# Patient Record
Sex: MALE | URBAN - METROPOLITAN AREA
[De-identification: names, ages, dates, MRNs, and addresses within clinical notes are randomized per-mention and may not be internally consistent; named-entity substitution may affect disease eponyms.]

---

## 2023-05-24 ENCOUNTER — EMERGENCY (EMERGENCY)
Facility: HOSPITAL | Age: 68
LOS: 1 days | Discharge: PSYCHIATRIC OR SUBSTANCE USE FACILITY | End: 2023-05-24
Attending: EMERGENCY MEDICINE | Admitting: EMERGENCY MEDICINE
Payer: MEDICARE

## 2023-05-24 VITALS
WEIGHT: 197.98 LBS | RESPIRATION RATE: 18 BRPM | HEIGHT: 71 IN | OXYGEN SATURATION: 97 % | DIASTOLIC BLOOD PRESSURE: 74 MMHG | HEART RATE: 77 BPM | TEMPERATURE: 98 F | SYSTOLIC BLOOD PRESSURE: 118 MMHG

## 2023-05-24 DIAGNOSIS — F25.9 SCHIZOAFFECTIVE DISORDER, UNSPECIFIED: ICD-10-CM

## 2023-05-24 DIAGNOSIS — F39 UNSPECIFIED MOOD [AFFECTIVE] DISORDER: ICD-10-CM

## 2023-05-24 LAB
ALBUMIN SERPL ELPH-MCNC: 3.7 G/DL — SIGNIFICANT CHANGE UP (ref 3.3–5)
ALP SERPL-CCNC: 108 U/L — SIGNIFICANT CHANGE UP (ref 30–120)
ALT FLD-CCNC: 19 U/L DA — SIGNIFICANT CHANGE UP (ref 10–60)
ANION GAP SERPL CALC-SCNC: 8 MMOL/L — SIGNIFICANT CHANGE UP (ref 5–17)
APAP SERPL-MCNC: <1 UG/ML — LOW (ref 10–30)
APPEARANCE UR: ABNORMAL
AST SERPL-CCNC: 13 U/L — SIGNIFICANT CHANGE UP (ref 10–40)
BASOPHILS # BLD AUTO: 0.01 K/UL — SIGNIFICANT CHANGE UP (ref 0–0.2)
BASOPHILS NFR BLD AUTO: 0.2 % — SIGNIFICANT CHANGE UP (ref 0–2)
BILIRUB SERPL-MCNC: 0.7 MG/DL — SIGNIFICANT CHANGE UP (ref 0.2–1.2)
BILIRUB UR-MCNC: NEGATIVE — SIGNIFICANT CHANGE UP
BUN SERPL-MCNC: 17 MG/DL — SIGNIFICANT CHANGE UP (ref 7–23)
CALCIUM SERPL-MCNC: 9.3 MG/DL — SIGNIFICANT CHANGE UP (ref 8.4–10.5)
CHLORIDE SERPL-SCNC: 106 MMOL/L — SIGNIFICANT CHANGE UP (ref 96–108)
CO2 SERPL-SCNC: 28 MMOL/L — SIGNIFICANT CHANGE UP (ref 22–31)
COLOR SPEC: SIGNIFICANT CHANGE UP
CREAT SERPL-MCNC: 0.94 MG/DL — SIGNIFICANT CHANGE UP (ref 0.5–1.3)
DIFF PNL FLD: NEGATIVE — SIGNIFICANT CHANGE UP
EGFR: 89 ML/MIN/1.73M2 — SIGNIFICANT CHANGE UP
EOSINOPHIL # BLD AUTO: 0.03 K/UL — SIGNIFICANT CHANGE UP (ref 0–0.5)
EOSINOPHIL NFR BLD AUTO: 0.5 % — SIGNIFICANT CHANGE UP (ref 0–6)
ETHANOL SERPL-MCNC: <3 MG/DL — SIGNIFICANT CHANGE UP (ref 0–3)
GLUCOSE SERPL-MCNC: 95 MG/DL — SIGNIFICANT CHANGE UP (ref 70–99)
GLUCOSE UR QL: NEGATIVE MG/DL — SIGNIFICANT CHANGE UP
HCT VFR BLD CALC: 38.4 % — LOW (ref 39–50)
HGB BLD-MCNC: 13.3 G/DL — SIGNIFICANT CHANGE UP (ref 13–17)
IMM GRANULOCYTES NFR BLD AUTO: 0.2 % — SIGNIFICANT CHANGE UP (ref 0–0.9)
KETONES UR-MCNC: 80 MG/DL
LEUKOCYTE ESTERASE UR-ACNC: ABNORMAL
LYMPHOCYTES # BLD AUTO: 1.05 K/UL — SIGNIFICANT CHANGE UP (ref 1–3.3)
LYMPHOCYTES # BLD AUTO: 18.7 % — SIGNIFICANT CHANGE UP (ref 13–44)
MCHC RBC-ENTMCNC: 31.1 PG — SIGNIFICANT CHANGE UP (ref 27–34)
MCHC RBC-ENTMCNC: 34.6 GM/DL — SIGNIFICANT CHANGE UP (ref 32–36)
MCV RBC AUTO: 89.7 FL — SIGNIFICANT CHANGE UP (ref 80–100)
MONOCYTES # BLD AUTO: 0.48 K/UL — SIGNIFICANT CHANGE UP (ref 0–0.9)
MONOCYTES NFR BLD AUTO: 8.6 % — SIGNIFICANT CHANGE UP (ref 2–14)
NEUTROPHILS # BLD AUTO: 4.03 K/UL — SIGNIFICANT CHANGE UP (ref 1.8–7.4)
NEUTROPHILS NFR BLD AUTO: 71.8 % — SIGNIFICANT CHANGE UP (ref 43–77)
NITRITE UR-MCNC: NEGATIVE — SIGNIFICANT CHANGE UP
NRBC # BLD: 0 /100 WBCS — SIGNIFICANT CHANGE UP (ref 0–0)
PCP SPEC-MCNC: SIGNIFICANT CHANGE UP
PH UR: 7.5 — SIGNIFICANT CHANGE UP (ref 5–8)
PLATELET # BLD AUTO: 219 K/UL — SIGNIFICANT CHANGE UP (ref 150–400)
POTASSIUM SERPL-MCNC: 3.7 MMOL/L — SIGNIFICANT CHANGE UP (ref 3.5–5.3)
POTASSIUM SERPL-SCNC: 3.7 MMOL/L — SIGNIFICANT CHANGE UP (ref 3.5–5.3)
PROT SERPL-MCNC: 6.8 G/DL — SIGNIFICANT CHANGE UP (ref 6–8.3)
PROT UR-MCNC: SIGNIFICANT CHANGE UP MG/DL
RBC # BLD: 4.28 M/UL — SIGNIFICANT CHANGE UP (ref 4.2–5.8)
RBC # FLD: 13.9 % — SIGNIFICANT CHANGE UP (ref 10.3–14.5)
SALICYLATES SERPL-MCNC: <0.2 MG/DL — LOW (ref 2.8–20)
SARS-COV-2 RNA SPEC QL NAA+PROBE: SIGNIFICANT CHANGE UP
SODIUM SERPL-SCNC: 142 MMOL/L — SIGNIFICANT CHANGE UP (ref 135–145)
SP GR SPEC: 1.02 — SIGNIFICANT CHANGE UP (ref 1–1.03)
UROBILINOGEN FLD QL: 2 MG/DL (ref 0.2–1)
WBC # BLD: 5.61 K/UL — SIGNIFICANT CHANGE UP (ref 3.8–10.5)
WBC # FLD AUTO: 5.61 K/UL — SIGNIFICANT CHANGE UP (ref 3.8–10.5)

## 2023-05-24 PROCEDURE — 90792 PSYCH DIAG EVAL W/MED SRVCS: CPT | Mod: 95,GC

## 2023-05-24 PROCEDURE — 70450 CT HEAD/BRAIN W/O DYE: CPT | Mod: 26,MA

## 2023-05-24 PROCEDURE — 93010 ELECTROCARDIOGRAM REPORT: CPT

## 2023-05-24 PROCEDURE — 99285 EMERGENCY DEPT VISIT HI MDM: CPT

## 2023-05-24 RX ORDER — CEFUROXIME AXETIL 250 MG
500 TABLET ORAL ONCE
Refills: 0 | Status: COMPLETED | OUTPATIENT
Start: 2023-05-24 | End: 2023-05-24

## 2023-05-24 RX ORDER — SODIUM CHLORIDE 9 MG/ML
1000 INJECTION INTRAMUSCULAR; INTRAVENOUS; SUBCUTANEOUS ONCE
Refills: 0 | Status: COMPLETED | OUTPATIENT
Start: 2023-05-24 | End: 2023-05-24

## 2023-05-24 RX ORDER — ACETAMINOPHEN 500 MG
2 TABLET ORAL
Refills: 0 | DISCHARGE

## 2023-05-24 RX ADMIN — Medication 500 MILLIGRAM(S): at 16:59

## 2023-05-24 RX ADMIN — SODIUM CHLORIDE 1000 MILLILITER(S): 9 INJECTION INTRAMUSCULAR; INTRAVENOUS; SUBCUTANEOUS at 14:30

## 2023-05-24 RX ADMIN — SODIUM CHLORIDE 1000 MILLILITER(S): 9 INJECTION INTRAMUSCULAR; INTRAVENOUS; SUBCUTANEOUS at 13:30

## 2023-05-24 NOTE — ED PROVIDER NOTE - PROGRESS NOTE DETAILS
Spoke to telepsych, will place on list for consult. Patient being seen by telepsych and is being evaluated for transfer to [facility] for admission there.  Patient is being treated for a UTI with cefuroxime 500 mg twice daily for 10 days.  Patient will need a walker for ambulation as he cannot have a cane at [facility].  Will need  to order walker as patient states he does not own 1.  Patient is medically cleared for transfer to psychiatric facility for admission there.

## 2023-05-24 NOTE — ED PROVIDER NOTE - ATTESTATION, MLM
EMS
I have reviewed and confirmed nurses' notes for patient's medications, allergies, medical history, and surgical history.

## 2023-05-24 NOTE — ED BEHAVIORAL HEALTH ASSESSMENT NOTE - HPI (INCLUDE ILLNESS QUALITY, SEVERITY, DURATION, TIMING, CONTEXT, MODIFYING FACTORS, ASSOCIATED SIGNS AND SYMPTOMS)
Patient is a 68 year-old male, single, domiciled at [facility] in [place], pmh htn, pph of schizoaffective d/o, BIBEMS from assisted living facility for worsening hallucinations and paranoia.     psychiatrist: Dr. Oral Irby    [facility] - Saritha, patient's sister, [phone]   Patient is on a ton of psych medication  Lives at Assisted Living   Mentlally ill his whole life, never took meds until __?  Patient is 1000x worse than before he went to AL  schizoaffective, bipolar  pt is extremely paranoid - thinks people are coming to get him, he did bad things, thinks smoke detectors are cameras to monitor to him  since Jan, lost over 50 lbs, won't eat or drink - food is very good but he doesn't eat bc insides are shaking  no fam hx of dementia  klonopin  quetiapine 300 mg   racing thoughts, delusions, paranoia, not eating x 5 months, no appetite, wringing hands, very nervous Patient is a 68 year-old male, single, domiciled at [facility] in [place], [facility] htn, mesothelioma, pph of schizoaffective d/o, depression, hx of suicide attempt, BIBEMS from assisted living facility for worsening hallucinations and paranoia.       Patient is alert and oriented to person, place, time and situation. States that he came to the ED due to feeling nervous and uptight, which he has been feeling a lot more lately.     Not eating - nervous. Sept. Nervous w/ the police. Court date coming up. States that he is nervous about going to residential. Feels like phone is tapped by facility, feels there are are cameras in his room, one right over his head. States that he feels safe but feels nervos because of the monitoring and thoughts. Started around Sept.     whole body shakes when nervous    only seen 3 times since Sept. /     feels groggy upon awakening and doesn't sleep well, decreased appetite, low energy, difficulty with concentration, hopeless/worthless, no suicidal ideation, homicidal ideation.     depression, hx of cutting wrist, [facility] recent admission - Sept last admission    alcohol - 1-2 times per week, hasn't used in a while  cocaine - hasn't done in 20 years  no cigs          Paranoia in the past - residential.    No AVH.       psychiatrist: Dr. Oral Irby, [phone]  Collateral - Saritha, patient's sister, [phone]   Patient is on a ton of psych medication  Lives at Assisted Living   Mentlally ill his whole life, never took meds until __?  Patient is 1000x worse than before he went to AL  schizoaffective, bipolar  pt is extremely paranoid - thinks people are coming to get him, he did bad things, thinks smoke detectors are cameras to monitor to him  since Jan, lost over 50 lbs, won't eat or drink - food is very good but he doesn't eat bc insides are shaking  no fam hx of dementia  klonopin  quetiapine 300 mg   racing thoughts, delusions, paranoia, not eating x 5 months, no appetite, wringing hands, very nervous Patient is a 68 year-old male, single, domiciled at [facility] in [place], [facility] htn, mesothelioma, pph of schizoaffective d/o, depression, hx of suicide attempt, BIBEMS from assisted living facility for worsening hallucinations and paranoia. Telepsychiatry consulted for mental health evaluation.    Patient is alert and oriented to person, place, time and situation. States that he came to the ED due to feeling nervous and uptight, which he has been feeling a lot more lately. Has noticed change since September 2022 when he arrived at his assisted living facility. Prior to going to assisted living facility, pt had an argument with his super who got an order of protection against him. A case date followed but case is now closed (per collateral); however, patient is very nervous that he will go to penitentiary due to having an upcoming court date (delusion).     Patient describes increased anxiety and feelings of paranoia, states he is nervous about the police, feels as if his phone is tapped by facility, feels there are cameras in his room including one right over his head. States that he feels safe but nervous that he is being monitored.     Patient describes feeling groggy upon awakening and doesn't sleep well, has had a decreased appetite (having lost about 50 lbs since Sept), has low energy, difficulty with concentration, feelings of hopeless/worthlessness, denies suicidal ideation, homicidal ideation. Denies AVH. Denies current substance use.    States that he has seen his psychiatrist 3 times since Sept and has not been able to express the severity of his sxs. Last IPP admission was in September at [facility].    psychiatrist: Dr. Oral Irby, [phone]  Collateral - Saritha, patient's sister, [phone]   argued with super at [employer] about feeding birds, got an order of protection, went to court in sept, paid fine, all done and over with. Bringing up court cases that are not true. Thinks that fire alarms are watchign him, constant racing thoughts. He is always wringing hands. lost 50 lbs.   Patient has struggled with mental health illness all his life. States his condition is getting much worse since January. Patient is extremely paranoid, thinks people are coming to get him, that he did bad things, thinks smoke detectors are cameras to monitor to him. Since Jan, lost over 50 lbs, won't eat or drink. Patient has racing thoughts, delusions, paranoia, not eating x 5 months, no appetite, wringing hands, very nervous Patient is a 68 year-old male, single, domiciled at [facility] in [place], [facility] htn, mesothelioma, pph of schizoaffective d/o, depression, hx of suicide attempt, BIBEMS from assisted living facility for worsening hallucinations and paranoia. Telepsychiatry consulted for mental health evaluation.    Patient is alert and oriented to person, place, time and situation. States that he came to the ED due to feeling nervous and uptight, which he has been feeling a lot more lately. Has noticed change since September 2022 when he arrived at his assisted living facility. Prior to going to assisted living facility, pt had an argument with his super who got an order of protection against him. A case date followed but case is now closed (per collateral); however, patient is very nervous that he will go to longterm due to having an upcoming court date (delusion).     Patient describes increased anxiety and feelings of paranoia, states he is nervous about the police, feels as if his phone is tapped by facility, feels there are cameras in his room including one right over his head. States that he feels safe but nervous that he is being monitored.     Patient describes feeling groggy upon awakening and doesn't sleep well, has had a decreased appetite (having lost about 50 lbs since Sept), has low energy, difficulty with concentration, feelings of hopeless/worthlessness, denies suicidal ideation, homicidal ideation. Denies AVH. Denies current substance use.    States that he has seen his psychiatrist 3 times since Sept and has not been able to express the severity of his sxs. Last IPP admission was in September at an outside hospital.    Collateral - Psychiatrist, Dr. Oral Irby, [phone] - contacted, left voicemail    Collateral - Saritha, patient's sister, [phone]   Patient has struggled with mental health illness all his life. States his condition is getting much worse since January. Patient is extremely paranoid, thinks people are coming to get him, that he did bad things, thinks smoke detectors and fire alarms are cameras monitoring him. States pt has delusions that he will go to longterm and that he has a court date pending, but all cases are closed and he does not have legal issues currently. Since Jan, lost over 50 lbs, won't eat or drink despite the food being good, does not report pt discussed food being poisoned. Says patient has racing thoughts, delusions, paranoia and is constantly wringing hands. Patient is a 68 year-old male, single, domiciled at [facility] in [place], [facility] htn, mesothelioma, pph of schizoaffective d/o, depression, hx of suicide attempt, BIBEMS from assisted living facility for worsening hallucinations and paranoia. Telepsychiatry consulted for mental health evaluation.    Patient is alert and oriented to person, place, time and situation. States that he came to the ED due to feeling nervous and uptight, which he has been feeling a lot more lately. Has noticed change since September 2022 when he arrived at his assisted living facility. Prior to going to assisted living facility, pt had an argument with his super who got an order of protection against him. A case date followed but case is now closed (per collateral); however, patient is very nervous that he will go to long-term due to having an upcoming court date (delusion).     Patient describes increased anxiety and feelings of paranoia, states he is nervous about the police, feels as if his phone is tapped by facility, feels there are cameras in his room including one right over his head. States that he feels safe but nervous that he is being monitored.     Patient describes feeling groggy upon awakening and doesn't sleep well, has had a decreased appetite (having lost about 50 lbs since Sept), has low energy, difficulty with concentration, feelings of hopeless/worthlessness, denies suicidal ideation, homicidal ideation. Denies AVH. Denies current substance use.    States that he has seen his psychiatrist 3 times since Sept and has not been able to express the severity of his sxs. Last IPP admission was in September at an outside hospital.    Collateral - Psychiatrist, Dr. Oral Irby, [phone] - contacted, left voicemail    Collateral - Saritha, patient's sister, [phone]   Patient has struggled with mental health illness all his life. States his condition is getting much worse since January. Patient is extremely paranoid, thinks people are coming to get him, that he did bad things, thinks smoke detectors and fire alarms are cameras monitoring him. States pt has delusions that he will go to long-term and that he has a court date pending, but all cases are closed and he does not have legal issues currently. Since Jan, lost over 50 lbs, won't eat or drink despite the food being good, does not report pt discussed food being poisoned. Says patient has racing thoughts, delusions, paranoia and is constantly wringing hands.    Group home also contacted and support admission for decompensation.

## 2023-05-24 NOTE — ED BEHAVIORAL HEALTH ASSESSMENT NOTE - CURRENT MEDICATION
seroquel 50 mg qhs, zolpidem 12.5 mg qhs, prozac 40 mg qdaily seroquel 50 mg qhs, seroquel 100 mg TID, zolpidem 12.5 mg qhs, prozac 40 mg qdaily, oxcarbazepine 300 mg BID, amlodipine 10 mg daily, tylenol 325 mg prn TID, klonopin 0.5 mg prn BID, multivitamin daily

## 2023-05-24 NOTE — ED PROVIDER NOTE - DIFFERENTIAL DIAGNOSIS
Differential Diagnosis Differential including but not limited to depression anxiety paranoia electrolyte abnormality

## 2023-05-24 NOTE — ED BEHAVIORAL HEALTH ASSESSMENT NOTE - PSYCHIATRIC ISSUES AND PLAN (INCLUDE STANDING AND PRN MEDICATION)
Hold Ambien, but continue with the rest of the pt's current outpt regimen. For agitation, can offer PO/IM Zyprexa 2.5 mg q6H PRN. If given, please repeat ecg and hold antipsychotics if QTC>500. NOTE: PLEASE DO NOT ADMINISTER IM BENZOS AND IM ZYPREXA WITHIN 2 HOURS OF ONE ANOTHER DUE TO INCREASED RISK OF CARDIOVASCULAR COLLAPSE

## 2023-05-24 NOTE — ED BEHAVIORAL HEALTH ASSESSMENT NOTE - RISK ASSESSMENT
Protective factors are patient's support of family, no access to lethal means, engaged in care, resides in supervised setting. Risk factors of prior suicide attempt, poor outpt care, depressed mood/psychosis, stressors.

## 2023-05-24 NOTE — ED PROVIDER NOTE - NS ED ATTENDING STATEMENT MOD
This was a shared visit with the ALEXSANDER. I reviewed and verified the documentation and independently performed the documented:

## 2023-05-24 NOTE — ED ADULT TRIAGE NOTE - CHIEF COMPLAINT QUOTE
hallucinations today thinks police are coming to get him and not eating today  denies homocidal/suicidal ideation

## 2023-05-24 NOTE — ED BEHAVIORAL HEALTH ASSESSMENT NOTE - NSBHATTESTCOMMENTATTENDFT_PSY_A_CORE
Patient is a 69 yo M, single, domiciled at [facility] in [place], [facility] significant for HTN and mesothelioma, psych h/o schizoaffective disorder, multiple prior psych admissions, prior suicide attempt, in outpt treatment with Dr. Oral Irby, [phone], on seroquel 50 mg qhs, seroquel 100 mg TID, zolpidem 12.5 mg qhs, prozac 40 mg qdaily, oxcarbazepine 300 mg BID, amlodipine 10 mg daily, tylenol 325 mg prn TID, klonopin 0.5 mg prn BID, multivitamin daily, who presents to the ED BIBA sent in from assisted living facility for worsening psychosis and depressive symptoms. Pt presents with worsening paranoid delusions with associated anxiety, lethargy, hopelessness, and reduced appetite with a 50 lb weight loss since Jan 2023 in the setting of moving into his [facility] in Sept 2022. On exam, pt presents as anxious and paranoid with poor insight and believes he will soon be sent to long-term and that his phones are currently tapped. Pt's current presentation is consistent with decompensated schizoaffective disorder. He presents with numerous chronic risk factors (male gender, age, race, prior psych dx/admissions, prior suicide attempt) and modifiable risk factors (psychosis, mood and anxiety symptoms, poor insight) that elevate his risk of harm and warrants inpatient admission for safety and stabilization. Agree with recs above

## 2023-05-24 NOTE — ED BEHAVIORAL HEALTH ASSESSMENT NOTE - SUMMARY
Patient is a 68 year-old male, single, domiciled at [facility] in [place], pmh htn, mesothelioma, pph of schizoaffective d/o, depression, hx of suicide attempt, BIBEMS from assisted living facility for worsening hallucinations and paranoia. Telepsychiatry consulted for mental health evaluation.    Patient reports increased anxiety, depression and paranoia. Denies suicidal ideation, homicidal ideation, AVH. Describes delusions that he is being monitored through fire alarms and smoke detectors. Collateral provided by sister indicates that patient has had delusions that he has an upcoming court date and will go to senior care, paranoid about being monitored, high anxiety preventing him from eating. ___ Patient is a 68 year-old male, single, domiciled at [facility] in [place], pmh htn, mesothelioma, pph of schizoaffective d/o, depression, hx of suicide attempt, BIBEMS from assisted living facility for worsening hallucinations and paranoia. Telepsychiatry consulted for mental health evaluation.    Patient reports increased anxiety, depression and paranoia. Denies suicidal ideation, homicidal ideation, AVH. Describes delusions that he is being monitored through fire alarms and smoke detectors. Collateral provided by sister indicates that patient has had delusions that he has an upcoming court date and will go to long term, paranoid about being monitored, high anxiety preventing him from eating. Assisted living facility also recognized decompensation, loss of appetite and support admission for stabilization. At this time, pt would benefit from medication optimization and continued psychiatric support. He was offered voluntary admission and agreed.    Plan:  -Admit 9.13  -continue seroquel 50 mg qhs, seroquel 100 mg TID, prozac 40 mg qdaily, oxcarbazepine 300 mg BID, amlodipine 10 mg daily, tylenol 325 mg prn TID, klonopin 0.5 mg prn BID, multivitamin daily  -hold  zolpidem 12.5 mg qhs  -For agitation, can offer PO/IM Zyprexa 2.5 mg q6H PRN. If given, please repeat ecg and hold antipsychotics if QTC>500. NOTE: PLEASE DO NOT ADMINISTER IM BENZOS AND IM ZYPREXA WITHIN 2 HOURS OF ONE ANOTHER DUE TO INCREASED RISK OF CARDIOVASCULAR COLLAPSE Patient is a 68 year-old male, single, domiciled at [facility] in [place], pmh htn, mesothelioma, pph of schizoaffective d/o, depression, hx of suicide attempt, BIBEMS from assisted living facility for worsening hallucinations and paranoia. Telepsychiatry consulted for mental health evaluation.    Patient reports increased anxiety, depression and paranoia. Denies suicidal ideation, homicidal ideation, AVH. Describes delusions that he is being monitored through fire alarms and smoke detectors. Collateral provided by sister indicates that patient has had delusions that he has an upcoming court date and will go to custodial, paranoid about being monitored, high anxiety preventing him from eating. Assisted living facility also recognized decompensation, loss of appetite and support admission for stabilization. At this time, pt would benefit from medication optimization and continued psychiatric support. He was offered voluntary admission and agreed.    Plan:  -Admit 9.13  -please note, pt ambulates with cane or walker  -continue seroquel 50 mg qhs, seroquel 100 mg TID, prozac 40 mg qdaily, oxcarbazepine 300 mg BID, amlodipine 10 mg daily, tylenol 325 mg prn TID, klonopin 0.5 mg prn BID, multivitamin daily  -hold  zolpidem 12.5 mg qhs  -For agitation, can offer PO/IM Zyprexa 2.5 mg q6H PRN. If given, please repeat ecg and hold antipsychotics if QTC>500. NOTE: PLEASE DO NOT ADMINISTER IM BENZOS AND IM ZYPREXA WITHIN 2 HOURS OF ONE ANOTHER DUE TO INCREASED RISK OF CARDIOVASCULAR COLLAPSE

## 2023-05-24 NOTE — ED ADULT NURSE NOTE - OBJECTIVE STATEMENT
Sent from [facility], with hallucinations, paranoia. Pt stated he is scared that police is after him, feeling anxious/ nervous. Denies hallucinations.  Denies suicidal or homicidal thoughts. Calm and cooperative. No agitation noted. Pt coherent, oriented x 4. Denies drug or alcohol use.

## 2023-05-24 NOTE — ED PROVIDER NOTE - CLINICAL SUMMARY MEDICAL DECISION MAKING FREE TEXT BOX
Patient brought in by EMS from assisted living for anxiety and paranoia worsening over the past few months despite medications.  Patient denies headache nausea vomiting dizziness chest pain short of breath abdominal pain suicidal homicidal ideation.    Plan EKG CT head labs IV fluids psychiatry consult    Differential including but not limited to depression anxiety paranoia electrolyte abnormality

## 2023-05-24 NOTE — ED ADULT NURSE NOTE - NSFALLUNIVINTERV_ED_ALL_ED
Bed/Stretcher in lowest position, wheels locked, appropriate side rails in place/Call bell, personal items and telephone in reach/Instruct patient to call for assistance before getting out of bed/chair/stretcher/Non-slip footwear applied when patient is off stretcher/[place] to call system/Physically safe environment - no spills, clutter or unnecessary equipment/Purposeful proactive rounding/Room/bathroom lighting operational, light cord in reach

## 2023-05-24 NOTE — ED BEHAVIORAL HEALTH NOTE - BEHAVIORAL HEALTH NOTE
===================       PRE-HOSPITAL COURSE       =================     Name Juan Arias.    SOURCE:  LESLIE Colón   DETAILS: Pt bib self for worsening paranoia, denies SI/HI, denies AV/H.     ============       ED COURSE       ============     SOURCE: LESLIE Colón   ARRIVAL: Per RN patient bib Self to ED. Patient cooperative with triage process.    BELONGINGS:    Per RN, patient currently in a gown with a 1:1 staff member.    BEHAVIOR:   RN reported that the patient appears to be well groomed, has decent hygiene. RN described the patient to be in a calm and cooperative mood. The patient is reportedly a clear communicator with good eye contact according to the Rn. The patient is orientated well and aware of his environment. Per RN, the patient is not displaying aggression towards staff or others. Currently denies SI. There are no visible marks, bruises, or lacerations on the body.    TREATMENT:    No medication administered in ED   VISITORS:    Sister gladys Melara      -----------------------------------------------      COVID Exposure Screen- collateral (i.e. third-party, chart review, belongings, etc; include EMS and ED staff)      ---------------------------------------------------      1. Has the patient had a COVID-19 test in the last 90 days? Unknown.      2. Has the patient tested positive for COVID-19 antibodies? Unknown.      3.Has the patient received 2 doses of the COVID-19 vaccine?  Unknown.      4. In the past 10 days, has the patient been around anyone with a positive COVID-19 test?* Unknown.      5.Has the patient been out of [place] within the past 10 days? Unknown.

## 2023-05-24 NOTE — ED ADULT NURSE NOTE - HPI (INCLUDE ILLNESS QUALITY, SEVERITY, DURATION, TIMING, CONTEXT, MODIFYING FACTORS, ASSOCIATED SIGNS AND SYMPTOMS)
Sent from [facility], with hallucinations, paranoia. Pt stated he is scared that police is after him, feeling anxious/ nervous. Denies hallucinations.  Denies suicidal or homicidal thoughts. Calm and cooperative. No agitation noted. Pt coherent, oriented x 4. Denies drug or alcohol use. Pt under the care of Psych MD Reynoso and has been compliant with meds for depression.  Pt stated he did attempt to hurt himself a long time ago, possibly >30 yrs ago, but has not had any thoughts since then. Pt's sister at bedside and very supportive.

## 2023-05-24 NOTE — ED BEHAVIORAL HEALTH ASSESSMENT NOTE - DESCRIPTION
BIBEMS from assisted living facility. [place], worked for [employer] for 25 years htn, mesothelioma

## 2023-05-24 NOTE — ED PROVIDER NOTE - OBJECTIVE STATEMENT
68-year-old male with history of anxiety, depression, psychosis, hypertension, seizures brought in by ambulance from the [facility] at [facility] for evaluation of hallucinations and paranoia.  Patient states that he has had anxiety and paranoia about going to nursing home building up for couple of months.  States that his symptoms have been getting worse lately and has had no appetite.  Patient states that he has been compliant with his medications.  States that he was having hallucinations this morning of police coming to get him to take him to nursing home.  States that he thinks he is going to go to nursing home due to his pending court cases due to issues with his apartment owner in the past prior to going to the [facility].  Denies suicidal/homicidal ideations, drug/alcohol use, chest pain, shortness of breath, abdominal pain, urinary symptoms, headache, dizziness, fever or other symptoms.   psychiatrist: Dr. Oral Irby  pcp: Dr. Brooks 68-year-old male with history of anxiety, depression, psychosis, hypertension, seizures brought in by ambulance from the [facility] at [facility] for evaluation of hallucinations and paranoia.  Patient states that he has had anxiety and paranoia about going to [facility] building up for couple of months.  States that his symptoms have been getting worse lately and has had no appetite.  Patient states that he has been compliant with his medications.  States that he was having hallucinations this morning of police coming to get him to take him to [facility].  States that he thinks he is going to go to [facility] due to his pending court cases due to issues with his apartment owner in the past prior to going to the [facility]. As per sister, Saritha at bedside, pt has been on multiple psych meds since last September and dosages of medications have been increased warmth  Denies suicidal/homicidal ideations, drug/alcohol use, chest pain, shortness of breath, abdominal pain, urinary symptoms, headache, dizziness, fever or other symptoms.   psychiatrist: Dr. Oral Irby  pcp: Dr. Brooks 68-year-old male with history of anxiety, depression, psychosis, hypertension brought in by ambulance from the [facility] at [facility] living for evaluation of hallucinations and paranoia.  Patient states that he has had anxiety and paranoia about going to [facility] building up for couple of months.  States that his symptoms have been getting worse lately and has had no appetite.  Patient states that he has been compliant with his medications.  States that he was having hallucinations this morning of police coming to get him to take him to [facility].  States that he thinks he is going to go to [facility] due to his pending court cases due to issues with his apartment owner in the past prior to going to the [facility] (hallucinations as per sister). As per sister, Saritha at bedside, pt has been on multiple psych meds since last September and dosages of medications have been increased however states that his paranoia is getting worse and affecting his appetite and has had weight loss since 01/2023. States that symptoms got even worse after quetiapine was increased to 300mg last month as per sister. Denies suicidal/homicidal ideations, drug/alcohol use, chest pain, shortness of breath, abdominal pain, urinary symptoms, headache, dizziness, fever or other symptoms.   psychiatrist: Dr. Oral Irby  pcp: Dr. Brooks

## 2023-05-25 VITALS
OXYGEN SATURATION: 99 % | HEART RATE: 96 BPM | RESPIRATION RATE: 16 BRPM | DIASTOLIC BLOOD PRESSURE: 86 MMHG | TEMPERATURE: 98 F | SYSTOLIC BLOOD PRESSURE: 148 MMHG

## 2023-05-25 LAB
CULTURE RESULTS: SIGNIFICANT CHANGE UP
SPECIMEN SOURCE: SIGNIFICANT CHANGE UP

## 2023-05-25 PROCEDURE — 80183 DRUG SCRN QUANT OXCARBAZEPIN: CPT

## 2023-05-25 PROCEDURE — 36415 COLL VENOUS BLD VENIPUNCTURE: CPT

## 2023-05-25 PROCEDURE — 81001 URINALYSIS AUTO W/SCOPE: CPT

## 2023-05-25 PROCEDURE — 93005 ELECTROCARDIOGRAM TRACING: CPT

## 2023-05-25 PROCEDURE — 96360 HYDRATION IV INFUSION INIT: CPT

## 2023-05-25 PROCEDURE — 87086 URINE CULTURE/COLONY COUNT: CPT

## 2023-05-25 PROCEDURE — 85025 COMPLETE CBC W/AUTO DIFF WBC: CPT

## 2023-05-25 PROCEDURE — 87635 SARS-COV-2 COVID-19 AMP PRB: CPT

## 2023-05-25 PROCEDURE — 80307 DRUG TEST PRSMV CHEM ANLYZR: CPT

## 2023-05-25 PROCEDURE — 99285 EMERGENCY DEPT VISIT HI MDM: CPT | Mod: 25

## 2023-05-25 PROCEDURE — 97161 PT EVAL LOW COMPLEX 20 MIN: CPT

## 2023-05-25 PROCEDURE — 70450 CT HEAD/BRAIN W/O DYE: CPT | Mod: MA

## 2023-05-25 PROCEDURE — 80053 COMPREHEN METABOLIC PANEL: CPT

## 2023-05-25 RX ORDER — FLUOXETINE HCL 10 MG
40 CAPSULE ORAL ONCE
Refills: 0 | Status: COMPLETED | OUTPATIENT
Start: 2023-05-25 | End: 2023-05-25

## 2023-05-25 RX ORDER — OXCARBAZEPINE 300 MG/1
300 TABLET, FILM COATED ORAL
Refills: 0 | Status: DISCONTINUED | OUTPATIENT
Start: 2023-05-25 | End: 2023-05-27

## 2023-05-25 RX ORDER — AMLODIPINE BESYLATE 2.5 MG/1
10 TABLET ORAL ONCE
Refills: 0 | Status: COMPLETED | OUTPATIENT
Start: 2023-05-25 | End: 2023-05-25

## 2023-05-25 RX ORDER — QUETIAPINE FUMARATE 200 MG/1
150 TABLET, FILM COATED ORAL ONCE
Refills: 0 | Status: COMPLETED | OUTPATIENT
Start: 2023-05-25 | End: 2023-05-25

## 2023-05-25 RX ORDER — QUETIAPINE FUMARATE 200 MG/1
100 TABLET, FILM COATED ORAL ONCE
Refills: 0 | Status: COMPLETED | OUTPATIENT
Start: 2023-05-25 | End: 2023-05-25

## 2023-05-25 RX ORDER — CEFUROXIME AXETIL 250 MG
500 TABLET ORAL EVERY 12 HOURS
Refills: 0 | Status: DISCONTINUED | OUTPATIENT
Start: 2023-05-25 | End: 2023-05-27

## 2023-05-25 RX ADMIN — QUETIAPINE FUMARATE 150 MILLIGRAM(S): 200 TABLET, FILM COATED ORAL at 01:20

## 2023-05-25 RX ADMIN — Medication 500 MILLIGRAM(S): at 10:38

## 2023-05-25 RX ADMIN — QUETIAPINE FUMARATE 100 MILLIGRAM(S): 200 TABLET, FILM COATED ORAL at 10:38

## 2023-05-25 RX ADMIN — OXCARBAZEPINE 300 MILLIGRAM(S): 300 TABLET, FILM COATED ORAL at 10:38

## 2023-05-25 RX ADMIN — Medication 40 MILLIGRAM(S): at 10:37

## 2023-05-25 RX ADMIN — AMLODIPINE BESYLATE 10 MILLIGRAM(S): 2.5 TABLET ORAL at 10:38

## 2023-05-25 NOTE — ED ADULT NURSE REASSESSMENT NOTE - NSFALLHARMRISKINTERV_ED_ALL_ED
Assistance OOB with selected safe patient handling equipment if applicable/Communicate risk of Fall with Harm to all staff, patient, and family/Provide visual cue: red socks, yellow wristband, yellow gown, etc/Reinforce activity limits and safety measures with patient and family/Bed in lowest position, wheels locked, appropriate side rails in place/Call bell, personal items and telephone in reach/Instruct patient to call for assistance before getting out of bed/chair/stretcher/Non-slip footwear applied when patient is off stretcher/[place] to call system/Physically safe environment - no spills, clutter or unnecessary equipment/Purposeful Proactive Rounding/Room/bathroom lighting operational, light cord in reach

## 2023-05-25 NOTE — ED BEHAVIORAL HEALTH NOTE - BEHAVIORAL HEALTH NOTE
=========  REASSESSMENT  =========	  SOURCE:  RN Kae and secondhand ED documentation.  BEHAVIOR: RN described patient to be odd, paranoid, believes he’s going to be arrested and will be arrested, otherwise calm and cooperative. No acute issues. No episodes of agitation.   TREATMENT:  Per chart and RN, patient PRN medications: PO Seroquel 150mg for sleep.   VISITORS:  Per RN, no visitors at bedside.

## 2023-05-25 NOTE — ED BEHAVIORAL HEALTH PROGRESS NOTE - DETAILS:
Patient this morning is awake, and continues to appear extremely anxious. He is a very poor historian, reporting main complaint of feeling very "nervous" but not able to elaborate in detail. He is very fixated generally on "legal" issues but he cannot even concretely describes what these are. He points to his head stating he is not doing well mentally. He is still agreeable to psychiatric admission.    Pt's outpatient psychiatrist returned call from last night. He strongly feels patient requires psychiatric hospitalization. States patient has been decompensated for the past year and half. Wanted patient to be admitted even prior. Attributes presentation to exacerbation of chronic psychosis.

## 2023-05-25 NOTE — ED ADULT NURSE REASSESSMENT NOTE - DESCRIPTION
pt received on stretcher calm and cooperative. offers no c/o at present still fixated on idea that police looking for him in relationship to housing issue. denies wanting to hurt self or others. pt pending dispo. signed voluntary papers.  pt awake and alert  skin warm and dry no resp distress lungs clear and equal b/l ascultation abd soft non tender + bs

## 2023-05-25 NOTE — PHYSICAL THERAPY INITIAL EVALUATION ADULT - PERTINENT HX OF CURRENT PROBLEM, REHAB EVAL
pt is a 68-year-old male with history of anxiety, depression, psychosis, hypertension brought in by ambulance from the [facility] for evaluation of hallucinations and paranoia.  Patient states that he has had anxiety and paranoia about going to assisted building up for couple of months.  States that his symptoms have been getting worse lately and has had no appetite.

## 2023-05-25 NOTE — ED BEHAVIORAL HEALTH PROGRESS NOTE - SUMMARY
Protective factors are patient's support of family, no access to lethal means, engaged in care, resides in supervised setting. Risk factors of prior suicide attempt, poor outpt care, depressed mood/psychosis, stressors.    Low acute risk of self-harm or violence

## 2023-05-25 NOTE — ED BEHAVIORAL HEALTH PROGRESS NOTE - RISK ASSESSMENT
Acute risk of self-harm remains low. Patient is being admitted primarily due to anxious distress and paranoid delusions.

## 2023-05-25 NOTE — ED ADULT NURSE REASSESSMENT NOTE - NS ED NURSE REASSESS COMMENT FT1
called telepsych and pt pending final destination
pt calm and cooperative and pending  for walker. pt evaluated pt and clear for transfer
pt maintained on 1 to 1and pending inpt dispo for psych bed

## 2023-05-25 NOTE — ED BEHAVIORAL HEALTH PROGRESS NOTE - CASE SUMMARY/FORMULATION (CLEARLY DOCUMENT RATIONALE FOR DISPOSITION CHANGE)
Patient is a 68 year-old male, single, domiciled at [facility] in [place], pmh htn, mesothelioma, pph of schizoaffective d/o, depression, hx of suicide attempt, BIBEMS from assisted living facility for worsening hallucinations and paranoia. Telepsychiatry consulted for mental health evaluation.    Patient reports increased anxiety, depression and paranoia. Denies suicidal ideation, homicidal ideation, AVH. Describes delusions that he is being monitored through fire alarms and smoke detectors. Collateral provided by sister indicates that patient has had delusions that he has an upcoming court date and will go to skilled nursing, paranoid about being monitored, high anxiety preventing him from eating. Assisted living facility also recognized decompensation, loss of appetite and support admission for stabilization. At this time, pt would benefit from medication optimization and continued psychiatric support. He was offered voluntary admission and agreed.    Plan:  -Admit 9.13  -please note, pt ambulates with cane or walker  -continue seroquel 50 mg qhs, seroquel 100 mg TID, prozac 40 mg qdaily, Remeron 7.5mg qhs, oxcarbazepine 300 mg BID, amlodipine 10 mg daily, tylenol 325 mg prn TID, klonopin 0.5 mg prn BID, multivitamin daily  -hold  zolpidem 12.5 mg qhs  -For agitation, can offer PO/IM Zyprexa 2.5 mg q6H PRN. If given, please repeat ecg and hold antipsychotics if QTC>500. NOTE: PLEASE DO NOT ADMINISTER IM BENZOS AND IM ZYPREXA WITHIN 2 HOURS OF ONE ANOTHER DUE TO INCREASED RISK OF CARDIOVASCULAR COLLAPSE

## 2023-05-26 LAB — OXCARBAZEPINE SERPL-MCNC: 2 UG/ML — LOW (ref 10–35)

## 2023-05-26 NOTE — CASE MANAGEMENT PROGRESS NOTE - NSCMPROGRESSNOTE_GEN_ALL_CORE
Late entry:    Was called by the ED team yesterday that this pt was accepted to [facility] but was ambulating with a cane and was not able to go to the facility with a cane, as the facility sees it as a weapon. PTE was ordered and the recommendation was for a rolling walker. I received a script for the RW from the ED attending and sent the script to [facility]. Spoke with the rep Catie who stated that the RW was approved, and this CM dispensed it to the bedside. The consignment form was signed by the pt and the bedside RN was made aware that transportation could be set up now for transfer to the facility. CM team remained available for post acute needs.

## 2023-05-26 NOTE — SOCIAL WORK PROGRESS NOTE - NSSWPROGRESSNOTE_GEN_ALL_CORE
Per chart review, patient was discharged from [facility] ED to [facility] for inpatient mental health treatment on 05/25/2023; [facility] ED  spoke w/ Charlee of [employer] @ [place] [phone] who issued reference #[id number] for authorization request and reported that a clinician would outreach to this  for live initial review. Will await such. UR dept @ receiving facility aware.

## 2023-07-03 ENCOUNTER — INPATIENT (INPATIENT)
Facility: HOSPITAL | Age: 68
LOS: 48 days | Discharge: HOME | End: 2023-08-21
Attending: STUDENT IN AN ORGANIZED HEALTH CARE EDUCATION/TRAINING PROGRAM | Admitting: PSYCHIATRY & NEUROLOGY
Payer: MEDICARE

## 2023-07-03 VITALS — TEMPERATURE: 97 F

## 2023-07-03 DIAGNOSIS — R26.9 UNSPECIFIED ABNORMALITIES OF GAIT AND MOBILITY: ICD-10-CM

## 2023-07-03 DIAGNOSIS — F25.9 SCHIZOAFFECTIVE DISORDER, UNSPECIFIED: ICD-10-CM

## 2023-07-03 PROCEDURE — 99223 1ST HOSP IP/OBS HIGH 75: CPT | Mod: GC

## 2023-07-03 RX ORDER — OXCARBAZEPINE 300 MG/1
150 TABLET, FILM COATED ORAL
Refills: 0 | Status: DISCONTINUED | OUTPATIENT
Start: 2023-07-03 | End: 2023-07-07

## 2023-07-03 RX ORDER — OLANZAPINE 15 MG/1
20 TABLET, FILM COATED ORAL AT BEDTIME
Refills: 0 | Status: DISCONTINUED | OUTPATIENT
Start: 2023-07-03 | End: 2023-08-10

## 2023-07-03 RX ORDER — CLONAZEPAM 1 MG
0.5 TABLET ORAL
Refills: 0 | Status: DISCONTINUED | OUTPATIENT
Start: 2023-07-03 | End: 2023-07-07

## 2023-07-03 RX ORDER — FLUOXETINE HCL 10 MG
80 CAPSULE ORAL DAILY
Refills: 0 | Status: DISCONTINUED | OUTPATIENT
Start: 2023-07-03 | End: 2023-07-20

## 2023-07-03 RX ADMIN — OLANZAPINE 20 MILLIGRAM(S): 15 TABLET, FILM COATED ORAL at 20:31

## 2023-07-03 RX ADMIN — OXCARBAZEPINE 150 MILLIGRAM(S): 300 TABLET, FILM COATED ORAL at 20:32

## 2023-07-03 RX ADMIN — Medication 0.5 MILLIGRAM(S): at 20:31

## 2023-07-03 NOTE — BH INPATIENT PSYCHIATRY ASSESSMENT NOTE - MSE UNSTRUCTURED FT
APPEARANCE: Adult male who appears stated age, in no acute distress; dressed in hospital attire; fair grooming and fair hygiene.  BEHAVIOR: Calm and cooperative; fair eye contact, poorly related.  SPEECH: Sponanteous and fluent; normal rate, rhythm, tone, and volume.   MOTOR: No psychomotor retardation/agitation; no tremor; no abnormal movements. Unstable gait with R hip abnormality.  MOOD: "depressed and anxious"  AFFECT: Anxious with constricted range; congruent with stated mood; stable.  THOUGHT PROCESS: Disorganized.  THOUGHT CONTENT: Delusions of "going to retirement for life"; denies suicidal ideation, intent, or plan. Denies homicidal ideation, intent, or plan.  PERCEPTION: Denies auditory or visual hallucinations.  COGNITION: Grossly intact.  INSIGHT: Poor.  JUDGMENT: Poor.  IMPULSE CONTROL: Fair. APPEARANCE: Adult male who appears stated age, in no acute distress; dressed in hospital attire; fair grooming and fair hygiene.  BEHAVIOR: Calm and cooperative; fair eye contact, poorly related.  SPEECH: Spontaneous and fluent; normal rate, rhythm, tone, and volume.   MOTOR: No psychomotor retardation/agitation; no tremor; no abnormal movements. Unstable gait with R hip abnormality.  MOOD: "depressed and anxious"  AFFECT: Anxious with constricted range; congruent with stated mood; stable.  THOUGHT PROCESS: Linear  THOUGHT CONTENT: Delusions of "going to [facility] for life"; denies suicidal ideation, intent, or plan. Denies homicidal ideation, intent, or plan.  PERCEPTION: Denies auditory or visual hallucinations.  COGNITION: Grossly intact.  INSIGHT: Poor.  JUDGMENT: Poor.  IMPULSE CONTROL: Fair.

## 2023-07-03 NOTE — BH INPATIENT PSYCHIATRY ASSESSMENT NOTE - LEGAL HISTORY
Per [facility] collateral note with Ezequiel Cuevas, pt has hx of 3 DUIs secondary to alcohol use and one year long alf sentence years ago.

## 2023-07-03 NOTE — BH INPATIENT PSYCHIATRY ASSESSMENT NOTE - NSBHASSESSSUMMFT_PSY_ALL_CORE
Pt is a 67 yo M domiciled in assisted living facility with hx of schizoaffective d/o (bipolar type), multiple past psych hospitalizations, 1 past SA in 1992 by cutting wrists, and remote substance use (alcohol use leading to 3 DUIs and 1 year-long halfway sentence, cocaine use 20 years ago), presenting to [facility] 2S as transfer from [facility] for ECT evaluation due to increasing paranoia and poor self-care. Working diagnosis is schizoaffective disorder with psychosis. Currently adherent to tx with psychotropic medications with no reported side effects. Reporting with ongoing delusion of "going to halfway for life" with subsequent anxiety and depression leading to poor appetite and sleep. Continued inpatient admission required for safety and ECT assessment.     Plan:   1. Legal: admitted on 9.13 voluntary status  2. Safety: No reported SI/SIB/HI/VI currently on unit; continue routine observation.  3. Psychiatric:   -Prozac 80 mg PO qD for schizoaffective d/o  -Zyprexa 20 mg PO qHS for schizoaffective d/o  -Trileptal 150 mg PO qHS (titrating down, per [facility] note) for previously suspected seizure  -Klonopin 0.5 mg PO BID for anxiety   4. Medical:  -PT consult ordered for abnormal gait likely 2/2 R hip osteoarthritis; fall precautions, recommend ambulating with walker and assistance.  -Missing teeth - recommend pureed diet  -HTN previously on Norvasc 10 mg PO daily, decreased to 5 mg PO daily on 6/16 at [facility] due to low BP -> discontinued on 6/19/23 at [facility]; f/u BP daily  5. Collateral: per [facility] note, phone contact made with sister Saritha on 6/8/23 and outpatient psychiatrist Dr. Irby on 6/2/23.  6. Disposition: When stable and reporting improvement in ability to care for self.      Pt is a 69 yo M domiciled in assisted living facility with hx of schizoaffective d/o (bipolar type), multiple past psych hospitalizations, 1 past SA in 1992 by cutting wrists, and remote substance use (alcohol use leading to 3 DUIs and 1 year-long correction sentence, cocaine use 20 years ago), presenting to [facility] 2S as transfer from [facility] for ECT evaluation due to increasing paranoia and poor self-care. Working diagnosis is schizoaffective disorder with psychosis. Currently adherent to tx with psychotropic medications with no reported side effects. Reporting with ongoing delusion of "going to correction for life" with subsequent anxiety and depression leading to poor appetite and sleep. Continued inpatient admission required for safety and ECT assessment.     Plan:   1. Legal: admitted on 9.13 voluntary status  2. Safety: No reported SI/SIB/HI/VI currently on unit; continue routine observation.  3. Psychiatric:   -Prozac 80 mg PO daily for schizoaffective d/o  -Zyprexa 20 mg PO qHS for schizoaffective d/o  -Trileptal 150 mg PO qHS (titrating down, per [facility] note) for previously suspected seizure  -Klonopin 0.5 mg PO BID for anxiety   	- Per [facility]: Last prescribed Klonopin 0.5 mg #60 on 5/5/23    Holding home dose of Ambien 12.5 mg QHS  4. Medical:  -ECT referral  	- CBC, CMP, EKG ordered for 7/4  	- May require dental consult  -PT consult ordered for abnormal gait likely 2/2 R hip osteoarthritis; fall precautions, recommend ambulating with walker and assistance.  -Missing teeth - recommend pureed diet  -HTN previously on Norvasc 10 mg PO daily, decreased to 5 mg PO daily on 6/16 at [facility] due to low BP -> discontinued on 6/19/23 at [facility]; f/u BP daily  5. Collateral: per [facility] note, phone contact made with sister Saritha on 6/8/23 and outpatient psychiatrist Dr. Irby on 6/2/23.  6. Disposition: When stable and reporting improvement in ability to care for self.

## 2023-07-03 NOTE — BH INPATIENT PSYCHIATRY ASSESSMENT NOTE - DESCRIPTION
Lives in assisted living facility, pt reports no family or children; collateral obtained at [facility]: long-time friend of 30 years, Ezequiel Cuevas ([phone]).

## 2023-07-03 NOTE — CHART NOTE - NSCHARTNOTEFT_GEN_A_CORE
Screening Medical Evaluation    Patient Admitted from: [facility] admitting diagnosis: Schizoaffective disorder      PAST MEDICAL & SURGICAL HISTORY:  HTN (hypertension)      HLD (hyperlipidemia)      Gait abnormality      Osteoarthritis            Allergies    No Known Allergies    Intolerances          Social History:       FAMILY HISTORY:        MEDICATIONS  (STANDING):  clonazePAM  Tablet 0.5 milliGRAM(s) Oral two times a day  FLUoxetine 80 milliGRAM(s) Oral daily  OLANZapine 20 milliGRAM(s) Oral at bedtime  OXcarbazepine 150 milliGRAM(s) Oral <User Schedule>    MEDICATIONS  (PRN):        Vital Signs Last 24 Hrs  T(C): 36.3 (03 Jul 2023 18:51), Max: 36.3 (03 Jul 2023 18:51)  T(F): 97.3 (03 Jul 2023 18:51), Max: 97.3 (03 Jul 2023 18:51)  HR: -- 88b/ min.  BP: -- ;   BP(mean): --  RR: --18b/ min.   SpO2: --      CAPILLARY BLOOD GLUCOSE            PHYSICAL EXAM:  GENERAL: NAD.   HEAD:  Atraumatic, Normocephalic  EYES: EOMI, PERRLA, conjunctiva and sclera clear  Mouth: Absent upper dentures, poor dentition. MMM  NECK: Supple, No JVD  CHEST/LUNG: Clear to auscultation bilaterally; No wheeze  HEART: Regular rate and rhythm; No murmurs, rubs, or gallops  ABDOMEN: Soft, Nontender, Nondistended; Bowel sounds present  EXTREMITIES:  2+ Peripheral Pulses, No clubbing, cyanosis, or edema  PSYCH: Calm and cooperative,   NEUROLOGY: non-focal  SKIN: No rashes or lesions see on exposed skin.     LABS:                    RADIOLOGY & ADDITIONAL TESTS:      Assessment and Plan:  68M admitted to [facility] for Schizoaffective disorder. PMHx of Gait abnormality, HTN, Hyperlipidemia. Pt seen for medical screening evaluation. Patient has no acute complaints at this time. Patient denies fever, chills, headache, dizziness, lightheadedness, N/V, SOB, cough, congestion, chest pain, abdominal pain, dysuria, hematuria, diarrhea, constipation. Physical exam unremarkable, VSS. Labs pending. 5/26: EKG NSR @ 66b/ min, QT/ QTC= 426/ 443.      1.) Gait abnormality, Fall precautions. PT eval.   2.) HTN: BP = 130/ 80. Pt not on BP medications, DASH diet.   3.) Hyperlipidemia: Pt not on lipid lowering agents: Lipid panel ordered for 7/4. DASH diet.   4.) Schizoaffective disorder: Plan per primary team.

## 2023-07-03 NOTE — BH INPATIENT PSYCHIATRY ASSESSMENT NOTE - NSICDXPASTMEDICALHX_GEN_ALL_CORE_FT
PAST MEDICAL HISTORY:  Gait abnormality     HLD (hyperlipidemia)     HTN (hypertension)     Osteoarthritis

## 2023-07-03 NOTE — BH PATIENT PROFILE - FALL HARM RISK - HARM RISK INTERVENTIONS

## 2023-07-03 NOTE — BH INPATIENT PSYCHIATRY ASSESSMENT NOTE - PAST PSYCHOTROPIC MEDICATION
Prozac 40 mg PO qAM -> 60 mg PO qD on 6/6/23 -> 80 mg PO qD  Seroquel 100 mg PO qAM and 300 mg PO qHS (discontinued with last dose on 6/4/23)  Zyprexa 5 mg PO qHS on 5/31/23 -> 7.5 mg PO qHS on 6/2/23 -> 10 mg PO qHS on 6/5/23 -> 12.5 mg PO qHS on 6/14/23 -> 15 mg PO qHS on 6/22/23 -> 17.5 mg PO qHS on 6/27/23 -> 20 mg qHS on 6/30/23  Trileptal 300 mg PO BID -> 150 mg PO qAM and 300 mg PO qHS on 6/10/23 -> 150 mg PO BID on 6/19/23 -> 150 mg PO qHS on 6/29/23

## 2023-07-03 NOTE — BH PATIENT PROFILE - HOME MEDICATIONS
1 Adult Reji Crawford , As Directed.  blue emu 4% daily prn  KlonoPIN 0.5 mg oral tablet , 1 orally 2 times a day  Tylenol 325 mg oral tablet , 2 orally 3 times a day  QUEtiapine 100 mg oral tablet , 1 orally 3 times a day at 8am 5pm and 9 pm  OXcarbazepine 300 mg oral tablet , 1 orally 2 times a day  mvi 1 tab daily  amLODIPine 10 mg oral tablet , 1 orally once a day  vitamin [id number] daily  PROzac 40 mg oral capsule , 1 orally once a day  Ambien CR 12.5 mg oral tablet, extended release , 1 orally once a day (at bedtime)  QUEtiapine 50 mg oral tablet , 1 orally once a day at 900 pm

## 2023-07-03 NOTE — BH INPATIENT PSYCHIATRY ASSESSMENT NOTE - RISK ASSESSMENT
Risk factors: h/o SA (1992), h/o psych admissions, past substance abuse (alcohol and remote cocaine use), unable to care for self 2/2 psychiatric illness and paranoid delusions, social isolation,  10 years ago.    Protective factors: no current SIIP/HIIP, no access to weapons, no active substance abuse, domiciled in assisted living facility, engaged in treatment, compliant with treatment.    Overall, pt is at moderate risk of harm to self and meets criteria for psychiatric admission.

## 2023-07-03 NOTE — BH INPATIENT PSYCHIATRY ASSESSMENT NOTE - NSCOMMENTSUICRISKFACT_PSY_ALL_CORE
Male, past suicide attempt in 1992 via cutting wrists, current psychosis and anxiety, social isolation, lack of family or friends.

## 2023-07-03 NOTE — BH INPATIENT PSYCHIATRY ASSESSMENT NOTE - OTHER PAST PSYCHIATRIC HISTORY (INCLUDE DETAILS REGARDING ONSET, COURSE OF ILLNESS, INPATIENT/OUTPATIENT TREATMENT)
Schizoaffective d/o, bipolar type with psychotic features, transferred from [facility]  Schizoaffective d/o, bipolar type with psychotic features, transferred from [facility]   10+ psychiatric hospitalizations

## 2023-07-03 NOTE — BH INPATIENT PSYCHIATRY ASSESSMENT NOTE - NSBHCHARTREVIEWVS_PSY_A_CORE FT
Vital Signs Last 24 Hrs  T(C): 36.3 (07-03-23 @ 18:51), Max: 36.3 (07-03-23 @ 18:51)  T(F): 97.3 (07-03-23 @ 18:51), Max: 97.3 (07-03-23 @ 18:51)  HR: --  BP: --  BP(mean): --  RR: --  SpO2: --    Orthostatic VS  07-03-23 @ 18:51  Lying BP: --/-- HR: --  Sitting BP: 130/80 HR: 88  Standing BP: 128/78 HR: 88  Site: --  Mode: --

## 2023-07-03 NOTE — BH INPATIENT PSYCHIATRY ASSESSMENT NOTE - HPI (INCLUDE ILLNESS QUALITY, SEVERITY, DURATION, TIMING, CONTEXT, MODIFYING FACTORS, ASSOCIATED SIGNS AND SYMPTOMS)
Pt is a 69 yo M domiciled at assisted living facility with hx of schizoaffective disorder (bipolar type with psychotic features), one past suicide attempt in 1992 via cutting wrists (found by mother and brought in to hospital), multiple past psychiatric hospitalizations, and remote substance use hx (alcohol last used 1 year ago, cocaine last used 20 yrs ago), who presents to [facility] from [facility] for evaluation of possible ECT treatment due to increasing paranoia and poor self care. Pt reports feeling "nervous and scared" that he will go to [facility] for "a lot of things, like threatening people," so much so that it is impacting his ability to sleep at night. Reporting racing thoughts about going to [facility]. Pt has been taking medications as prescribed at [facility]; denies medication side effects at this time. Reports poor appetite due to anxiety about going to [facility], and also states that he is missing some teeth, which makes chewing solid foods difficult. Denies any passive or active SI/HI currently. Denies any auditory or visual hallucinations, though fixates on the idea that he will be going to [facility]. Denies belief that anyone is coming after him. Pt is at increased fall risk due to arthritis in R hip causing antalgic gait. Pt is a 69 yo M domiciled at assisted living facility with hx of schizoaffective disorder (bipolar type with psychotic features), one past suicide attempt in 1992 via cutting wrists (found by mother and brought in to hospital), multiple past psychiatric hospitalizations, and remote substance use hx (alcohol last used 1 year ago, cocaine last used 20 yrs ago), who presents to [facility] from [facility] for evaluation of possible ECT treatment due to increasing paranoia and poor self care. Pt reports feeling "nervous and scared" that he will go to [facility] for "a lot of things, like threatening people," so much so that it is impacting his ability to sleep at night. Reporting racing thoughts about going to [facility]. Pt has been taking medications as prescribed at [facility]; denies medication side effects at this time. Reports poor appetite due to anxiety about going to [facility], and also states that he is missing some teeth, which makes chewing solid foods difficult. Denies any passive or active SI/HI currently. Denies any auditory or visual hallucinations, though fixates on the idea that he will be going to [facility]. Denies belief that anyone is coming after him. Pt is at increased fall risk due to arthritis in R hip causing antalgic gait.    From ED note at [place]:  Patient is a 68 year-old male, single, domiciled at [facility] in [place], [facility] htn, mesothelioma, pph of schizoaffective d/o, depression, hx of suicide attempt, BIBEMS from assisted living facility for worsening hallucinations and paranoia. Telepsychiatry consulted for mental health evaluation.    Patient is alert and oriented to person, place, time and situation. States that he came to the ED due to feeling nervous and uptight, which he has been feeling a lot more lately. Has noticed change since September 2022 when he arrived at his assisted living facility. Prior to going to assisted living facility, pt had an argument with his super who got an order of protection against him. A case date followed but case is now closed (per collateral); however, patient is very nervous that he will go to [facility] due to having an upcoming court date (delusion).     Patient describes increased anxiety and feelings of paranoia, states he is nervous about the police, feels as if his phone is tapped by facility, feels there are cameras in his room including one right over his head. States that he feels safe but nervous that he is being monitored.     Patient describes feeling groggy upon awakening and doesn't sleep well, has had a decreased appetite (having lost about 50 lbs since Sept), has low energy, difficulty with concentration, feelings of hopeless/worthlessness, denies suicidal ideation, homicidal ideation. Denies AVH. Denies current substance use.    States that he has seen his psychiatrist 3 times since Sept and has not been able to express the severity of his sxs. Last IPP admission was in September at an outside hospital.    Collateral - Psychiatrist, Dr. Oral Irby, [phone] - contacted, left voicemail    Collateral - Saritha, patient's sister, [phone]   Patient has struggled with mental health illness all his life. States his condition is getting much worse since January. Patient is extremely paranoid, thinks people are coming to get him, that he did bad things, thinks smoke detectors and fire alarms are cameras monitoring him. States pt has delusions that he will go to [facility] and that he has a court date pending, but all cases are closed and he does not have legal issues currently. Since Jan, lost over 50 lbs, won't eat or drink despite the food being good, does not report pt discussed food being poisoned. Says patient has racing thoughts, delusions, paranoia and is constantly wringing hands.    Group home also contacted and support admission for decompensation.

## 2023-07-03 NOTE — BH INPATIENT PSYCHIATRY ASSESSMENT NOTE - CURRENT MEDICATION
MEDICATIONS  (STANDING):  clonazePAM  Tablet 0.5 milliGRAM(s) Oral two times a day  FLUoxetine 80 milliGRAM(s) Oral daily  OLANZapine 20 milliGRAM(s) Oral at bedtime    MEDICATIONS  (PRN):   MEDICATIONS  (STANDING):  clonazePAM  Tablet 0.5 milliGRAM(s) Oral two times a day  FLUoxetine 80 milliGRAM(s) Oral daily  OLANZapine 20 milliGRAM(s) Oral at bedtime  OXcarbazepine 150 milliGRAM(s) Oral <User Schedule>    MEDICATIONS  (PRN):

## 2023-07-04 LAB
ALBUMIN SERPL ELPH-MCNC: 3.8 G/DL — SIGNIFICANT CHANGE UP (ref 3.3–5)
ALP SERPL-CCNC: 100 U/L — SIGNIFICANT CHANGE UP (ref 40–120)
ALT FLD-CCNC: 44 U/L — HIGH (ref 4–41)
ANION GAP SERPL CALC-SCNC: 10 MMOL/L — SIGNIFICANT CHANGE UP (ref 7–14)
AST SERPL-CCNC: 32 U/L — SIGNIFICANT CHANGE UP (ref 4–40)
BASOPHILS # BLD AUTO: 0.02 K/UL — SIGNIFICANT CHANGE UP (ref 0–0.2)
BASOPHILS NFR BLD AUTO: 0.3 % — SIGNIFICANT CHANGE UP (ref 0–2)
BILIRUB SERPL-MCNC: 0.4 MG/DL — SIGNIFICANT CHANGE UP (ref 0.2–1.2)
BUN SERPL-MCNC: 15 MG/DL — SIGNIFICANT CHANGE UP (ref 7–23)
CALCIUM SERPL-MCNC: 9.6 MG/DL — SIGNIFICANT CHANGE UP (ref 8.4–10.5)
CHLORIDE SERPL-SCNC: 105 MMOL/L — SIGNIFICANT CHANGE UP (ref 98–107)
CO2 SERPL-SCNC: 27 MMOL/L — SIGNIFICANT CHANGE UP (ref 22–31)
CREAT SERPL-MCNC: 0.78 MG/DL — SIGNIFICANT CHANGE UP (ref 0.5–1.3)
EGFR: 98 ML/MIN/1.73M2 — SIGNIFICANT CHANGE UP
EOSINOPHIL # BLD AUTO: 0.15 K/UL — SIGNIFICANT CHANGE UP (ref 0–0.5)
EOSINOPHIL NFR BLD AUTO: 2.6 % — SIGNIFICANT CHANGE UP (ref 0–6)
GLUCOSE SERPL-MCNC: 86 MG/DL — SIGNIFICANT CHANGE UP (ref 70–99)
HCT VFR BLD CALC: 41.8 % — SIGNIFICANT CHANGE UP (ref 39–50)
HGB BLD-MCNC: 13.8 G/DL — SIGNIFICANT CHANGE UP (ref 13–17)
IANC: 3.64 K/UL — SIGNIFICANT CHANGE UP (ref 1.8–7.4)
IMM GRANULOCYTES NFR BLD AUTO: 0.2 % — SIGNIFICANT CHANGE UP (ref 0–0.9)
LYMPHOCYTES # BLD AUTO: 1.43 K/UL — SIGNIFICANT CHANGE UP (ref 1–3.3)
LYMPHOCYTES # BLD AUTO: 24.9 % — SIGNIFICANT CHANGE UP (ref 13–44)
MCHC RBC-ENTMCNC: 31.7 PG — SIGNIFICANT CHANGE UP (ref 27–34)
MCHC RBC-ENTMCNC: 33 GM/DL — SIGNIFICANT CHANGE UP (ref 32–36)
MCV RBC AUTO: 96.1 FL — SIGNIFICANT CHANGE UP (ref 80–100)
MONOCYTES # BLD AUTO: 0.49 K/UL — SIGNIFICANT CHANGE UP (ref 0–0.9)
MONOCYTES NFR BLD AUTO: 8.5 % — SIGNIFICANT CHANGE UP (ref 2–14)
NEUTROPHILS # BLD AUTO: 3.64 K/UL — SIGNIFICANT CHANGE UP (ref 1.8–7.4)
NEUTROPHILS NFR BLD AUTO: 63.5 % — SIGNIFICANT CHANGE UP (ref 43–77)
NRBC # BLD: 0 /100 WBCS — SIGNIFICANT CHANGE UP (ref 0–0)
NRBC # FLD: 0 K/UL — SIGNIFICANT CHANGE UP (ref 0–0)
PLATELET # BLD AUTO: 275 K/UL — SIGNIFICANT CHANGE UP (ref 150–400)
POTASSIUM SERPL-MCNC: 4.1 MMOL/L — SIGNIFICANT CHANGE UP (ref 3.5–5.3)
POTASSIUM SERPL-SCNC: 4.1 MMOL/L — SIGNIFICANT CHANGE UP (ref 3.5–5.3)
PROT SERPL-MCNC: 6.8 G/DL — SIGNIFICANT CHANGE UP (ref 6–8.3)
RBC # BLD: 4.35 M/UL — SIGNIFICANT CHANGE UP (ref 4.2–5.8)
RBC # FLD: 15 % — HIGH (ref 10.3–14.5)
SARS-COV-2 RNA SPEC QL NAA+PROBE: SIGNIFICANT CHANGE UP
SODIUM SERPL-SCNC: 142 MMOL/L — SIGNIFICANT CHANGE UP (ref 135–145)
WBC # BLD: 5.74 K/UL — SIGNIFICANT CHANGE UP (ref 3.8–10.5)
WBC # FLD AUTO: 5.74 K/UL — SIGNIFICANT CHANGE UP (ref 3.8–10.5)

## 2023-07-04 PROCEDURE — 99231 SBSQ HOSP IP/OBS SF/LOW 25: CPT

## 2023-07-04 PROCEDURE — 93010 ELECTROCARDIOGRAM REPORT: CPT

## 2023-07-04 RX ADMIN — Medication 0.5 MILLIGRAM(S): at 10:00

## 2023-07-04 RX ADMIN — Medication 0.5 MILLIGRAM(S): at 20:20

## 2023-07-04 RX ADMIN — Medication 80 MILLIGRAM(S): at 10:00

## 2023-07-04 RX ADMIN — OLANZAPINE 20 MILLIGRAM(S): 15 TABLET, FILM COATED ORAL at 20:19

## 2023-07-04 RX ADMIN — OXCARBAZEPINE 150 MILLIGRAM(S): 300 TABLET, FILM COATED ORAL at 20:20

## 2023-07-04 NOTE — BH INPATIENT PSYCHIATRY PROGRESS NOTE - MSE UNSTRUCTURED FT
Patient is awake and alert. Apprehensive affect. Speech fluent. TP coherent. +delusions of guilt. Motor retardation. Poor insight.

## 2023-07-04 NOTE — BH INPATIENT PSYCHIATRY PROGRESS NOTE - NSBHASSESSSUMMFT_PSY_ALL_CORE
Pt is a 69 yo M domiciled in assisted living facility with hx of schizoaffective d/o (bipolar type), multiple past psych hospitalizations, 1 past SA in 1992 by cutting wrists, and remote substance use (alcohol use leading to 3 DUIs and 1 year-long long-term sentence, cocaine use 20 years ago), presenting to [facility] as transfer from [facility] for ECT evaluation due to increasing paranoia and poor self-care. Working diagnosis is schizoaffective disorder with psychosis. Currently adherent to tx with psychotropic medications with no reported side effects. Reporting with ongoing delusion of "going to long-term for life" with subsequent anxiety and depression leading to poor appetite and sleep. Continued inpatient admission required for safety and ECT assessment.     Patient continues to have delusional guilt, plan is for ECT     Plan:   1. Legal: admitted on 9.13 voluntary status  2. Safety: No reported SI/SIB/HI/VI currently on unit; continue routine observation.  3. Psychiatric:   -Prozac 80 mg PO daily for schizoaffective d/o  -Zyprexa 20 mg PO qHS for schizoaffective d/o  -Trileptal 150 mg PO qHS (titrating down, per [facility] note) for previously suspected seizure  -Klonopin 0.5 mg PO BID for anxiety   	- Per [facility]: Last prescribed Klonopin 0.5 mg #60 on 5/5/23    Holding home dose of Ambien 12.5 mg QHS  4. Medical:  -ECT referral  	- CBC, CMP, EKG ordered for 7/4  	- May require dental consult  -PT consult ordered for abnormal gait likely 2/2 R hip osteoarthritis; fall precautions, recommend ambulating with walker and assistance.  -Missing teeth - recommend pureed diet  -HTN previously on Norvasc 10 mg PO daily, decreased to 5 mg PO daily on 6/16 at [facility] due to low BP -> discontinued on 6/19/23 at [facility]; f/u BP daily  5. Collateral: per [facility] note, phone contact made with sister Saritha on 6/8/23 and outpatient psychiatrist Dr. Irby on 6/2/23.  6. Disposition: When stable and reporting improvement in ability to care for self.

## 2023-07-04 NOTE — BH INPATIENT PSYCHIATRY PROGRESS NOTE - NSBHFUPINTERVALHXFT_PSY_A_CORE
Patient remains with delusional guilt, reclusive, in his room most of the time. Slightly hypertensive, systolic high 150s

## 2023-07-04 NOTE — BH INPATIENT PSYCHIATRY PROGRESS NOTE - NSBHCHARTREVIEWVS_PSY_A_CORE FT
Vital Signs Last 24 Hrs  T(C): 36.6 (07-04-23 @ 06:36), Max: 36.6 (07-04-23 @ 06:36)  T(F): 97.8 (07-04-23 @ 06:36), Max: 97.8 (07-04-23 @ 06:36)  HR: --  BP: --  BP(mean): --  RR: --  SpO2: --    Orthostatic VS  07-04-23 @ 06:36  Lying BP: --/-- HR: --  Sitting BP: 158/91 HR: 52  Standing BP: 145/95 HR: 60  Site: upper right arm  Mode: electronic  Orthostatic VS  07-03-23 @ 18:51  Lying BP: --/-- HR: --  Sitting BP: 130/80 HR: 88  Standing BP: 128/78 HR: 88  Site: --  Mode: --

## 2023-07-04 NOTE — BH INPATIENT PSYCHIATRY PROGRESS NOTE - CURRENT MEDICATION
MEDICATIONS  (STANDING):  clonazePAM  Tablet 0.5 milliGRAM(s) Oral two times a day  FLUoxetine 80 milliGRAM(s) Oral daily  OLANZapine 20 milliGRAM(s) Oral at bedtime  OXcarbazepine 150 milliGRAM(s) Oral <User Schedule>    MEDICATIONS  (PRN):

## 2023-07-04 NOTE — PSYCHIATRIC REHAB INITIAL EVALUATION - NSBHPRRECOMMEND_PSY_ALL_CORE
Writer met with the patient to orient him to the psych rehab services and to establish therapeutic goals. Patient stated his goal is not to go to [facility]. According to the patient he was hospitalized due to increased symptoms of depression and anxiety. Patient over the months he has become depressed because he knows he is going to [facility]. According to the patient he has done many bad things and now the police are going to arrest him. However, according to the chart patient does not have any legal issues. According to the chart patient has a long history of mental illness, multiple psychiatric admissions and one suicide attempt. According to the chart patient is compliant with his medications.

## 2023-07-05 PROCEDURE — 99232 SBSQ HOSP IP/OBS MODERATE 35: CPT | Mod: GC

## 2023-07-05 RX ORDER — ACETAMINOPHEN 500 MG
650 TABLET ORAL EVERY 6 HOURS
Refills: 0 | Status: DISCONTINUED | OUTPATIENT
Start: 2023-07-05 | End: 2023-08-21

## 2023-07-05 RX ORDER — POLYETHYLENE GLYCOL 3350 17 G/17G
17 POWDER, FOR SOLUTION ORAL DAILY
Refills: 0 | Status: DISCONTINUED | OUTPATIENT
Start: 2023-07-05 | End: 2023-08-21

## 2023-07-05 RX ORDER — SENNA PLUS 8.6 MG/1
2 TABLET ORAL AT BEDTIME
Refills: 0 | Status: DISCONTINUED | OUTPATIENT
Start: 2023-07-05 | End: 2023-08-21

## 2023-07-05 RX ADMIN — OLANZAPINE 20 MILLIGRAM(S): 15 TABLET, FILM COATED ORAL at 21:14

## 2023-07-05 RX ADMIN — Medication 0.5 MILLIGRAM(S): at 21:14

## 2023-07-05 RX ADMIN — Medication 80 MILLIGRAM(S): at 09:03

## 2023-07-05 RX ADMIN — Medication 0.5 MILLIGRAM(S): at 09:03

## 2023-07-05 RX ADMIN — OXCARBAZEPINE 150 MILLIGRAM(S): 300 TABLET, FILM COATED ORAL at 21:14

## 2023-07-05 NOTE — UM REPORT PROGRESS NOTE - NSUMSWNOTE_GEN_A_CORE FT
Pt with eight ECT treatments to date, with gradual improvement.  Pt had been fixed on belief that he would go to long-term at [facility], despite reassurance from writer, and multiple staff members.  Pt is starting to question that belief.  Pt will continue with ECT through next week.

## 2023-07-05 NOTE — DIETITIAN INITIAL EVALUATION ADULT - PERTINENT LABORATORY DATA
07-04    142  |  105  |  15  ----------------------------<  86  4.1   |  27  |  0.78    Ca    9.6      04 Jul 2023 11:30    TPro  6.8  /  Alb  3.8  /  TBili  0.4  /  DBili  x   /  AST  32  /  ALT  44<H>  /  AlkPhos  100  07-04

## 2023-07-05 NOTE — BH INPATIENT PSYCHIATRY PROGRESS NOTE - PRN MEDS
MEDICATIONS  (PRN):   MEDICATIONS  (PRN):  acetaminophen     Tablet .. 650 milliGRAM(s) Oral every 6 hours PRN Mild Pain (1 - 3), Moderate Pain (4 - 6), Severe Pain (7 - 10)  polyethylene glycol 3350 17 Gram(s) Oral daily PRN constipation  senna 2 Tablet(s) Oral at bedtime PRN constipation

## 2023-07-05 NOTE — BH CHART NOTE - NSEVENTNOTEFT_PSY_ALL_CORE
Spoke to Saritha at     Reports patient had and argument with  2 years ago. She states manager filled an order of protection towards patient after he was threatening him. Court date was adjourned due to Covid; last court date was on september/2022. Patient was fined with 500$ for patient and all resolved. She states that since that moment patient started to present beliefs about going to correction with poor functioning at home; not sleeping or eating. She states patient was admitted to [facility] for UTI which was treated.     Discussed about ECT, provided psychoeducation. Denies patient has mesothelioma, and states patient sometimes have "invented" terminal diseases that are not true. She also reports patient has been in correction in the past for DWI, last when he was 49 y/o.          Spoke to Saritha at     Reports patient had and argument with  2 years ago. She states manager filled an order of protection towards patient after he was threatening him. Court date was adjourned due to Covid; last court date was on september/2022. Patient was fined with 500$ and all resolved. She states that since that moment patient started to present beliefs about going to [facility] with poor functioning at home; not sleeping or eating. She states patient was admitted to other hospital for UTI which was treated.     Discussed about ECT, provided psychoeducation. Denies patient has mesothelioma, and states patient sometimes have "invented" terminal diseases that are not true. She also reports patient has been in [facility] in the past for DWI, last when he was 49 y/o.

## 2023-07-05 NOTE — BH INPATIENT PSYCHIATRY PROGRESS NOTE - NSBHFUPINTERVALHXFT_PSY_A_CORE
Chart reviewed. Case discussed with interdisciplinary team. Patient seen and examined for follow up of psychosis and depression. No acute events overnight. Compliant with standing medication. No PRNs required or requested. Per sleep log, fair sleep.    Patient mostly isolative in his room. Patient reports concerns and anxiety related to beliefs he will go to [facility] after being discharged from the hospital. Patient states going to [facility] after he was threatening "to kill" some people more than a year ago. Patient denies acting in these threats, but beliefs he has to "pay for this crime". Patient states having court hearings coming, however, denies being served. Patient denies any AH/VH or referential thinking. He states feeling depressed about going to [facility] with anhedonia, hopelessness, guilt and shame related to these beliefs. Patient also reports poor sleep/eating; and SI w/o plan or intent on the unit saying "I don't have anything to hurt my self here, but if I had I would". Patient reports being able to reach out for staff if needed.     Reports decrease appetite and sleep. No physical complaints. No side effects to medications reported.

## 2023-07-05 NOTE — BH INPATIENT PSYCHIATRY PROGRESS NOTE - MSE UNSTRUCTURED FT
APPEARANCE: Adult male who appears stated age, in no acute distress; dressed in hospital attire; fair grooming and fair hygiene.  BEHAVIOR: Calm and cooperative; fair eye contact, poorly related.  SPEECH: Spontaneous and fluent; normal rate, rhythm, tone, and volume.   MOTOR: psychomotor retardation; no tremor; no abnormal movements. Unstable gait with R hip abnormality, uses a walker.   MOOD: "depressed and anxious"  AFFECT: Anxious with constricted range; congruent with stated mood; stable.  THOUGHT PROCESS: Linear  THOUGHT CONTENT: Themes around paranoid delusions impacting functioning. Reports SI w/o plan or intent on the unit. Denies homicidal ideation, intent, or plan.  PERCEPTION: Denies auditory or visual hallucinations.  COGNITION: Grossly intact.  INSIGHT: Poor.  JUDGMENT: Poor.  IMPULSE CONTROL: Fair.

## 2023-07-05 NOTE — BH INPATIENT PSYCHIATRY PROGRESS NOTE - CURRENT MEDICATION
MEDICATIONS  (STANDING):  clonazePAM  Tablet 0.5 milliGRAM(s) Oral two times a day  FLUoxetine 80 milliGRAM(s) Oral daily  OLANZapine 20 milliGRAM(s) Oral at bedtime  OXcarbazepine 150 milliGRAM(s) Oral <User Schedule>    MEDICATIONS  (PRN):   MEDICATIONS  (STANDING):  clonazePAM  Tablet 0.5 milliGRAM(s) Oral two times a day  FLUoxetine 80 milliGRAM(s) Oral daily  OLANZapine 20 milliGRAM(s) Oral at bedtime  OXcarbazepine 150 milliGRAM(s) Oral <User Schedule>    MEDICATIONS  (PRN):  acetaminophen     Tablet .. 650 milliGRAM(s) Oral every 6 hours PRN Mild Pain (1 - 3), Moderate Pain (4 - 6), Severe Pain (7 - 10)  polyethylene glycol 3350 17 Gram(s) Oral daily PRN constipation  senna 2 Tablet(s) Oral at bedtime PRN constipation

## 2023-07-05 NOTE — BH INPATIENT PSYCHIATRY PROGRESS NOTE - NSBHASSESSSUMMFT_PSY_ALL_CORE
Pt is a 67 yo M domiciled in assisted living facility with hx of schizoaffective d/o (bipolar type), multiple past psych hospitalizations, 1 past SA in 1992 by cutting wrists, and remote substance use (alcohol use leading to 3 DUIs and 1 year-long [facility] sentence, cocaine use 20 years ago), presenting to [facility] 2S as transfer from [facility] for ECT evaluation due to increasing paranoia and poor self-care. Working diagnosis is schizoaffective disorder with psychosis. Currently adherent to tx with psychotropic medications with no reported side effects. Reporting with ongoing delusion of "going to [facility] for life" with subsequent anxiety and depression leading to poor appetite and sleep. Continued inpatient admission required for safety and ECT assessment.     working diagnosis: SAD bipolar type + current depressive episode w/ psychosis.     Today, patient continues to be perseverative about paranoid delusions, unable to challenge beliefs. Patient continues to endorse depressive/anxious mood with poor sleep and eating secondary to delusions. Patient compliant with standing medication w/o side effects. Started planning for ECT.     Plan:   1. Legal: admitted on 9.13 voluntary status  2. Safety: No reported SI/SIB/HI/VI currently on unit; continue routine observation.  3. Psychiatric:   -Prozac 80 mg PO daily for schizoaffective d/o  -Zyprexa 20 mg PO qHS for schizoaffective d/o  -Trileptal 150 mg PO qHS (titrating down, per [facility] note) for previously suspected seizure  -Klonopin 0.5 mg PO BID for anxiety   	- Per ISTOP: Last prescribed Klonopin 0.5 mg #60 on 5/5/23  - Holding home dose of Ambien 12.5 mg QHS  4. Medical:  a) ECT referral  	- CBC, CMP, EKG ordered for 7/4 wnl. Review labs in [facility] 5/23: TSH, B12, Folate, lipid panel and A1c WNL.   	- Last UA 5/26 abnormal likely contamination but will repeat UA.  	- Will complete ECT checklist for consult.   b) PT consult: fall precautions, recommend ambulating with walker.   c) Dental consult ordered for missing teeth/ECT. Minced and moist diet with ensure TID.    -HTN previously on Norvasc 10 mg PO daily. Discontinued on 6/19/23 at [facility] fro low BP. On unit BP stable around 130's/90', will continue to monitor closely.   5. Collateral: sister Saritha on 6/8/23 and outpatient psychiatrist Dr. Irby on 6/2/23 on [facility]. Will expand collateral here.   6. Disposition: When stable.      Pt is a 67 yo M domiciled in assisted living facility with hx of schizoaffective d/o (bipolar type), multiple past psych hospitalizations, 1 past SA in 1992 by cutting wrists, and remote substance use (alcohol use leading to 3 DUIs and 1 year-long retirement sentence, cocaine use 20 years ago), presenting to [facility] 2S as transfer from [facility] for ECT evaluation due to increasing paranoia and poor self-care. Continued inpatient admission required for safety and ECT assessment.     working diagnosis: SAD bipolar type + current depressive episode w/ psychosis.     Today, patient continues to be perseverative about paranoid delusions, unable to challenge beliefs. Patient continues to endorse depressive/anxious mood with poor sleep and eating secondary to delusions. Patient compliant with standing medication w/o side effects. Started planning for ECT.     Plan:   1. Legal: admitted on 9.13 voluntary status  2. Safety: No reported SI/SIB/HI/VI currently on unit; continue routine observation.  3. Psychiatric:   -Prozac 80 mg PO daily for schizoaffective d/o  -Zyprexa 20 mg PO qHS for schizoaffective d/o  -Trileptal 150 mg PO qHS (titrating down, per [facility] note) for previously suspected seizure  -Klonopin 0.5 mg PO BID for anxiety   	- Per ISTOP: Last prescribed Klonopin 0.5 mg #60 on 5/5/23  - Holding home dose of Ambien 12.5 mg QHS  4. Medical:  a) ECT referral  	- CBC, CMP, EKG ordered for 7/4 wnl. Review labs in [facility] 5/23: TSH, B12, Folate, lipid panel and A1c WNL.   	- Last UA 5/26 abnormal likely contamination but will repeat UA.  	- Will complete ECT checklist for consult.   b) PT consult: fall precautions, recommend ambulating with walker.   c) Dental consult ordered for missing teeth/ECT. Minced and moist diet with ensure TID.    -HTN previously on Norvasc 10 mg PO daily. Discontinued on 6/19/23 at [facility] fro low BP. On unit BP stable around 130's/90', will continue to monitor closely.   5. Collateral: sister Saritha on 6/8/23 and outpatient psychiatrist Dr. Irby on 6/2/23 on [facility]. Will expand collateral here.   6. Disposition: When stable.      Pt is a 67 yo M domiciled in assisted living facility with hx of schizoaffective d/o (bipolar type), multiple past psych hospitalizations, 1 past SA in 1992 by cutting wrists, and remote substance use (alcohol use leading to 3 DUIs and 1 year-long skilled nursing sentence, cocaine use 20 years ago), presenting to [facility] 2S as transfer from [facility] for ECT evaluation due to increasing paranoia and poor self-care. Continued inpatient admission required for safety and ECT assessment.     working diagnosis: SAD bipolar type + current depressive episode w/ psychosis.     Today, patient continues to be perseverative about paranoid delusions, unable to challenge beliefs. Patient continues to endorse depressive/anxious mood with poor sleep and eating secondary to delusions. Patient compliant with standing medication w/o side effects. Started planning for ECT.     Plan:   1. Legal: admitted on 9.13 voluntary status  2. Safety: No reported SI/SIB/HI/VI currently on unit; continue routine observation.  3. Psychiatric:   -Prozac 80 mg PO daily for schizoaffective d/o  -Zyprexa 20 mg PO qHS for schizoaffective d/o  -Trileptal 150 mg PO qHS (titrating down, per [facility] note) for previously suspected seizure  -Klonopin 0.5 mg PO BID for anxiety   	- Per ISTOP: Last prescribed Klonopin 0.5 mg #60 on 5/5/23  - Holding home dose of Ambien 12.5 mg QHS  4. Medical:  a) ECT referral  	- CBC, CMP, EKG ordered for 7/4 wnl. Review labs in [facility] 5/23: TSH, B12, Folate, lipid panel and A1c WNL.   	- Last UA 5/26 abnormal. Will repeat UA.  	- Will complete ECT checklist for consult.   b) PT consult: fall precautions, recommend ambulating with walker.   c) Dental consult ordered for missing teeth/ECT. Minced and moist diet with ensure TID.    -HTN previously on Norvasc 10 mg PO daily. Discontinued on 6/19/23 at [facility] fro low BP. On unit BP stable around 130's/90', will continue to monitor closely.   5. Collateral: sister Saritha on 6/8/23 and outpatient psychiatrist Dr. Irby on 6/2/23 on [facility]. Will expand collateral here.   6. Disposition: When stable.      Pt is a 67 yo M domiciled in assisted living facility with hx of schizoaffective d/o (bipolar type), multiple past psych hospitalizations, 1 past SA in 1992 by cutting wrists, and remote substance use (alcohol use leading to 3 DUIs and 1 year-long skilled nursing sentence, cocaine use 20 years ago), presenting to [facility] 2S as transfer from [facility] for ECT evaluation due to increasing paranoia and poor self-care. Continued inpatient admission required for safety and ECT assessment.     working diagnosis: SAD bipolar type + current depressive episode w/ psychosis.     Today, patient continues to be perseverative about paranoid delusions, unable to challenge beliefs. Patient continues to endorse depressive/anxious mood with poor sleep and eating secondary to delusions. Patient compliant with standing medication w/o side effects. Started planning for ECT.     Plan:   1. Legal: admitted on 9.13 voluntary status  2. Safety: No reported SI/SIB/HI/VI currently on unit; continue routine observation.  3. Psychiatric:   -Prozac 80 mg PO daily for schizoaffective d/o  -Zyprexa 20 mg PO qHS for schizoaffective d/o  -Trileptal 150 mg PO qHS (titrating down, per [facility] note last on 6/29/23) for previously suspected seizure  -Klonopin 0.5 mg PO BID for anxiety   	- Per ISTOP: Last prescribed Klonopin 0.5 mg #60 on 5/5/23  - Holding home dose of Ambien 12.5 mg QHS  4. Medical:  a) ECT referral  	- CBC, CMP, EKG ordered for 7/4 wnl. Review labs in [facility] 5/23: TSH, B12, Folate, lipid panel and A1c WNL.   	- Last UA 5/26 UTI completed antibiotics on 5/31/23. Will repeat UA.  	- Will complete ECT checklist for consult.   b) PT consult: fall precautions, recommend ambulating with walker.   c) Dental consult ordered for missing teeth/ECT. Minced and moist diet with ensure TID.    -HTN previously on Norvasc 10 mg PO daily. Discontinued on 6/19/23 at [facility] for low BP. On unit BP stable around 130's/90' w/o antihypertensives. Will continue to monitor closely.   5. Collateral: sister Saritha on 6/8/23 and outpatient psychiatrist Dr. Irby on 6/2/23 on [facility]. Will expand collateral here.   6. Disposition: When stable.

## 2023-07-05 NOTE — DIETITIAN INITIAL EVALUATION ADULT - OTHER INFO
Pt is a 67 y/o male with PMH of Hyperlipidemia, OA, HTN, Mesothelioma, PPHx of Schizoaffective disorder, depressive type. [facility]. Pt presents to [facility] from [facility] for evaluation of ECT Treatment d/t increased paranoia. Saw Pt in his room. He is currently on Pureed diet. Reports consuming ~50% of most meals. Noted Pt with missing teeth. Denies any swallowing difficulty. No GI distress noted. UBW: 200 lbs. Noted 37 lbs weight loss within 10 months. Pt amenable for Ensure Plus HP x 3 daily (1050 kcal and 60 g protein). Will also send nutrient dense snacks (puddings). Encouraged po intake. Pt verbalized understanding. NKFA.  Pt is a 67 y/o male with PMH of Hyperlipidemia, OA, HTN, Mesothelioma, PPHx of Schizoaffective disorder, depressive type. Pt domiciled in [facility] since September 2022. Pt presents to [facility] from [facility] for evaluation of ECT Treatment d/t increased paranoia. Saw Pt in his room. Noted Pt with missing teeth. He is currently on Pureed diet. Reports consuming ~50% of most meals. Denies any swallowing difficulty. No GI distress noted. UBW: 200 lbs. Noted 37 lbs weight loss within 10 months. Pt states he feels full quickly when eating. Pt amenable for Ensure Plus HP x 3 daily (1050 kcal and 60 g protein). Will also send nutrient dense snacks (puddings). Encouraged po intake. Pt verbalized understanding. NKFA.

## 2023-07-05 NOTE — UM REPORT PROGRESS NOTE - NSUMSWACTION_GEN_A_CORE FT
Contact with pt to encourage acceptance of return to his [facility].  Contact with [facility], the [facility] [place], who've indicated pt will be screened for readmission, but they are expecting him back.  SW will likely call the [facility] early next week, based on pt's tx progress, and initiate the readmission process.  ATA updates with pt's brother, who plans to transport pt to his [facility] at [place].

## 2023-07-05 NOTE — BH INPATIENT PSYCHIATRY PROGRESS NOTE - NSBHCHARTREVIEWVS_PSY_A_CORE FT
Vital Signs Last 24 Hrs  T(C): 36.7 (07-05-23 @ 07:59), Max: 36.7 (07-05-23 @ 07:59)  T(F): 98 (07-05-23 @ 07:59), Max: 98 (07-05-23 @ 07:59)  HR: --  BP: --  BP(mean): --  RR: --  SpO2: --    Orthostatic VS  07-05-23 @ 07:59  Lying BP: --/-- HR: --  Sitting BP: 119/88 HR: 90  Standing BP: 134/79 HR: 76  Site: --  Mode: --  Orthostatic VS  07-04-23 @ 06:36  Lying BP: --/-- HR: --  Sitting BP: 158/91 HR: 52  Standing BP: 145/95 HR: 60  Site: upper right arm  Mode: electronic  Orthostatic VS  07-03-23 @ 18:51  Lying BP: --/-- HR: --  Sitting BP: 130/80 HR: 88  Standing BP: 128/78 HR: 88  Site: --  Mode: --

## 2023-07-06 PROCEDURE — 93010 ELECTROCARDIOGRAM REPORT: CPT

## 2023-07-06 PROCEDURE — 99232 SBSQ HOSP IP/OBS MODERATE 35: CPT | Mod: GC

## 2023-07-06 RX ADMIN — Medication 0.5 MILLIGRAM(S): at 20:38

## 2023-07-06 RX ADMIN — OLANZAPINE 20 MILLIGRAM(S): 15 TABLET, FILM COATED ORAL at 20:37

## 2023-07-06 RX ADMIN — Medication 80 MILLIGRAM(S): at 09:04

## 2023-07-06 RX ADMIN — OXCARBAZEPINE 150 MILLIGRAM(S): 300 TABLET, FILM COATED ORAL at 20:38

## 2023-07-06 RX ADMIN — Medication 0.5 MILLIGRAM(S): at 09:04

## 2023-07-06 NOTE — ECT CONSULT NOTE - OTHER PAST PSYCHIATRIC HISTORY (INCLUDE DETAILS REGARDING ONSET, COURSE OF ILLNESS, INPATIENT/OUTPATIENT TREATMENT)
As per the medical record and interview with the patient on the psychiatric johnson today, "69 yo M w/ PMHx HTN (stable no in tx), HLD, osteoarthritis on the hip, w/ hx of schizoaffective d/o (bipolar type), presenting with ongoing delusion of "going to prison for life" with subsequent anxiety and depression leading to poor functioning, unable to take care of self and SI w/o plan." Patient was transferred from [facility] for ECT treatment at [facility].     On initial ED evaluation 5/24/2023, "Patient is a 68 year-old male, single, domiciled at [facility] in [place], [facility] htn, mesothelioma, pph of schizoaffective d/o, depression, hx of suicide attempt, BIBEMS from assisted living facility for worsening hallucinations and paranoia. Telepsychiatry consulted for mental health evaluation.    "Patient is alert and oriented to person, place, time and situation. States that he came to the ED due to feeling nervous and uptight, which he has been feeling a lot more lately. Has noticed change since September 2022 when he arrived at his assisted living facility. Prior to going to assisted living facility, pt had an argument with his super who got an order of protection against him. A case date followed but case is now closed (per collateral); however, patient is very nervous that he will go to prison due to having an upcoming court date (delusion).     "Patient describes increased anxiety and feelings of paranoia, states he is nervous about the police, feels as if his phone is tapped by facility, feels there are cameras in his room including one right over his head. States that he feels safe but nervous that he is being monitored.     "Patient describes feeling groggy upon awakening and doesn't sleep well, has had a decreased appetite (having lost about 50 lbs since Sept), has low energy, difficulty with concentration, feelings of hopeless/worthlessness, denies suicidal ideation, homicidal ideation. Denies AVH. Denies current substance use.    "States that he has seen his psychiatrist 3 times since Sept and has not been able to express the severity of his sxs. Last IPP admission was in September at an outside hospital...    "Collateral - Saritha, patient's sister, [phone]   Patient has struggled with mental health illness all his life. States his condition is getting much worse since January. Patient is extremely paranoid, thinks people are coming to get him, that he did bad things, thinks smoke detectors and fire alarms are cameras monitoring him. States pt has delusions that he will go to prison and that he has a court date pending, but all cases are closed and he does not have legal issues currently. Since Jan, lost over 50 lbs, won't eat or drink despite the food being good, does not report pt discussed food being poisoned. Says patient has racing thoughts, delusions, paranoia and is constantly wringing hands."    Upon admission, he "...presents to [facility] from [facility] for evaluation of possible ECT treatment due to increasing paranoia and poor self care. Pt reports feeling "nervous and scared" that he will go to prison for "a lot of things, like threatening people," so much so that it is impacting his ability to sleep at night. Reporting racing thoughts about going to prison. Pt has been taking medications as prescribed at [facility]; denies medication side effects at this time. Reports poor appetite due to anxiety about going to prison, and also states that he is missing some teeth, which makes chewing solid foods difficult. Denies any passive or active SI/HI currently. Denies any auditory or visual hallucinations, though fixates on the idea that he will be going to prison. Denies belief that anyone is coming after him. Pt is at increased fall risk due to arthritis in R hip causing antalgic gait."    Today's inpatient psychiatry PN noted, "...Compliant with standing medication. No PRNs required or requested. Per sleep log, fair sleep.    "Patient remains isolative in his room.  Patient continues to reports depressive mood with poor sleep and appetite related to beliefs he will end up in prison after being discharge from the hospital. Patient is perseverative about "going to prison", and is unable to engage in any meaningful conversation. Patient continues to endorse SI w/o plan or intent on the unit.      "Decrease appetite and PO intake, but has been taking ensure. Reports poor sleep due to perseveration about going to prison. No physical complaints. No side effects to medications reported."    Patient confirmed above ongoing symptoms and remains focused on his fears of going to prison for life. However, he was fully amenable to discussion of ECT risks/benefits and alternatives, participated reasonably in the conversation and requested ECT treatment.
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
## 2023-07-06 NOTE — BH SOCIAL WORK INITIAL PSYCHOSOCIAL EVALUATION - NSBHSUPPORTCONSENTPR_PSY_ALL_CORE
Brother reports that pt's sister is his financial POA.  He will clarify if pt has an existing healthcare proxy.

## 2023-07-06 NOTE — BH INPATIENT PSYCHIATRY PROGRESS NOTE - NSBHASSESSSUMMFT_PSY_ALL_CORE
Pt is a 67 yo M domiciled in assisted living facility with hx of schizoaffective d/o (bipolar type), multiple past psych hospitalizations, 1 past SA in 1992 by cutting wrists, and remote substance use (alcohol use leading to 3 DUIs and 1 year-long residential sentence, cocaine use 20 years ago), presenting to [facility] 2S as transfer from [facility] for ECT evaluation due to increasing paranoia and poor self-care. Continued inpatient admission required for safety and ECT assessment.     working diagnosis: SAD bipolar type + current depressive episode w/ psychosis.     Today, patient continues to be perseverative about paranoid delusions, unable to challenge beliefs. Patient continues to endorse depressive/anxious mood with poor sleep and eating secondary to delusions. Patient compliant with standing medication w/o side effects. Started planning for ECT.     Plan:   1. Legal: admitted on 9.13 voluntary status  2. Safety: No reported SI/SIB/HI/VI currently on unit; continue routine observation.  3. Psychiatric:   -Prozac 80 mg PO daily for schizoaffective d/o  -Zyprexa 20 mg PO qHS for schizoaffective d/o  -Trileptal 150 mg PO qHS (titrating down, per [facility] note last on 6/29/23) for previously suspected seizure  -Klonopin 0.5 mg PO BID for anxiety   	- Per ISTOP: Last prescribed Klonopin 0.5 mg #60 on 5/5/23  - Holding home dose of Ambien 12.5 mg QHS  4. Medical:  a) ECT referral  	- CBC, CMP, EKG ordered for 7/4 wnl. Review labs in [facility] 5/23: TSH, B12, Folate, lipid panel and A1c WNL.   	- Last UA 5/26 UTI completed antibiotics on 5/31/23. Will repeat UA.  	- Will complete ECT checklist for consult.   b) PT consult: fall precautions, recommend ambulating with walker.   c) Dental consult ordered for missing teeth/ECT. Minced and moist diet with ensure TID.    -HTN previously on Norvasc 10 mg PO daily. Discontinued on 6/19/23 at [facility] for low BP. On unit BP stable around 130's/90' w/o antihypertensives. Will continue to monitor closely.   5. Collateral: sister Saritha on 6/8/23 and outpatient psychiatrist Dr. Irby on 6/2/23 on [facility]. Will expand collateral here.   6. Disposition: When stable.      Pt is a 69 yo M domiciled in assisted living facility with hx of schizoaffective d/o (bipolar type), multiple past psych hospitalizations, 1 past SA in 1992 by cutting wrists, and remote substance use (alcohol use leading to 3 DUIs and 1 year-long [facility] sentence, cocaine use 20 years ago), presenting to [facility] 2S as transfer from [facility] for ECT evaluation due to increasing paranoia and poor self-care. Continued inpatient admission required for safety and ECT assessment.     working diagnosis: SAD bipolar type + current depressive episode w/ psychosis.     Today, patient unable to engage in any meaningful conversation given perseveration on delusions with ongoing depressed and anxious mood; and SI w/o plan or intent on the unit. PO intake encouraged  given patient decrease appetite and mostly isolative in his room. Patient compliant with standing medication w/o side effects. Planning for ECT.     Plan:   1. Legal: admitted on 9.13 voluntary status  2. Safety: No reported SI/SIB/HI/VI currently on unit; continue routine observation.  3. Psychiatric:   -Prozac 80 mg PO daily for schizoaffective d/o  -Zyprexa 20 mg PO qHS for schizoaffective d/o  -Trileptal 150 mg PO qHS (titrating down, per [facility] note last on 6/29/23) for previously suspected seizure with goal to dc.   -Klonopin 0.5 mg PO BID for anxiety   	- Per ISTOP: Last prescribed Klonopin 0.5 mg #60 on 5/5/23  - Holding home dose of Ambien 12.5 mg QHS  4. Medical:  a) ECT referral  	- CBC, CMP, EKG ordered for 7/4 wnl. Review labs in [facility] 5/23: TSH, B12, Folate, lipid panel and A1c WNL.   	- Last UA 5/26 UTI completed antibiotics on 5/31/23. Pending UA.   	- Will complete ECT checklist for consult. ECT consult ordered.   	- EKG today  b) PT consult: fall precautions, recommend ambulating with walker but patient has been refusing.   c) Dental consult ordered for missing teeth/ECT. Minced and moist diet with ensure TID.    d) HTN previously on Norvasc 10 mg PO daily. Discontinued on 6/19/23 at [facility] for low BP. On unit BP stable around 130's/90' w/o antihypertensives. Will continue to monitor closely.   5. Collateral: sister Saritha on 6/8/23 and outpatient psychiatrist Dr. Irby on 6/2/23 on [facility]. Will expand collateral here.   6. Disposition: When stable.

## 2023-07-06 NOTE — BH INPATIENT PSYCHIATRY PROGRESS NOTE - NSBHFUPINTERVALHXFT_PSY_A_CORE
Chart reviewed. Case discussed with interdisciplinary team. Patient seen and examined for follow up of psychosis and depression. No acute events overnight. Compliant with standing medication. No PRNs required or requested. Per sleep log, fair sleep.    Patient remains isolative in his room.  Patient reports concerns and anxiety related to beliefs he will go to skilled nursing after being discharged from the hospital. Patient states going to skilled nursing after he was threatening "to kill" some people more than a year ago. Patient denies acting in these threats, but beliefs he has to "pay for this crime". Patient states having court hearings coming, however, denies being served. Patient denies any AH/VH or referential thinking. He states feeling depressed about going to skilled nursing with anhedonia, hopelessness, guilt and shame related to these beliefs. Patient also reports poor sleep/eating; and SI w/o plan or intent on the unit saying "I don't have anything to hurt my self here, but if I had I would". Patient reports being able to reach out for staff if needed.     Reports decrease appetite and sleep. No physical complaints. No side effects to medications reported. Chart reviewed. Case discussed with interdisciplinary team. Patient seen and examined for follow up of psychosis and depression. No acute events overnight. Compliant with standing medication. No PRNs required or requested. Per sleep log, fair sleep.    Patient remains isolative in his room.  Patient continues to reports depressive mood with poor sleep and eating stating "I am terrible". Pa     Patient also reports poor sleep/eating; and SI w/o plan or intent on the unit saying "I don't have anything to hurt my self here, but if I had I would". Patient reports being able to reach out for staff if needed.     Reports decrease appetite and sleep. No physical complaints. No side effects to medications reported. Chart reviewed. Case discussed with interdisciplinary team. Patient seen and examined for follow up of psychosis and depression. No acute events overnight. Compliant with standing medication. No PRNs required or requested. Per sleep log, fair sleep.    Patient remains isolative in his room.  Patient continues to reports depressive mood with poor sleep and appetite related to beliefs he will end up in alf after being discharge from the hospital. Patient is perseverative about "going to alf", and is unable to engage in any meaningful conversation. Patient continues to endorse SI w/o plan or intent on the unit.        Decrease appetite and PO intake, but has been taking ensure. Reports poor sleep due to perseveration about going to alf. No physical complaints. No side effects to medications reported.

## 2023-07-06 NOTE — ECT CONSULT NOTE - NSECTASSESSRECOMM_PSY_ALL_CORE
A course of ECT is a reasonable choice.    The bulk of consult time was spent in counselling and/or coordination of care, including but not limited to discussing with patient the risks and benefits of ECT, the usual trajectory of response with acute course of ECT, obtaining informed consent for ECT, and reviewing ECT fasting guidelines.    Referring psychiatrist was contacted.     Dental consultation needed    Hold OXcarbazepine on day of treatment please

## 2023-07-06 NOTE — BH SOCIAL WORK INITIAL PSYCHOSOCIAL EVALUATION - NSBHBARRIERS_PSY_ALL_CORE
Stress related to recent legal action, involving his behavior, loss of his apartment.  Pt's mother passed away four years ago.  As per brother, pt's mother was a source of stability for him

## 2023-07-06 NOTE — BH INPATIENT PSYCHIATRY PROGRESS NOTE - CURRENT MEDICATION
MEDICATIONS  (STANDING):  clonazePAM  Tablet 0.5 milliGRAM(s) Oral two times a day  FLUoxetine 80 milliGRAM(s) Oral daily  OLANZapine 20 milliGRAM(s) Oral at bedtime  OXcarbazepine 150 milliGRAM(s) Oral <User Schedule>    MEDICATIONS  (PRN):  acetaminophen     Tablet .. 650 milliGRAM(s) Oral every 6 hours PRN Mild Pain (1 - 3), Moderate Pain (4 - 6), Severe Pain (7 - 10)  polyethylene glycol 3350 17 Gram(s) Oral daily PRN constipation  senna 2 Tablet(s) Oral at bedtime PRN constipation

## 2023-07-06 NOTE — BH SOCIAL WORK INITIAL PSYCHOSOCIAL EVALUATION - OTHER PAST PSYCHIATRIC HISTORY (INCLUDE DETAILS REGARDING ONSET, COURSE OF ILLNESS, INPATIENT/OUTPATIENT TREATMENT)
Pt with formal history of psychiatric treatment for schizoaffective disorder since about age 18.  Pt with multiple admissions, history of cocaine use (none x 20 years), and ETOH abuse (none x 1 year).  Pt reports attendance in substance abuse rehabs, inpatient and outpatient.  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  Pt reportedly as a hard worker, but also argumentative, at times unreasonable and suspicious in his interactions.  Pt currently followed onsite at his assisted living with Dr. Oral Irby.  Pt with history of suicide attempt in 1992.  Pt placed in the [facility], ( nine months ago) after a few months of living in the [facility].  Pt was evicted from his apartment a year ago, due to issues with his landlord over sheltering a goose with a broken wing.  Pt reportedly damaged his landlord's door.  Legal actions are completed.  Pt's condition has worsened since the resolution of his legal issues, with worsening paranoia.  Pt has reportedly lost 50 lbs over the last nine months.

## 2023-07-06 NOTE — BH SOCIAL WORK INITIAL PSYCHOSOCIAL EVALUATION - NSHIGHRISKBEHFT_PSY_ALL_CORE
poor self-care, with significant weight loss.  Pt with poor socialization, history of disruptive interactions related to paranoia

## 2023-07-06 NOTE — BH INPATIENT PSYCHIATRY PROGRESS NOTE - NSBHCHARTREVIEWVS_PSY_A_CORE FT
Vital Signs Last 24 Hrs  T(C): 37 (07-06-23 @ 06:43), Max: 37 (07-06-23 @ 06:43)  T(F): 98.6 (07-06-23 @ 06:43), Max: 98.6 (07-06-23 @ 06:43)  HR: --  BP: --  BP(mean): --  RR: --  SpO2: --    Orthostatic VS  07-06-23 @ 06:43  Lying BP: --/-- HR: --  Sitting BP: 129/87 HR: 90  Standing BP: 138/87 HR: 67  Site: --  Mode: --  Orthostatic VS  07-05-23 @ 07:59  Lying BP: --/-- HR: --  Sitting BP: 119/88 HR: 90  Standing BP: 134/79 HR: 76  Site: --  Mode: --

## 2023-07-06 NOTE — BH INPATIENT PSYCHIATRY PROGRESS NOTE - MSE UNSTRUCTURED FT
APPEARANCE: Adult male who appears stated age, in no acute distress; dressed in hospital attire; fair grooming and fair hygiene.  BEHAVIOR: Calm and cooperative; fair eye contact, poorly related.  SPEECH: Spontaneous and fluent; normal rate, rhythm, tone, and volume.   MOTOR: psychomotor retardation; no tremor; no abnormal movements. Unstable gait with R hip abnormality, uses a walker.   MOOD: "depressed and anxious"  AFFECT: Anxious with constricted range; congruent with stated mood; stable.  THOUGHT PROCESS: Linear  THOUGHT CONTENT: Themes around paranoid delusions impacting functioning. Reports SI w/o plan or intent on the unit. Denies homicidal ideation, intent, or plan.  PERCEPTION: Denies auditory or visual hallucinations.  COGNITION: Grossly intact.  INSIGHT: Poor.  JUDGMENT: Poor.  IMPULSE CONTROL: Fair. APPEARANCE: Adult male who appears stated age, in no acute distress; dressed in hospital attire; fair grooming and fair hygiene.  BEHAVIOR: Calm and cooperative; fair eye contact, poorly related.  SPEECH: Spontaneous and fluent; normal rate, rhythm, tone, and volume.   MOTOR: psychomotor retardation; no tremor; no abnormal movements. Unstable gait with R hip abnormality, refuses to use a walker.   MOOD: "terrible"  AFFECT: Anxious and depressed with constricted range; congruent with stated mood; stable.  THOUGHT PROCESS: Linear  THOUGHT CONTENT: Perseveration about paranoid delusions impacting functioning. Reports SI w/o plan or intent on the unit. Denies homicidal ideation, intent, or plan.  PERCEPTION: Denies auditory or visual hallucinations.  COGNITION: Grossly intact.  INSIGHT: Poor.  JUDGMENT: Poor.  IMPULSE CONTROL: Fair.

## 2023-07-06 NOTE — ECT CONSULT NOTE - NSECTMENTALSTATUSEXAM_PSY_ALL_CORE
Conscious, cooperative, alert.   No psychomotor agitation/retardation.   Good eye contact.   Speech: regular rate and rhythm,   Mood: "Depressed" Affect: appropriate, full range.   Thought Process: linear, goal directed   Thought Content: Death wish, No active Suicidal ideation/intent/plan, No homicidal ideation/intent/plan. Delusions as above  Perception: No hallucinations   Insight and Judgement: fair  Impulse Control: fair at this time.

## 2023-07-06 NOTE — BH INPATIENT PSYCHIATRY PROGRESS NOTE - PRN MEDS
MEDICATIONS  (PRN):  acetaminophen     Tablet .. 650 milliGRAM(s) Oral every 6 hours PRN Mild Pain (1 - 3), Moderate Pain (4 - 6), Severe Pain (7 - 10)  polyethylene glycol 3350 17 Gram(s) Oral daily PRN constipation  senna 2 Tablet(s) Oral at bedtime PRN constipation

## 2023-07-07 PROCEDURE — 99232 SBSQ HOSP IP/OBS MODERATE 35: CPT | Mod: GC

## 2023-07-07 RX ORDER — CLONAZEPAM 1 MG
0.5 TABLET ORAL
Refills: 0 | Status: DISCONTINUED | OUTPATIENT
Start: 2023-07-07 | End: 2023-07-14

## 2023-07-07 RX ADMIN — OLANZAPINE 20 MILLIGRAM(S): 15 TABLET, FILM COATED ORAL at 20:22

## 2023-07-07 RX ADMIN — Medication 0.5 MILLIGRAM(S): at 08:43

## 2023-07-07 RX ADMIN — Medication 0.5 MILLIGRAM(S): at 20:21

## 2023-07-07 RX ADMIN — Medication 80 MILLIGRAM(S): at 08:43

## 2023-07-07 NOTE — BH INPATIENT PSYCHIATRY PROGRESS NOTE - NSBHMETABOLIC_PSY_ALL_CORE_FT
BMI: BMI (kg/m2): 27.6 (05-24-23 @ 11:58)  HbA1c:   Glucose:   BP: --  Lipid Panel:  BMI: BMI (kg/m2): 27.6 (05-26-23)  HbA1c: 5.3  Glucose: 95  BP: --  Lipid Panel:   cholesterol: 161  HDL: 61  Triglycerides: 47

## 2023-07-07 NOTE — BH INPATIENT PSYCHIATRY PROGRESS NOTE - NSBHCHARTREVIEWVS_PSY_A_CORE FT
Vital Signs Last 24 Hrs  T(C): 36.3 (07-07-23 @ 07:50), Max: 36.3 (07-07-23 @ 07:50)  T(F): 97.3 (07-07-23 @ 07:50), Max: 97.3 (07-07-23 @ 07:50)  HR: 68 (07-07-23 @ 09:06) (68 - 68)  BP: --  BP(mean): --  RR: 17 (07-07-23 @ 09:06) (17 - 17)  SpO2: 99% (07-07-23 @ 09:06) (99% - 99%)    Orthostatic VS  07-07-23 @ 09:28  Lying BP: --/-- HR: --  Sitting BP: 126/76 HR: 78  Standing BP: 124/77 HR: 87  Site: --  Mode: --  Orthostatic VS  07-07-23 @ 07:50  Lying BP: --/-- HR: --  Sitting BP: 127/74 HR: 60  Standing BP: 107/87 HR: --  Site: upper left arm  Mode: electronic  Orthostatic VS  07-06-23 @ 06:43  Lying BP: --/-- HR: --  Sitting BP: 129/87 HR: 90  Standing BP: 138/87 HR: 67  Site: --  Mode: --

## 2023-07-07 NOTE — BH INPATIENT PSYCHIATRY PROGRESS NOTE - CURRENT MEDICATION
MEDICATIONS  (STANDING):  clonazePAM  Tablet 0.5 milliGRAM(s) Oral two times a day  FLUoxetine 80 milliGRAM(s) Oral daily  OLANZapine 20 milliGRAM(s) Oral at bedtime    MEDICATIONS  (PRN):  acetaminophen     Tablet .. 650 milliGRAM(s) Oral every 6 hours PRN Mild Pain (1 - 3), Moderate Pain (4 - 6), Severe Pain (7 - 10)  polyethylene glycol 3350 17 Gram(s) Oral daily PRN constipation  senna 2 Tablet(s) Oral at bedtime PRN constipation

## 2023-07-07 NOTE — BH INPATIENT PSYCHIATRY PROGRESS NOTE - NSBHFUPINTERVALHXFT_PSY_A_CORE
Chart reviewed. Case discussed with interdisciplinary team. Patient seen and examined for follow up of psychosis and depression. No acute events overnight. Compliant with standing medication. No PRNs required or requested. Per sleep log, fair sleep.    Patient remains isolative in his room. Patient continues to be perseverative about "going to custodial" with anxiety. Patient states "police tapped" his phone before coming to the hospital to monitor him as he has been "threatening people". Patient states       Decrease appetite and PO intake, but has been taking ensure. Reports poor sleep due to perseveration about going to custodial. No physical complaints. No side effects to medications reported. Chart reviewed. Case discussed with interdisciplinary team. Patient seen and examined for follow up of psychosis and depression. No acute events overnight. Compliant with standing medication. No PRNs required or requested. Per sleep log, fair sleep.    Patient remains isolative in his room. Patient continues to be perseverative about "going to alf" with anxiety. Patient states "police tapped" his phone before coming to the hospital to monitor him as he has been "threatening people". Patient states also being monitored on the hospital from cameras and microphones on the room. Patient denies any AH/VH. Patient continues to endorse "terrible" mood with SI w/o plan or intent on the unit.     Decrease appetite and PO intake, but has been taking ensure and having two meals a day. Reports poor sleep due to perseveration about going to alf. No physical complaints, last BM today normal. No side effects to medications reported.

## 2023-07-07 NOTE — BH CHART NOTE - NSEVENTNOTEFT_PSY_ALL_CORE
Spoke to Saritha at .     Provided updates regarding clinical course and treatment plan. Discussed about ECT which she agrees. Will continue to update.

## 2023-07-07 NOTE — BH INPATIENT PSYCHIATRY PROGRESS NOTE - NSBHASSESSSUMMFT_PSY_ALL_CORE
Pt is a 69 yo M domiciled in assisted living facility with hx of schizoaffective d/o (bipolar type), multiple past psych hospitalizations, 1 past SA in 1992 by cutting wrists, and remote substance use (alcohol use leading to 3 DUIs and 1 year-long custodial sentence, cocaine use 20 years ago), presenting to [facility] 2S as transfer from [facility] for ECT evaluation due to increasing paranoia and poor self-care. Continued inpatient admission required for safety and ECT assessment.     working diagnosis: SAD bipolar type + current depressive episode w/ psychosis.     Today, patient unable to engage in any meaningful conversation given perseveration on delusions with ongoing depressed and anxious mood; and SI w/o plan or intent on the unit. PO intake encouraged  given patient decrease appetite and mostly isolative in his room. Patient compliant with standing medication w/o side effects. Planning for ECT.     Plan:   1. Legal: admitted on 9.13 voluntary status  2. Safety: No reported SI/SIB/HI/VI currently on unit; continue routine observation.  3. Psychiatric:   -Prozac 80 mg PO daily for schizoaffective d/o  -Zyprexa 20 mg PO qHS for schizoaffective d/o  -Trileptal 150 mg PO qHS (titrating down, per [facility] note last on 6/29/23) for previously suspected seizure with goal to dc.   -Klonopin 0.5 mg PO BID for anxiety   	- Per ISTOP: Last prescribed Klonopin 0.5 mg #60 on 5/5/23  - Holding home dose of Ambien 12.5 mg QHS  4. Medical:  a) ECT referral  	- CBC, CMP, EKG ordered for 7/4 wnl. Review labs in [facility] 5/23: TSH, B12, Folate, lipid panel and A1c WNL.   	- Last UA 5/26 UTI completed antibiotics on 5/31/23. Pending UA.   	- Will complete ECT checklist for consult. ECT consult ordered.   	- EKG today  b) PT consult: fall precautions, recommend ambulating with walker but patient has been refusing.   c) Dental consult ordered for missing teeth/ECT. Minced and moist diet with ensure TID.    d) HTN previously on Norvasc 10 mg PO daily. Discontinued on 6/19/23 at [facility] for low BP. On unit BP stable around 130's/90' w/o antihypertensives. Will continue to monitor closely.   5. Collateral: sister Saritha on 6/8/23 and outpatient psychiatrist Dr. Irby on 6/2/23 on [facility]. Will expand collateral here.   6. Disposition: When stable.      Pt is a 69 yo M domiciled in assisted living facility with hx of schizoaffective d/o (bipolar type), multiple past psych hospitalizations, 1 past SA in 1992 by cutting wrists, and remote substance use (alcohol use leading to 3 DUIs and 1 year-long correction sentence, cocaine use 20 years ago), presenting to [facility] 2S as transfer from [facility] for ECT evaluation due to increasing paranoia and poor self-care. Continued inpatient admission required for safety and ECT assessment.     working diagnosis: SAD bipolar type + current depressive episode w/ psychosis.     Today, patient continues to be perseverative about paranoid delusions, and unable to challenge delusions or engage in any meaningful information. Patient is mostly isolative on his room requiring encouragement to maintain PO intake. Patient continues to endorse depressed/anxious mood and SI w/o plan or intent related to paranoid delusions. Pending dental consult today, and planning for ECT on Monday.     Plan:   1. Legal: admitted on 9.13 voluntary status  2. Safety: Reports SI w/o plan or intent. Denies SIB/HI/VI; continue routine observation.  3. Psychiatric:   -Prozac 80 mg PO daily for schizoaffective d/o  -Zyprexa 20 mg PO qHS for schizoaffective d/o  - DC Trileptal 150 mg PO qHS (titrating down, per [facility] note last on 6/29/23) for previously suspected seizure.    -Klonopin 0.5 mg PO BID for anxiety   	- Per ISTOP: Last prescribed Klonopin 0.5 mg #60 on 5/5/23  - Holding home dose of Ambien 12.5 mg QHS  4. Medical:  a) ECT referral  	- CBC, CMP, EKG ordered for 7/4 wnl. Review labs in [facility] 5/23: TSH, B12, Folate, lipid panel and A1c WNL.   	- Last UA 5/26 UTI completed antibiotics on 5/31/23. Pending UA.   	- ECT consult ordered and completed. Pending dental consult today. Possible  ECT on 7/10/23.   	- EKG 7/6: WNL.   b) PT consult: fall precautions, recommend ambulating with walker but patient has been refusing.   c) NUT consult: Minced and moist diet with ensure TID.    d) HTN previously on Norvasc 10 mg PO daily. Discontinued on 6/19/23 at [facility] for low BP. On unit BP stable around 130's/90' w/o antihypertensives. Will continue to monitor closely.   5. Collateral: sister Saritha on 6/8/23 and outpatient psychiatrist Dr. Irby on 6/2/23 on [facility]. Collateral from sister. See  note.   6. Disposition: When stable.

## 2023-07-07 NOTE — BH INPATIENT PSYCHIATRY PROGRESS NOTE - MSE UNSTRUCTURED FT
APPEARANCE: Adult male who appears stated age, in no acute distress; dressed in hospital attire; fair grooming and fair hygiene.  BEHAVIOR: Calm and cooperative; fair eye contact, poorly related.  SPEECH: Spontaneous and fluent; normal rate, rhythm, tone, and volume.   MOTOR: psychomotor retardation; no tremor; no abnormal movements. Unstable gait with R hip abnormality, refuses to use a walker.   MOOD: "terrible"  AFFECT: Anxious and depressed with constricted range; congruent with stated mood; stable.  THOUGHT PROCESS: Linear  THOUGHT CONTENT: Perseveration about paranoid delusions impacting functioning. Reports SI w/o plan or intent on the unit. Denies homicidal ideation, intent, or plan.  PERCEPTION: Denies auditory or visual hallucinations.  COGNITION: Grossly intact.  INSIGHT: Poor.  JUDGMENT: Poor.  IMPULSE CONTROL: Fair. APPEARANCE: Adult male who appears stated age, in no acute distress; dressed in hospital attire; disheveled.   BEHAVIOR: Calm and cooperative; fair eye contact, poorly related.  SPEECH: Spontaneous and fluent; normal rate, rhythm, tone, and volume.   MOTOR: psychomotor retardation; no tremor; no abnormal movements. Unstable gait with R hip abnormality, refuses to use a walker.   MOOD: "same, terrible"  AFFECT: Anxious and depressed with constricted range; congruent with stated mood; stable.  THOUGHT PROCESS: Linear  THOUGHT CONTENT: Perseveration about paranoid delusions impacting functioning (phone being tapped, being monitored on the unit). Reports SI w/o plan or intent on the unit. Denies homicidal ideation, intent, or plan.  PERCEPTION: Denies auditory or visual hallucinations.  COGNITION: Grossly intact.  INSIGHT: Poor.  JUDGMENT: Poor.  IMPULSE CONTROL: Fair.

## 2023-07-07 NOTE — PROGRESS NOTE ADULT - SUBJECTIVE AND OBJECTIVE BOX
Pt presents for ECT Consultation    EOE: (-) Trismus, (-) LAD, (-) asymmetry, (-) swelling, (-) palpation    IOE: Carious root tips #26, 27, 29. Multiple mandibular restorations present. No tooth mobility. Edentulous maxilla.  Pt does not wear maxillary complete denture.    Assessment: Custom mouthguard not indicated for pt.     Advised pt to seek comprehensive dental care for extraction of root tips and treatment of caries.     Recommendations:   1. Use standard bite block therapy  2. Remove patient's dentures prior to ECT  3. Follow up with outside dentist or [facility] for comprehensive dental care and extraction of root tips  4. Contact [facility] for any acute dental needs [phone]    Carley Flores, #[id number]  Kelsey Tobar, #[id number]

## 2023-07-08 PROCEDURE — 99232 SBSQ HOSP IP/OBS MODERATE 35: CPT | Mod: GC

## 2023-07-08 RX ADMIN — Medication 80 MILLIGRAM(S): at 08:49

## 2023-07-08 RX ADMIN — OLANZAPINE 20 MILLIGRAM(S): 15 TABLET, FILM COATED ORAL at 20:38

## 2023-07-08 RX ADMIN — Medication 0.5 MILLIGRAM(S): at 08:49

## 2023-07-08 RX ADMIN — Medication 0.5 MILLIGRAM(S): at 20:39

## 2023-07-08 NOTE — BH INPATIENT PSYCHIATRY PROGRESS NOTE - NSBHFUPINTERVALHXFT_PSY_A_CORE
Chart reviewed. Case discussed with interdisciplinary team. Patient seen and examined for follow up of psychosis and depression. No acute events overnight. Compliant with standing medication. No PRNs required or requested. Per sleep log, fair sleep.    Patient remains isolative in his room. Patient continues to be perseverative about "going to [facility]" with anxiety. Patient states "police tapped" his phone before coming to the hospital to monitor him as he has been "threatening people". Patient states also being monitored on the hospital from cameras and microphones on the room. Patient denies any AH/VH. Patient continues to endorse "terrible" mood with SI w/o plan or intent on the unit.     Decrease appetite and PO intake, but has been taking ensure and having two meals a day. Reports poor sleep due to perseveration about going to [facility]. No physical complaints, last BM today normal. No side effects to medications reported.  7/8 Patient seen for SAD. No overnight events. Patient eating poorly but takes Ensures. Sleep okay

## 2023-07-08 NOTE — PHYSICAL THERAPY INITIAL EVALUATION ADULT - PERTINENT HX OF CURRENT PROBLEM, REHAB EVAL
Patient is 68 year old M domiciled in assisted living facility with hx of schizoaffective d/o (bipolar type), multiple past psych hospitalizations, 1 past SA in 1992 by cutting wrists, and remote substance use (alcohol use leading to 3 DUIs and 1 year-long [facility] sentence, cocaine use 20 years ago), presenting to [facility] 2S as transfer from [facility] for ECT evaluation due to increasing paranoia and poor self-care.

## 2023-07-08 NOTE — BH INPATIENT PSYCHIATRY PROGRESS NOTE - NSBHASSESSSUMMFT_PSY_ALL_CORE
Pt is a 69 yo M domiciled in assisted living facility with hx of schizoaffective d/o (bipolar type), multiple past psych hospitalizations, 1 past SA in 1992 by cutting wrists, and remote substance use (alcohol use leading to 3 DUIs and 1 year-long skilled nursing sentence, cocaine use 20 years ago), presenting to [facility]S as transfer from [facility] for ECT evaluation due to increasing paranoia and poor self-care. Continued inpatient admission required for safety and ECT assessment.     working diagnosis: SAD bipolar type + current depressive episode w/ psychosis.     Today, patient continues to be perseverative about paranoid delusions, and unable to challenge delusions or engage in any meaningful information. Patient is mostly isolative on his room requiring encouragement to maintain PO intake. Patient continues to endorse depressed/anxious mood and SI w/o plan or intent related to paranoid delusions. Pending dental consult today, and planning for ECT on Monday.     Plan:   1. Legal: admitted on 9.13 voluntary status  2. Safety: Reports SI w/o plan or intent. Denies SIB/HI/VI; continue routine observation.  3. Psychiatric:   -Prozac 80 mg PO daily for schizoaffective d/o  -Zyprexa 20 mg PO qHS for schizoaffective d/o  - DC Trileptal 150 mg PO qHS (titrating down, per [facility] note last on 6/29/23) for previously suspected seizure.    -Klonopin 0.5 mg PO BID for anxiety   	- Per ISTOP: Last prescribed Klonopin 0.5 mg #60 on 5/5/23  - Holding home dose of Ambien 12.5 mg QHS  4. Medical:  a) ECT referral  	- CBC, CMP, EKG ordered for 7/4 wnl. Review labs in [facility] 5/23: TSH, B12, Folate, lipid panel and A1c WNL.   	- Last UA 5/26 UTI completed antibiotics on 5/31/23. Pending UA.   	- ECT consult ordered and completed. Pending dental consult today. Possible  ECT on 7/10/23.   	- EKG 7/6: WNL.   b) PT consult: fall precautions, recommend ambulating with walker but patient has been refusing.   c) NUT consult: Minced and moist diet with ensure TID.    d) HTN previously on Norvasc 10 mg PO daily. Discontinued on 6/19/23 at [facility] for low BP. On unit BP stable around 130's/90' w/o antihypertensives. Will continue to monitor closely.   5. Collateral: sister Saritha on 6/8/23 and outpatient psychiatrist Dr. Irby on 6/2/23 on [facility]. Collateral from sister. See  note.   6. Disposition: When stable.   7/8epressed with paranoid delusions that he will be arrested and passive SI. Dential consult says he can have standard bite block. Will send for ECT 7/10 cont to push fluids consider IV fluids during ECT if needed

## 2023-07-08 NOTE — BH INPATIENT PSYCHIATRY PROGRESS NOTE - MSE UNSTRUCTURED FT
APPEARANCE: Adult male who appears stated age, in no acute distress; dressed in hospital attire; disheveled.   BEHAVIOR: Calm and cooperative; fair eye contact, poorly related.  SPEECH: Spontaneous and fluent; normal rate, rhythm, tone, and volume.   MOTOR: psychomotor retardation; no tremor; no abnormal movements. Unstable gait with R hip abnormality, refuses to use a walker.   MOOD: "same, terrible"  AFFECT: Anxious and depressed with constricted range; congruent with stated mood; stable.  THOUGHT PROCESS: Linear  THOUGHT CONTENT: Perseveration about paranoid delusions impacting functioning (phone being tapped, being monitored on the unit). Reports SI w/o plan or intent on the unit. Denies homicidal ideation, intent, or plan.  PERCEPTION: Denies auditory or visual hallucinations.  COGNITION: Grossly intact.  INSIGHT: Poor.  JUDGMENT: Poor.  IMPULSE CONTROL: Fair.

## 2023-07-08 NOTE — BH INPATIENT PSYCHIATRY PROGRESS NOTE - NSBHCHARTREVIEWVS_PSY_A_CORE FT
Vital Signs Last 24 Hrs  T(C): 36.2 (07-08-23 @ 07:47), Max: 36.2 (07-08-23 @ 07:47)  T(F): 97.2 (07-08-23 @ 07:47), Max: 97.2 (07-08-23 @ 07:47)  HR: --  BP: --  BP(mean): --  RR: --  SpO2: --    Orthostatic VS  07-08-23 @ 09:03  Lying BP: --/-- HR: --  Sitting BP: 120/80 HR: --  Standing BP: 120/80 HR: --  Site: --  Mode: --  Orthostatic VS  07-08-23 @ 07:47  Lying BP: --/-- HR: --  Sitting BP: 131/60 HR: 106  Standing BP: 107/79 HR: 64  Site: --  Mode: --  Orthostatic VS  07-07-23 @ 09:28  Lying BP: --/-- HR: --  Sitting BP: 126/76 HR: 78  Standing BP: 124/77 HR: 87  Site: --  Mode: --  Orthostatic VS  07-07-23 @ 07:50  Lying BP: --/-- HR: --  Sitting BP: 127/74 HR: 60  Standing BP: 107/87 HR: --  Site: upper left arm  Mode: electronic

## 2023-07-09 PROCEDURE — 99231 SBSQ HOSP IP/OBS SF/LOW 25: CPT | Mod: GC

## 2023-07-09 RX ORDER — HYDROXYZINE HCL 10 MG
25 TABLET ORAL ONCE
Refills: 0 | Status: COMPLETED | OUTPATIENT
Start: 2023-07-09 | End: 2023-07-09

## 2023-07-09 RX ADMIN — OLANZAPINE 20 MILLIGRAM(S): 15 TABLET, FILM COATED ORAL at 21:40

## 2023-07-09 RX ADMIN — Medication 0.5 MILLIGRAM(S): at 08:29

## 2023-07-09 RX ADMIN — Medication 25 MILLIGRAM(S): at 17:14

## 2023-07-09 RX ADMIN — Medication 0.5 MILLIGRAM(S): at 21:40

## 2023-07-09 RX ADMIN — Medication 80 MILLIGRAM(S): at 08:30

## 2023-07-09 NOTE — BH INPATIENT PSYCHIATRY PROGRESS NOTE - NSBHCHARTREVIEWVS_PSY_A_CORE FT
Vital Signs Last 24 Hrs  T(C): 36.4 (07-09-23 @ 07:52), Max: 36.4 (07-09-23 @ 07:52)  T(F): 97.6 (07-09-23 @ 07:52), Max: 97.6 (07-09-23 @ 07:52)  HR: --  BP: --  BP(mean): --  RR: --  SpO2: --    Orthostatic VS  07-09-23 @ 07:52  Lying BP: --/-- HR: --  Sitting BP: 115/82 HR: 82  Standing BP: 132/81 HR: 61  Site: upper left arm  Mode: electronic  Orthostatic VS  07-08-23 @ 09:03  Lying BP: --/-- HR: --  Sitting BP: 120/80 HR: --  Standing BP: 120/80 HR: --  Site: --  Mode: --  Orthostatic VS  07-08-23 @ 07:47  Lying BP: --/-- HR: --  Sitting BP: 131/60 HR: 106  Standing BP: 107/79 HR: 64  Site: --  Mode: --

## 2023-07-09 NOTE — BH INPATIENT PSYCHIATRY PROGRESS NOTE - NSBHASSESSSUMMFT_PSY_ALL_CORE
Pt is a 67 yo M domiciled in assisted living facility with hx of schizoaffective d/o (bipolar type), multiple past psych hospitalizations, 1 past SA in 1992 by cutting wrists, and remote substance use (alcohol use leading to 3 DUIs and 1 year-long penitentiary sentence, cocaine use 20 years ago), presenting to [facility] 2S as transfer from [facility] for ECT evaluation due to increasing paranoia and poor self-care. Continued inpatient admission required for safety and ECT assessment.     working diagnosis: SAD bipolar type + current depressive episode w/ psychosis.     Today, patient continues to be perseverative about paranoid delusions, and unable to challenge delusions or engage in any meaningful information. Patient is mostly isolative on his room requiring encouragement to maintain PO intake. Patient continues to endorse depressed/anxious mood and SI w/o plan or intent related to paranoid delusions. Pending dental consult today, and planning for ECT on Monday.     Plan:   1. Legal: admitted on 9.13 voluntary status  2. Safety: Reports SI w/o plan or intent. Denies SIB/HI/VI; continue routine observation.  3. Psychiatric:   -Prozac 80 mg PO daily for schizoaffective d/o  -Zyprexa 20 mg PO qHS for schizoaffective d/o  - DC Trileptal 150 mg PO qHS (titrating down, per [facility] note last on 6/29/23) for previously suspected seizure.    -Klonopin 0.5 mg PO BID for anxiety   	- Per ISTOP: Last prescribed Klonopin 0.5 mg #60 on 5/5/23  - Holding home dose of Ambien 12.5 mg QHS  4. Medical:  a) ECT referral  	- CBC, CMP, EKG ordered for 7/4 wnl. Review labs in [facility] 5/23: TSH, B12, Folate, lipid panel and A1c WNL.   	- Last UA 5/26 UTI completed antibiotics on 5/31/23. Pending UA.   	- ECT consult ordered and completed. Pending dental consult today. Possible  ECT on 7/10/23.   	- EKG 7/6: WNL.   b) PT consult: fall precautions, recommend ambulating with walker but patient has been refusing.   c) NUT consult: Minced and moist diet with ensure TID.    d) HTN previously on Norvasc 10 mg PO daily. Discontinued on 6/19/23 at [facility] for low BP. On unit BP stable around 130's/90' w/o antihypertensives. Will continue to monitor closely.   5. Collateral: sister Saritha on 6/8/23 and outpatient psychiatrist Dr. Irby on 6/2/23 on [facility]. Collateral from sister. See  note.   6. Disposition: When stable.   7/8epressed with paranoid delusions that he will be arrested and passive SI. Dential consult says he can have standard bite block. Will send for ECT 7/10 cont to push fluids consider IV fluids during ECT if needed  7/9 Depressed psychotic passive no active Si. For ECT in AM

## 2023-07-09 NOTE — BH INPATIENT PSYCHIATRY PROGRESS NOTE - NSBHFUPINTERVALHXFT_PSY_A_CORE
Chart reviewed. Case discussed with interdisciplinary team. Patient seen and examined for follow up of psychosis and depression. No acute events overnight. Compliant with standing medication. No PRNs required or requested. Per sleep log, fair sleep.    Patient remains isolative in his room. Patient continues to be perseverative about "going to retirement" with anxiety. Patient states "police tapped" his phone before coming to the hospital to monitor him as he has been "threatening people". Patient states also being monitored on the hospital from cameras and microphones on the room. Patient denies any AH/VH. Patient continues to endorse "terrible" mood with SI w/o plan or intent on the unit.     Decrease appetite and PO intake, but has been taking ensure and having two meals a day. Reports poor sleep due to perseveration about going to retirement. No physical complaints, last BM today normal. No side effects to medications reported.  7/9Patient seen for SAD. No overnight events. Patient eating poorly but takes Ensures. Sleep okay VSS

## 2023-07-10 PROCEDURE — 99231 SBSQ HOSP IP/OBS SF/LOW 25: CPT | Mod: GC

## 2023-07-10 RX ADMIN — Medication 80 MILLIGRAM(S): at 11:03

## 2023-07-10 RX ADMIN — Medication 0.5 MILLIGRAM(S): at 21:55

## 2023-07-10 RX ADMIN — Medication 0.5 MILLIGRAM(S): at 11:02

## 2023-07-10 RX ADMIN — OLANZAPINE 20 MILLIGRAM(S): 15 TABLET, FILM COATED ORAL at 21:55

## 2023-07-10 NOTE — BH INPATIENT PSYCHIATRY PROGRESS NOTE - NSBHCHARTREVIEWVS_PSY_A_CORE FT
Vital Signs Last 24 Hrs  T(C): 36.4 (07-10-23 @ 05:47), Max: 36.4 (07-10-23 @ 05:47)  T(F): 97.6 (07-10-23 @ 05:47), Max: 97.6 (07-10-23 @ 05:47)  HR: --  BP: --  BP(mean): --  RR: --  SpO2: --    Orthostatic VS  07-10-23 @ 05:47  Lying BP: --/-- HR: --  Sitting BP: 129/79 HR: 53  Standing BP: 135/77 HR: 58  Site: --  Mode: --  Orthostatic VS  07-09-23 @ 07:52  Lying BP: --/-- HR: --  Sitting BP: 115/82 HR: 82  Standing BP: 132/81 HR: 61  Site: upper left arm  Mode: electronic

## 2023-07-10 NOTE — BH INPATIENT PSYCHIATRY PROGRESS NOTE - NSBHASSESSSUMMFT_PSY_ALL_CORE
Pt is a 69 yo M domiciled in assisted living facility with hx of schizoaffective d/o (bipolar type), multiple past psych hospitalizations, 1 past SA in 1992 by cutting wrists, and remote substance use (alcohol use leading to 3 DUIs and 1 year-long residential sentence, cocaine use 20 years ago), presenting to [facility] 2S as transfer from [facility] for ECT evaluation due to increasing paranoia and poor self-care. Continued inpatient admission required for safety and ECT assessment.     working diagnosis: SAD bipolar type + current depressive episode w/ psychosis.     7/8epressed with paranoid delusions that he will be arrested and passive SI. Dential consult says he can have standard bite block. Will send for ECT 7/10 cont to push fluids consider IV fluids during ECT if needed  7/9 Depressed psychotic passive no active Si. For ECT in AM  7/10 Continues to endorse depressive mood related to paranoid beliefs and SI w/o plan or intent on the unit. ECT planned for today, however, patient not compliant to NPO and ECT cancelled. Will go for ECT tomorrow.     Plan:   1. Legal: admitted on 9.13 voluntary status  2. Safety: Reports SI w/o plan or intent. Denies SIB/HI/VI; continue routine observation.  3. Psychiatric:   -Prozac 80 mg PO daily for schizoaffective d/o  -Zyprexa 20 mg PO qHS for schizoaffective d/o  - DC Trileptal 150 mg PO qHS (titrating down, per [facility] note last on 6/29/23) for previously suspected seizure.    -Klonopin 0.5 mg PO BID for anxiety   	- Per ISTOP: Last prescribed Klonopin 0.5 mg #60 on 5/5/23  - Holding home dose of Ambien 12.5 mg QHS  4. Medical:  a) ECT referral  	- CBC, CMP, EKG ordered for 7/4 wnl. Review labs in [facility] 5/23: TSH, B12, Folate, lipid panel and A1c WNL.   	- Last UA 5/26 UTI completed antibiotics on 5/31/23. Pending UA.   	- ECT consult ordered and completed. Pending dental consult today. ECT on 7/11/23.   	- EKG 7/6: WNL.   b) PT consult: fall precautions, recommend ambulating with walker but patient has been refusing.   c) NUT consult: Minced and moist diet with ensure TID.    d) HTN previously on Norvasc 10 mg PO daily. Discontinued on 6/19/23 at [facility] for low BP. On unit BP stable around 130's/90' w/o antihypertensives. Will continue to monitor closely.   5. Collateral: sister Saritha on 6/8/23 and outpatient psychiatrist Dr. Irby on 6/2/23 on [facility]. Collateral from sister. See  note.   6. Disposition: When stable.

## 2023-07-10 NOTE — BH INPATIENT PSYCHIATRY PROGRESS NOTE - NSBHFUPINTERVALHXFT_PSY_A_CORE
Chart reviewed. Case discussed with interdisciplinary team. Patient seen and examined for follow up of psychosis and depression. No acute events overnight. Compliant with standing medication. Atarax PRN requested for anxiety Per sleep log, fair sleep.    Patient remains isolative in his room. Patient reports feeling the same as previous days, states depressive mood related to beliefs he will go to long term after being discharged from the hospital. Patient reports staying on his room all weekend unable to think about other things beside this belief. Patient reports he wasn't aware he was not able to eat or drink for ECT, and states having "a bite of breakfast" this AM. Provided psychoeducation regarding ETC. Patient continues to endorse  SI w/o plan or intent on the unit.     Patient continues to report poor sleep due to perseveration about going to long term. No physical complaints, last BM today normal. No side effects to medications reported.

## 2023-07-10 NOTE — BH INPATIENT PSYCHIATRY PROGRESS NOTE - MSE UNSTRUCTURED FT
APPEARANCE: Adult male who appears stated age, in no acute distress; dressed in hospital attire; disheveled.   BEHAVIOR: Calm and cooperative; fair eye contact, poorly related.  SPEECH: Spontaneous and fluent; normal rate, rhythm, tone, and volume.   MOTOR: psychomotor retardation; no tremor; no abnormal movements. Unstable gait with R hip abnormality, refuses to use a walker.   MOOD: "I am not good"  AFFECT: Anxious and depressed with constricted range; congruent with stated mood; stable.  THOUGHT PROCESS: Linear  THOUGHT CONTENT: Themes around delusions about going to long-term with depressive mood, hopelessness and SI w/o plan or intent on the unit. Denies homicidal ideation, intent, or plan.  PERCEPTION: Denies auditory or visual hallucinations.  COGNITION: Grossly intact.  INSIGHT: Poor.  JUDGMENT: Poor.  IMPULSE CONTROL: Fair.

## 2023-07-11 PROCEDURE — 99232 SBSQ HOSP IP/OBS MODERATE 35: CPT | Mod: GC,25

## 2023-07-11 PROCEDURE — 90870 ELECTROCONVULSIVE THERAPY: CPT

## 2023-07-11 RX ORDER — MIDAZOLAM HYDROCHLORIDE 1 MG/ML
2 INJECTION, SOLUTION INTRAMUSCULAR; INTRAVENOUS ONCE
Refills: 0 | Status: DISCONTINUED | OUTPATIENT
Start: 2023-07-11 | End: 2023-07-19

## 2023-07-11 RX ORDER — FLUMAZENIL 0.1 MG/ML
0.2 VIAL (ML) INTRAVENOUS ONCE
Refills: 0 | Status: COMPLETED | OUTPATIENT
Start: 2023-07-11 | End: 2023-07-11

## 2023-07-11 RX ADMIN — Medication 0.5 MILLIGRAM(S): at 21:10

## 2023-07-11 RX ADMIN — Medication 0.5 MILLIGRAM(S): at 09:04

## 2023-07-11 RX ADMIN — Medication 0.2 MILLIGRAM(S): at 11:25

## 2023-07-11 RX ADMIN — Medication 80 MILLIGRAM(S): at 09:05

## 2023-07-11 RX ADMIN — OLANZAPINE 20 MILLIGRAM(S): 15 TABLET, FILM COATED ORAL at 21:10

## 2023-07-11 NOTE — BH INPATIENT PSYCHIATRY PROGRESS NOTE - NSBHMETABOLIC_PSY_ALL_CORE_FT
BMI: BMI (kg/m2): 22.7 (07-07-23 @ 17:08)  HbA1c:   Glucose:   BP: 147/88 (07-11-23 @ 11:40) (130/85 - 152/83)  Lipid Panel:  BMI: BMI (kg/m2): 22.7 (07-07-23 @ 17:08)  HbA1c:   Glucose:   BP: 145/85 (07-11-23 @ 12:15) (130/85 - 154/88)  Lipid Panel:

## 2023-07-11 NOTE — BH INPATIENT PSYCHIATRY PROGRESS NOTE - NSBHCHARTREVIEWVS_PSY_A_CORE FT
Vital Signs Last 24 Hrs  T(C): 36.5 (07-11-23 @ 11:29), Max: 36.8 (07-11-23 @ 11:01)  T(F): 97.7 (07-11-23 @ 11:29), Max: 98.3 (07-11-23 @ 11:01)  HR: 51 (07-11-23 @ 11:40) (50 - 53)  BP: 147/88 (07-11-23 @ 11:40) (130/85 - 152/83)  BP(mean): 102 (07-11-23 @ 11:40) (100 - 102)  RR: 16 (07-11-23 @ 11:40) (16 - 17)  SpO2: 98% (07-11-23 @ 11:40) (98% - 100%)    Orthostatic VS  07-11-23 @ 07:45  Lying BP: --/-- HR: --  Sitting BP: 121/73 HR: 84  Standing BP: 116/78 HR: 86  Site: --  Mode: --  Orthostatic VS  07-10-23 @ 05:47  Lying BP: --/-- HR: --  Sitting BP: 129/79 HR: 53  Standing BP: 135/77 HR: 58  Site: --  Mode: --   Vital Signs Last 24 Hrs  T(C): 36.2 (07-11-23 @ 12:15), Max: 36.8 (07-11-23 @ 11:01)  T(F): 97.2 (07-11-23 @ 12:15), Max: 98.3 (07-11-23 @ 11:01)  HR: 50 (07-11-23 @ 12:15) (50 - 53)  BP: 145/85 (07-11-23 @ 12:15) (130/85 - 154/88)  BP(mean): 102 (07-11-23 @ 11:40) (100 - 102)  RR: 16 (07-11-23 @ 12:15) (14 - 17)  SpO2: 100% (07-11-23 @ 12:15) (97% - 100%)    Orthostatic VS  07-11-23 @ 07:45  Lying BP: --/-- HR: --  Sitting BP: 121/73 HR: 84  Standing BP: 116/78 HR: 86  Site: --  Mode: --  Orthostatic VS  07-10-23 @ 05:47  Lying BP: --/-- HR: --  Sitting BP: 129/79 HR: 53  Standing BP: 135/77 HR: 58  Site: --  Mode: --

## 2023-07-11 NOTE — BH INPATIENT PSYCHIATRY PROGRESS NOTE - CURRENT MEDICATION
MEDICATIONS  (STANDING):  clonazePAM  Tablet 0.5 milliGRAM(s) Oral two times a day  FLUoxetine 80 milliGRAM(s) Oral daily  midazolam Injectable 2 milliGRAM(s) IV Push once  OLANZapine 20 milliGRAM(s) Oral at bedtime    MEDICATIONS  (PRN):  acetaminophen     Tablet .. 650 milliGRAM(s) Oral every 6 hours PRN Mild Pain (1 - 3), Moderate Pain (4 - 6), Severe Pain (7 - 10)  polyethylene glycol 3350 17 Gram(s) Oral daily PRN constipation  senna 2 Tablet(s) Oral at bedtime PRN constipation

## 2023-07-11 NOTE — BH INPATIENT PSYCHIATRY PROGRESS NOTE - NSBHFUPINTERVALHXFT_PSY_A_CORE
Chart reviewed. Case discussed with interdisciplinary team. Patient seen and examined for follow up of psychosis and depression. No acute events overnight. Compliant with standing medication. No PRN requested or required. Per sleep log, fair sleep.    Patient remains isolative in his room. Patient seen in his room resting, continues to endorse beliefs about going to senior living and states being unable to focused in any other themes. Patient continues to endorse depressed mood with hopelessness. Patient states feeling anxious about going to ETC as he doesn't know what to expect. Provided psychoeducation regarding ETC and patient is receptive. Patient continues to endorse  SI w/o plan or intent on the unit.     Patient continues to report poor sleep due to perseveration about going to senior living. No physical complaints, last BM today normal. No side effects to medications reported.

## 2023-07-11 NOTE — BH INPATIENT PSYCHIATRY PROGRESS NOTE - MSE UNSTRUCTURED FT
APPEARANCE: Adult male who appears stated age, in no acute distress; dressed in hospital attire; disheveled.   BEHAVIOR: Calm and cooperative; fair eye contact, poorly related.  SPEECH: Spontaneous and fluent; normal rate, rhythm, tone, and volume.   MOTOR: psychomotor retardation; no tremor; no abnormal movements. Unstable gait with R hip abnormality, refuses to use a walker.   MOOD: "anxious"  AFFECT: Anxious and depressed with constricted range; congruent with stated mood; stable.  THOUGHT PROCESS: Linear  THOUGHT CONTENT: Themes about ECT treatment and expectations. Themes around delusions about going to [facility] with depressive mood, hopelessness and SI w/o plan or intent on the unit. Denies homicidal ideation, intent, or plan.  PERCEPTION: Denies auditory or visual hallucinations.  COGNITION: Grossly intact.  INSIGHT: Poor.  JUDGMENT: Poor.  IMPULSE CONTROL: Fair.

## 2023-07-11 NOTE — BH INPATIENT PSYCHIATRY PROGRESS NOTE - NSBHASSESSSUMMFT_PSY_ALL_CORE
Pt is a 67 yo M domiciled in assisted living facility with hx of schizoaffective d/o (bipolar type), multiple past psych hospitalizations, 1 past SA in 1992 by cutting wrists, and remote substance use (alcohol use leading to 3 DUIs and 1 year-long residential sentence, cocaine use 20 years ago), presenting to [facility] 2S as transfer from [facility] for ECT evaluation due to increasing paranoia and poor self-care. Continued inpatient admission required for safety and ECT assessment.     working diagnosis: SAD bipolar type + current depressive episode w/ psychosis.     7/8epressed with paranoid delusions that he will be arrested and passive SI. Dential consult says he can have standard bite block. Will send for ECT 7/10 cont to push fluids consider IV fluids during ECT if needed  7/9 Depressed psychotic passive no active Si. For ECT in AM  7/10 Continues to endorse depressive mood related to paranoid beliefs and SI w/o plan or intent on the unit. ECT planned for today, however, patient not compliant to NPO and ECT cancelled. Will go for ECT tomorrow.   7/11: remains perseverative about paranoid delusions with depressed mood and SI w/o plan or intent. Patient reports anxiety related to ECT treatment today. Provided psychoeducation about ECT. ECT#1 today.      Plan:   1. Legal: admitted on 9.13 voluntary status  2. Safety: Reports SI w/o plan or intent. Denies SIB/HI/VI; continue routine observation.  3. Psychiatric:   -Prozac 80 mg PO daily for schizoaffective d/o  -Zyprexa 20 mg PO qHS for schizoaffective d/o  - DC Trileptal 150 mg PO qHS (titrating down, per [facility] note last on 6/29/23) for previously suspected seizure.    -Klonopin 0.5 mg PO BID for anxiety   	- Per ISTOP: Last prescribed Klonopin 0.5 mg #60 on 5/5/23  - Holding home dose of Ambien 12.5 mg QHS  4. Medical:  a) ECT referral  	- CBC, CMP, EKG ordered for 7/4 wnl. Review labs in [facility] 5/23: TSH, B12, Folate, lipid panel and A1c WNL.   	- Last UA 5/26 UTI completed antibiotics on 5/31/23. Pending UA.   	- ECT consult ordered and completed. ECT #1 on 7/11/23.   	- EKG 7/6: WNL.   b) PT consult: fall precautions, recommend ambulating with walker but patient has been refusing.   c) NUT consult: Minced and moist diet with ensure TID.    d) HTN previously on Norvasc 10 mg PO daily. Discontinued on 6/19/23 at [facility] for low BP. On unit BP stable around 130's/90' w/o antihypertensives. Will continue to monitor closely.   5. Collateral: sister Saritha on 6/8/23 and outpatient psychiatrist Dr. Irby on 6/2/23 on [facility]. Collateral from sister. See  note.   6. Disposition: When stable.

## 2023-07-12 PROCEDURE — 99231 SBSQ HOSP IP/OBS SF/LOW 25: CPT | Mod: 1L,GC

## 2023-07-12 RX ADMIN — OLANZAPINE 20 MILLIGRAM(S): 15 TABLET, FILM COATED ORAL at 21:05

## 2023-07-12 RX ADMIN — Medication 80 MILLIGRAM(S): at 09:08

## 2023-07-12 RX ADMIN — Medication 0.5 MILLIGRAM(S): at 21:05

## 2023-07-12 RX ADMIN — Medication 0.5 MILLIGRAM(S): at 09:08

## 2023-07-12 NOTE — BH INPATIENT PSYCHIATRY PROGRESS NOTE - NSBHFUPINTERVALHXFT_PSY_A_CORE
Chart reviewed. Case discussed with interdisciplinary team. Patient seen and examined for follow up of psychosis and depression. No acute events overnight. Compliant with standing medication. No PRN requested or required. Per sleep log, fair sleep.    Patient remains isolative in his room. Patient calmly resting in his room, reports feeling the same "still anxious" related to beliefs about going to custodial with depressive mood, hopelessness and SI w/o plan or intent. He states not feeling any change after ECT treatment yesterday, provided psychoeducation on ECT which patient was receptive. Patient reports poor sleep despite sleep log stating "my eyes are closed but I am still thinking about custodial". Patient reports eating adequately and denies any physical complaint.

## 2023-07-12 NOTE — BH INPATIENT PSYCHIATRY PROGRESS NOTE - NSBHMETABOLIC_PSY_ALL_CORE_FT
BMI: BMI (kg/m2): 22.7 (07-07-23 @ 17:08)  HbA1c:   Glucose:   BP: 145/85 (07-11-23 @ 12:15) (130/85 - 154/88)  Lipid Panel:

## 2023-07-12 NOTE — BH INPATIENT PSYCHIATRY PROGRESS NOTE - NSBHASSESSSUMMFT_PSY_ALL_CORE
Pt is a 69 yo M domiciled in assisted living facility with hx of schizoaffective d/o (bipolar type), multiple past psych hospitalizations, 1 past SA in 1992 by cutting wrists, and remote substance use (alcohol use leading to 3 DUIs and 1 year-long prison sentence, cocaine use 20 years ago), presenting to [facility] as transfer from [facility] for ECT evaluation due to increasing paranoia and poor self-care. Continued inpatient admission required for safety and ECT assessment.     working diagnosis: SAD bipolar type + current depressive episode w/ psychosis.     7/8epressed with paranoid delusions that he will be arrested and passive SI. Dential consult says he can have standard bite block. Will send for ECT 7/10 cont to push fluids consider IV fluids during ECT if needed  7/9 Depressed psychotic passive no active Si. For ECT in AM  7/10 Continues to endorse depressive mood related to paranoid beliefs and SI w/o plan or intent on the unit. ECT planned for today, however, patient not compliant to NPO and ECT cancelled. Will go for ECT tomorrow.   7/11: remains perseverative about paranoid delusions with depressed mood and SI w/o plan or intent. Patient reports anxiety related to ECT treatment today. Provided psychoeducation about ECT. ECT#1 today.    7/12: continues to endorse depressive mood with hopelessness, anxiety and SI w/o plan or intent related to paranoid beliefs. Tolerating well ECT treatment, tomorrow ECT#2. No physical complaints, VS stable.     Plan:   1. Legal: admitted on 9.13 voluntary status  2. Safety: Reports SI w/o plan or intent. Denies SIB/HI/VI; continue routine observation.  3. Psychiatric:   -Prozac 80 mg PO daily for schizoaffective d/o  -Zyprexa 20 mg PO qHS for schizoaffective d/o  -Klonopin 0.5 mg PO BID for anxiety   	- Per ISTOP: Last prescribed Klonopin 0.5 mg #60 on 5/5/23  - Holding home dose of Ambien 12.5 mg QHS  4. Medical:  a) ECT referral  	- CBC, CMP, EKG ordered for 7/4 wnl. Review labs in HIE 5/23: TSH, B12, Folate, lipid panel and A1c WNL.   	- Last UA 5/26 UTI completed antibiotics on 5/31/23. Pending UA.   	- ECT consult ordered and completed. ECT #1 on 7/11/23.   	- EKG 7/6: WNL.   b) PT consult: fall precautions, recommend ambulating with walker but patient has been refusing.   c) NUT consult: Minced and moist diet with ensure TID.    d) HTN previously on Norvasc 10 mg PO daily. Discontinued on 6/19/23 at [facility] for low BP. On unit BP stable around 130's/90' w/o antihypertensives. Will continue to monitor closely.   5. Collateral: outpatient psychiatrist Dr. Irby on 6/2/23 on [facility] and handoff from Dr. Hernandez. Collateral from sister. See  note.   6. Disposition: When stable.

## 2023-07-12 NOTE — BH INPATIENT PSYCHIATRY PROGRESS NOTE - MSE UNSTRUCTURED FT
APPEARANCE: Adult male who appears stated age, in no acute distress; dressed in hospital attire; disheveled.   BEHAVIOR: Calm and cooperative; fair eye contact, poorly related.  SPEECH: Spontaneous and fluent; normal rate, rhythm, tone, and volume.   MOTOR: psychomotor retardation; no tremor; no abnormal movements. Unstable gait with R hip abnormality, refuses to use a walker.   MOOD: "still anxious and depressed"  AFFECT: Anxious and depressed with constricted range; congruent with stated mood; stable.  THOUGHT PROCESS: Linear  THOUGHT CONTENT: Perseveration about going to senior care with hopelessness and SI w/o plan or intent;  and themes around ECT treatment and improvements. Denies homicidal ideation, intent, or plan.  PERCEPTION: Denies auditory or visual hallucinations.  COGNITION: Grossly intact.  INSIGHT: Poor.  JUDGMENT: Poor.  IMPULSE CONTROL: Fair.

## 2023-07-12 NOTE — BH CHART NOTE - NSEVENTNOTEFT_PSY_ALL_CORE
Spoke to Saritha at ,     Provided updates regarding clinical course and treatment plan which she agrees.     She state not being aware of all the medication trials patient has had, but states patient was very inconsistent with medication. She states mother was in charge of patient's medications and doctors appointments but after she  , patient stopped taking medications. She then states that from 2022 through may 2023 patient was on Seroquel 200mg a day (150mg AM, 25mg noon and 50mg PO qhs). She states patient was "mellow" but still endorsed intense anxiety related to beliefs about going to [facility]. She also remembers patient was on Zoloft on .     Sent a secure email to Dr. Hernandez for collateral about past medications trials. She states looking into patient's chart but states patient did not remember his medications and does not have any information about past medication trials.

## 2023-07-12 NOTE — BH INPATIENT PSYCHIATRY PROGRESS NOTE - NSBHCHARTREVIEWVS_PSY_A_CORE FT
Vital Signs Last 24 Hrs  T(C): 36.6 (07-12-23 @ 07:45), Max: 36.6 (07-12-23 @ 07:45)  T(F): 97.8 (07-12-23 @ 07:45), Max: 97.8 (07-12-23 @ 07:45)  HR: --  BP: --  BP(mean): --  RR: --  SpO2: --    Orthostatic VS  07-12-23 @ 07:45  Lying BP: --/-- HR: --  Sitting BP: 119/77 HR: 54  Standing BP: 111/78 HR: 65  Site: upper left arm  Mode: electronic  Orthostatic VS  07-11-23 @ 07:45  Lying BP: --/-- HR: --  Sitting BP: 121/73 HR: 84  Standing BP: 116/78 HR: 86  Site: --  Mode: --

## 2023-07-13 PROCEDURE — 99231 SBSQ HOSP IP/OBS SF/LOW 25: CPT

## 2023-07-13 RX ADMIN — OLANZAPINE 20 MILLIGRAM(S): 15 TABLET, FILM COATED ORAL at 20:44

## 2023-07-13 RX ADMIN — Medication 0.5 MILLIGRAM(S): at 20:43

## 2023-07-13 RX ADMIN — Medication 80 MILLIGRAM(S): at 09:11

## 2023-07-13 RX ADMIN — Medication 0.5 MILLIGRAM(S): at 09:11

## 2023-07-13 NOTE — BH INPATIENT PSYCHIATRY PROGRESS NOTE - NSBHCHARTREVIEWVS_PSY_A_CORE FT
Vital Signs Last 24 Hrs  T(C): 36.8 (07-13-23 @ 07:50), Max: 36.8 (07-13-23 @ 07:50)  T(F): 98.3 (07-13-23 @ 07:50), Max: 98.3 (07-13-23 @ 07:50)  HR: --  BP: --  BP(mean): --  RR: --  SpO2: --    Orthostatic VS  07-13-23 @ 07:50  Lying BP: --/-- HR: --  Sitting BP: 122/75 HR: 52  Standing BP: 117/73 HR: 61  Site: --  Mode: --  Orthostatic VS  07-12-23 @ 07:45  Lying BP: --/-- HR: --  Sitting BP: 119/77 HR: 54  Standing BP: 111/78 HR: 65  Site: upper left arm  Mode: electronic   Vital Signs Last 24 Hrs  T(C): 36.4 (07-14-23 @ 06:19), Max: 36.8 (07-13-23 @ 07:50)  T(F): 97.6 (07-14-23 @ 06:19), Max: 98.3 (07-13-23 @ 07:50)  HR: --  BP: --  BP(mean): --  RR: 18 (07-14-23 @ 06:19) (18 - 18)  SpO2: --    Orthostatic VS  07-14-23 @ 06:19  Lying BP: --/-- HR: --  Sitting BP: 134/82 HR: 55  Standing BP: 134/89 HR: 68  Site: upper left arm  Mode: electronic  Orthostatic VS  07-13-23 @ 07:50  Lying BP: --/-- HR: --  Sitting BP: 122/75 HR: 52  Standing BP: 117/73 HR: 61  Site: --  Mode: --  Orthostatic VS  07-12-23 @ 07:45  Lying BP: --/-- HR: --  Sitting BP: 119/77 HR: 54  Standing BP: 111/78 HR: 65  Site: upper left arm  Mode: electronic

## 2023-07-13 NOTE — BH INPATIENT PSYCHIATRY PROGRESS NOTE - MSE UNSTRUCTURED FT
APPEARANCE: Adult male who appears stated age, in no acute distress; dressed in hospital attire; disheveled.   BEHAVIOR: Calm and cooperative; fair eye contact, poorly related.  SPEECH: Spontaneous and fluent; normal rate, rhythm, tone, and volume.   MOTOR: psychomotor retardation; no tremor; no abnormal movements. Unstable gait with R hip abnormality, refuses to use a walker.   MOOD: "same"  AFFECT: Anxious and depressed with constricted range; congruent with stated mood; stable.  THOUGHT PROCESS: Linear  THOUGHT CONTENT: Themes around ECT with some hopelessness regarding treatment. Perseveration of paranoid delusions (going to intermediate). Si w/o plan or intent. Denies homicidal ideation, intent, or plan.  PERCEPTION: Denies auditory or visual hallucinations.  COGNITION: Grossly intact.  INSIGHT: Poor.  JUDGMENT: Poor.  IMPULSE CONTROL: Fair.

## 2023-07-13 NOTE — BH INPATIENT PSYCHIATRY PROGRESS NOTE - MSE UNSTRUCTURED FT
APPEARANCE: Adult male who appears stated age, in no acute distress; dressed in hospital attire; disheveled.   BEHAVIOR: Calm and cooperative; fair eye contact, poorly related.  SPEECH: Spontaneous and fluent; normal rate, rhythm, tone, and volume.   MOTOR: psychomotor retardation; no tremor; no abnormal movements. Unstable gait with R hip abnormality, refuses to use a walker.   MOOD: "same"  AFFECT: Anxious and depressed with constricted range; congruent with stated mood; stable.  THOUGHT PROCESS: Linear  THOUGHT CONTENT: Themes around ECT with some hopelessness regarding treatment. Perseveration of paranoid delusions (going to [facility]). Si w/o plan or intent. Denies homicidal ideation, intent, or plan.  PERCEPTION: Denies auditory or visual hallucinations.  COGNITION: Grossly intact.  INSIGHT: Poor.  JUDGMENT: Poor.  IMPULSE CONTROL: Fair.

## 2023-07-13 NOTE — BH INPATIENT PSYCHIATRY PROGRESS NOTE - NSBHFUPINTERVALHXFT_PSY_A_CORE
Chart reviewed. Case discussed with interdisciplinary team. Patient seen and examined for follow up of psychosis and depression. No acute events overnight. Compliant with standing medication. No PRN requested or required. Per sleep log, fair sleep.    Patient remains isolative in his room. Patient reports feeling "the same today" with depressive mood and anxiety related to belief he is going to custodial. Patient states feeling hopeless about the treatment stating "I know this feeling is not going to go away with anything", and not feeling any improvement with ECT session. Provided psychoeducation about ECT and patient is receptive. Patient continues to endorse SI w/o plan or intent. Patient continues to report fragment sleep despite adequate sleep log. Patient reports eating adequately and denies any physical complaint.

## 2023-07-13 NOTE — BH INPATIENT PSYCHIATRY PROGRESS NOTE - NSBHCHARTREVIEWVS_PSY_A_CORE FT
Vital Signs Last 24 Hrs  T(C): 36.8 (07-13-23 @ 07:50), Max: 36.8 (07-13-23 @ 07:50)  T(F): 98.3 (07-13-23 @ 07:50), Max: 98.3 (07-13-23 @ 07:50)  HR: --  BP: --  BP(mean): --  RR: --  SpO2: --    Orthostatic VS  07-13-23 @ 07:50  Lying BP: --/-- HR: --  Sitting BP: 122/75 HR: 52  Standing BP: 117/73 HR: 61  Site: --  Mode: --  Orthostatic VS  07-12-23 @ 07:45  Lying BP: --/-- HR: --  Sitting BP: 119/77 HR: 54  Standing BP: 111/78 HR: 65  Site: upper left arm  Mode: electronic

## 2023-07-13 NOTE — BH INPATIENT PSYCHIATRY PROGRESS NOTE - NSBHASSESSSUMMFT_PSY_ALL_CORE
Pt is a 67 yo M domiciled in assisted living facility with hx of schizoaffective d/o (bipolar type), multiple past psych hospitalizations, 1 past SA in 1992 by cutting wrists, and remote substance use (alcohol use leading to 3 DUIs and 1 year-long [facility] sentence, cocaine use 20 years ago), presenting to [facility] as transfer from [facility] for ECT evaluation due to increasing paranoia and poor self-care. Continued inpatient admission required for safety and ECT assessment.     working diagnosis: SAD bipolar type + current depressive episode w/ psychosis.     7/8epressed with paranoid delusions that he will be arrested and passive SI. Dential consult says he can have standard bite block. Will send for ECT 7/10 cont to push fluids consider IV fluids during ECT if needed  7/9 Depressed psychotic passive no active Si. For ECT in AM  7/10 Continues to endorse depressive mood related to paranoid beliefs and SI w/o plan or intent on the unit. ECT planned for today, however, patient not compliant to NPO and ECT cancelled. Will go for ECT tomorrow.   7/11: remains perseverative about paranoid delusions with depressed mood and SI w/o plan or intent. Patient reports anxiety related to ECT treatment today. Provided psychoeducation about ECT. ECT#1 today.    7/12: continues to endorse depressive mood with hopelessness, anxiety and SI w/o plan or intent related to paranoid beliefs. Tolerating well ECT treatment, Friday ECT#2. No physical complaints, VS stable.   7/13: continues to present depressive mood with some hopelessness regarding treatment, but receptive to continuing ECT. Tolerating well ECT, tomorrow ECT#2. No physical complaints, VS stable.      Plan:   1. Legal: admitted on 9.13 voluntary status  2. Safety: Reports SI w/o plan or intent. Denies SIB/HI/VI; continue routine observation.  3. Psychiatric:   -Prozac 80 mg PO daily for schizoaffective d/o  -Zyprexa 20 mg PO qHS for schizoaffective d/o  -Klonopin 0.5 mg PO BID for anxiety   	- Per ISTOP: Last prescribed Klonopin 0.5 mg #60 on 5/5/23  - Holding home dose of Ambien 12.5 mg QHS  4. Medical:  a) ECT referral  	- CBC, CMP, EKG ordered for 7/4 wnl. Review labs in HIE 5/23: TSH, B12, Folate, lipid panel and A1c WNL.   	- Last UA 5/26 UTI completed antibiotics on 5/31/23. Pending UA.   	- ECT consult ordered and completed. ECT #1 on 7/11/23. ECT#2 7/14.   	- EKG 7/6: WNL.   b) PT consult: fall precautions, recommend ambulating with walker but patient has been refusing.   c) NUT consult: Minced and moist diet with ensure TID.    d) HTN previously on Norvasc 10 mg PO daily. Discontinued on 6/19/23 at [facility] for low BP. On unit BP stable around 130's/90' w/o antihypertensives. Will continue to monitor closely.   5. Collateral: outpatient psychiatrist Dr. Irby on 6/2/23 on [facility] and handoff from Dr. Hernandez. Collateral from sister. See  note.   6. Disposition: When stable.

## 2023-07-13 NOTE — BH INPATIENT PSYCHIATRY PROGRESS NOTE - NSBHMETABOLIC_PSY_ALL_CORE_FT
HPI:  79 RHM w/ PMH of HTN, HLD, prostate cancer s/p prostectomy, basal cell skin cancer, GBM (dx in 2018, s/p resection 8/3/18, s/p 2nd resection 19, s/p 3rd resection 2020 s/p multiple does of Temodar admitted to [facility] on 20 w/ worsening of his L. sided weakness. No other complaints. Denied changes in speech, vision, hearing, swallowing, voice quality, numbness/tingling,  balance/room-spinning sensations.    He was seen in Dr. Harman's office on 20 where he was noted to have worsening strength, lethargy and decreased appetitie. He had been tapering his steroids for over a month. Recently tapered from 4mg QD --> 3mg QD ---> 2mg QD.    CT Head on admission 2020, showed- Worsening vasogenic edema involving the R. temporal frontal parietal region again identified increased when compared to previous CT in 2020.     Patient admitted to neurology floor, started on decadron 4mg q6h later increased to 8mg q8h. MR head w/ and w/o contrast with progression of disease, neuro-onc on board, patient to follow up outpatient for initiation of avastin therapy.   Patient also with urinary retention, urology consulted, suspect urinary retention is neurologic in etiology, on clean intermittent catheterization; further followup to continue at rehab.   Patient stable for discharge to acute rehab 20.     Of note--patient was admitted to  rehab after his most recent resection in May 2020.  He was at [facility] from 20  to 20.  At the time of discharge from  rehab, patient was supervision for eating, grooming, and dressing, and contact guard for transfers.  He was able to ambulate 150' with SAC with CG/min assist and to navigate 12 steps with u/l HR with min assist.  SLP at [facility] noted higher level cognitive deficits with mild impulsivity and reduced insight, necessitating supervision while at home. (07 Aug 2020 14:30)      PAST MEDICAL & SURGICAL HISTORY:  Fall, sequela  Bilateral hearing loss, unspecified hearing loss type   activity: Fixed - [employer] [place]    [place]  Type 2 diabetes mellitus without complication, without long-term current use of insulin: stop oral meds  3 weeks ago  Glioblastoma: biopsy 2018  surgery  6 weeks radiation  35 days of oral chemotherapy last 3 weeks  Basal cell carcinoma (BCC) of left lower leg: s/p resection  right lower leg top  left lower leg  Prostate cancer: s/p radical prostatectomy   HLD (hyperlipidemia)  HTN (hypertension)  S/P colonoscopic polypectomy: 3 years benign polyps  S/P tonsillectomy: age 30  H/O bilateral cataract extraction: 3-4 years ago  S/P prostatectomy:   S/P brain surgery: 2018  Lipoma of neck: s/p resection at age 30  Basal cell carcinoma (BCC) of lower leg: s/p resection  History of tonsillectomy: at age 30      Subjective:  Patient noted to be febrile overnight. Also hypoglycemic which humalog standing was dc'd. Patient endorsed to feeling a little better today.     REVIEW OF SYMPTOMS  Positive:  Fever. depressed, left HP, cognitive deficits, urinary retention   Denies: headache, lightheadedness, CP, SOB, abdominal pain, dysuria, nausea, constipation      Vital Signs Last 24 Hrs  T(C): 37.6 (03 Sep 2020 09:20), Max: 38.9 (02 Sep 2020 19:53)  T(F): 99.7 (03 Sep 2020 09:20), Max: 102 (02 Sep 2020 19:53)  HR: 77 (03 Sep 2020 07:42) (77 - 95)  BP: 94/60 (03 Sep 2020 07:42) (94/60 - 121/77)  BP(mean): --  RR: 16 (03 Sep 2020 07:42) (16 - 16)  SpO2: 94% (03 Sep 2020 07:42) (93% - 94%)    PHYSICAL EXAM  Constitutional - NAD, Comfortable  	HEENT - NCAT, EOMI  	Chest -  CTAB   	Cardio - warm and well perfused, RRR, no murmur  	Abdomen -  Soft, NTND  	Extremities - No edema, No calf tenderness   	Neurologic Exam:                 	   Cognitive -   	          Orientation: Awake, Alert,  	      	          Thought:  slow processing, perseverative, agitated  	   Speech - Fluent, Comprehensible,   	   Cranial Nerves - left visual field deficit, left facial weakness, Tongue midline, EOMI, Shoulder shrug intact  	   Motor -  LEFT hemiparesis  	   Sensory - Impaired-- extinguishes on left  	   Coordination - impaired on left  Psychiatric - depressed, anxious      RECENT LABS/IMAGING                        11.3   6.98  )-----------( 119      ( 03 Sep 2020 05:03 )             33.1     09-03    135  |  99  |  37<H>  ----------------------------<  122<H>  3.3<L>   |  29  |  0.81    Ca    8.1<L>      03 Sep 2020 05:03  Mg     2.0         TPro  5.6<L>  /  Alb  2.1<L>  /  TBili  0.8  /  DBili  x   /  AST  21  /  ALT  43  /  AlkPhos  63  09-03      Urinalysis Basic - ( 02 Sep 2020 08:31 )    Color: Yellow / Appearance: Slightly Turbid / S.010 / pH: x  Gluc: x / Ketone: Negative  / Bili: Negative / Urobili: 1   Blood: x / Protein: 30 mg/dL / Nitrite: Positive   Leuk Esterase: Small / RBC: 5-10 /HPF / WBC 26-50 /HPF   Sq Epi: x / Non Sq Epi: Neg.-Few / Bacteria: TNTC /HPF      RADIOLOGY/OTHER RESULTS  < from: CT Chest No Cont (20 @ 12:08) >  IMPRESSION:  Bilateral upper lobe and right middle lobe airspace infiltrates on the basisof pneumonia, pulmonary edema and/or motion artifact. Bibasilar dependent changes.    < end of copied text >      MEDICATIONS  (STANDING):  ALPRAZolam 0.5 milliGRAM(s) Oral at bedtime  ALPRAZolam 0.25 milliGRAM(s) Oral three times a day  aspirin  chewable 81 milliGRAM(s) Oral daily  atorvastatin 20 milliGRAM(s) Oral at bedtime  bethanechol 10 milliGRAM(s) Oral three times a day  bisacodyl 5 milliGRAM(s) Oral every 12 hours  cefepime   IVPB 2000 milliGRAM(s) IV Intermittent every 8 hours  docusate sodium 100 milliGRAM(s) Oral daily  enoxaparin Injectable 40 milliGRAM(s) SubCutaneous daily  escitalopram 10 milliGRAM(s) Oral <User Schedule>  furosemide    Tablet 40 milliGRAM(s) Oral daily  hemorrhoidal Ointment 1 Application(s) Rectal three times a day  levETIRAcetam 1000 milliGRAM(s) Oral two times a day  lidocaine   Patch 1 Patch Transdermal daily  melatonin 3 milliGRAM(s) Oral at bedtime  metoprolol succinate ER 25 milliGRAM(s) Oral daily  pantoprazole    Tablet 40 milliGRAM(s) Oral before breakfast  polyethylene glycol 3350 17 Gram(s) Oral two times a day  saccharomyces boulardii 250 milliGRAM(s) Oral two times a day  senna 2 Tablet(s) Oral at bedtime  triamcinolone 0.1% Cream 1 Application(s) Topical two times a day  vancomycin  IVPB 1000 milliGRAM(s) IV Intermittent every 12 hours    MEDICATIONS  (PRN):  acetaminophen   Tablet .. 650 milliGRAM(s) Oral every 6 hours PRN Temp greater or equal to 38C (100.4F), Mild Pain (1 - 3)  ALPRAZolam 0.25 milliGRAM(s) Oral every 8 hours PRN Anxiety  benzocaine 15 mG/menthol 3.6 mG (Sugar-Free) Lozenge 1 Lozenge Oral two times a day PRN Sore Throat  bisacodyl Suppository 10 milliGRAM(s) Rectal daily PRN Constipation  glucagon  Injectable 1 milliGRAM(s) IntraMuscular once PRN Glucose LESS THAN 70 milligrams/deciliter  ibuprofen  Tablet. 400 milliGRAM(s) Oral every 8 hours PRN Temp greater or equal to 38C (100.4F), Moderate Pain (4 - 6) HPI:  79 RHM w/ PMH of HTN, HLD, prostate cancer s/p prostectomy, basal cell skin cancer, GBM (dx in 2018, s/p resection 8/3/18, s/p 2nd resection 19, s/p 3rd resection 2020 s/p multiple does of Temodar admitted to [facility] on 20 w/ worsening of his L. sided weakness. No other complaints. Denied changes in speech, vision, hearing, swallowing, voice quality, numbness/tingling,  balance/room-spinning sensations.    He was seen in Dr. Harman's office on 20 where he was noted to have worsening strength, lethargy and decreased appetitie. He had been tapering his steroids for over a month. Recently tapered from 4mg QD --> 3mg QD ---> 2mg QD.    CT Head on admission 2020, showed- Worsening vasogenic edema involving the R. temporal frontal parietal region again identified increased when compared to previous CT in 2020.     Patient admitted to neurology floor, started on decadron 4mg q6h later increased to 8mg q8h. MR head w/ and w/o contrast with progression of disease, neuro-onc on board, patient to follow up outpatient for initiation of avastin therapy.   Patient also with urinary retention, urology consulted, suspect urinary retention is neurologic in etiology, on clean intermittent catheterization; further followup to continue at rehab.   Patient stable for discharge to acute rehab 20.     Of note--patient was admitted to  rehab after his most recent resection in May 2020.  He was at [facility] from 20  to 20.  At the time of discharge from  rehab, patient was supervision for eating, grooming, and dressing, and contact guard for transfers.  He was able to ambulate 150' with SAC with CG/min assist and to navigate 12 steps with u/l HR with min assist.  SLP at [facility] noted higher level cognitive deficits with mild impulsivity and reduced insight, necessitating supervision while at home. (07 Aug 2020 14:30)      PAST MEDICAL & SURGICAL HISTORY:  Fall, sequela  Bilateral hearing loss, unspecified hearing loss type   activity: [place] -    [place]  Type 2 diabetes mellitus without complication, without long-term current use of insulin: stop oral meds  3 weeks ago  Glioblastoma: biopsy 2018  surgery  6 weeks radiation  35 days of oral chemotherapy last 3 weeks  Basal cell carcinoma (BCC) of left lower leg: s/p resection  right lower leg top  left lower leg  Prostate cancer: s/p radical prostatectomy   HLD (hyperlipidemia)  HTN (hypertension)  S/P colonoscopic polypectomy: 3 years benign polyps  S/P tonsillectomy: age 30  H/O bilateral cataract extraction: 3-4 years ago  S/P prostatectomy:   S/P brain surgery: 2018  Lipoma of neck: s/p resection at age 30  Basal cell carcinoma (BCC) of lower leg: s/p resection  History of tonsillectomy: at age 30      Subjective:  Patient noted to be febrile overnight Tmax 102.  Low grade fever this AM.  Also hypoglycemic which humalog standing was dc'd. Patient lethargic,   No void-- Bladder scan 900s.   States he slept last night.   SpO2 this AM dropped to 85% on RA,  improved with 2 L NC to 94%    REVIEW OF SYMPTOMS  Positive:  Fever. depressed, left HP, cognitive deficits, urinary retention   Denies: headache, lightheadedness, CP, SOB, abdominal pain, dysuria, nausea, constipation      Vital Signs Last 24 Hrs  T(C): 37.6 (03 Sep 2020 09:20), Max: 38.9 (02 Sep 2020 19:53)  T(F): 99.7 (03 Sep 2020 09:20), Max: 102 (02 Sep 2020 19:53)  HR: 77 (03 Sep 2020 07:42) (77 - 95)  BP: 94/60 (03 Sep 2020 07:42) (94/60 - 121/77)  BP(mean): --  RR: 16 (03 Sep 2020 07:42) (16 - 16)  SpO2: 94% (03 Sep 2020 07:42) (93% - 94%)    PHYSICAL EXAM  Constitutional - NAD, Comfortable  	HEENT - NCAT, EOMI  	Chest -  CTAB   	Cardio - warm and well perfused, RRR, no murmur  	Abdomen -  Soft, NTND  	Extremities - No edema, No calf tenderness   	Neurologic Exam:                 	   Cognitive -   	          Orientation: Awake, Alert,  	      	          Thought:  slow processing, perseverative, agitated  	   Speech - Fluent, Comprehensible,   	   Cranial Nerves - left visual field deficit, left facial weakness, Tongue midline, EOMI, Shoulder shrug intact  	   Motor -  LEFT hemiparesis  	   Sensory - Impaired-- extinguishes on left  	   Coordination - impaired on left  Psychiatric - depressed, anxious      RECENT LABS/IMAGING                        11.3   6.98  )-----------( 119      ( 03 Sep 2020 05:03 )             33.1         135  |  99  |  37<H>  ----------------------------<  122<H>  3.3<L>   |  29  |  0.81    Ca    8.1<L>      03 Sep 2020 05:03  Mg     2.0         TPro  5.6<L>  /  Alb  2.1<L>  /  TBili  0.8  /  DBili  x   /  AST  21  /  ALT  43  /  AlkPhos  63        Urinalysis Basic - ( 02 Sep 2020 08:31 )    Color: Yellow / Appearance: Slightly Turbid / S.010 / pH: x  Gluc: x / Ketone: Negative  / Bili: Negative / Urobili: 1   Blood: x / Protein: 30 mg/dL / Nitrite: Positive   Leuk Esterase: Small / RBC: 5-10 /HPF / WBC 26-50 /HPF   Sq Epi: x / Non Sq Epi: Neg.-Few / Bacteria: TNTC /HPF      RADIOLOGY/OTHER RESULTS  < from: CT Chest No Cont (20 @ 12:08) >  IMPRESSION:  Bilateral upper lobe and right middle lobe airspace infiltrates on the basisof pneumonia, pulmonary edema and/or motion artifact. Bibasilar dependent changes.    < end of copied text >      MEDICATIONS  (STANDING):  ALPRAZolam 0.5 milliGRAM(s) Oral at bedtime  ALPRAZolam 0.25 milliGRAM(s) Oral three times a day  aspirin  chewable 81 milliGRAM(s) Oral daily  atorvastatin 20 milliGRAM(s) Oral at bedtime  bethanechol 10 milliGRAM(s) Oral three times a day  bisacodyl 5 milliGRAM(s) Oral every 12 hours  cefepime   IVPB 2000 milliGRAM(s) IV Intermittent every 8 hours  docusate sodium 100 milliGRAM(s) Oral daily  enoxaparin Injectable 40 milliGRAM(s) SubCutaneous daily  escitalopram 10 milliGRAM(s) Oral <User Schedule>  furosemide    Tablet 40 milliGRAM(s) Oral daily  hemorrhoidal Ointment 1 Application(s) Rectal three times a day  levETIRAcetam 1000 milliGRAM(s) Oral two times a day  lidocaine   Patch 1 Patch Transdermal daily  melatonin 3 milliGRAM(s) Oral at bedtime  metoprolol succinate ER 25 milliGRAM(s) Oral daily  pantoprazole    Tablet 40 milliGRAM(s) Oral before breakfast  polyethylene glycol 3350 17 Gram(s) Oral two times a day  saccharomyces boulardii 250 milliGRAM(s) Oral two times a day  senna 2 Tablet(s) Oral at bedtime  triamcinolone 0.1% Cream 1 Application(s) Topical two times a day  vancomycin  IVPB 1000 milliGRAM(s) IV Intermittent every 12 hours    MEDICATIONS  (PRN):  acetaminophen   Tablet .. 650 milliGRAM(s) Oral every 6 hours PRN Temp greater or equal to 38C (100.4F), Mild Pain (1 - 3)  ALPRAZolam 0.25 milliGRAM(s) Oral every 8 hours PRN Anxiety  benzocaine 15 mG/menthol 3.6 mG (Sugar-Free) Lozenge 1 Lozenge Oral two times a day PRN Sore Throat  bisacodyl Suppository 10 milliGRAM(s) Rectal daily PRN Constipation  glucagon  Injectable 1 milliGRAM(s) IntraMuscular once PRN Glucose LESS THAN 70 milligrams/deciliter  ibuprofen  Tablet. 400 milliGRAM(s) Oral every 8 hours PRN Temp greater or equal to 38C (100.4F), Moderate Pain (4 - 6) BMI: BMI (kg/m2): 22.7 (07-07-23 @ 17:08)  HbA1c:   Glucose:   BP: 145/85 (07-11-23 @ 12:15) (130/85 - 154/88)  Lipid Panel:

## 2023-07-13 NOTE — BH INPATIENT PSYCHIATRY PROGRESS NOTE - NSBHASSESSSUMMFT_PSY_ALL_CORE
Pt is a 67 yo M domiciled in assisted living facility with hx of schizoaffective d/o (bipolar type), multiple past psych hospitalizations, 1 past SA in 1992 by cutting wrists, and remote substance use (alcohol use leading to 3 DUIs and 1 year-long retirement sentence, cocaine use 20 years ago), presenting to [facility] 2S as transfer from [facility] for ECT evaluation due to increasing paranoia and poor self-care. Continued inpatient admission required for safety and ECT assessment.     working diagnosis: SAD bipolar type + current depressive episode w/ psychosis.     7/8epressed with paranoid delusions that he will be arrested and passive SI. Dential consult says he can have standard bite block. Will send for ECT 7/10 cont to push fluids consider IV fluids during ECT if needed  7/9 Depressed psychotic passive no active Si. For ECT in AM  7/10 Continues to endorse depressive mood related to paranoid beliefs and SI w/o plan or intent on the unit. ECT planned for today, however, patient not compliant to NPO and ECT cancelled. Will go for ECT tomorrow.   7/11: remains perseverative about paranoid delusions with depressed mood and SI w/o plan or intent. Patient reports anxiety related to ECT treatment today. Provided psychoeducation about ECT. ECT#1 today.    7/12: continues to endorse depressive mood with hopelessness, anxiety and SI w/o plan or intent related to paranoid beliefs. Tolerating well ECT treatment, Friday ECT#2. No physical complaints, VS stable.   7/13: continues to present depressive mood with some hopelessness regarding treatment, but receptive to continuing ECT. Tolerating well ECT, tomorrow ECT#2. No physical complaints, VS stable.      Plan:   1. Legal: admitted on 9.13 voluntary status  2. Safety: Reports SI w/o plan or intent. Denies SIB/HI/VI; continue routine observation.  3. Psychiatric:   -Prozac 80 mg PO daily for schizoaffective d/o  -Zyprexa 20 mg PO qHS for schizoaffective d/o  -Klonopin 0.5 mg PO BID for anxiety   	- Per ISTOP: Last prescribed Klonopin 0.5 mg #60 on 5/5/23  - Holding home dose of Ambien 12.5 mg QHS  4. Medical:  a) ECT referral  	- CBC, CMP, EKG ordered for 7/4 wnl. Review labs in HIE 5/23: TSH, B12, Folate, lipid panel and A1c WNL.   	- Last UA 5/26 UTI completed antibiotics on 5/31/23. Pending UA.   	- ECT consult ordered and completed. ECT #1 on 7/11/23. ECT#2 7/14.   	- EKG 7/6: WNL.   b) PT consult: fall precautions, recommend ambulating with walker but patient has been refusing.   c) NUT consult: Minced and moist diet with ensure TID.    d) HTN previously on Norvasc 10 mg PO daily. Discontinued on 6/19/23 at [facility] for low BP. On unit BP stable around 130's/90' w/o antihypertensives. Will continue to monitor closely.   5. Collateral: outpatient psychiatrist Dr. Irby on 6/2/23 on [facility] and handoff from Dr. Hernandez. Collateral from sister. See  note.   6. Disposition: When stable.

## 2023-07-13 NOTE — BH INPATIENT PSYCHIATRY PROGRESS NOTE - NSBHFUPINTERVALHXFT_PSY_A_CORE
Chart reviewed. Case discussed with interdisciplinary team. Patient seen and examined for follow up of psychosis and depression. No acute events overnight. Compliant with standing medication. No PRN requested or required. Per sleep log, fair sleep.    Patient remains isolative in his room. Patient reports feeling "the same today" with depressive mood and anxiety related to belief he is going to prison. Patient states feeling hopeless about the treatment stating "I know this feeling is not going to go away with anything", and not feeling any improvement with ECT session. Provided psychoeducation about ECT and patient is receptive. Patient continues to endorse SI w/o plan or intent. Patient continues to report fragment sleep despite adequate sleep log. Patient reports eating adequately and denies any physical complaint.

## 2023-07-14 PROCEDURE — 90870 ELECTROCONVULSIVE THERAPY: CPT

## 2023-07-14 PROCEDURE — 99232 SBSQ HOSP IP/OBS MODERATE 35: CPT | Mod: GC,25

## 2023-07-14 RX ORDER — FLUMAZENIL 0.1 MG/ML
0.2 VIAL (ML) INTRAVENOUS ONCE
Refills: 0 | Status: COMPLETED | OUTPATIENT
Start: 2023-07-14 | End: 2023-07-14

## 2023-07-14 RX ORDER — CLONAZEPAM 1 MG
0.5 TABLET ORAL
Refills: 0 | Status: DISCONTINUED | OUTPATIENT
Start: 2023-07-14 | End: 2023-07-21

## 2023-07-14 RX ADMIN — Medication 0.5 MILLIGRAM(S): at 07:39

## 2023-07-14 RX ADMIN — Medication 80 MILLIGRAM(S): at 07:39

## 2023-07-14 RX ADMIN — OLANZAPINE 20 MILLIGRAM(S): 15 TABLET, FILM COATED ORAL at 21:30

## 2023-07-14 RX ADMIN — Medication 0.2 MILLIGRAM(S): at 08:02

## 2023-07-14 RX ADMIN — Medication 0.5 MILLIGRAM(S): at 21:29

## 2023-07-14 NOTE — BH INPATIENT PSYCHIATRY PROGRESS NOTE - NSBHMETABOLIC_PSY_ALL_CORE_FT
BMI: BMI (kg/m2): 22.7 (07-07-23 @ 17:08)  HbA1c:   Glucose:   BP: 145/86 (07-14-23 @ 08:50) (138/86 - 154/86)  Lipid Panel:

## 2023-07-14 NOTE — BH INPATIENT PSYCHIATRY PROGRESS NOTE - NSBHFUPINTERVALHXFT_PSY_A_CORE
Chart reviewed. Case discussed with interdisciplinary team. Patient seen and examined for follow up of psychosis and depression. No acute events overnight. Compliant with standing medication. No PRN requested or required. Per sleep log, fair sleep.    Patient seen on the day room, having breakfast after ECT#2. Patient reports feeling ok, enjoying his breakfast and appropriately smiling occasionally. Patient endorse mood is still depressed and anxious with ongoing beliefs about going to [facility]. Patient states being anxious about being "around people" as he doesn't known other patient but denies any paranoid beliefs towards staff or peers. Patient denies any AH/VH or referential thinking. Patient continues to reports fragmented sleep beliefs related to delusions. Patient denies any self harm thoughts, passive or active SI/HI. Denies any physical complaints or medication side effects.

## 2023-07-14 NOTE — BH INPATIENT PSYCHIATRY PROGRESS NOTE - NSBHCHARTREVIEWVS_PSY_A_CORE FT
Vital Signs Last 24 Hrs  T(C): 36.7 (07-14-23 @ 08:50), Max: 36.7 (07-14-23 @ 08:50)  T(F): 98 (07-14-23 @ 08:50), Max: 98 (07-14-23 @ 08:50)  HR: 51 (07-14-23 @ 08:50) (50 - 61)  BP: 145/86 (07-14-23 @ 08:50) (138/86 - 154/86)  BP(mean): --  RR: 18 (07-14-23 @ 08:50) (16 - 18)  SpO2: 100% (07-14-23 @ 08:50) (99% - 100%)    Orthostatic VS  07-14-23 @ 09:20  Lying BP: --/-- HR: --  Sitting BP: 154/83 HR: 57  Standing BP: 149/91 HR: 60  Site: --  Mode: --  Orthostatic VS  07-14-23 @ 06:19  Lying BP: --/-- HR: --  Sitting BP: 134/82 HR: 55  Standing BP: 134/89 HR: 68  Site: upper left arm  Mode: electronic  Orthostatic VS  07-13-23 @ 07:50  Lying BP: --/-- HR: --  Sitting BP: 122/75 HR: 52  Standing BP: 117/73 HR: 61  Site: --  Mode: --

## 2023-07-14 NOTE — BH INPATIENT PSYCHIATRY PROGRESS NOTE - NSBHASSESSSUMMFT_PSY_ALL_CORE
Pt is a 69 yo M domiciled in assisted living facility with hx of schizoaffective d/o (bipolar type), multiple past psych hospitalizations, 1 past SA in 1992 by cutting wrists, and remote substance use (alcohol use leading to 3 DUIs and 1 year-long alf sentence, cocaine use 20 years ago), presenting to [facility] 2S as transfer from [facility] for ECT evaluation due to increasing paranoia and poor self-care. Continued inpatient admission required for safety and ECT assessment.     working diagnosis: SAD bipolar type + current depressive episode w/ psychosis.     7/8epressed with paranoid delusions that he will be arrested and passive SI. Dential consult says he can have standard bite block. Will send for ECT 7/10 cont to push fluids consider IV fluids during ECT if needed  7/9 Depressed psychotic passive no active Si. For ECT in AM  7/10 Continues to endorse depressive mood related to paranoid beliefs and SI w/o plan or intent on the unit. ECT planned for today, however, patient not compliant to NPO and ECT cancelled. Will go for ECT tomorrow.   7/11: remains perseverative about paranoid delusions with depressed mood and SI w/o plan or intent. Patient reports anxiety related to ECT treatment today. Provided psychoeducation about ECT. ECT#1 today.    7/12: continues to endorse depressive mood with hopelessness, anxiety and SI w/o plan or intent related to paranoid beliefs. Tolerating well ECT treatment, Friday ECT#2. No physical complaints, VS stable.   7/13: continues to present depressive mood with some hopelessness regarding treatment, but receptive to continuing ECT. Tolerating well ECT, tomorrow ECT#2. No physical complaints, VS stable.    7/14: continues to endorse depressed mood and anxiety, though present small improvement in affect and less perseverative about paranoid delusions. Tolerating well ECT, today #2. Next on 7/17. No physical complaints, VS stable.      Plan:   1. Legal: admitted on 9.13 voluntary status  2. Safety: Reports SI w/o plan or intent. Denies SIB/HI/VI; continue routine observation.  3. Psychiatric:   -Prozac 80 mg PO daily for schizoaffective d/o  -Zyprexa 20 mg PO qHS for schizoaffective d/o  -Klonopin 0.5 mg PO BID for anxiety   	- Per ISTOP: Last prescribed Klonopin 0.5 mg #60 on 5/5/23  - Holding home dose of Ambien 12.5 mg QHS  4. Medical:  a) ECT referral  	- CBC, CMP, EKG ordered for 7/4 wnl. Review labs in HIE 5/23: TSH, B12, Folate, lipid panel and A1c WNL.   	- Last UA 5/26 UTI completed antibiotics on 5/31/23. Pending UA.   	- ECT consult ordered and completed. ECT #1 on 7/11/23. ECT#2 7/14. ECT#3 on 7/17.   	- EKG 7/6: WNL.   b) PT consult: fall precautions, recommend ambulating with walker but patient has been refusing.   c) NUT consult: Minced and moist diet with ensure TID.    d) HTN previously on Norvasc 10 mg PO daily. Discontinued on 6/19/23 at [facility] for low BP. On unit BP stable around 130's/90' w/o antihypertensives. Will continue to monitor closely.   5. Collateral: outpatient psychiatrist Dr. Irby on 6/2/23 on [facility] and handoff from Dr. Hernandez. Collateral from sister. See  note.   6. Disposition: When stable.

## 2023-07-14 NOTE — BH INPATIENT PSYCHIATRY PROGRESS NOTE - MSE UNSTRUCTURED FT
APPEARANCE: Adult male who appears stated age, in no acute distress; dressed in hospital attire; disheveled.   BEHAVIOR: Calm and cooperative; fair eye contact, poorly related.  SPEECH: Spontaneous and fluent; normal rate, rhythm, tone, and volume.   MOTOR: psychomotor retardation; no tremor; no abnormal movements. Unstable gait with R hip abnormality, refuses to use a walker.   MOOD: "still depressed and anxious"  AFFECT: anxious and depressed but with small improvement in affect, smiling occasionally.   THOUGHT PROCESS: Linear and goal directed.   THOUGHT CONTENT: Perseveration of paranoid delusions (going to correction) and themes around being anxious around people. Denies suicidal or homicidal ideation, intent, or plan.  PERCEPTION: Denies auditory or visual hallucinations.  COGNITION: Grossly intact.  INSIGHT: Poor.  JUDGMENT: Poor.  IMPULSE CONTROL: Fair.

## 2023-07-15 PROCEDURE — 99232 SBSQ HOSP IP/OBS MODERATE 35: CPT

## 2023-07-15 RX ADMIN — OLANZAPINE 20 MILLIGRAM(S): 15 TABLET, FILM COATED ORAL at 20:47

## 2023-07-15 RX ADMIN — Medication 80 MILLIGRAM(S): at 08:49

## 2023-07-15 RX ADMIN — Medication 0.5 MILLIGRAM(S): at 20:46

## 2023-07-15 RX ADMIN — Medication 0.5 MILLIGRAM(S): at 08:49

## 2023-07-15 NOTE — BH INPATIENT PSYCHIATRY PROGRESS NOTE - MSE UNSTRUCTURED FT
APPEARANCE: Adult male who appears stated age, in no acute distress; dressed in hospital attire; disheveled.   BEHAVIOR: Calm and cooperative; fair eye contact, superficially related.  SPEECH: relevant and fluent; normal rate, rhythm, tone, and volume.   MOTOR: mild psychomotor retardation; no tremor; no abnormal movements. Unstable gait with R hip abnormality, refuses to use a walker.   MOOD: "depressed and anxious"  AFFECT: anxious and depressed but with small improvement in affect, smiling occasionally.   THOUGHT PROCESS: Linear and goal directed.   THOUGHT CONTENT: Perseveration of paranoid delusions (going to [facility]) and themes around being anxious around people. Denies suicidal or homicidal ideation, intent, or plan.  PERCEPTION: Denies auditory or visual hallucinations.  COGNITION: Grossly intact.  INSIGHT: Poor.  JUDGMENT: Poor.  IMPULSE CONTROL: Fair.

## 2023-07-15 NOTE — BH INPATIENT PSYCHIATRY PROGRESS NOTE - NSBHCHARTREVIEWVS_PSY_A_CORE FT
Vital Signs Last 24 Hrs  T(C): 36.8 (07-15-23 @ 07:37), Max: 36.8 (07-15-23 @ 07:37)  T(F): 98.2 (07-15-23 @ 07:37), Max: 98.2 (07-15-23 @ 07:37)  HR: --  BP: --  BP(mean): --  RR: --  SpO2: --    Orthostatic VS  07-15-23 @ 07:37  Lying BP: --/-- HR: --  Sitting BP: 115/80 HR: 68  Standing BP: 112/76 HR: 69  Site: --  Mode: --  Orthostatic VS  07-14-23 @ 09:20  Lying BP: --/-- HR: --  Sitting BP: 154/83 HR: 57  Standing BP: 149/91 HR: 60  Site: --  Mode: --  Orthostatic VS  07-14-23 @ 06:19  Lying BP: --/-- HR: --  Sitting BP: 134/82 HR: 55  Standing BP: 134/89 HR: 68  Site: upper left arm  Mode: electronic

## 2023-07-15 NOTE — BH INPATIENT PSYCHIATRY PROGRESS NOTE - NSBHASSESSSUMMFT_PSY_ALL_CORE
Pt is a 69 yo M domiciled in assisted living facility with hx of schizoaffective d/o (bipolar type), multiple past psych hospitalizations, 1 past SA in 1992 by cutting wrists, and remote substance use (alcohol use leading to 3 DUIs and 1 year-long retirement sentence, cocaine use 20 years ago), presenting to [facility] 2S as transfer from [facility] for ECT evaluation due to increasing paranoia and poor self-care. Continued inpatient admission required for safety and ECT assessment.     working diagnosis: SAD bipolar type + current depressive episode w/ psychosis.     7/8epressed with paranoid delusions that he will be arrested and passive SI. Dential consult says he can have standard bite block. Will send for ECT 7/10 cont to push fluids consider IV fluids during ECT if needed  7/9 Depressed psychotic passive no active Si. For ECT in AM  7/10 Continues to endorse depressive mood related to paranoid beliefs and SI w/o plan or intent on the unit. ECT planned for today, however, patient not compliant to NPO and ECT cancelled. Will go for ECT tomorrow.   7/11: remains perseverative about paranoid delusions with depressed mood and SI w/o plan or intent. Patient reports anxiety related to ECT treatment today. Provided psychoeducation about ECT. ECT#1 today.    7/12: continues to endorse depressive mood with hopelessness, anxiety and SI w/o plan or intent related to paranoid beliefs. Tolerating well ECT treatment, Friday ECT#2. No physical complaints, VS stable.   7/13: continues to present depressive mood with some hopelessness regarding treatment, but receptive to continuing ECT. Tolerating well ECT, tomorrow ECT#2. No physical complaints, VS stable.    7/14: continues to endorse depressed mood and anxiety, though present small improvement in affect and less perseverative about paranoid delusions. Tolerating well ECT, today #2. Next on 7/17. No physical complaints, VS stable.   7/15 dysphoric, withdrawn, superficial, no SI/HI.      Plan:   1. Legal: admitted on 9.13 voluntary status  2. Safety: Reports SI w/o plan or intent. Denies SIB/HI/VI; continue routine observation.  3. Psychiatric:   -Prozac 80 mg PO daily for schizoaffective d/o  -Zyprexa 20 mg PO qHS for schizoaffective d/o  -Klonopin 0.5 mg PO BID for anxiety   	- Per ISTOP: Last prescribed Klonopin 0.5 mg #60 on 5/5/23  - Holding home dose of Ambien 12.5 mg QHS  4. Medical:  a) ECT referral  	- CBC, CMP, EKG ordered for 7/4 wnl. Review labs in HIE 5/23: TSH, B12, Folate, lipid panel and A1c WNL.   	- Last UA 5/26 UTI completed antibiotics on 5/31/23. Pending UA.   	- ECT consult ordered and completed. ECT #1 on 7/11/23. ECT#2 7/14. ECT#3 on 7/17.   	- EKG 7/6: WNL.   b) PT consult: fall precautions, recommend ambulating with walker but patient has been refusing.   c) NUT consult: Minced and moist diet with ensure TID.    d) HTN previously on Norvasc 10 mg PO daily. Discontinued on 6/19/23 at [facility] for low BP. On unit BP stable around 130's/90' w/o antihypertensives. Will continue to monitor closely.   5. Collateral: outpatient psychiatrist Dr. Irby on 6/2/23 on [facility] and handoff from Dr. Hernandez. Collateral from sister. See  note.   6. Disposition: When stable.

## 2023-07-15 NOTE — BH INPATIENT PSYCHIATRY PROGRESS NOTE - NSBHFUPINTERVALHXFT_PSY_A_CORE
Chart reviewed. Case discussed with nursing team. Patient seen and examined for follow up of psychosis and depression. No acute events overnight. Compliant with standing medication. No PRN requested or required. On exam, Patient reports feeling depressed, when asked to clarify, he reports everything is bothering him.  Reports sleep is not good and appetite is ok. Patient denies any paranoid beliefs, AH/VH or referential thinking. Patient denies any self harm thoughts, passive or active SI/HI. Denies any physical complaints or medication side effects.

## 2023-07-16 RX ADMIN — Medication 80 MILLIGRAM(S): at 08:57

## 2023-07-16 RX ADMIN — Medication 0.5 MILLIGRAM(S): at 08:57

## 2023-07-16 RX ADMIN — OLANZAPINE 20 MILLIGRAM(S): 15 TABLET, FILM COATED ORAL at 20:55

## 2023-07-16 RX ADMIN — Medication 0.5 MILLIGRAM(S): at 20:55

## 2023-07-17 PROCEDURE — 90870 ELECTROCONVULSIVE THERAPY: CPT

## 2023-07-17 PROCEDURE — 99232 SBSQ HOSP IP/OBS MODERATE 35: CPT | Mod: 25,GC

## 2023-07-17 RX ORDER — FLUMAZENIL 0.1 MG/ML
0.2 VIAL (ML) INTRAVENOUS ONCE
Refills: 0 | Status: COMPLETED | OUTPATIENT
Start: 2023-07-17 | End: 2023-07-17

## 2023-07-17 RX ADMIN — Medication 80 MILLIGRAM(S): at 09:08

## 2023-07-17 RX ADMIN — Medication 0.5 MILLIGRAM(S): at 09:08

## 2023-07-17 RX ADMIN — Medication 0.2 MILLIGRAM(S): at 07:56

## 2023-07-17 RX ADMIN — Medication 0.5 MILLIGRAM(S): at 20:53

## 2023-07-17 RX ADMIN — OLANZAPINE 20 MILLIGRAM(S): 15 TABLET, FILM COATED ORAL at 20:52

## 2023-07-17 NOTE — BH INPATIENT PSYCHIATRY PROGRESS NOTE - NSBHASSESSSUMMFT_PSY_ALL_CORE
Pt is a 67 yo M domiciled in assisted living facility with hx of schizoaffective d/o (bipolar type), multiple past psych hospitalizations, 1 past SA in 1992 by cutting wrists, and remote substance use (alcohol use leading to 3 DUIs and 1 year-long care home sentence, cocaine use 20 years ago), presenting to [facility] 2S as transfer from [facility] for ECT evaluation due to increasing paranoia and poor self-care. Continued inpatient admission required for safety and ECT assessment.     working diagnosis: SAD bipolar type + current depressive episode w/ psychosis.     7/11: remains perseverative about paranoid delusions with depressed mood and SI w/o plan or intent. Patient reports anxiety related to ECT treatment today. Provided psychoeducation about ECT. ECT#1 today.    7/12: continues to endorse depressive mood with hopelessness, anxiety and SI w/o plan or intent related to paranoid beliefs. Tolerating well ECT treatment, Friday ECT#2. No physical complaints, VS stable.   7/13: continues to present depressive mood with some hopelessness regarding treatment, but receptive to continuing ECT. Tolerating well ECT, tomorrow ECT#2. No physical complaints, VS stable.    7/14: continues to endorse depressed mood and anxiety, though present small improvement in affect and less perseverative about paranoid delusions. Tolerating well ECT, today #2. Next on 7/17. No physical complaints, VS stable.   7/15 dysphoric, withdrawn, superficial, no SI/HI.    7/17: continues to reports depressed mood with fragmented sleep and mostly isolative, though patient is less perseverative about paranoid delusions with no SIIP/HIIP. Tolerating well ECT today #3. Next on 7/19. No physical complaints, VS stable.     Plan:   1. Legal: admitted on 9.13 voluntary status  2. Safety: Reports SI w/o plan or intent. Denies SIB/HI/VI; continue routine observation.  3. Psychiatric:   -Prozac 80 mg PO daily for schizoaffective d/o  -Zyprexa 20 mg PO qHS for schizoaffective d/o  -Klonopin 0.5 mg PO BID for anxiety   	- Per ISTOP: Last prescribed Klonopin 0.5 mg #60 on 5/5/23  - Holding home dose of Ambien 12.5 mg QHS  4. Medical:  a) ECT referral  	- CBC, CMP, EKG ordered for 7/4 wnl. Review labs in HIE 5/23: TSH, B12, Folate, lipid panel and A1c WNL.   	- Last UA 5/26 UTI completed antibiotics on 5/31/23. Pending UA.   	- ECT consult ordered and completed. ECT #1 on 7/11/23, #2 7/14, #3 on 7/17.   	- EKG 7/6: WNL.   b) PT consult: fall precautions, recommend ambulating with walker but patient has been refusing.   c) NUT consult: Minced and moist diet with ensure TID.    d) HTN previously on Norvasc 10 mg PO daily. Discontinued on 6/19/23 at [facility] for low BP. On unit BP stable around 130's/90' w/o antihypertensives. Will continue to monitor closely.   5. Collateral: outpatient psychiatrist Dr. Irby on 6/2/23 on [facility] and handoff from Dr. Hernandez. Collateral from sister. See  note.   6. Disposition: When stable.      Pt is a 69 yo M domiciled in assisted living facility with hx of schizoaffective d/o (bipolar type), multiple past psych hospitalizations, 1 past SA in 1992 by cutting wrists, and remote substance use (alcohol use leading to 3 DUIs and 1 year-long group home sentence, cocaine use 20 years ago), presenting to [facility] 2S as transfer from [facility] for ECT evaluation due to increasing paranoia and poor self-care. Continued inpatient admission required for safety and ECT assessment.     working diagnosis: SAD bipolar type + current depressive episode w/ psychosis.     7/11: remains perseverative about paranoid delusions with depressed mood and SI w/o plan or intent. Patient reports anxiety related to ECT treatment today. Provided psychoeducation about ECT. ECT#1 today.    7/12: continues to endorse depressive mood with hopelessness, anxiety and SI w/o plan or intent related to paranoid beliefs. Tolerating well ECT treatment, Friday ECT#2. No physical complaints, VS stable.   7/13: continues to present depressive mood with some hopelessness regarding treatment, but receptive to continuing ECT. Tolerating well ECT, tomorrow ECT#2. No physical complaints, VS stable.    7/14: continues to endorse depressed mood and anxiety, though present small improvement in affect and less perseverative about paranoid delusions. Tolerating well ECT, today #2. Next on 7/17. No physical complaints, VS stable.   7/15 dysphoric, withdrawn, superficial, no SI/HI.    7/17: continues to reports depressed mood with fragmented sleep and mostly isolative, though patient is less perseverative about paranoid delusions with no SIIP/HIIP. Tolerating well ECT today #3. Next on 7/19. No physical complaints, VS stable.     Plan:   1. Legal: admitted on 9.13 voluntary status  2. Safety:  Denies SI/SIB/HI/VI; continue routine observation.  3. Psychiatric:   -Prozac 80 mg PO daily for schizoaffective d/o  -Zyprexa 20 mg PO qHS for schizoaffective d/o  -Klonopin 0.5 mg PO BID for anxiety   	- Per ISTOP: Last prescribed Klonopin 0.5 mg #60 on 5/5/23  - Holding home dose of Ambien 12.5 mg QHS  4. Medical:  a) ECT referral  	- CBC, CMP, EKG ordered for 7/4 wnl. Review labs in HIE 5/23: TSH, B12, Folate, lipid panel and A1c WNL.   	- Last UA 5/26 UTI completed antibiotics on 5/31/23. Pending UA.   	- ECT consult ordered and completed. ECT #1 on 7/11/23, #2 7/14, #3 on 7/17.   	- EKG 7/6: WNL.   b) PT consult: fall precautions, recommend ambulating with walker but patient has been refusing.   c) NUT consult: Minced and moist diet with ensure TID.    d) HTN previously on Norvasc 10 mg PO daily. Discontinued on 6/19/23 at [facility] for low BP. On unit BP stable around 130's/90' w/o antihypertensives. Will continue to monitor closely.   5. Collateral: outpatient psychiatrist Dr. Irby on 6/2/23 on [facility] and handoff from Dr. Hernandez. Collateral from sister. See  note.   6. Disposition: When stable.

## 2023-07-17 NOTE — BH INPATIENT PSYCHIATRY PROGRESS NOTE - NSBHMETABOLIC_PSY_ALL_CORE_FT
BMI: BMI (kg/m2): 22.7 (07-07-23 @ 17:08)  HbA1c:   Glucose:   BP: 136/84 (07-17-23 @ 09:07) (133/89 - 147/84)  Lipid Panel:

## 2023-07-17 NOTE — BH INPATIENT PSYCHIATRY PROGRESS NOTE - NSBHCHARTREVIEWVS_PSY_A_CORE FT
Vital Signs Last 24 Hrs  T(C): 36.6 (07-17-23 @ 08:45), Max: 36.6 (07-17-23 @ 07:44)  T(F): 97.8 (07-17-23 @ 08:45), Max: 97.8 (07-17-23 @ 07:44)  HR: 50 (07-17-23 @ 08:45) (50 - 59)  BP: 136/84 (07-17-23 @ 09:07) (133/89 - 147/84)  BP(mean): 60 (07-17-23 @ 09:07) (60 - 60)  RR: 16 (07-17-23 @ 08:45) (16 - 22)  SpO2: 100% (07-17-23 @ 08:45) (97% - 100%)    Orthostatic VS  07-17-23 @ 05:26  Lying BP: --/-- HR: --  Sitting BP: 147/95 HR: 53  Standing BP: 135/92 HR: 72  Site: --  Mode: --  Orthostatic VS  07-16-23 @ 07:57  Lying BP: --/-- HR: --  Sitting BP: 122/75 HR: 53  Standing BP: 121/75 HR: 67  Site: --  Mode: --

## 2023-07-17 NOTE — BH INPATIENT PSYCHIATRY PROGRESS NOTE - MSE UNSTRUCTURED FT
APPEARANCE: Adult male who appears stated age, in no acute distress; dressed in hospital attire; disheveled.   BEHAVIOR: Calm and cooperative; fair eye contact, superficially related.  SPEECH: relevant and fluent; normal rate, rhythm, tone, and volume.   MOTOR: mild psychomotor retardation; no tremor; no abnormal movements. Unstable gait with R hip abnormality, refuses to use a walker.   MOOD: "depressed and anxious"  AFFECT: anxious and depressed but with small improvement in affect, smiling occasionally.   THOUGHT PROCESS: Linear and goal directed.   THOUGHT CONTENT: Perseveration of paranoid delusions (going to [facility]) and themes around being anxious around people. Denies suicidal or homicidal ideation, intent, or plan.  PERCEPTION: Denies auditory or visual hallucinations.  COGNITION: Grossly intact.  INSIGHT: Poor.  JUDGMENT: Poor.  IMPULSE CONTROL: Fair. APPEARANCE: Adult male who appears stated age, in no acute distress; dressed in hospital attire; disheveled.   BEHAVIOR: Calm and cooperative; fair eye contact, superficially related.  SPEECH: relevant and fluent; normal rate, rhythm, tone, and volume.   MOTOR: mild psychomotor retardation; no tremor; no abnormal movements. Unstable gait with R hip abnormality, refuses to use a walker.   MOOD: "depressed"  AFFECT: depressed but with small improvement in affect, smiling occasionally.   THOUGHT PROCESS: Linear and goal directed.   THOUGHT CONTENT: less perseveration of paranoid delusions (going to custodial). Denies suicidal or homicidal ideation, intent, or plan.  PERCEPTION: Denies auditory or visual hallucinations.  COGNITION: Grossly intact.  INSIGHT: Poor.  JUDGMENT: Poor.  IMPULSE CONTROL: Fair.

## 2023-07-17 NOTE — BH INPATIENT PSYCHIATRY PROGRESS NOTE - NSBHFUPINTERVALHXFT_PSY_A_CORE
Chart reviewed. Case discussed with interdisciplinary team. Patient seen and examined for follow up of psychosis and depression. No acute events overnight. Compliant with standing medication. No PRN requested or required. Per sleep log, fair sleep.    Patient seen on the day room after ECT#3. Patient reports feeling "the same" with depressed mood related to beliefs he will go to correction though is less perseverative. Patient reports eating fine, but states poor sleep though is unable to state reason saying "I don't know". Patient denies any AH/VH or referential thinking. Patient denies any self harm thoughts, passive or active SI/HI. Denies any physical complaints or medication side effects.

## 2023-07-18 PROCEDURE — 99232 SBSQ HOSP IP/OBS MODERATE 35: CPT | Mod: GC

## 2023-07-18 RX ADMIN — OLANZAPINE 20 MILLIGRAM(S): 15 TABLET, FILM COATED ORAL at 20:34

## 2023-07-18 RX ADMIN — Medication 80 MILLIGRAM(S): at 09:12

## 2023-07-18 RX ADMIN — Medication 0.5 MILLIGRAM(S): at 09:12

## 2023-07-18 RX ADMIN — Medication 0.5 MILLIGRAM(S): at 20:34

## 2023-07-18 NOTE — BH INPATIENT PSYCHIATRY PROGRESS NOTE - NSBHASSESSSUMMFT_PSY_ALL_CORE
Pt is a 69 yo M domiciled in assisted living facility with hx of schizoaffective d/o (bipolar type), multiple past psych hospitalizations, 1 past SA in 1992 by cutting wrists, and remote substance use (alcohol use leading to 3 DUIs and 1 year-long custodial sentence, cocaine use 20 years ago), presenting to [facility] 2S as transfer from [facility] for ECT evaluation due to increasing paranoia and poor self-care. Continued inpatient admission required for safety and ECT assessment.     working diagnosis: SAD bipolar type + current depressive episode w/ psychosis.       7/15 dysphoric, withdrawn, superficial, no SI/HI.    7/17: continues to reports depressed mood with fragmented sleep and mostly isolative, though patient is less perseverative about paranoid delusions with no SIIP/HIIP. Tolerating well ECT today #3. Next on 7/19. No physical complaints, VS stable.   7/18: continues to endorse depressed mood with concerns about future living situation though is less perseverative about paranoid delusions with no SIP/HIIP. Tolerating well ECT, next on 7/19. No physical complaints, VS stable.     Plan:   1. Legal: admitted on 9.13 voluntary status  2. Safety:  Denies SI/SIB/HI/VI; continue routine observation.  3. Psychiatric:   -Prozac 80 mg PO daily for schizoaffective d/o  -Zyprexa 20 mg PO qHS for schizoaffective d/o  -Klonopin 0.5 mg PO BID for anxiety   	- Per ISTOP: Last prescribed Klonopin 0.5 mg #60 on 5/5/23  - Holding home dose of Ambien 12.5 mg QHS  4. Medical:  a) ECT referral  	- CBC, CMP, EKG ordered for 7/4 wnl. Review labs in HIE 5/23: TSH, B12, Folate, lipid panel and A1c WNL.   	- Last UA 5/26 UTI completed antibiotics on 5/31/23. Pending UA.   	- ECT consult ordered and completed. ECT #1 on 7/11/23, #2 7/14, #3 on 7/17.   	- EKG 7/6: WNL.   b) PT consult: fall precautions, recommend ambulating with walker but patient has been refusing.   c) NUT consult: Minced and moist diet with ensure TID.    d) HTN previously on Norvasc 10 mg PO daily. Discontinued on 6/19/23 at [facility] for low BP. On unit BP stable around 130's/90' w/o antihypertensives. Will continue to monitor closely.   5. Collateral: outpatient psychiatrist Dr. Irby on 6/2/23 on [facility] and handoff from Dr. Hernandez. Collateral from sister. See  note.   6. Disposition: When stable.

## 2023-07-18 NOTE — BH INPATIENT PSYCHIATRY PROGRESS NOTE - NSBHCHARTREVIEWVS_PSY_A_CORE FT
Vital Signs Last 24 Hrs  T(C): --  T(F): --  HR: --  BP: --  BP(mean): --  RR: --  SpO2: --    Orthostatic VS  07-17-23 @ 05:26  Lying BP: --/-- HR: --  Sitting BP: 147/95 HR: 53  Standing BP: 135/92 HR: 72  Site: --  Mode: --

## 2023-07-18 NOTE — BH INPATIENT PSYCHIATRY PROGRESS NOTE - MSE UNSTRUCTURED FT
APPEARANCE: Adult male who appears stated age, in no acute distress; dressed in hospital attire.   BEHAVIOR: Calm and cooperative; fair eye contact, superficially related.  SPEECH: relevant and fluent; normal rate, rhythm, tone, and volume.   MOTOR: mild psychomotor retardation; no tremor; no abnormal movements. Unstable gait with R hip abnormality, refuses to use a walker.   MOOD: "same"  AFFECT: constricted affect, stable.   THOUGHT PROCESS: Linear and goal directed.   THOUGHT CONTENT: Themes around future and living situation with somewhat perseveration. Denies suicidal or homicidal ideation, intent, or plan.  PERCEPTION: Denies auditory or visual hallucinations.  COGNITION: Grossly intact.  INSIGHT: Poor.  JUDGMENT: Poor.  IMPULSE CONTROL: Fair.

## 2023-07-18 NOTE — BH INPATIENT PSYCHIATRY PROGRESS NOTE - NSBHFUPINTERVALHXFT_PSY_A_CORE
Chart reviewed. Case discussed with interdisciplinary team. Patient seen and examined for follow up of psychosis and depression. No acute events overnight. Compliant with standing medication. No PRN requested or required. Per sleep log, fair sleep.    Patient reports feeling "the same" with low mood and unable to rest properly as his "mind keeps going in circles". Patient reports concerns about where going "next after the hospital", as he is doesn't not have "anywhere" to go. Discussed about assisted living facility, but patient states he can not get back as previous roommate told him he wont be able to return if leaving. Patient is reassured, but later states having also concerns about previous place was "too expensive" for him. Discussed with patient that SW will provide information about living situation, and patient is receptive. Patient denies any self harm thoughts, passive or active SI/HI. Denies any physical complaint or side effect from medication.

## 2023-07-19 PROCEDURE — 90870 ELECTROCONVULSIVE THERAPY: CPT

## 2023-07-19 PROCEDURE — 99232 SBSQ HOSP IP/OBS MODERATE 35: CPT | Mod: 25,GC

## 2023-07-19 RX ORDER — FLUMAZENIL 0.1 MG/ML
0.2 VIAL (ML) INTRAVENOUS ONCE
Refills: 0 | Status: COMPLETED | OUTPATIENT
Start: 2023-07-19 | End: 2023-07-19

## 2023-07-19 RX ADMIN — Medication 0.2 MILLIGRAM(S): at 08:07

## 2023-07-19 RX ADMIN — OLANZAPINE 20 MILLIGRAM(S): 15 TABLET, FILM COATED ORAL at 21:02

## 2023-07-19 RX ADMIN — Medication 0.5 MILLIGRAM(S): at 21:02

## 2023-07-19 RX ADMIN — Medication 80 MILLIGRAM(S): at 07:47

## 2023-07-19 RX ADMIN — Medication 0.5 MILLIGRAM(S): at 07:47

## 2023-07-19 NOTE — BH INPATIENT PSYCHIATRY PROGRESS NOTE - NSBHCHARTREVIEWVS_PSY_A_CORE FT
Vital Signs Last 24 Hrs  T(C): 36.6 (07-19-23 @ 08:55), Max: 36.7 (07-19-23 @ 07:52)  T(F): 97.9 (07-19-23 @ 08:55), Max: 98 (07-19-23 @ 07:52)  HR: 48 (07-19-23 @ 08:55) (48 - 61)  BP: 147/80 (07-19-23 @ 08:55) (131/77 - 151/85)  BP(mean): --  RR: 16 (07-19-23 @ 08:55) (12 - 16)  SpO2: 99% (07-19-23 @ 08:55) (93% - 99%)    Orthostatic VS  07-19-23 @ 06:36  Lying BP: --/-- HR: --  Sitting BP: 132/78 HR: 64  Standing BP: 129/78 HR: 67  Site: --  Mode: --

## 2023-07-19 NOTE — BH INPATIENT PSYCHIATRY PROGRESS NOTE - NSBHFUPINTERVALHXFT_PSY_A_CORE
Chart reviewed. Case discussed with interdisciplinary team. Patient seen and examined for follow up of psychosis and depression. No acute events overnight. Compliant with standing medication. No PRN requested or required. Per sleep log, fair sleep.    Patient seen after ECT treatment, reports mood is "fine, 50/50" with improved sleep and good appetite. Patient reports talking to SW regarding living situation but states not recalling the details of the conversation though does not mention previous concerns about assisting living facility. Patient reports having ECT today w/o any issues. Patient denies any self harm thoughts, passive or active SI/HI. Denies any physical complaint or side effect from medication.

## 2023-07-19 NOTE — BH INPATIENT PSYCHIATRY PROGRESS NOTE - NSBHASSESSSUMMFT_PSY_ALL_CORE
Pt is a 67 yo M domiciled in assisted living facility with hx of schizoaffective d/o (bipolar type), multiple past psych hospitalizations, 1 past SA in 1992 by cutting wrists, and remote substance use (alcohol use leading to 3 DUIs and 1 year-long [facility] sentence, cocaine use 20 years ago), presenting to [facility] 2S as transfer from [facility] for ECT evaluation due to increasing paranoia and poor self-care. Continued inpatient admission required for safety and ECT assessment.     working diagnosis: SAD bipolar type + current depressive episode w/ psychosis.       7/15 dysphoric, withdrawn, superficial, no SI/HI.    7/17: continues to reports depressed mood with fragmented sleep and mostly isolative, though patient is less perseverative about paranoid delusions with no SIIP/HIIP. Tolerating well ECT today #3. Next on 7/19. No physical complaints, VS stable.   7/18: continues to endorse depressed mood with concerns about future living situation though is less perseverative about paranoid delusions with no SIP/HIIP. Tolerating well ECT, next on 7/19. No physical complaints, VS stable.   7/19: reports small improvement in sxs today with better mood and sleep; and less perseveration about paranoid delusions and no SIIP/HIIP. Tolerating well ECT #4 today, next on 7/24. No physical complaints, VS stable.     Plan:   1. Legal: admitted on 9.13 voluntary status  2. Safety:  Denies SI/SIB/HI/VI; continue routine observation.  3. Psychiatric:   -Prozac 80 mg PO daily for schizoaffective d/o  -Zyprexa 20 mg PO qHS for schizoaffective d/o  -Klonopin 0.5 mg PO BID for anxiety   	- Per ISTOP: Last prescribed Klonopin 0.5 mg #60 on 5/5/23  - Holding home dose of Ambien 12.5 mg QHS  4. Medical:  a) ECT referral  	- CBC, CMP, EKG ordered for 7/4 wnl. Review labs in HIE 5/23: TSH, B12, Folate, lipid panel and A1c WNL.   	- Last UA 5/26 UTI completed antibiotics on 5/31/23. Pending UA.   	- ECT consult ordered and completed. ECT #1 on 7/11/23, #2 7/14, #3 on 7/17, #4 7/19.   	- EKG 7/6: WNL.   b) PT consult: fall precautions, recommend ambulating with walker but patient has been refusing.   c) NUT consult: Minced and moist diet with ensure TID.    d) HTN previously on Norvasc 10 mg PO daily. Discontinued on 6/19/23 at [facility] for low BP. On unit BP stable around 130's/90' w/o antihypertensives. Will continue to monitor closely.   5. Collateral: outpatient psychiatrist Dr. Irby on 6/2/23 on [facility] and handoff from Dr. Hernandez. Collateral from sister. See  note.   6. Disposition: When stable.

## 2023-07-19 NOTE — BH INPATIENT PSYCHIATRY PROGRESS NOTE - MSE UNSTRUCTURED FT
APPEARANCE: Adult male who appears stated age, in no acute distress; dressed in hospital attire.   BEHAVIOR: Calm and cooperative; fair eye contact, superficially related.  SPEECH: relevant and fluent; normal rate, rhythm, tone, and volume.   MOTOR: mild psychomotor retardation; no tremor; no abnormal movements. Unstable gait with R hip abnormality, refuses to use a walker.   MOOD: "fine"  AFFECT: constricted affect, stable.   THOUGHT PROCESS: Linear and goal directed.   THOUGHT CONTENT: Themes ECT treatment with some improvements in sxs; and talking to SW about living situation. Denies suicidal or homicidal ideation, intent, or plan.  PERCEPTION: Denies auditory or visual hallucinations.  COGNITION: Grossly intact.  INSIGHT: Poor.  JUDGMENT: Poor.  IMPULSE CONTROL: Fair.

## 2023-07-19 NOTE — BH INPATIENT PSYCHIATRY PROGRESS NOTE - NSBHMETABOLIC_PSY_ALL_CORE_FT
BMI: BMI (kg/m2): 22.7 (07-07-23 @ 17:08)  HbA1c:   Glucose:   BP: 147/80 (07-19-23 @ 08:55) (131/77 - 151/85)  Lipid Panel:

## 2023-07-20 PROCEDURE — 99232 SBSQ HOSP IP/OBS MODERATE 35: CPT | Mod: GC

## 2023-07-20 RX ORDER — VENLAFAXINE HCL 75 MG
37.5 CAPSULE, EXT RELEASE 24 HR ORAL DAILY
Refills: 0 | Status: DISCONTINUED | OUTPATIENT
Start: 2023-07-20 | End: 2023-07-20

## 2023-07-20 RX ORDER — FLUOXETINE HCL 10 MG
60 CAPSULE ORAL DAILY
Refills: 0 | Status: DISCONTINUED | OUTPATIENT
Start: 2023-07-20 | End: 2023-07-21

## 2023-07-20 RX ORDER — VENLAFAXINE HCL 75 MG
37.5 CAPSULE, EXT RELEASE 24 HR ORAL DAILY
Refills: 0 | Status: DISCONTINUED | OUTPATIENT
Start: 2023-07-20 | End: 2023-07-24

## 2023-07-20 RX ADMIN — Medication 0.5 MILLIGRAM(S): at 10:21

## 2023-07-20 RX ADMIN — Medication 80 MILLIGRAM(S): at 10:21

## 2023-07-20 RX ADMIN — Medication 0.5 MILLIGRAM(S): at 20:44

## 2023-07-20 RX ADMIN — OLANZAPINE 20 MILLIGRAM(S): 15 TABLET, FILM COATED ORAL at 20:44

## 2023-07-20 NOTE — BH INPATIENT PSYCHIATRY PROGRESS NOTE - MSE UNSTRUCTURED FT
APPEARANCE: Adult male who appears stated age, in no acute distress; dressed in hospital attire and somewhat disheveled.   BEHAVIOR: Calm and cooperative; fair eye contact, superficially related.  SPEECH: relevant and fluent; normal rate, rhythm, tone, and volume.   MOTOR: mild psychomotor retardation; no tremor; no abnormal movements. Unstable gait with R hip abnormality, refuses to use a walker.   MOOD: "same, not good"  AFFECT: depressed and constricted affect.  THOUGHT PROCESS: Linear and goal directed.   THOUGHT CONTENT: Themes around future with some hopelessness and perseveration about going to detention.  Denies suicidal or homicidal ideation, intent, or plan.  PERCEPTION: Denies auditory or visual hallucinations.  COGNITION: Grossly intact.  INSIGHT: Poor.  JUDGMENT: Poor.  IMPULSE CONTROL: Fair.

## 2023-07-20 NOTE — BH INPATIENT PSYCHIATRY PROGRESS NOTE - NSBHCHARTREVIEWVS_PSY_A_CORE FT
Vital Signs Last 24 Hrs  T(C): --  T(F): --  HR: --  BP: --  BP(mean): --  RR: --  SpO2: --    Orthostatic VS  07-19-23 @ 06:36  Lying BP: --/-- HR: --  Sitting BP: 132/78 HR: 64  Standing BP: 129/78 HR: 67  Site: --  Mode: --

## 2023-07-20 NOTE — BH INPATIENT PSYCHIATRY PROGRESS NOTE - NSBHASSESSSUMMFT_PSY_ALL_CORE
Pt is a 67 yo M domiciled in assisted living facility with hx of schizoaffective d/o (bipolar type), multiple past psych hospitalizations, 1 past SA in 1992 by cutting wrists, and remote substance use (alcohol use leading to 3 DUIs and 1 year-long custodial sentence, cocaine use 20 years ago), presenting to [facility] 2S as transfer from [facility] for ECT evaluation due to increasing paranoia and poor self-care. Continued inpatient admission required for safety and ECT assessment.     working diagnosis: SAD bipolar type + current depressive episode w/ psychosis.       7/15 dysphoric, withdrawn, superficial, no SI/HI.    7/17: continues to reports depressed mood with fragmented sleep and mostly isolative, though patient is less perseverative about paranoid delusions with no SIIP/HIIP. Tolerating well ECT today #3. Next on 7/19. No physical complaints, VS stable.   7/18: continues to endorse depressed mood with concerns about future living situation though is less perseverative about paranoid delusions with no SIP/HIIP. Tolerating well ECT, next on 7/19. No physical complaints, VS stable.   7/19: reports small improvement in sxs today with better mood and sleep; and less perseveration about paranoid delusions and no SIIP/HIIP. Tolerating well ECT #4 today, next on 7/24. No physical complaints, VS stable.   7/20: reports depressed mood with low energy/motivation, appetite and some hopelessness about future which are related to paranoid delusions. Denies SIIP/HIIP. Tolerating well ECT w/o physical complains, VS stable. Will start Cross tapering from Prozac to Effexor for better management of sxs.     Plan:   1. Legal: admitted on 9.13 voluntary status  2. Safety:  Denies SI/SIB/HI/VI; continue routine observation.  3. Psychiatric:   - Start cross taper from Prozac to Effexor. Taper Prozac to 60mg mg PO daily and start Effexor 37.5mg PO daily for schizoaffective d/o  -Zyprexa 20 mg PO qHS for schizoaffective d/o  -Klonopin 0.5 mg PO BID for anxiety   	- Per ISTOP: Last prescribed Klonopin 0.5 mg #60 on 5/5/23  - Holding home dose of Ambien 12.5 mg QHS  4. Medical:  a) ECT referral  	- CBC, CMP, EKG ordered for 7/4 wnl. Review labs in HIE 5/23: TSH, B12, Folate, lipid panel and A1c WNL.   	- Last UA 5/26 UTI completed antibiotics on 5/31/23. Pending UA.   	- ECT consult ordered and completed. ECT #1 on 7/11/23, #2 7/14, #3 on 7/17, #4 7/19.   	- EKG 7/6: WNL.   b) PT consult: fall precautions, recommend ambulating with walker but patient has been refusing.   c) NUT consult: Minced and moist diet with ensure TID.    d) HTN previously on Norvasc 10 mg PO daily. Discontinued on 6/19/23 at [facility] for low BP. On unit BP stable around 130's/90' w/o antihypertensives. Will continue to monitor closely.   5. Collateral: outpatient psychiatrist Dr. Irby on 6/2/23 on [facility] and handoff from Dr. Hernandez. Collateral from sister. See  note.   6. Disposition: When stable.

## 2023-07-20 NOTE — BH INPATIENT PSYCHIATRY PROGRESS NOTE - NSBHFUPINTERVALHXFT_PSY_A_CORE
Chart reviewed. Case discussed with interdisciplinary team. Patient seen and examined for follow up of psychosis and depression. No acute events overnight. Compliant with standing medication. No PRN requested or required. Per sleep log, fair sleep.    Patient seen on his room resting. Patient reports low mood with decrease motivation and energy, as well as not feeling rested as he closes his eyes but his mind continues to focus in negative thoughts about where he will be going after the hospital. Patient briefly mentions about beliefs of going to prison. Patient also reports poor appetite, though reports adequate PO. Patient denies any self harm thoughts, passive or active SI/HI. Denies any physical complaint or side effect from medication.

## 2023-07-20 NOTE — BH INPATIENT PSYCHIATRY PROGRESS NOTE - CURRENT MEDICATION
MEDICATIONS  (STANDING):  clonazePAM  Tablet 0.5 milliGRAM(s) Oral two times a day  FLUoxetine 80 milliGRAM(s) Oral daily  OLANZapine 20 milliGRAM(s) Oral at bedtime    MEDICATIONS  (PRN):  acetaminophen     Tablet .. 650 milliGRAM(s) Oral every 6 hours PRN Mild Pain (1 - 3), Moderate Pain (4 - 6), Severe Pain (7 - 10)  polyethylene glycol 3350 17 Gram(s) Oral daily PRN constipation  senna 2 Tablet(s) Oral at bedtime PRN constipation   MEDICATIONS  (STANDING):  clonazePAM  Tablet 0.5 milliGRAM(s) Oral two times a day  FLUoxetine 60 milliGRAM(s) Oral daily  OLANZapine 20 milliGRAM(s) Oral at bedtime  venlafaxine XR 37.5 milliGRAM(s) Oral daily    MEDICATIONS  (PRN):  acetaminophen     Tablet .. 650 milliGRAM(s) Oral every 6 hours PRN Mild Pain (1 - 3), Moderate Pain (4 - 6), Severe Pain (7 - 10)  polyethylene glycol 3350 17 Gram(s) Oral daily PRN constipation  senna 2 Tablet(s) Oral at bedtime PRN constipation

## 2023-07-21 RX ORDER — FLUOXETINE HCL 10 MG
40 CAPSULE ORAL DAILY
Refills: 0 | Status: DISCONTINUED | OUTPATIENT
Start: 2023-07-21 | End: 2023-07-24

## 2023-07-21 RX ORDER — CLONAZEPAM 1 MG
0.5 TABLET ORAL
Refills: 0 | Status: DISCONTINUED | OUTPATIENT
Start: 2023-07-21 | End: 2023-07-27

## 2023-07-21 RX ADMIN — Medication 60 MILLIGRAM(S): at 09:42

## 2023-07-21 RX ADMIN — Medication 0.5 MILLIGRAM(S): at 21:00

## 2023-07-21 RX ADMIN — Medication 0.5 MILLIGRAM(S): at 09:42

## 2023-07-21 RX ADMIN — Medication 37.5 MILLIGRAM(S): at 09:41

## 2023-07-21 RX ADMIN — OLANZAPINE 20 MILLIGRAM(S): 15 TABLET, FILM COATED ORAL at 21:00

## 2023-07-21 NOTE — BH INPATIENT PSYCHIATRY PROGRESS NOTE - NSBHCHARTREVIEWVS_PSY_A_CORE FT
Vital Signs Last 24 Hrs  T(C): 36.7 (07-21-23 @ 08:08), Max: 36.7 (07-21-23 @ 08:08)  T(F): 98 (07-21-23 @ 08:08), Max: 98 (07-21-23 @ 08:08)  HR: --  BP: --  BP(mean): --  RR: --  SpO2: --    Orthostatic VS  07-21-23 @ 08:08  Lying BP: --/-- HR: --  Sitting BP: 100/69 HR: 64  Standing BP: 106/72 HR: 78  Site: upper left arm  Mode: electronic

## 2023-07-21 NOTE — BH INPATIENT PSYCHIATRY PROGRESS NOTE - MSE UNSTRUCTURED FT
APPEARANCE: Adult male who appears stated age, in no acute distress; dressed in hospital attire and somewhat disheveled.   BEHAVIOR: Calm and cooperative; fair eye contact, superficially related.  SPEECH: relevant and fluent; normal rate, rhythm, tone, and volume.   MOTOR: mild psychomotor retardation; no tremor; no abnormal movements. Unstable gait with R hip abnormality, refuses to use a walker.   MOOD: "same, not good"  AFFECT: depressed and constricted affect.  THOUGHT PROCESS: Linear and goal directed.   THOUGHT CONTENT: Themes around future with some hopelessness and perseveration about going to prison.  Denies suicidal or homicidal ideation, intent, or plan.  PERCEPTION: Denies auditory or visual hallucinations.  COGNITION: Grossly intact.  INSIGHT: Poor.  JUDGMENT: Poor.  IMPULSE CONTROL: Fair.

## 2023-07-21 NOTE — BH INPATIENT PSYCHIATRY PROGRESS NOTE - NSBHFUPINTERVALHXFT_PSY_A_CORE
Chart reviewed. Case discussed with interdisciplinary team. Patient seen and examined for follow up of psychosis and depression. No acute events overnight. Compliant with standing medication. No PRN requested or required.     Patient seen on his room resting. Patient reports low mood with decrease motivation and energy. Continues to endorse belief he may go to [facility] after leaving the hospital, unable to explain why. When given reassurance his MALKA wants him back he responds "we'll see". Patient denies any self harm thoughts, passive or active SI/HI. Denies any physical complaint or side effect from medication. Will continue ECT.

## 2023-07-21 NOTE — BH INPATIENT PSYCHIATRY PROGRESS NOTE - CURRENT MEDICATION
MEDICATIONS  (STANDING):  clonazePAM  Tablet 0.5 milliGRAM(s) Oral two times a day  FLUoxetine 40 milliGRAM(s) Oral daily  OLANZapine 20 milliGRAM(s) Oral at bedtime  venlafaxine XR 37.5 milliGRAM(s) Oral daily    MEDICATIONS  (PRN):  acetaminophen     Tablet .. 650 milliGRAM(s) Oral every 6 hours PRN Mild Pain (1 - 3), Moderate Pain (4 - 6), Severe Pain (7 - 10)  polyethylene glycol 3350 17 Gram(s) Oral daily PRN constipation  senna 2 Tablet(s) Oral at bedtime PRN constipation

## 2023-07-21 NOTE — BH INPATIENT PSYCHIATRY PROGRESS NOTE - NSBHASSESSSUMMFT_PSY_ALL_CORE
Pt is a 69 yo M domiciled in assisted living facility with hx of schizoaffective d/o (bipolar type), multiple past psych hospitalizations, 1 past SA in 1992 by cutting wrists, and remote substance use (alcohol use leading to 3 DUIs and 1 year-long half-way sentence, cocaine use 20 years ago), presenting to [facility] 2S as transfer from [facility] for ECT evaluation due to increasing paranoia and poor self-care. Continued inpatient admission required for safety and ECT assessment.     working diagnosis: SAD bipolar type + current depressive episode w/ psychosis.       7/15 dysphoric, withdrawn, superficial, no SI/HI.    7/17: continues to reports depressed mood with fragmented sleep and mostly isolative, though patient is less perseverative about paranoid delusions with no SIIP/HIIP. Tolerating well ECT today #3. Next on 7/19. No physical complaints, VS stable.   7/18: continues to endorse depressed mood with concerns about future living situation though is less perseverative about paranoid delusions with no SIP/HIIP. Tolerating well ECT, next on 7/19. No physical complaints, VS stable.   7/19: reports small improvement in sxs today with better mood and sleep; and less perseveration about paranoid delusions and no SIIP/HIIP. Tolerating well ECT #4 today, next on 7/24. No physical complaints, VS stable.   7/20: reports depressed mood with low energy/motivation, appetite and some hopelessness about future which are related to paranoid delusions. Denies SIIP/HIIP. Tolerating well ECT w/o physical complains, VS stable. Will start Cross tapering from Prozac to Effexor for better management of sxs.     Plan:   1. Legal: admitted on 9.13 voluntary status  2. Safety:  Denies SI/SIB/HI/VI; continue routine observation.  3. Psychiatric:   - Start cross taper from Prozac to Effexor. Taper Prozac to 60mg mg PO daily and start Effexor 37.5mg PO daily for schizoaffective d/o  -Zyprexa 20 mg PO qHS for schizoaffective d/o  -Klonopin 0.5 mg PO BID for anxiety   	- Per ISTOP: Last prescribed Klonopin 0.5 mg #60 on 5/5/23  - Holding home dose of Ambien 12.5 mg QHS  4. Medical:  a) ECT referral  	- CBC, CMP, EKG ordered for 7/4 wnl. Review labs in HIE 5/23: TSH, B12, Folate, lipid panel and A1c WNL.   	- Last UA 5/26 UTI completed antibiotics on 5/31/23. Pending UA.   	- ECT consult ordered and completed. ECT #1 on 7/11/23, #2 7/14, #3 on 7/17, #4 7/19.   	- EKG 7/6: WNL.   b) PT consult: fall precautions, recommend ambulating with walker but patient has been refusing.   c) NUT consult: Minced and moist diet with ensure TID.    d) HTN previously on Norvasc 10 mg PO daily. Discontinued on 6/19/23 at [facility] for low BP. On unit BP stable around 130's/90' w/o antihypertensives. Will continue to monitor closely.   5. Collateral: outpatient psychiatrist Dr. Irby on 6/2/23 on [facility] and handoff from Dr. Hernandez. Collateral from sister. See  note.   6. Disposition: When stable.

## 2023-07-22 RX ADMIN — Medication 0.5 MILLIGRAM(S): at 21:59

## 2023-07-22 RX ADMIN — Medication 40 MILLIGRAM(S): at 08:31

## 2023-07-22 RX ADMIN — Medication 37.5 MILLIGRAM(S): at 08:31

## 2023-07-22 RX ADMIN — OLANZAPINE 20 MILLIGRAM(S): 15 TABLET, FILM COATED ORAL at 21:59

## 2023-07-22 RX ADMIN — Medication 0.5 MILLIGRAM(S): at 08:31

## 2023-07-23 RX ADMIN — Medication 37.5 MILLIGRAM(S): at 09:32

## 2023-07-23 RX ADMIN — Medication 0.5 MILLIGRAM(S): at 09:32

## 2023-07-23 RX ADMIN — Medication 0.5 MILLIGRAM(S): at 21:05

## 2023-07-23 RX ADMIN — Medication 40 MILLIGRAM(S): at 09:32

## 2023-07-23 RX ADMIN — OLANZAPINE 20 MILLIGRAM(S): 15 TABLET, FILM COATED ORAL at 21:05

## 2023-07-24 PROCEDURE — 90870 ELECTROCONVULSIVE THERAPY: CPT

## 2023-07-24 RX ORDER — VENLAFAXINE HCL 75 MG
75 CAPSULE, EXT RELEASE 24 HR ORAL DAILY
Refills: 0 | Status: DISCONTINUED | OUTPATIENT
Start: 2023-07-24 | End: 2023-07-27

## 2023-07-24 RX ORDER — FLUMAZENIL 0.1 MG/ML
0.2 VIAL (ML) INTRAVENOUS ONCE
Refills: 0 | Status: COMPLETED | OUTPATIENT
Start: 2023-07-24 | End: 2023-07-24

## 2023-07-24 RX ORDER — LOPERAMIDE HCL 2 MG
4 TABLET ORAL ONCE
Refills: 0 | Status: DISCONTINUED | OUTPATIENT
Start: 2023-07-24 | End: 2023-07-24

## 2023-07-24 RX ORDER — FLUOXETINE HCL 10 MG
20 CAPSULE ORAL DAILY
Refills: 0 | Status: DISCONTINUED | OUTPATIENT
Start: 2023-07-24 | End: 2023-07-25

## 2023-07-24 RX ADMIN — Medication 37.5 MILLIGRAM(S): at 07:15

## 2023-07-24 RX ADMIN — OLANZAPINE 20 MILLIGRAM(S): 15 TABLET, FILM COATED ORAL at 21:03

## 2023-07-24 RX ADMIN — Medication 0.5 MILLIGRAM(S): at 21:03

## 2023-07-24 RX ADMIN — Medication 0.5 MILLIGRAM(S): at 07:15

## 2023-07-24 RX ADMIN — Medication 0.2 MILLIGRAM(S): at 11:12

## 2023-07-24 RX ADMIN — Medication 40 MILLIGRAM(S): at 07:15

## 2023-07-24 NOTE — BH INPATIENT PSYCHIATRY PROGRESS NOTE - NSBHFUPINTERVALHXFT_PSY_A_CORE
Chart reviewed. Case discussed with interdisciplinary team. Patient seen and examined for follow up of psychosis and depression. No acute events over the weekend. Compliant with standing medication. No PRN requested or required. Per sleep log, fair sleep.     Patient seen on his room resting, states waiting for ECT today. Reports no issues over the weekend, reports feeling "the same" with depressed mood and anxiety, though states small improvement in sleep and appetite. Patient continues to endorse concerns about his future and where he will live after hospital.  Patient denies any self harm thoughts, passive or active SI/HI. Denies any physical complaint or side effect from medication.     Denies any physical complaints or side effects from medication. VS stable.

## 2023-07-24 NOTE — BH INPATIENT PSYCHIATRY PROGRESS NOTE - NSBHASSESSSUMMFT_PSY_ALL_CORE
Pt is a 67 yo M domiciled in assisted living facility with hx of schizoaffective d/o (bipolar type), multiple past psych hospitalizations, 1 past SA in 1992 by cutting wrists, and remote substance use (alcohol use leading to 3 DUIs and 1 year-long [facility] sentence, cocaine use 20 years ago), presenting to [facility] 2S as transfer from [facility] for ECT evaluation due to increasing paranoia and poor self-care. Continued inpatient admission required for safety and ECT assessment.     working diagnosis: SAD bipolar type + current depressive episode w/ psychosis.     7/15 dysphoric, withdrawn, superficial, no SI/HI.    7/17: continues to reports depressed mood with fragmented sleep and mostly isolative, though patient is less perseverative about paranoid delusions with no SIIP/HIIP. Tolerating well ECT today #3. Next on 7/19. No physical complaints, VS stable.   7/18: continues to endorse depressed mood with concerns about future living situation though is less perseverative about paranoid delusions with no SIP/HIIP. Tolerating well ECT, next on 7/19. No physical complaints, VS stable.   7/19: reports small improvement in sxs today with better mood and sleep; and less perseveration about paranoid delusions and no SIIP/HIIP. Tolerating well ECT #4 today, next on 7/24. No physical complaints, VS stable.   7/20: reports depressed mood with low energy/motivation, appetite and some hopelessness about future which are related to paranoid delusions. Denies SIIP/HIIP. Tolerating well ECT w/o physical complains, VS stable. Will start Cross tapering from Prozac to Effexor for better management of sxs.   7/21: continue to taper Prozac, patient remains mostly isolative with perseveration about going to [facility]  7/24: continues to endorse depression and anxiety though with small improvement in sleep and appetite; and less perseveration about going to [facility] today. Denies SIIP/HIIP. Tolerating well ECT w/o physical complains, VS stable. ECT session #5 today.       Plan:   1. Legal: admitted on 9.13 voluntary status  2. Safety:  Denies SI/SIB/HI/VI; continue routine observation.  3. Psychiatric:   - Start cross taper from Prozac to Effexor. Taper Prozac to 20mg mg PO daily and titrate Effexor to 75mg PO daily for schizoaffective d/o  -Zyprexa 20 mg PO qHS for schizoaffective d/o  -Klonopin 0.5 mg PO BID for anxiety   	- Per ISTOP: Last prescribed Klonopin 0.5 mg #60 on 5/5/23  - Holding home dose of Ambien 12.5 mg QHS  4. Medical:  a) ECT referral  	- CBC, CMP, EKG ordered for 7/4 wnl. Review labs in HIE 5/23: TSH, B12, Folate, lipid panel and A1c WNL.   	- Last UA 5/26 UTI completed antibiotics on 5/31/23. Pending UA.   	- ECT consult ordered and completed. ECT #1 on 7/11/23, #2 7/14, #3 on 7/17, #4 7/19, #5 7/24  	- EKG 7/6: WNL.   b) PT consult: fall precautions, recommend ambulating with walker but patient has been refusing.   c) NUT consult: Minced and moist diet with ensure TID.    d) HTN previously on Norvasc 10 mg PO daily. Discontinued on 6/19/23 at [facility] for low BP. On unit BP stable around 130's/90' w/o antihypertensives. Will continue to monitor closely.   5. Collateral: outpatient psychiatrist Dr. Irby on 6/2/23 on [facility] and handoff from Dr. Hernandez. Collateral from sister. See  note.   6. Disposition: When stable.

## 2023-07-24 NOTE — BH INPATIENT PSYCHIATRY PROGRESS NOTE - NSBHMETABOLIC_PSY_ALL_CORE_FT
BMI: BMI (kg/m2): 22.7 (07-07-23 @ 17:08)  HbA1c:   Glucose:   BP: 133/81 (07-24-23 @ 12:18) (105/75 - 163/87)  Lipid Panel:

## 2023-07-24 NOTE — BH INPATIENT PSYCHIATRY PROGRESS NOTE - NSBHCHARTREVIEWVS_PSY_A_CORE FT
Vital Signs Last 24 Hrs  T(C): 36.9 (07-24-23 @ 12:00), Max: 37.1 (07-24-23 @ 10:55)  T(F): 98.5 (07-24-23 @ 12:00), Max: 98.7 (07-24-23 @ 10:55)  HR: 55 (07-24-23 @ 12:18) (54 - 66)  BP: 133/81 (07-24-23 @ 12:18) (105/75 - 163/87)  BP(mean): --  RR: 19 (07-24-23 @ 12:00) (17 - 20)  SpO2: 98% (07-24-23 @ 12:00) (97% - 99%)    Orthostatic VS  07-24-23 @ 05:58  Lying BP: --/-- HR: --  Sitting BP: 115/79 HR: 57  Standing BP: 125/85 HR: 92  Site: --  Mode: --  Orthostatic VS  07-23-23 @ 07:42  Lying BP: --/-- HR: --  Sitting BP: 109/54 HR: 54  Standing BP: 115/73 HR: 62  Site: --  Mode: --

## 2023-07-24 NOTE — BH INPATIENT PSYCHIATRY PROGRESS NOTE - MSE UNSTRUCTURED FT
APPEARANCE: Adult male who appears stated age, in no acute distress; dressed in hospital attire and somewhat disheveled.   BEHAVIOR: Calm and cooperative; fair eye contact, superficially related.  SPEECH: relevant and fluent; normal rate, rhythm, tone, and volume.   MOTOR: mild psychomotor retardation; no tremor; no abnormal movements. Unstable gait with R hip abnormality, refuses to use a walker.   MOOD: "same"  AFFECT: depressed and constricted affect.  THOUGHT PROCESS: Linear and goal directed.   THOUGHT CONTENT: Themes around future and going to ECT session today in order to eat breakfast. Denies suicidal or homicidal ideation, intent, or plan.  PERCEPTION: Denies auditory or visual hallucinations.  COGNITION: Grossly intact.  INSIGHT: Poor.  JUDGMENT: Poor.  IMPULSE CONTROL: Fair.

## 2023-07-25 PROCEDURE — 99231 SBSQ HOSP IP/OBS SF/LOW 25: CPT | Mod: GC

## 2023-07-25 RX ADMIN — Medication 0.5 MILLIGRAM(S): at 20:16

## 2023-07-25 RX ADMIN — Medication 75 MILLIGRAM(S): at 08:47

## 2023-07-25 RX ADMIN — Medication 20 MILLIGRAM(S): at 08:46

## 2023-07-25 RX ADMIN — Medication 0.5 MILLIGRAM(S): at 08:47

## 2023-07-25 RX ADMIN — OLANZAPINE 20 MILLIGRAM(S): 15 TABLET, FILM COATED ORAL at 20:16

## 2023-07-25 NOTE — BH INPATIENT PSYCHIATRY PROGRESS NOTE - NSBHFUPINTERVALHXFT_PSY_A_CORE
Chart reviewed. Case discussed with interdisciplinary team. Patient seen and examined for follow up of psychosis and depression. No acute events overnight. Compliant with standing medication. No PRN requested or required. Per sleep log, fair sleep.     Patient seen on his room resting, patient reports mood is 50/50 today. Patients states small improvement in sleep and appetite, but continues to endorse concerns towards the future. Patient states concerns where he will live after the hospital but doesn't elaborate any further stating "I don't know, I don't know". Patient denies any self harm thoughts, passive or active SI/hi.     Denies any physical complaints or side effects from medication. VS stable.

## 2023-07-25 NOTE — BH INPATIENT PSYCHIATRY PROGRESS NOTE - NSBHCHARTREVIEWVS_PSY_A_CORE FT
Vital Signs Last 24 Hrs  T(C): 36.7 (07-25-23 @ 07:45), Max: 37.1 (07-24-23 @ 10:55)  T(F): 98.1 (07-25-23 @ 07:45), Max: 98.7 (07-24-23 @ 10:55)  HR: 60 (07-25-23 @ 07:45) (54 - 66)  BP: 105/73 (07-25-23 @ 07:45) (105/73 - 163/87)  BP(mean): --  RR: 16 (07-25-23 @ 07:45) (16 - 20)  SpO2: 98% (07-24-23 @ 12:00) (97% - 99%)    Orthostatic VS  07-24-23 @ 05:58  Lying BP: --/-- HR: --  Sitting BP: 115/79 HR: 57  Standing BP: 125/85 HR: 92  Site: --  Mode: --   Vital Signs Last 24 Hrs  T(C): 36.7 (07-25-23 @ 07:45), Max: 36.7 (07-25-23 @ 07:45)  T(F): 98.1 (07-25-23 @ 07:45), Max: 98.1 (07-25-23 @ 07:45)  HR: 60 (07-25-23 @ 07:45) (60 - 60)  BP: 105/73 (07-25-23 @ 07:45) (105/73 - 105/73)  BP(mean): --  RR: 16 (07-25-23 @ 07:45) (16 - 16)  SpO2: --    Orthostatic VS  07-24-23 @ 05:58  Lying BP: --/-- HR: --  Sitting BP: 115/79 HR: 57  Standing BP: 125/85 HR: 92  Site: --  Mode: --

## 2023-07-25 NOTE — BH INPATIENT PSYCHIATRY PROGRESS NOTE - CURRENT MEDICATION
MEDICATIONS  (STANDING):  clonazePAM  Tablet 0.5 milliGRAM(s) Oral two times a day  FLUoxetine 20 milliGRAM(s) Oral daily  OLANZapine 20 milliGRAM(s) Oral at bedtime  venlafaxine XR 75 milliGRAM(s) Oral daily    MEDICATIONS  (PRN):  acetaminophen     Tablet .. 650 milliGRAM(s) Oral every 6 hours PRN Mild Pain (1 - 3), Moderate Pain (4 - 6), Severe Pain (7 - 10)  polyethylene glycol 3350 17 Gram(s) Oral daily PRN constipation  senna 2 Tablet(s) Oral at bedtime PRN constipation   MEDICATIONS  (STANDING):  clonazePAM  Tablet 0.5 milliGRAM(s) Oral two times a day  OLANZapine 20 milliGRAM(s) Oral at bedtime  venlafaxine XR 75 milliGRAM(s) Oral daily    MEDICATIONS  (PRN):  acetaminophen     Tablet .. 650 milliGRAM(s) Oral every 6 hours PRN Mild Pain (1 - 3), Moderate Pain (4 - 6), Severe Pain (7 - 10)  polyethylene glycol 3350 17 Gram(s) Oral daily PRN constipation  senna 2 Tablet(s) Oral at bedtime PRN constipation

## 2023-07-25 NOTE — BH INPATIENT PSYCHIATRY PROGRESS NOTE - NSBHASSESSSUMMFT_PSY_ALL_CORE
Pt is a 67 yo M domiciled in assisted living facility with hx of schizoaffective d/o (bipolar type), multiple past psych hospitalizations, 1 past SA in 1992 by cutting wrists, and remote substance use (alcohol use leading to 3 DUIs and 1 year-long care home sentence, cocaine use 20 years ago), presenting to [facility]S as transfer from [facility] for ECT evaluation due to increasing paranoia and poor self-care. Continued inpatient admission required for safety and ECT assessment.     working diagnosis: SAD bipolar type + current depressive episode w/ psychosis.     7/15 dysphoric, withdrawn, superficial, no SI/HI.    7/17: continues to reports depressed mood with fragmented sleep and mostly isolative, though patient is less perseverative about paranoid delusions with no SIIP/HIIP. Tolerating well ECT today #3. Next on 7/19. No physical complaints, VS stable.   7/18: continues to endorse depressed mood with concerns about future living situation though is less perseverative about paranoid delusions with no SIP/HIIP. Tolerating well ECT, next on 7/19. No physical complaints, VS stable.   7/19: reports small improvement in sxs today with better mood and sleep; and less perseveration about paranoid delusions and no SIIP/HIIP. Tolerating well ECT #4 today, next on 7/24. No physical complaints, VS stable.   7/20: reports depressed mood with low energy/motivation, appetite and some hopelessness about future which are related to paranoid delusions. Denies SIIP/HIIP. Tolerating well ECT w/o physical complains, VS stable. Will start Cross tapering from Prozac to Effexor for better management of sxs.   7/21: continue to taper Prozac, patient remains mostly isolative with perseveration about going to care home  7/24: continues to endorse depression and anxiety though with small improvement in sleep and appetite; and less perseveration about going to care home today. Denies SIIP/HIIP. Tolerating well ECT w/o physical complains, VS stable. ECT session #5 today.   7/25: reports small improvements in mood, appetite and sleep, though continues to perseverate about future and living situation. Denies SIIP/HIIP. Tolerating well ECT w/o physical complains, VS stable. Next ECT 7/26.       Plan:   1. Legal: admitted on 9.13 voluntary status  2. Safety:  Denies SI/SIB/HI/VI; continue routine observation.  3. Psychiatric:   - Start cross taper from Prozac to Effexor. c/w Prozac to 20mg mg PO daily and c/w Effexor to 75mg PO daily for schizoaffective d/o  -Zyprexa 20 mg PO qHS for schizoaffective d/o  -Klonopin 0.5 mg PO BID for anxiety   	- Per ISTOP: Last prescribed Klonopin 0.5 mg #60 on 5/5/23  - Holding home dose of Ambien 12.5 mg QHS  4. Medical:  a) ECT referral  	- CBC, CMP, EKG ordered for 7/4 wnl. Review labs in HIE 5/23: TSH, B12, Folate, lipid panel and A1c WNL.   	- Last UA 5/26 UTI completed antibiotics on 5/31/23. Pending UA.   	- ECT consult ordered and completed. ECT #1 on 7/11/23, #2 7/14, #3 on 7/17, #4 7/19, #5 7/24  	- EKG 7/6: WNL.   b) PT consult: fall precautions, recommend ambulating with walker but patient has been refusing.   c) NUT consult: Minced and moist diet with ensure TID.    d) HTN previously on Norvasc 10 mg PO daily. Discontinued on 6/19/23 at [facility] for low BP. On unit BP stable around 130's/90' w/o antihypertensives. Will continue to monitor closely.   5. Collateral: outpatient psychiatrist Dr. Irby on 6/2/23 on [facility] and handoff from Dr. Hernandez. Collateral from sister. See  note.   6. Disposition: When stable.

## 2023-07-25 NOTE — BH INPATIENT PSYCHIATRY PROGRESS NOTE - NSBHMETABOLIC_PSY_ALL_CORE_FT
BMI: BMI (kg/m2): 22.7 (07-07-23 @ 17:08)  HbA1c:   Glucose:   BP: 105/73 (07-25-23 @ 07:45) (105/73 - 163/87)  Lipid Panel:

## 2023-07-25 NOTE — BH INPATIENT PSYCHIATRY PROGRESS NOTE - MSE UNSTRUCTURED FT
APPEARANCE: Adult male who appears stated age, in no acute distress; dressed in hospital attire and somewhat disheveled.   BEHAVIOR: Calm and cooperative; fair eye contact, superficially related.  SPEECH: relevant and fluent; normal rate, rhythm, tone, and volume.   MOTOR: mild psychomotor retardation; no tremor; no abnormal movements. Unstable gait with R hip abnormality, refuses to use a walker.   MOOD: "50/50"  AFFECT: depressed and constricted affect.  THOUGHT PROCESS: Linear and goal directed.   THOUGHT CONTENT: Themes around future and living situation. Denies suicidal or homicidal ideation, intent, or plan.  PERCEPTION: Denies auditory or visual hallucinations.  COGNITION: Grossly intact.  INSIGHT: Poor.  JUDGMENT: Poor.  IMPULSE CONTROL: Fair.

## 2023-07-26 PROCEDURE — 90870 ELECTROCONVULSIVE THERAPY: CPT

## 2023-07-26 PROCEDURE — 99231 SBSQ HOSP IP/OBS SF/LOW 25: CPT | Mod: GC,25

## 2023-07-26 RX ORDER — FLUMAZENIL 0.1 MG/ML
0.2 VIAL (ML) INTRAVENOUS ONCE
Refills: 0 | Status: COMPLETED | OUTPATIENT
Start: 2023-07-26 | End: 2023-07-26

## 2023-07-26 RX ADMIN — Medication 0.5 MILLIGRAM(S): at 21:15

## 2023-07-26 RX ADMIN — Medication 0.2 MILLIGRAM(S): at 08:34

## 2023-07-26 RX ADMIN — OLANZAPINE 20 MILLIGRAM(S): 15 TABLET, FILM COATED ORAL at 21:15

## 2023-07-26 RX ADMIN — Medication 0.5 MILLIGRAM(S): at 09:48

## 2023-07-26 RX ADMIN — Medication 75 MILLIGRAM(S): at 09:47

## 2023-07-26 NOTE — BH INPATIENT PSYCHIATRY PROGRESS NOTE - NSBHASSESSSUMMFT_PSY_ALL_CORE
Pt is a 69 yo M domiciled in assisted living facility with hx of schizoaffective d/o (bipolar type), multiple past psych hospitalizations, 1 past SA in 1992 by cutting wrists, and remote substance use (alcohol use leading to 3 DUIs and 1 year-long correction sentence, cocaine use 20 years ago), presenting to [facility] 2S as transfer from [facility] for ECT evaluation due to increasing paranoia and poor self-care. Continued inpatient admission required for safety and ECT assessment.     working diagnosis: SAD bipolar type + current depressive episode w/ psychosis.     7/15 dysphoric, withdrawn, superficial, no SI/HI.    7/17: continues to reports depressed mood with fragmented sleep and mostly isolative, though patient is less perseverative about paranoid delusions with no SIIP/HIIP. Tolerating well ECT today #3. Next on 7/19. No physical complaints, VS stable.   7/18: continues to endorse depressed mood with concerns about future living situation though is less perseverative about paranoid delusions with no SIP/HIIP. Tolerating well ECT, next on 7/19. No physical complaints, VS stable.   7/19: reports small improvement in sxs today with better mood and sleep; and less perseveration about paranoid delusions and no SIIP/HIIP. Tolerating well ECT #4 today, next on 7/24. No physical complaints, VS stable.   7/20: reports depressed mood with low energy/motivation, appetite and some hopelessness about future which are related to paranoid delusions. Denies SIIP/HIIP. Tolerating well ECT w/o physical complains, VS stable. Will start Cross tapering from Prozac to Effexor for better management of sxs.   7/21: continue to taper Prozac, patient remains mostly isolative with perseveration about going to correction  7/24: continues to endorse depression and anxiety though with small improvement in sleep and appetite; and less perseveration about going to correction today. Denies SIIP/HIIP. Tolerating well ECT w/o physical complains, VS stable. ECT session #5 today.   7/25: reports small improvements in mood, appetite and sleep, though continues to perseverate about future and living situation. Denies SIIP/HIIP. Tolerating well ECT w/o physical complains, VS stable. Next ECT 7/26.       Plan:   1. Legal: admitted on 9.13 voluntary status  2. Safety:  Denies SI/SIB/HI/VI; continue routine observation.  3. Psychiatric:   - Start cross taper from Prozac to Effexor. c/w Prozac to 20mg mg PO daily and c/w Effexor to 75mg PO daily for schizoaffective d/o  -Zyprexa 20 mg PO qHS for schizoaffective d/o  -Klonopin 0.5 mg PO BID for anxiety   	- Per ISTOP: Last prescribed Klonopin 0.5 mg #60 on 5/5/23  - Holding home dose of Ambien 12.5 mg QHS  4. Medical:  a) ECT referral  	- CBC, CMP, EKG ordered for 7/4 wnl. Review labs in HIE 5/23: TSH, B12, Folate, lipid panel and A1c WNL.   	- Last UA 5/26 UTI completed antibiotics on 5/31/23. Pending UA.   	- ECT consult ordered and completed. ECT #1 on 7/11/23, #2 7/14, #3 on 7/17, #4 7/19, #5 7/24  	- EKG 7/6: WNL.   b) PT consult: fall precautions, recommend ambulating with walker but patient has been refusing.   c) NUT consult: Minced and moist diet with ensure TID.    d) HTN previously on Norvasc 10 mg PO daily. Discontinued on 6/19/23 at [facility] for low BP. On unit BP stable around 130's/90' w/o antihypertensives. Will continue to monitor closely.   5. Collateral: outpatient psychiatrist Dr. Irby on 6/2/23 on [facility] and handoff from Dr. Hernandez. Collateral from sister. See  note.   6. Disposition: When stable.

## 2023-07-26 NOTE — BH INPATIENT PSYCHIATRY PROGRESS NOTE - NSBHATTESTCOMMENTATTENDFT_PSY_A_CORE
See resident note for details. I saw and evaluated patient. I agree with assessment and pan of resident Patient remains paranoid and with hopelesness and passive SI cont ECT in AM. Appears that information re prior trials is not readily available
See resident note for details. I saw and evaluated patient. I agree with assessment and plan of resident. PAtient to have dental consult to today, consented already to ECT hope to begin 7/10 consider requesting for extra IV fluid if po not adequate. 
See resident note for details. I saw and evaluated patient. I agree with assessment and plan of resident. PAtient to have dental consult to today, consented already to ECT hope to begin 7/10 consider requesting for extra IV fluid if po not adequate. 
Agree with assessment above. 
See resident note for details. I saw and evaluated patient. I  agree with assessment and plan of resident. COnt ECT w/u ppush fluids , Ensure as appetie is poor. Not suicidal but very dysphoric, delusional
See resident note for details. I saw and evaluated patient. I agree with assessment and plan of resident. COnt with ECT w/o cont current meds for now, need dental consult as has poor hygiene only few teeth with caps
See resident note for details. I saw and evaluated patient. Mirlande with assessment and plan of resident. Patient broke NPO ECT cancelled but have been able to reschedule for tomorrow
See resident note for details. I saw and evaluated patient. I agree with assessment and plan fo resident. Patient depressed, psychotic , passive SI, for ECT Vipin
see resident note for details. I saw and evaluated patient. I agree with assessment and plan of resident. Will cont ECT 7/12 cont to look for info re past med trials. 
Agree with assessment above. Will continue ECT 2x per week and Effexor titration. 
Agree with assessment above. 
See resident note for details. I saw and evaluated patient. I agree with assessment and plan of resident. PAtient to have dental consult to today, consented already to ECT hope to begin 7/10 consider requesting for extra IV fluid if po not adequate. 
Agree with assessment above. 
Agree with assessment above. Will continue ECT 2x per week and Effexor titration. 
Agree with assessment above. Pt seems improved, denies SIIP. Tolerating ECT well. 
Agree with assessment above. 
Agree with assessment above. 
Agree with assessment above. Continue ECT on Friday 7/14.

## 2023-07-26 NOTE — BH INPATIENT PSYCHIATRY PROGRESS NOTE - NSBHCHARTREVIEWVS_PSY_A_CORE FT
Vital Signs Last 24 Hrs  T(C): 36.2 (07-26-23 @ 09:25), Max: 36.7 (07-26-23 @ 08:01)  T(F): 97.1 (07-26-23 @ 09:25), Max: 98.1 (07-26-23 @ 08:01)  HR: 57 (07-26-23 @ 09:25) (53 - 62)  BP: 130/91 (07-26-23 @ 09:25) (110/77 - 149/85)  BP(mean): --  RR: 20 (07-26-23 @ 09:25) (16 - 20)  SpO2: 98% (07-26-23 @ 09:25) (96% - 100%)    Orthostatic VS  07-26-23 @ 06:35  Lying BP: --/-- HR: --  Sitting BP: 125/74 HR: 53  Standing BP: 129/74 HR: 64  Site: --  Mode: --

## 2023-07-26 NOTE — BH INPATIENT PSYCHIATRY PROGRESS NOTE - NSBHFUPINTERVALHXFT_PSY_A_CORE
Chart reviewed. Case discussed with interdisciplinary team. Patient seen and examined for follow up of psychosis and depression. No acute events overnight. Compliant with standing medication. No PRN requested or required. Per sleep log, fair sleep.     Seen after ECT #6 this morning. Reporting modest gains in terms of mood. Denies any acute concerns or side effects. Ambivalent about discharge and returning to retirement.

## 2023-07-26 NOTE — BH INPATIENT PSYCHIATRY PROGRESS NOTE - CURRENT MEDICATION
MEDICATIONS  (STANDING):  clonazePAM  Tablet 0.5 milliGRAM(s) Oral two times a day  OLANZapine 20 milliGRAM(s) Oral at bedtime  venlafaxine XR 75 milliGRAM(s) Oral daily    MEDICATIONS  (PRN):  acetaminophen     Tablet .. 650 milliGRAM(s) Oral every 6 hours PRN Mild Pain (1 - 3), Moderate Pain (4 - 6), Severe Pain (7 - 10)  polyethylene glycol 3350 17 Gram(s) Oral daily PRN constipation  senna 2 Tablet(s) Oral at bedtime PRN constipation

## 2023-07-26 NOTE — BH INPATIENT PSYCHIATRY PROGRESS NOTE - NSBHMETABOLIC_PSY_ALL_CORE_FT
BMI: BMI (kg/m2): 22.7 (07-07-23 @ 17:08)  HbA1c:   Glucose:   BP: 130/91 (07-26-23 @ 09:25) (105/73 - 163/87)  Lipid Panel:

## 2023-07-27 PROCEDURE — 99231 SBSQ HOSP IP/OBS SF/LOW 25: CPT

## 2023-07-27 RX ORDER — VENLAFAXINE HCL 75 MG
150 CAPSULE, EXT RELEASE 24 HR ORAL DAILY
Refills: 0 | Status: DISCONTINUED | OUTPATIENT
Start: 2023-07-27 | End: 2023-08-21

## 2023-07-27 RX ORDER — CLONAZEPAM 1 MG
0.5 TABLET ORAL
Refills: 0 | Status: DISCONTINUED | OUTPATIENT
Start: 2023-07-27 | End: 2023-08-02

## 2023-07-27 RX ADMIN — Medication 75 MILLIGRAM(S): at 09:42

## 2023-07-27 RX ADMIN — Medication 0.5 MILLIGRAM(S): at 20:05

## 2023-07-27 RX ADMIN — OLANZAPINE 20 MILLIGRAM(S): 15 TABLET, FILM COATED ORAL at 20:05

## 2023-07-27 RX ADMIN — Medication 0.5 MILLIGRAM(S): at 09:42

## 2023-07-27 NOTE — BH INPATIENT PSYCHIATRY PROGRESS NOTE - CURRENT MEDICATION
MEDICATIONS  (STANDING):  clonazePAM  Tablet 0.5 milliGRAM(s) Oral two times a day  OLANZapine 20 milliGRAM(s) Oral at bedtime  venlafaxine  milliGRAM(s) Oral daily    MEDICATIONS  (PRN):  acetaminophen     Tablet .. 650 milliGRAM(s) Oral every 6 hours PRN Mild Pain (1 - 3), Moderate Pain (4 - 6), Severe Pain (7 - 10)  polyethylene glycol 3350 17 Gram(s) Oral daily PRN constipation  senna 2 Tablet(s) Oral at bedtime PRN constipation

## 2023-07-27 NOTE — BH INPATIENT PSYCHIATRY PROGRESS NOTE - NSBHMETABOLIC_PSY_ALL_CORE_FT
BMI: BMI (kg/m2): 22.7 (07-07-23 @ 17:08)  HbA1c:   Glucose:   BP: 130/91 (07-26-23 @ 09:25) (105/73 - 149/85)  Lipid Panel:

## 2023-07-27 NOTE — BH INPATIENT PSYCHIATRY PROGRESS NOTE - NSBHASSESSSUMMFT_PSY_ALL_CORE
Pt is a 67 yo M domiciled in assisted living facility with hx of schizoaffective d/o (bipolar type), multiple past psych hospitalizations, 1 past SA in 1992 by cutting wrists, and remote substance use (alcohol use leading to 3 DUIs and 1 year-long [facility] sentence, cocaine use 20 years ago), presenting to [facility] 2S as transfer from [facility] for ECT evaluation due to increasing paranoia and poor self-care. Continued inpatient admission required for safety and ECT assessment.     working diagnosis: SAD bipolar type + current depressive episode w/ psychosis.     7/15 dysphoric, withdrawn, superficial, no SI/HI.    7/17: continues to reports depressed mood with fragmented sleep and mostly isolative, though patient is less perseverative about paranoid delusions with no SIIP/HIIP. Tolerating well ECT today #3. Next on 7/19. No physical complaints, VS stable.   7/18: continues to endorse depressed mood with concerns about future living situation though is less perseverative about paranoid delusions with no SIP/HIIP. Tolerating well ECT, next on 7/19. No physical complaints, VS stable.   7/19: reports small improvement in sxs today with better mood and sleep; and less perseveration about paranoid delusions and no SIIP/HIIP. Tolerating well ECT #4 today, next on 7/24. No physical complaints, VS stable.   7/20: reports depressed mood with low energy/motivation, appetite and some hopelessness about future which are related to paranoid delusions. Denies SIIP/HIIP. Tolerating well ECT w/o physical complains, VS stable. Will start Cross tapering from Prozac to Effexor for better management of sxs.   7/21: continue to taper Prozac, patient remains mostly isolative with perseveration about going to [facility]  7/24: continues to endorse depression and anxiety though with small improvement in sleep and appetite; and less perseveration about going to [facility] today. Denies SIIP/HIIP. Tolerating well ECT w/o physical complains, VS stable. ECT session #5 today.   7/25: reports small improvements in mood, appetite and sleep, though continues to perseverate about future and living situation. Denies SIIP/HIIP. Tolerating well ECT w/o physical complains, VS stable. Next ECT 7/26.   7/27: Pt reports feeling depressed and noticing no improvement. Will titrate Effexor XR to 150mg QD. ECT #7 scheduled for Monday.     Plan:   1. Legal: admitted on 9.13 voluntary status  2. Safety:  Denies SI/SIB/HI/VI; continue routine observation.  3. Psychiatric:   - Start cross taper from Prozac to Effexor. c/w Prozac to 20mg mg PO daily and c/w Effexor to 75mg PO daily for schizoaffective d/o  -Zyprexa 20 mg PO qHS for schizoaffective d/o  -Klonopin 0.5 mg PO BID for anxiety   	- Per ISTOP: Last prescribed Klonopin 0.5 mg #60 on 5/5/23  - Holding home dose of Ambien 12.5 mg QHS  4. Medical:  a) ECT referral  	- CBC, CMP, EKG ordered for 7/4 wnl. Review labs in HIE 5/23: TSH, B12, Folate, lipid panel and A1c WNL.   	- Last UA 5/26 UTI completed antibiotics on 5/31/23. Pending UA.   	- ECT consult ordered and completed. ECT #1 on 7/11/23, #2 7/14, #3 on 7/17, #4 7/19, #5 7/24  	- EKG 7/6: WNL.   b) PT consult: fall precautions, recommend ambulating with walker but patient has been refusing.   c) NUT consult: Minced and moist diet with ensure TID.    d) HTN previously on Norvasc 10 mg PO daily. Discontinued on 6/19/23 at [facility] for low BP. On unit BP stable around 130's/90' w/o antihypertensives. Will continue to monitor closely.   5. Collateral: outpatient psychiatrist Dr. Irby on 6/2/23 on [facility] and handoff from Dr. Hernandez. Collateral from sister. See  note.   6. Disposition: When stable.

## 2023-07-27 NOTE — BH INPATIENT PSYCHIATRY PROGRESS NOTE - NSBHFUPINTERVALHXFT_PSY_A_CORE
Chart reviewed. Case discussed with interdisciplinary team. Patient seen and examined for follow up of psychosis and depression. No acute events overnight. Compliant with standing medication. No PRN requested or required. Per sleep log, fair sleep.     On encounter today pt reports feeling still very depressed (2/10 with 10 being the best) and denies noticing any changes or improvement since coming to the hospital, despite reporting some improvement during prior encounters. Pt denies suicidal ideation, intent or plan. Denies AH/VH, paranoid thoughts. Does not mention fear of going to shelter after discharge, when asked future plans pt offers "I don't know". ECT #7 scheduled for Monday. Will continue titrating Effexor XR as needed/tolerated.

## 2023-07-28 PROCEDURE — 99231 SBSQ HOSP IP/OBS SF/LOW 25: CPT

## 2023-07-28 RX ADMIN — Medication 0.5 MILLIGRAM(S): at 08:28

## 2023-07-28 RX ADMIN — Medication 0.5 MILLIGRAM(S): at 20:30

## 2023-07-28 RX ADMIN — Medication 150 MILLIGRAM(S): at 08:28

## 2023-07-28 RX ADMIN — OLANZAPINE 20 MILLIGRAM(S): 15 TABLET, FILM COATED ORAL at 20:30

## 2023-07-28 NOTE — BH INPATIENT PSYCHIATRY PROGRESS NOTE - NSBHMETABOLIC_PSY_ALL_CORE_FT
BMI: BMI (kg/m2): 22.7 (07-07-23 @ 17:08)  HbA1c:   Glucose:   BP: 130/91 (07-26-23 @ 09:25) (110/77 - 149/85)  Lipid Panel:

## 2023-07-28 NOTE — BH INPATIENT PSYCHIATRY PROGRESS NOTE - NSBHASSESSSUMMFT_PSY_ALL_CORE
Pt is a 67 yo M domiciled in assisted living facility with hx of schizoaffective d/o (bipolar type), multiple past psych hospitalizations, 1 past SA in 1992 by cutting wrists, and remote substance use (alcohol use leading to 3 DUIs and 1 year-long penit[facility] sentence, cocaine use 20 years ago), presenting to [facility] 2S as transfer from [facility] for ECT evaluation due to increasing paranoia and poor self-care. Continued inpatient admission required for safety and ECT assessment.     working diagnosis: SAD bipolar type + current depressive episode w/ psychosis.     7/15 dysphoric, withdrawn, superficial, no SI/HI.    7/17: continues to reports depressed mood with fragmented sleep and mostly isolative, though patient is less perseverative about paranoid delusions with no SIIP/HIIP. Tolerating well ECT today #3. Next on 7/19. No physical complaints, VS stable.   7/18: continues to endorse depressed mood with concerns about future living situation though is less perseverative about paranoid delusions with no SIP/HIIP. Tolerating well ECT, next on 7/19. No physical complaints, VS stable.   7/19: reports small improvement in sxs today with better mood and sleep; and less perseveration about paranoid delusions and no SIIP/HIIP. Tolerating well ECT #4 today, next on 7/24. No physical complaints, VS stable.   7/20: reports depressed mood with low energy/motivation, appetite and some hopelessness about future which are related to paranoid delusions. Denies SIIP/HIIP. Tolerating well ECT w/o physical complains, VS stable. Will start Cross tapering from Prozac to Effexor for better management of sxs.   7/21: continue to taper Prozac, patient remains mostly isolative with perseveration about going to penitentiary  7/24: continues to endorse depression and anxiety though with small improvement in sleep and appetite; and less perseveration about going to penitentiary today. Denies SIIP/HIIP. Tolerating well ECT w/o physical complains, VS stable. ECT session #5 today.   7/25: reports small improvements in mood, appetite and sleep, though continues to perseverate about future and living situation. Denies SIIP/HIIP. Tolerating well ECT w/o physical complains, VS stable. Next ECT 7/26.   7/27: Pt reports feeling depressed and noticing no improvement. Will titrate Effexor XR to 150mg QD. ECT #7 scheduled for Monday.     Plan:   1. Legal: admitted on 9.13 voluntary status  2. Safety:  Denies SI/SIB/HI/VI; continue routine observation.  3. Psychiatric:   - Start cross taper from Prozac to Effexor. c/w Prozac to 20mg mg PO daily and c/w Effexor to 75mg PO daily for schizoaffective d/o  -Zyprexa 20 mg PO qHS for schizoaffective d/o  -Klonopin 0.5 mg PO BID for anxiety   	- Per ISTOP: Last prescribed Klonopin 0.5 mg #60 on 5/5/23  - Holding home dose of Ambien 12.5 mg QHS  4. Medical:  a) ECT referral  	- CBC, CMP, EKG ordered for 7/4 wnl. Review labs in HIE 5/23: TSH, B12, Folate, lipid panel and A1c WNL.   	- Last UA 5/26 UTI completed antibiotics on 5/31/23. Pending UA.   	- ECT consult ordered and completed. ECT #1 on 7/11/23, #2 7/14, #3 on 7/17, #4 7/19, #5 7/24  	- EKG 7/6: WNL.   b) PT consult: fall precautions, recommend ambulating with walker but patient has been refusing.   c) NUT consult: Minced and moist diet with ensure TID.    d) HTN previously on Norvasc 10 mg PO daily. Discontinued on 6/19/23 at [facility] [place] for low BP. On unit BP stable around 130's/90' w/o antihypertensives. Will continue to monitor closely.   5. Collateral: outpatient psychiatrist Dr. Irby on 6/2/23 on [facility] [place] and handoff from Dr. Hernandez. Collateral from sister. See  note.   6. Disposition: When stable.

## 2023-07-28 NOTE — BH INPATIENT PSYCHIATRY PROGRESS NOTE - NSBHCHARTREVIEWVS_PSY_A_CORE FT
Vital Signs Last 24 Hrs  T(C): 36.8 (07-28-23 @ 07:49), Max: 36.8 (07-28-23 @ 07:49)  T(F): 98.3 (07-28-23 @ 07:49), Max: 98.3 (07-28-23 @ 07:49)  HR: --  BP: --  BP(mean): --  RR: --  SpO2: --    Orthostatic VS  07-28-23 @ 07:49  Lying BP: --/-- HR: --  Sitting BP: 111/72 HR: 53  Standing BP: 100/74 HR: 66  Site: --  Mode: --  Orthostatic VS  07-27-23 @ 08:08  Lying BP: --/-- HR: --  Sitting BP: 109/65 HR: 54  Standing BP: 99/67 HR: 67  Site: --  Mode: --

## 2023-07-28 NOTE — BH INPATIENT PSYCHIATRY PROGRESS NOTE - NSBHFUPINTERVALHXFT_PSY_A_CORE
Chart reviewed. Case discussed with interdisciplinary team. Patient seen and examined for follow up of psychosis and depression. No acute events overnight. Compliant with standing medication. No PRN requested or required. Per sleep log, fair sleep.     On encounter today continues to report severe depression, 2/10 with 10 being the best.  Pt denies suicidal ideation, intent or plan. Denies AH/VH, paranoid thoughts. Does not mention fear of going to residential after discharge, when asked future plans pt offers "I don't know". ECT #7 scheduled for Monday. Will continue titrating Effexor XR as needed/tolerated. Still endorsing beliefs he may end up in residential.

## 2023-07-29 RX ADMIN — OLANZAPINE 20 MILLIGRAM(S): 15 TABLET, FILM COATED ORAL at 20:13

## 2023-07-29 RX ADMIN — Medication 0.5 MILLIGRAM(S): at 20:14

## 2023-07-29 RX ADMIN — Medication 150 MILLIGRAM(S): at 08:05

## 2023-07-29 RX ADMIN — Medication 0.5 MILLIGRAM(S): at 08:05

## 2023-07-30 RX ADMIN — Medication 0.5 MILLIGRAM(S): at 20:07

## 2023-07-30 RX ADMIN — Medication 150 MILLIGRAM(S): at 09:18

## 2023-07-30 RX ADMIN — Medication 0.5 MILLIGRAM(S): at 09:18

## 2023-07-30 RX ADMIN — OLANZAPINE 20 MILLIGRAM(S): 15 TABLET, FILM COATED ORAL at 20:07

## 2023-07-31 PROCEDURE — 99231 SBSQ HOSP IP/OBS SF/LOW 25: CPT

## 2023-07-31 PROCEDURE — 90870 ELECTROCONVULSIVE THERAPY: CPT

## 2023-07-31 RX ORDER — FLUMAZENIL 0.1 MG/ML
0.2 VIAL (ML) INTRAVENOUS ONCE
Refills: 0 | Status: COMPLETED | OUTPATIENT
Start: 2023-07-31 | End: 2023-07-31

## 2023-07-31 RX ADMIN — Medication 0.2 MILLIGRAM(S): at 07:57

## 2023-07-31 RX ADMIN — Medication 0.5 MILLIGRAM(S): at 09:31

## 2023-07-31 RX ADMIN — OLANZAPINE 20 MILLIGRAM(S): 15 TABLET, FILM COATED ORAL at 20:22

## 2023-07-31 RX ADMIN — Medication 0.5 MILLIGRAM(S): at 20:22

## 2023-07-31 RX ADMIN — Medication 150 MILLIGRAM(S): at 09:32

## 2023-07-31 NOTE — BH INPATIENT PSYCHIATRY PROGRESS NOTE - NSBHCHARTREVIEWVS_PSY_A_CORE FT
Vital Signs Last 24 Hrs  T(C): 36.6 (07-31-23 @ 08:45), Max: 36.6 (07-31-23 @ 05:55)  T(F): 97.8 (07-31-23 @ 08:45), Max: 97.9 (07-31-23 @ 05:55)  HR: 60 (07-31-23 @ 08:45) (53 - 75)  BP: 133/78 (07-31-23 @ 08:45) (133/78 - 193/100)  BP(mean): --  RR: 16 (07-31-23 @ 08:45) (16 - 19)  SpO2: 96% (07-31-23 @ 08:45) (94% - 97%)    Orthostatic VS  07-31-23 @ 09:01  Lying BP: --/-- HR: --  Sitting BP: 108/82 HR: 89  Standing BP: --/-- HR: --  Site: upper left arm  Mode: electronic  Orthostatic VS  07-31-23 @ 05:55  Lying BP: --/-- HR: --  Sitting BP: 124/74 HR: 54  Standing BP: 125/80 HR: 85  Site: --  Mode: --  Orthostatic VS  07-30-23 @ 06:28  Lying BP: --/-- HR: --  Sitting BP: 128/66 HR: 80  Standing BP: 126/72 HR: 69  Site: --  Mode: --

## 2023-07-31 NOTE — BH INPATIENT PSYCHIATRY PROGRESS NOTE - NSBHASSESSSUMMFT_PSY_ALL_CORE
Pt is a 67 yo M domiciled in assisted living facility with hx of schizoaffective d/o (bipolar type), multiple past psych hospitalizations, 1 past SA in 1992 by cutting wrists, and remote substance use (alcohol use leading to 3 DUIs and 1 year-long group home sentence, cocaine use 20 years ago), presenting to [facility] as transfer from [facility] for ECT evaluation due to increasing paranoia and poor self-care. Continued inpatient admission required for safety and ECT assessment.     working diagnosis: SAD bipolar type + current depressive episode w/ psychosis.     7/15 dysphoric, withdrawn, superficial, no SI/HI.    7/17: continues to reports depressed mood with fragmented sleep and mostly isolative, though patient is less perseverative about paranoid delusions with no SIIP/HIIP. Tolerating well ECT today #3. Next on 7/19. No physical complaints, VS stable.   7/18: continues to endorse depressed mood with concerns about future living situation though is less perseverative about paranoid delusions with no SIP/HIIP. Tolerating well ECT, next on 7/19. No physical complaints, VS stable.   7/19: reports small improvement in sxs today with better mood and sleep; and less perseveration about paranoid delusions and no SIIP/HIIP. Tolerating well ECT #4 today, next on 7/24. No physical complaints, VS stable.   7/20: reports depressed mood with low energy/motivation, appetite and some hopelessness about future which are related to paranoid delusions. Denies SIIP/HIIP. Tolerating well ECT w/o physical complains, VS stable. Will start Cross tapering from Prozac to Effexor for better management of sxs.   7/21: continue to taper Prozac, patient remains mostly isolative with perseveration about going to group home  7/24: continues to endorse depression and anxiety though with small improvement in sleep and appetite; and less perseveration about going to group home today. Denies SIIP/HIIP. Tolerating well ECT w/o physical complains, VS stable. ECT session #5 today.   7/25: reports small improvements in mood, appetite and sleep, though continues to perseverate about future and living situation. Denies SIIP/HIIP. Tolerating well ECT w/o physical complains, VS stable. Next ECT 7/26.   7/27: Pt reports feeling depressed and noticing no improvement. Will titrate Effexor XR to 150mg QD. ECT #7 scheduled for Monday.   7/31: S/P ECT #7. Reporting modest gains in terms of mood. ECT #8 scheduled for Wednesday 8/2.    Plan:   1. Legal: admitted on 9.13 voluntary status  2. Safety:  Denies SI/SIB/HI/VI; continue routine observation.  3. Psychiatric:   - Start cross taper from Prozac to Effexor. c/w Prozac to 20mg mg PO daily and c/w Effexor to 75mg PO daily for schizoaffective d/o  -Zyprexa 20 mg PO qHS for schizoaffective d/o  -Klonopin 0.5 mg PO BID for anxiety   	- Per ISTOP: Last prescribed Klonopin 0.5 mg #60 on 5/5/23  - Holding home dose of Ambien 12.5 mg QHS  4. Medical:  a) ECT referral  	- CBC, CMP, EKG ordered for 7/4 wnl. Review labs in HIE 5/23: TSH, B12, Folate, lipid panel and A1c WNL.   	- Last UA 5/26 UTI completed antibiotics on 5/31/23. Pending UA.   	- ECT consult ordered and completed. ECT #1 on 7/11/23, #2 7/14, #3 on 7/17, #4 7/19, #5 7/24  	- EKG 7/6: WNL.   b) PT consult: fall precautions, recommend ambulating with walker but patient has been refusing.   c) NUT consult: Minced and moist diet with ensure TID.    d) HTN previously on Norvasc 10 mg PO daily. Discontinued on 6/19/23 at [facility] for low BP. On unit BP stable around 130's/90' w/o antihypertensives. Will continue to monitor closely.   5. Collateral: outpatient psychiatrist Dr. Irby on 6/2/23 on [facility] and handoff from Dr. Hernandez. Collateral from sister. See  note.   6. Disposition: When stable.

## 2023-07-31 NOTE — BH INPATIENT PSYCHIATRY PROGRESS NOTE - NSBHMETABOLIC_PSY_ALL_CORE_FT
BMI: BMI (kg/m2): 22.7 (07-07-23 @ 17:08)  HbA1c:   Glucose:   BP: 133/78 (07-31-23 @ 08:45) (133/78 - 193/100)  Lipid Panel:

## 2023-07-31 NOTE — BH INPATIENT PSYCHIATRY PROGRESS NOTE - NSBHFUPINTERVALHXFT_PSY_A_CORE
Chart reviewed. Case discussed with interdisciplinary team. Patient seen and examined for follow up of psychosis and depression. No acute events overnight. Compliant with standing medication. No PRN requested or required. VSS.  Pt s/p ECT #7 this morning. During encounter after ECT pt was eating breakfast. Reported modest gains in terms of mood, rating his mood today as 5/10 with 10 being the best. Last week he stated he was at around 2/10. Pt noted he feels more energetic and hopeful about the future. Denies suicidal/homicidal ideation, intent or plans. Will continue ECT this Wednesday.

## 2023-08-01 PROCEDURE — 99231 SBSQ HOSP IP/OBS SF/LOW 25: CPT

## 2023-08-01 RX ADMIN — Medication 0.5 MILLIGRAM(S): at 08:23

## 2023-08-01 RX ADMIN — Medication 150 MILLIGRAM(S): at 08:23

## 2023-08-01 RX ADMIN — Medication 0.5 MILLIGRAM(S): at 22:01

## 2023-08-01 RX ADMIN — OLANZAPINE 20 MILLIGRAM(S): 15 TABLET, FILM COATED ORAL at 22:02

## 2023-08-01 NOTE — BH INPATIENT PSYCHIATRY PROGRESS NOTE - NSBHFUPINTERVALHXFT_PSY_A_CORE
Chart reviewed. Case discussed with interdisciplinary team. Patient seen and examined for follow up of psychosis and depression. No acute events overnight. Compliant with standing medication. No PRN requested or required. VSS.  Pt s/p ECT #7 yesterday. Reports sustained gains in terms of mood. Reports feeling more motivated and hopeful about the future. No longer believe she will go to [facility]. Denies AH/VH/SI/HI. Will continue ECT tomorrow. Continue current regimen.

## 2023-08-01 NOTE — BH INPATIENT PSYCHIATRY PROGRESS NOTE - NSBHASSESSSUMMFT_PSY_ALL_CORE
Pt is a 69 yo M domiciled in assisted living facility with hx of schizoaffective d/o (bipolar type), multiple past psych hospitalizations, 1 past SA in 1992 by cutting wrists, and remote substance use (alcohol use leading to 3 DUIs and 1 year-long assisted sentence, cocaine use 20 years ago), presenting to [facility] 2S as transfer from [facility] for ECT evaluation due to increasing paranoia and poor self-care. Continued inpatient admission required for safety and ECT assessment.     working diagnosis: SAD bipolar type + current depressive episode w/ psychosis.     7/15 dysphoric, withdrawn, superficial, no SI/HI.    7/17: continues to reports depressed mood with fragmented sleep and mostly isolative, though patient is less perseverative about paranoid delusions with no SIIP/HIIP. Tolerating well ECT today #3. Next on 7/19. No physical complaints, VS stable.   7/18: continues to endorse depressed mood with concerns about future living situation though is less perseverative about paranoid delusions with no SIP/HIIP. Tolerating well ECT, next on 7/19. No physical complaints, VS stable.   7/19: reports small improvement in sxs today with better mood and sleep; and less perseveration about paranoid delusions and no SIIP/HIIP. Tolerating well ECT #4 today, next on 7/24. No physical complaints, VS stable.   7/20: reports depressed mood with low energy/motivation, appetite and some hopelessness about future which are related to paranoid delusions. Denies SIIP/HIIP. Tolerating well ECT w/o physical complains, VS stable. Will start Cross tapering from Prozac to Effexor for better management of sxs.   7/21: continue to taper Prozac, patient remains mostly isolative with perseveration about going to assisted  7/24: continues to endorse depression and anxiety though with small improvement in sleep and appetite; and less perseveration about going to assisted today. Denies SIIP/HIIP. Tolerating well ECT w/o physical complains, VS stable. ECT session #5 today.   7/25: reports small improvements in mood, appetite and sleep, though continues to perseverate about future and living situation. Denies SIIP/HIIP. Tolerating well ECT w/o physical complains, VS stable. Next ECT 7/26.   7/27: Pt reports feeling depressed and noticing no improvement. Will titrate Effexor XR to 150mg QD. ECT #7 scheduled for Monday.   7/31: S/P ECT #7. Reporting modest gains in terms of mood. ECT #8 scheduled for Wednesday 8/2.    Plan:   1. Legal: admitted on 9.13 voluntary status  2. Safety:  Denies SI/SIB/HI/VI; continue routine observation.  3. Psychiatric:   - Start cross taper from Prozac to Effexor. c/w Prozac to 20mg mg PO daily and c/w Effexor to 75mg PO daily for schizoaffective d/o  -Zyprexa 20 mg PO qHS for schizoaffective d/o  -Klonopin 0.5 mg PO BID for anxiety   	- Per ISTOP: Last prescribed Klonopin 0.5 mg #60 on 5/5/23  - Holding home dose of Ambien 12.5 mg QHS  4. Medical:  a) ECT referral  	- CBC, CMP, EKG ordered for 7/4 wnl. Review labs in HIE 5/23: TSH, B12, Folate, lipid panel and A1c WNL.   	- Last UA 5/26 UTI completed antibiotics on 5/31/23. Pending UA.   	- ECT consult ordered and completed. ECT #1 on 7/11/23, #2 7/14, #3 on 7/17, #4 7/19, #5 7/24  	- EKG 7/6: WNL.   b) PT consult: fall precautions, recommend ambulating with walker but patient has been refusing.   c) NUT consult: Minced and moist diet with ensure TID.    d) HTN previously on Norvasc 10 mg PO daily. Discontinued on 6/19/23 at [facility] for low BP. On unit BP stable around 130's/90' w/o antihypertensives. Will continue to monitor closely.   5. Collateral: outpatient psychiatrist Dr. Irby on 6/2/23 on [facility] and handoff from Dr. Hernandez. Collateral from sister. See  note.   6. Disposition: When stable.

## 2023-08-01 NOTE — BH INPATIENT PSYCHIATRY PROGRESS NOTE - NSBHCHARTREVIEWVS_PSY_A_CORE FT
Vital Signs Last 24 Hrs  T(C): 36.7 (08-01-23 @ 07:44), Max: 36.7 (08-01-23 @ 07:44)  T(F): 98 (08-01-23 @ 07:44), Max: 98 (08-01-23 @ 07:44)  HR: --  BP: --  BP(mean): --  RR: --  SpO2: --    Orthostatic VS  08-01-23 @ 07:44  Lying BP: 122/72 HR: 60  Sitting BP: 123/78 HR: 60  Standing BP: --/-- HR: --  Site: --  Mode: --  Orthostatic VS  07-31-23 @ 09:01  Lying BP: --/-- HR: --  Sitting BP: 108/82 HR: 89  Standing BP: --/-- HR: --  Site: upper left arm  Mode: electronic  Orthostatic VS  07-31-23 @ 05:55  Lying BP: --/-- HR: --  Sitting BP: 124/74 HR: 54  Standing BP: 125/80 HR: 85  Site: --  Mode: --

## 2023-08-02 PROCEDURE — 90870 ELECTROCONVULSIVE THERAPY: CPT

## 2023-08-02 PROCEDURE — 99231 SBSQ HOSP IP/OBS SF/LOW 25: CPT | Mod: 25

## 2023-08-02 RX ORDER — FLUMAZENIL 0.1 MG/ML
0.2 VIAL (ML) INTRAVENOUS ONCE
Refills: 0 | Status: COMPLETED | OUTPATIENT
Start: 2023-08-02 | End: 2023-08-02

## 2023-08-02 RX ORDER — CLONAZEPAM 1 MG
0.5 TABLET ORAL
Refills: 0 | Status: DISCONTINUED | OUTPATIENT
Start: 2023-08-02 | End: 2023-08-09

## 2023-08-02 RX ADMIN — OLANZAPINE 20 MILLIGRAM(S): 15 TABLET, FILM COATED ORAL at 20:16

## 2023-08-02 RX ADMIN — Medication 150 MILLIGRAM(S): at 08:18

## 2023-08-02 RX ADMIN — Medication 0.5 MILLIGRAM(S): at 20:17

## 2023-08-02 RX ADMIN — Medication 0.5 MILLIGRAM(S): at 08:18

## 2023-08-02 RX ADMIN — Medication 0.2 MILLIGRAM(S): at 11:15

## 2023-08-02 NOTE — BH INPATIENT PSYCHIATRY PROGRESS NOTE - NSBHCHARTREVIEWVS_PSY_A_CORE FT
Vital Signs Last 24 Hrs  T(C): 36.3 (08-02-23 @ 12:05), Max: 36.7 (08-02-23 @ 10:53)  T(F): 97.4 (08-02-23 @ 12:05), Max: 98.1 (08-02-23 @ 10:53)  HR: 51 (08-02-23 @ 12:05) (50 - 62)  BP: 128/77 (08-02-23 @ 12:05) (125/75 - 145/82)  BP(mean): --  RR: 18 (08-02-23 @ 12:05) (16 - 18)  SpO2: 99% (08-02-23 @ 12:05) (96% - 99%)    Orthostatic VS  08-02-23 @ 05:43  Lying BP: --/-- HR: --  Sitting BP: 107/74 HR: 59  Standing BP: 98/64 HR: 83  Site: upper right arm  Mode: electronic  Orthostatic VS  08-01-23 @ 07:44  Lying BP: 122/72 HR: 60  Sitting BP: 123/78 HR: 60  Standing BP: --/-- HR: --  Site: --  Mode: --

## 2023-08-02 NOTE — BH INPATIENT PSYCHIATRY PROGRESS NOTE - NSBHFUPINTERVALHXFT_PSY_A_CORE
Chart reviewed. Case discussed with interdisciplinary team. Patient seen and examined for follow up of psychosis and depression. No acute events overnight. Compliant with standing medication and denying side effects. No PRN requested or required. VSS.  Completed ECT #8 this morning. Reports sustained gains in terms of mood, rates it as a 5/10 with 10 being the best. Denies AH/VH/SI/HI. Will continue ECT tomorrow. Continue current regimen.

## 2023-08-02 NOTE — BH INPATIENT PSYCHIATRY PROGRESS NOTE - NSBHASSESSSUMMFT_PSY_ALL_CORE
Pt is a 67 yo M domiciled in assisted living facility with hx of schizoaffective d/o (bipolar type), multiple past psych hospitalizations, 1 past SA in 1992 by cutting wrists, and remote substance use (alcohol use leading to 3 DUIs and 1 year-long detention sentence, cocaine use 20 years ago), presenting to [facility] 2S as transfer from [facility] for ECT evaluation due to increasing paranoia and poor self-care. Continued inpatient admission required for safety and ECT assessment.     working diagnosis: SAD bipolar type + current depressive episode w/ psychosis.     7/15 dysphoric, withdrawn, superficial, no SI/HI.    7/17: continues to reports depressed mood with fragmented sleep and mostly isolative, though patient is less perseverative about paranoid delusions with no SIIP/HIIP. Tolerating well ECT today #3. Next on 7/19. No physical complaints, VS stable.   7/18: continues to endorse depressed mood with concerns about future living situation though is less perseverative about paranoid delusions with no SIP/HIIP. Tolerating well ECT, next on 7/19. No physical complaints, VS stable.   7/19: reports small improvement in sxs today with better mood and sleep; and less perseveration about paranoid delusions and no SIIP/HIIP. Tolerating well ECT #4 today, next on 7/24. No physical complaints, VS stable.   7/20: reports depressed mood with low energy/motivation, appetite and some hopelessness about future which are related to paranoid delusions. Denies SIIP/HIIP. Tolerating well ECT w/o physical complains, VS stable. Will start Cross tapering from Prozac to Effexor for better management of sxs.   7/21: continue to taper Prozac, patient remains mostly isolative with perseveration about going to detention  7/24: continues to endorse depression and anxiety though with small improvement in sleep and appetite; and less perseveration about going to detention today. Denies SIIP/HIIP. Tolerating well ECT w/o physical complains, VS stable. ECT session #5 today.   7/25: reports small improvements in mood, appetite and sleep, though continues to perseverate about future and living situation. Denies SIIP/HIIP. Tolerating well ECT w/o physical complains, VS stable. Next ECT 7/26.   7/27: Pt reports feeling depressed and noticing no improvement. Will titrate Effexor XR to 150mg QD. ECT #7 scheduled for Monday.   7/31: S/P ECT #7. Reporting modest gains in terms of mood. ECT #8 scheduled for Wednesday 8/2.    Plan:   1. Legal: admitted on 9.13 voluntary status  2. Safety:  Denies SI/SIB/HI/VI; continue routine observation.  3. Psychiatric:   - Start cross taper from Prozac to Effexor. c/w Prozac to 20mg mg PO daily and c/w Effexor to 75mg PO daily for schizoaffective d/o  -Zyprexa 20 mg PO qHS for schizoaffective d/o  -Klonopin 0.5 mg PO BID for anxiety   	- Per ISTOP: Last prescribed Klonopin 0.5 mg #60 on 5/5/23  - Holding home dose of Ambien 12.5 mg QHS  4. Medical:  a) ECT referral  	- CBC, CMP, EKG ordered for 7/4 wnl. Review labs in HIE 5/23: TSH, B12, Folate, lipid panel and A1c WNL.   	- Last UA 5/26 UTI completed antibiotics on 5/31/23. Pending UA.   	- ECT consult ordered and completed. ECT #1 on 7/11/23, #2 7/14, #3 on 7/17, #4 7/19, #5 7/24  	- EKG 7/6: WNL.   b) PT consult: fall precautions, recommend ambulating with walker but patient has been refusing.   c) NUT consult: Minced and moist diet with ensure TID.    d) HTN previously on Norvasc 10 mg PO daily. Discontinued on 6/19/23 at [facility] for low BP. On unit BP stable around 130's/90' w/o antihypertensives. Will continue to monitor closely.   5. Collateral: outpatient psychiatrist Dr. Irby on 6/2/23 on [facility] and handoff from Dr. Hernandez. Collateral from sister. See  note.   6. Disposition: When stable.

## 2023-08-02 NOTE — BH INPATIENT PSYCHIATRY PROGRESS NOTE - NSBHMETABOLIC_PSY_ALL_CORE_FT
BMI: BMI (kg/m2): 22.7 (07-07-23 @ 17:08)  HbA1c:   Glucose:   BP: 128/77 (08-02-23 @ 12:05) (125/75 - 193/100)  Lipid Panel:

## 2023-08-03 PROCEDURE — 99231 SBSQ HOSP IP/OBS SF/LOW 25: CPT

## 2023-08-03 RX ADMIN — OLANZAPINE 20 MILLIGRAM(S): 15 TABLET, FILM COATED ORAL at 20:33

## 2023-08-03 RX ADMIN — Medication 0.5 MILLIGRAM(S): at 08:59

## 2023-08-03 RX ADMIN — Medication 150 MILLIGRAM(S): at 08:59

## 2023-08-03 RX ADMIN — Medication 0.5 MILLIGRAM(S): at 20:33

## 2023-08-03 NOTE — BH INPATIENT PSYCHIATRY PROGRESS NOTE - NSBHCHARTREVIEWVS_PSY_A_CORE FT
Vital Signs Last 24 Hrs  T(C): 36.3 (08-03-23 @ 07:49), Max: 36.3 (08-03-23 @ 07:49)  T(F): 97.3 (08-03-23 @ 07:49), Max: 97.3 (08-03-23 @ 07:49)  HR: --  BP: --  BP(mean): --  RR: --  SpO2: --    Orthostatic VS  08-03-23 @ 07:49  Lying BP: --/-- HR: --  Sitting BP: 126/83 HR: 52  Standing BP: 129/84 HR: 66  Site: --  Mode: --  Orthostatic VS  08-02-23 @ 05:43  Lying BP: --/-- HR: --  Sitting BP: 107/74 HR: 59  Standing BP: 98/64 HR: 83  Site: upper right arm  Mode: electronic

## 2023-08-03 NOTE — BH INPATIENT PSYCHIATRY PROGRESS NOTE - NSBHFUPINTERVALHXFT_PSY_A_CORE
Chart reviewed. Case discussed with interdisciplinary team. Patient seen and examined for follow up of psychosis and depression. No acute events overnight. Compliant with standing medication and denying side effects. No PRN requested or required. VSS.  Completed ECT #8 yesterday morning. Denies interval changes, continues to report partial improvement sin mood. Denies AH/VH/SI/HI. Will continue ECT tomorrow. Continue current regimen.

## 2023-08-03 NOTE — BH INPATIENT PSYCHIATRY PROGRESS NOTE - NSBHMETABOLIC_PSY_ALL_CORE_FT
BMI: BMI (kg/m2): 22.7 (07-07-23 @ 17:08)  HbA1c:   Glucose:   BP: 128/77 (08-02-23 @ 12:05) (125/75 - 145/82)  Lipid Panel:

## 2023-08-03 NOTE — BH INPATIENT PSYCHIATRY PROGRESS NOTE - NSBHASSESSSUMMFT_PSY_ALL_CORE
Pt is a 69 yo M domiciled in assisted living facility with hx of schizoaffective d/o (bipolar type), multiple past psych hospitalizations, 1 past SA in 1992 by cutting wrists, and remote substance use (alcohol use leading to 3 DUIs and 1 year-long penitentiary sentence, cocaine use 20 years ago), presenting to [facility] 2S as transfer from [facility] for ECT evaluation due to increasing paranoia and poor self-care. Continued inpatient admission required for safety and ECT assessment.     working diagnosis: SAD bipolar type + current depressive episode w/ psychosis.     7/15 dysphoric, withdrawn, superficial, no SI/HI.    7/17: continues to reports depressed mood with fragmented sleep and mostly isolative, though patient is less perseverative about paranoid delusions with no SIIP/HIIP. Tolerating well ECT today #3. Next on 7/19. No physical complaints, VS stable.   7/18: continues to endorse depressed mood with concerns about future living situation though is less perseverative about paranoid delusions with no SIP/HIIP. Tolerating well ECT, next on 7/19. No physical complaints, VS stable.   7/19: reports small improvement in sxs today with better mood and sleep; and less perseveration about paranoid delusions and no SIIP/HIIP. Tolerating well ECT #4 today, next on 7/24. No physical complaints, VS stable.   7/20: reports depressed mood with low energy/motivation, appetite and some hopelessness about future which are related to paranoid delusions. Denies SIIP/HIIP. Tolerating well ECT w/o physical complains, VS stable. Will start Cross tapering from Prozac to Effexor for better management of sxs.   7/21: continue to taper Prozac, patient remains mostly isolative with perseveration about going to penitentiary  7/24: continues to endorse depression and anxiety though with small improvement in sleep and appetite; and less perseveration about going to penitentiary today. Denies SIIP/HIIP. Tolerating well ECT w/o physical complains, VS stable. ECT session #5 today.   7/25: reports small improvements in mood, appetite and sleep, though continues to perseverate about future and living situation. Denies SIIP/HIIP. Tolerating well ECT w/o physical complains, VS stable. Next ECT 7/26.   7/27: Pt reports feeling depressed and noticing no improvement. Will titrate Effexor XR to 150mg QD. ECT #7 scheduled for Monday.   7/31: S/P ECT #7. Reporting modest gains in terms of mood. ECT #8 scheduled for Wednesday 8/2.    Plan:   1. Legal: admitted on 9.13 voluntary status  2. Safety:  Denies SI/SIB/HI/VI; continue routine observation.  3. Psychiatric:   - Start cross taper from Prozac to Effexor. c/w Prozac to 20mg mg PO daily and c/w Effexor to 75mg PO daily for schizoaffective d/o  -Zyprexa 20 mg PO qHS for schizoaffective d/o  -Klonopin 0.5 mg PO BID for anxiety   	- Per ISTOP: Last prescribed Klonopin 0.5 mg #60 on 5/5/23  - Holding home dose of Ambien 12.5 mg QHS  4. Medical:  a) ECT referral  	- CBC, CMP, EKG ordered for 7/4 wnl. Review labs in HIE 5/23: TSH, B12, Folate, lipid panel and A1c WNL.   	- Last UA 5/26 UTI completed antibiotics on 5/31/23. Pending UA.   	- ECT consult ordered and completed. ECT #1 on 7/11/23, #2 7/14, #3 on 7/17, #4 7/19, #5 7/24  	- EKG 7/6: WNL.   b) PT consult: fall precautions, recommend ambulating with walker but patient has been refusing.   c) NUT consult: Minced and moist diet with ensure TID.    d) HTN previously on Norvasc 10 mg PO daily. Discontinued on 6/19/23 at [facility] for low BP. On unit BP stable around 130's/90' w/o antihypertensives. Will continue to monitor closely.   5. Collateral: outpatient psychiatrist Dr. Irby on 6/2/23 on [facility] and handoff from Dr. Hernandez. Collateral from sister. See  note.   6. Disposition: When stable.

## 2023-08-03 NOTE — BH TREATMENT PLAN - NSTXDCOTHRINTERSW_PSY_ALL_CORE
SW meets with pt regularly to encourage his compliance and verbalization of his concerns.  ATA updates with pt's brother as pt's tx progresses, planning for pt's DC.
ATA meets wit pt regularly to encourage verbalization of his concerns, compliance with treatment and participation in self care.  ATA updates with pt's family as treatment progresses
SW meets with pt regularly to encourage his compliance, address his ongoing concerns.  SW updates with pt's family and facility
SW meets with pt regularly to encourage his compliance, address his ongoing concerns.  SW updates with pt's family and facility
SW will meet with pt routinely as he proceeds with his ECT course, encouraging his compliance and self-care.  SW will coordinate pt's DC with the [facility] and pt's family supports.

## 2023-08-04 PROCEDURE — 99231 SBSQ HOSP IP/OBS SF/LOW 25: CPT

## 2023-08-04 RX ADMIN — Medication 0.5 MILLIGRAM(S): at 10:01

## 2023-08-04 RX ADMIN — OLANZAPINE 20 MILLIGRAM(S): 15 TABLET, FILM COATED ORAL at 20:08

## 2023-08-04 RX ADMIN — Medication 150 MILLIGRAM(S): at 10:00

## 2023-08-04 RX ADMIN — Medication 0.5 MILLIGRAM(S): at 20:08

## 2023-08-04 NOTE — BH INPATIENT PSYCHIATRY PROGRESS NOTE - NSBHASSESSSUMMFT_PSY_ALL_CORE
Pt is a 69 yo M domiciled in assisted living facility with hx of schizoaffective d/o (bipolar type), multiple past psych hospitalizations, 1 past SA in 1992 by cutting wrists, and remote substance use (alcohol use leading to 3 DUIs and 1 year-long prison sentence, cocaine use 20 years ago), presenting to [facility] 2S as transfer from [facility] for ECT evaluation due to increasing paranoia and poor self-care. Continued inpatient admission required for safety and ECT assessment.     working diagnosis: SAD bipolar type + current depressive episode w/ psychosis.     7/15 dysphoric, withdrawn, superficial, no SI/HI.    7/17: continues to reports depressed mood with fragmented sleep and mostly isolative, though patient is less perseverative about paranoid delusions with no SIIP/HIIP. Tolerating well ECT today #3. Next on 7/19. No physical complaints, VS stable.   7/18: continues to endorse depressed mood with concerns about future living situation though is less perseverative about paranoid delusions with no SIP/HIIP. Tolerating well ECT, next on 7/19. No physical complaints, VS stable.   7/19: reports small improvement in sxs today with better mood and sleep; and less perseveration about paranoid delusions and no SIIP/HIIP. Tolerating well ECT #4 today, next on 7/24. No physical complaints, VS stable.   7/20: reports depressed mood with low energy/motivation, appetite and some hopelessness about future which are related to paranoid delusions. Denies SIIP/HIIP. Tolerating well ECT w/o physical complains, VS stable. Will start Cross tapering from Prozac to Effexor for better management of sxs.   7/21: continue to taper Prozac, patient remains mostly isolative with perseveration about going to prison  7/24: continues to endorse depression and anxiety though with small improvement in sleep and appetite; and less perseveration about going to prison today. Denies SIIP/HIIP. Tolerating well ECT w/o physical complains, VS stable. ECT session #5 today.   7/25: reports small improvements in mood, appetite and sleep, though continues to perseverate about future and living situation. Denies SIIP/HIIP. Tolerating well ECT w/o physical complains, VS stable. Next ECT 7/26.   7/27: Pt reports feeling depressed and noticing no improvement. Will titrate Effexor XR to 150mg QD. ECT #7 scheduled for Monday.   7/31: S/P ECT #7. Reporting modest gains in terms of mood. ECT #8 scheduled for Wednesday 8/2.    Plan:   1. Legal: admitted on 9.13 voluntary status  2. Safety:  Denies SI/SIB/HI/VI; continue routine observation.  3. Psychiatric:   - Start cross taper from Prozac to Effexor. c/w Prozac to 20mg mg PO daily and c/w Effexor to 75mg PO daily for schizoaffective d/o  -Zyprexa 20 mg PO qHS for schizoaffective d/o  -Klonopin 0.5 mg PO BID for anxiety   	- Per ISTOP: Last prescribed Klonopin 0.5 mg #60 on 5/5/23  - Holding home dose of Ambien 12.5 mg QHS  4. Medical:  a) ECT referral  	- CBC, CMP, EKG ordered for 7/4 wnl. Review labs in [facility] 5/23: TSH, B12, Folate, lipid panel and A1c WNL.   	- Last UA 5/26 UTI completed antibiotics on 5/31/23. Pending UA.   	- ECT consult ordered and completed. ECT #1 on 7/11/23, #2 7/14, #3 on 7/17, #4 7/19, #5 7/24  	- EKG 7/6: WNL.   b) PT consult: fall precautions, recommend ambulating with walker but patient has been refusing.   c) NUT consult: Minced and moist diet with ensure TID.    d) HTN previously on Norvasc 10 mg PO daily. Discontinued on 6/19/23 at [facility] for low BP. On unit BP stable around 130's/90' w/o antihypertensives. Will continue to monitor closely.   5. Collateral: outpatient psychiatrist Dr. Irby on 6/2/23 on [facility] and handoff from Dr. Hernandez. Collateral from sister. See  note.   6. Disposition: When stable.

## 2023-08-04 NOTE — BH INPATIENT PSYCHIATRY PROGRESS NOTE - NSBHFUPINTERVALHXFT_PSY_A_CORE
Chart reviewed. Case discussed with interdisciplinary team. Patient seen and examined for follow up of psychosis and depression. No acute events overnight. Compliant with standing medication and denying side effects. No PRN requested or required. VSS. Denies interval changes, continues to report partial improvement sin mood. Denies thinking he is going to custodial. Denies AH/VH/SI/HI. Will continue ECT next Monday. Continue current regimen.

## 2023-08-04 NOTE — BH INPATIENT PSYCHIATRY PROGRESS NOTE - NSBHCHARTREVIEWVS_PSY_A_CORE FT
Vital Signs Last 24 Hrs  T(C): 36.9 (08-04-23 @ 07:51), Max: 36.9 (08-04-23 @ 07:51)  T(F): 98.5 (08-04-23 @ 07:51), Max: 98.5 (08-04-23 @ 07:51)  HR: --  BP: --  BP(mean): --  RR: --  SpO2: --    Orthostatic VS  08-04-23 @ 07:51  Lying BP: --/-- HR: --  Sitting BP: 114/74 HR: 58  Standing BP: 121/82 HR: 79  Site: --  Mode: --  Orthostatic VS  08-03-23 @ 07:49  Lying BP: --/-- HR: --  Sitting BP: 126/83 HR: 52  Standing BP: 129/84 HR: 66  Site: --  Mode: --

## 2023-08-05 RX ADMIN — Medication 0.5 MILLIGRAM(S): at 08:54

## 2023-08-05 RX ADMIN — OLANZAPINE 20 MILLIGRAM(S): 15 TABLET, FILM COATED ORAL at 20:52

## 2023-08-05 RX ADMIN — Medication 150 MILLIGRAM(S): at 08:59

## 2023-08-05 RX ADMIN — Medication 0.5 MILLIGRAM(S): at 20:52

## 2023-08-06 RX ADMIN — Medication 0.5 MILLIGRAM(S): at 20:08

## 2023-08-06 RX ADMIN — OLANZAPINE 20 MILLIGRAM(S): 15 TABLET, FILM COATED ORAL at 20:08

## 2023-08-06 RX ADMIN — Medication 150 MILLIGRAM(S): at 08:53

## 2023-08-06 RX ADMIN — Medication 0.5 MILLIGRAM(S): at 08:53

## 2023-08-07 PROCEDURE — 90870 ELECTROCONVULSIVE THERAPY: CPT

## 2023-08-07 PROCEDURE — 99231 SBSQ HOSP IP/OBS SF/LOW 25: CPT | Mod: 25

## 2023-08-07 RX ORDER — FLUMAZENIL 0.1 MG/ML
0.2 VIAL (ML) INTRAVENOUS ONCE
Refills: 0 | Status: COMPLETED | OUTPATIENT
Start: 2023-08-07 | End: 2023-08-07

## 2023-08-07 RX ADMIN — Medication 0.5 MILLIGRAM(S): at 07:34

## 2023-08-07 RX ADMIN — OLANZAPINE 20 MILLIGRAM(S): 15 TABLET, FILM COATED ORAL at 21:13

## 2023-08-07 RX ADMIN — Medication 0.5 MILLIGRAM(S): at 21:13

## 2023-08-07 RX ADMIN — Medication 0.2 MILLIGRAM(S): at 08:58

## 2023-08-07 RX ADMIN — Medication 150 MILLIGRAM(S): at 07:34

## 2023-08-07 NOTE — BH INPATIENT PSYCHIATRY PROGRESS NOTE - NSBHCHARTREVIEWVS_PSY_A_CORE FT
Vital Signs Last 24 Hrs  T(C): 36.2 (08-07-23 @ 09:45), Max: 36.5 (08-07-23 @ 06:26)  T(F): 97.2 (08-07-23 @ 09:45), Max: 97.7 (08-07-23 @ 06:26)  HR: 57 (08-07-23 @ 09:45) (53 - 74)  BP: 132/82 (08-07-23 @ 09:45) (115/70 - 171/96)  BP(mean): --  RR: 16 (08-07-23 @ 09:45) (16 - 24)  SpO2: 99% (08-07-23 @ 09:45) (97% - 99%)    Orthostatic VS  08-07-23 @ 06:26  Lying BP: --/-- HR: --  Sitting BP: 133/84 HR: 61  Standing BP: 132/76 HR: 66  Site: --  Mode: --  Orthostatic VS  08-06-23 @ 08:18  Lying BP: --/-- HR: --  Sitting BP: 122/80 HR: 76  Standing BP: 115/86 HR: 60  Site: upper right arm  Mode: electronic

## 2023-08-07 NOTE — BH INPATIENT PSYCHIATRY PROGRESS NOTE - NSBHASSESSSUMMFT_PSY_ALL_CORE
Pt is a 69 yo M domiciled in assisted living facility with hx of schizoaffective d/o (bipolar type), multiple past psych hospitalizations, 1 past SA in 1992 by cutting wrists, and remote substance use (alcohol use leading to 3 DUIs and 1 year-long prison sentence, cocaine use 20 years ago), presenting to [facility] 2S as transfer from [facility] for ECT evaluation due to increasing paranoia and poor self-care. Continued inpatient admission required for safety and ECT assessment.     working diagnosis: SAD bipolar type + current depressive episode w/ psychosis.     7/15 dysphoric, withdrawn, superficial, no SI/HI.    7/17: continues to reports depressed mood with fragmented sleep and mostly isolative, though patient is less perseverative about paranoid delusions with no SIIP/HIIP. Tolerating well ECT today #3. Next on 7/19. No physical complaints, VS stable.   7/18: continues to endorse depressed mood with concerns about future living situation though is less perseverative about paranoid delusions with no SIP/HIIP. Tolerating well ECT, next on 7/19. No physical complaints, VS stable.   7/19: reports small improvement in sxs today with better mood and sleep; and less perseveration about paranoid delusions and no SIIP/HIIP. Tolerating well ECT #4 today, next on 7/24. No physical complaints, VS stable.   7/20: reports depressed mood with low energy/motivation, appetite and some hopelessness about future which are related to paranoid delusions. Denies SIIP/HIIP. Tolerating well ECT w/o physical complains, VS stable. Will start Cross tapering from Prozac to Effexor for better management of sxs.   7/21: continue to taper Prozac, patient remains mostly isolative with perseveration about going to prison  7/24: continues to endorse depression and anxiety though with small improvement in sleep and appetite; and less perseveration about going to prison today. Denies SIIP/HIIP. Tolerating well ECT w/o physical complains, VS stable. ECT session #5 today.   7/25: reports small improvements in mood, appetite and sleep, though continues to perseverate about future and living situation. Denies SIIP/HIIP. Tolerating well ECT w/o physical complains, VS stable. Next ECT 7/26.   7/27: Pt reports feeling depressed and noticing no improvement. Will titrate Effexor XR to 150mg QD. ECT #7 scheduled for Monday.   7/31: S/P ECT #7. Reporting modest gains in terms of mood. ECT #8 scheduled for Wednesday 8/2.  8/7: Pt s/p ECT #9, showing modest gains in terms of mod this week. ECT #10 scheduled for Wednesday 8/9.     Plan:   1. Legal: admitted on 9.13 voluntary status  2. Safety:  Denies SI/SIB/HI/VI; continue routine observation.  3. Psychiatric:   - Start cross taper from Prozac to Effexor. c/w Prozac to 20mg mg PO daily and c/w Effexor to 75mg PO daily for schizoaffective d/o  -Zyprexa 20 mg PO qHS for schizoaffective d/o  -Klonopin 0.5 mg PO BID for anxiety   	- Per ISTOP: Last prescribed Klonopin 0.5 mg #60 on 5/5/23  - Holding home dose of Ambien 12.5 mg QHS  4. Medical:  a) ECT referral  	- CBC, CMP, EKG ordered for 7/4 wnl. Review labs in [facility] 5/23: TSH, B12, Folate, lipid panel and A1c WNL.   	- Last UA 5/26 UTI completed antibiotics on 5/31/23. Pending UA.   	- ECT consult ordered and completed. ECT #1 on 7/11/23, #2 7/14, #3 on 7/17, #4 7/19, #5 7/24  	- EKG 7/6: WNL.   b) PT consult: fall precautions, recommend ambulating with walker but patient has been refusing.   c) NUT consult: Minced and moist diet with ensure TID.    d) HTN previously on Norvasc 10 mg PO daily. Discontinued on 6/19/23 at [facility] for low BP. On unit BP stable around 130's/90' w/o antihypertensives. Will continue to monitor closely.   5. Collateral: outpatient psychiatrist Dr. Irby on 6/2/23 on [facility] and handoff from Dr. Hernandez. Collateral from sister. See  note.   6. Disposition: When stable.

## 2023-08-07 NOTE — BH INPATIENT PSYCHIATRY PROGRESS NOTE - NSBHMETABOLIC_PSY_ALL_CORE_FT
BMI: BMI (kg/m2): 22.7 (07-07-23 @ 17:08)  HbA1c:   Glucose:   BP: 132/82 (08-07-23 @ 09:45) (115/70 - 171/96)  Lipid Panel:

## 2023-08-07 NOTE — BH INPATIENT PSYCHIATRY PROGRESS NOTE - NSBHFUPINTERVALHXFT_PSY_A_CORE
Chart reviewed. Case discussed with interdisciplinary team. Patient seen and examined for follow up of psychosis and depression. No acute events overnight. No PRN requested or required. Compliant with standing medication and denying side effects. VSS. reports mood has improved today "it's like a 7/10". Denies AH/VH/SI/HI. Will continue ECT next Wednesday. Continue current regimen.

## 2023-08-08 PROCEDURE — 99231 SBSQ HOSP IP/OBS SF/LOW 25: CPT

## 2023-08-08 RX ADMIN — Medication 0.5 MILLIGRAM(S): at 20:32

## 2023-08-08 RX ADMIN — OLANZAPINE 20 MILLIGRAM(S): 15 TABLET, FILM COATED ORAL at 20:32

## 2023-08-08 RX ADMIN — Medication 0.5 MILLIGRAM(S): at 09:13

## 2023-08-08 RX ADMIN — Medication 150 MILLIGRAM(S): at 09:13

## 2023-08-08 NOTE — BH INPATIENT PSYCHIATRY PROGRESS NOTE - NSBHASSESSSUMMFT_PSY_ALL_CORE
Pt is a 67 yo M domiciled in assisted living facility with hx of schizoaffective d/o (bipolar type), multiple past psych hospitalizations, 1 past SA in 1992 by cutting wrists, and remote substance use (alcohol use leading to 3 DUIs and 1 year-long [facility] sentence, cocaine use 20 years ago), presenting to [facility] 2S as transfer from [facility] for ECT evaluation due to increasing paranoia and poor self-care. Continued inpatient admission required for safety and ECT assessment.     working diagnosis: SAD bipolar type + current depressive episode w/ psychosis.     7/15 dysphoric, withdrawn, superficial, no SI/HI.    7/17: continues to reports depressed mood with fragmented sleep and mostly isolative, though patient is less perseverative about paranoid delusions with no SIIP/HIIP. Tolerating well ECT today #3. Next on 7/19. No physical complaints, VS stable.   7/18: continues to endorse depressed mood with concerns about future living situation though is less perseverative about paranoid delusions with no SIP/HIIP. Tolerating well ECT, next on 7/19. No physical complaints, VS stable.   7/19: reports small improvement in sxs today with better mood and sleep; and less perseveration about paranoid delusions and no SIIP/HIIP. Tolerating well ECT #4 today, next on 7/24. No physical complaints, VS stable.   7/20: reports depressed mood with low energy/motivation, appetite and some hopelessness about future which are related to paranoid delusions. Denies SIIP/HIIP. Tolerating well ECT w/o physical complains, VS stable. Will start Cross tapering from Prozac to Effexor for better management of sxs.   7/21: continue to taper Prozac, patient remains mostly isolative with perseveration about going to [facility]  7/24: continues to endorse depression and anxiety though with small improvement in sleep and appetite; and less perseveration about going to [facility] today. Denies SIIP/HIIP. Tolerating well ECT w/o physical complains, VS stable. ECT session #5 today.   7/25: reports small improvements in mood, appetite and sleep, though continues to perseverate about future and living situation. Denies SIIP/HIIP. Tolerating well ECT w/o physical complains, VS stable. Next ECT 7/26.   7/27: Pt reports feeling depressed and noticing no improvement. Will titrate Effexor XR to 150mg QD. ECT #7 scheduled for Monday.   7/31: S/P ECT #7. Reporting modest gains in terms of mood. ECT #8 scheduled for Wednesday 8/2.  8/7: Pt s/p ECT #9, showing modest gains in terms of mod this week. ECT #10 scheduled for Wednesday 8/9.   8/8: Continues to endorse sustained improvement in terms of mood and denies SIIP. Agrees to return to [facility]. Discharge planning in process. ECT #10 scheduled for tomorrow.     Plan:   1. Legal: admitted on 9.13 voluntary status  2. Safety:  Denies SI/SIB/HI/VI; continue routine observation.  3. Psychiatric:   - Start cross taper from Prozac to Effexor. c/w Prozac to 20mg mg PO daily and c/w Effexor to 75mg PO daily for schizoaffective d/o  -Zyprexa 20 mg PO qHS for schizoaffective d/o  -Klonopin 0.5 mg PO BID for anxiety   	- Per ISTOP: Last prescribed Klonopin 0.5 mg #60 on 5/5/23  - Holding home dose of Ambien 12.5 mg QHS  4. Medical:  a) ECT referral  	- CBC, CMP, EKG ordered for 7/4 wnl. Review labs in [facility] 5/23: TSH, B12, Folate, lipid panel and A1c WNL.   	- Last UA 5/26 UTI completed antibiotics on 5/31/23. Pending UA.   	- ECT consult ordered and completed. ECT #1 on 7/11/23, #2 7/14, #3 on 7/17, #4 7/19, #5 7/24  	- EKG 7/6: WNL.   b) PT consult: fall precautions, recommend ambulating with walker but patient has been refusing.   c) NUT consult: Minced and moist diet with ensure TID.    d) HTN previously on Norvasc 10 mg PO daily. Discontinued on 6/19/23 at [facility] for low BP. On unit BP stable around 130's/90' w/o antihypertensives. Will continue to monitor closely.   5. Collateral: outpatient psychiatrist Dr. Irby on 6/2/23 on [facility] and handoff from Dr. Hernandez. Collateral from sister. See  note.   6. Disposition: When stable.

## 2023-08-08 NOTE — BH INPATIENT PSYCHIATRY PROGRESS NOTE - NSBHFUPINTERVALHXFT_PSY_A_CORE
Chart reviewed. Case discussed with interdisciplinary team. Patient seen and examined for follow up of psychosis and depression. No acute events overnight. No PRN requested or required. Compliant with standing medication and denying side effects. VSS. Continues to endorse sustained improvement in terms of mood and denies SIIP. Agrees to return to [facility]. Discharge planning in process.

## 2023-08-08 NOTE — BH INPATIENT PSYCHIATRY PROGRESS NOTE - NSBHCHARTREVIEWVS_PSY_A_CORE FT
Vital Signs Last 24 Hrs  T(C): 36.8 (08-08-23 @ 07:51), Max: 36.8 (08-08-23 @ 07:51)  T(F): 98.2 (08-08-23 @ 07:51), Max: 98.2 (08-08-23 @ 07:51)  HR: --  BP: --  BP(mean): --  RR: --  SpO2: --    Orthostatic VS  08-08-23 @ 07:51  Lying BP: --/-- HR: --  Sitting BP: 124/86 HR: 62  Standing BP: 131/83 HR: 90  Site: --  Mode: --  Orthostatic VS  08-07-23 @ 06:26  Lying BP: --/-- HR: --  Sitting BP: 133/84 HR: 61  Standing BP: 132/76 HR: 66  Site: --  Mode: --

## 2023-08-09 LAB
ANION GAP SERPL CALC-SCNC: 9 MMOL/L — SIGNIFICANT CHANGE UP (ref 7–14)
APPEARANCE UR: CLEAR — SIGNIFICANT CHANGE UP
BILIRUB UR-MCNC: NEGATIVE — SIGNIFICANT CHANGE UP
BUN SERPL-MCNC: 28 MG/DL — HIGH (ref 7–23)
CALCIUM SERPL-MCNC: 8.9 MG/DL — SIGNIFICANT CHANGE UP (ref 8.4–10.5)
CHLORIDE SERPL-SCNC: 105 MMOL/L — SIGNIFICANT CHANGE UP (ref 98–107)
CO2 SERPL-SCNC: 26 MMOL/L — SIGNIFICANT CHANGE UP (ref 22–31)
COLOR SPEC: YELLOW — SIGNIFICANT CHANGE UP
CREAT SERPL-MCNC: 0.82 MG/DL — SIGNIFICANT CHANGE UP (ref 0.5–1.3)
DIFF PNL FLD: NEGATIVE — SIGNIFICANT CHANGE UP
EGFR: 97 ML/MIN/1.73M2 — SIGNIFICANT CHANGE UP
GLUCOSE SERPL-MCNC: 82 MG/DL — SIGNIFICANT CHANGE UP (ref 70–99)
GLUCOSE UR QL: NEGATIVE MG/DL — SIGNIFICANT CHANGE UP
KETONES UR-MCNC: NEGATIVE MG/DL — SIGNIFICANT CHANGE UP
LEUKOCYTE ESTERASE UR-ACNC: NEGATIVE — SIGNIFICANT CHANGE UP
MAGNESIUM SERPL-MCNC: 1.9 MG/DL — SIGNIFICANT CHANGE UP (ref 1.6–2.6)
NITRITE UR-MCNC: NEGATIVE — SIGNIFICANT CHANGE UP
PH UR: 6 — SIGNIFICANT CHANGE UP (ref 5–8)
PHOSPHATE SERPL-MCNC: 3.5 MG/DL — SIGNIFICANT CHANGE UP (ref 2.5–4.5)
POTASSIUM SERPL-MCNC: 4.5 MMOL/L — SIGNIFICANT CHANGE UP (ref 3.5–5.3)
POTASSIUM SERPL-SCNC: 4.5 MMOL/L — SIGNIFICANT CHANGE UP (ref 3.5–5.3)
PROT UR-MCNC: NEGATIVE MG/DL — SIGNIFICANT CHANGE UP
SODIUM SERPL-SCNC: 140 MMOL/L — SIGNIFICANT CHANGE UP (ref 135–145)
SP GR SPEC: 1.01 — SIGNIFICANT CHANGE UP (ref 1–1.03)
UROBILINOGEN FLD QL: 0.2 MG/DL — SIGNIFICANT CHANGE UP (ref 0.2–1)

## 2023-08-09 PROCEDURE — 99231 SBSQ HOSP IP/OBS SF/LOW 25: CPT | Mod: 25

## 2023-08-09 PROCEDURE — 90870 ELECTROCONVULSIVE THERAPY: CPT

## 2023-08-09 RX ORDER — FLUMAZENIL 0.1 MG/ML
0.2 VIAL (ML) INTRAVENOUS ONCE
Refills: 0 | Status: COMPLETED | OUTPATIENT
Start: 2023-08-09 | End: 2023-08-09

## 2023-08-09 RX ADMIN — Medication 0.5 MILLIGRAM(S): at 20:41

## 2023-08-09 RX ADMIN — Medication 0.5 MILLIGRAM(S): at 10:18

## 2023-08-09 RX ADMIN — Medication 0.2 MILLIGRAM(S): at 11:28

## 2023-08-09 RX ADMIN — Medication 150 MILLIGRAM(S): at 10:19

## 2023-08-09 RX ADMIN — OLANZAPINE 20 MILLIGRAM(S): 15 TABLET, FILM COATED ORAL at 20:41

## 2023-08-09 NOTE — BH INPATIENT PSYCHIATRY PROGRESS NOTE - NSBHASSESSSUMMFT_PSY_ALL_CORE
Pt is a 69 yo M domiciled in assisted living facility with hx of schizoaffective d/o (bipolar type), multiple past psych hospitalizations, 1 past SA in 1992 by cutting wrists, and remote substance use (alcohol use leading to 3 DUIs and 1 year-long senior care sentence, cocaine use 20 years ago), presenting to [facility] 2S as transfer from [facility] for ECT evaluation due to increasing paranoia and poor self-care. Continued inpatient admission required for safety and ECT assessment.     working diagnosis: SAD bipolar type + current depressive episode w/ psychosis.     7/15 dysphoric, withdrawn, superficial, no SI/HI.    7/17: continues to reports depressed mood with fragmented sleep and mostly isolative, though patient is less perseverative about paranoid delusions with no SIIP/HIIP. Tolerating well ECT today #3. Next on 7/19. No physical complaints, VS stable.   7/18: continues to endorse depressed mood with concerns about future living situation though is less perseverative about paranoid delusions with no SIP/HIIP. Tolerating well ECT, next on 7/19. No physical complaints, VS stable.   7/19: reports small improvement in sxs today with better mood and sleep; and less perseveration about paranoid delusions and no SIIP/HIIP. Tolerating well ECT #4 today, next on 7/24. No physical complaints, VS stable.   7/20: reports depressed mood with low energy/motivation, appetite and some hopelessness about future which are related to paranoid delusions. Denies SIIP/HIIP. Tolerating well ECT w/o physical complains, VS stable. Will start Cross tapering from Prozac to Effexor for better management of sxs.   7/21: continue to taper Prozac, patient remains mostly isolative with perseveration about going to senior care  7/24: continues to endorse depression and anxiety though with small improvement in sleep and appetite; and less perseveration about going to senior care today. Denies SIIP/HIIP. Tolerating well ECT w/o physical complains, VS stable. ECT session #5 today.   7/25: reports small improvements in mood, appetite and sleep, though continues to perseverate about future and living situation. Denies SIIP/HIIP. Tolerating well ECT w/o physical complains, VS stable. Next ECT 7/26.   7/27: Pt reports feeling depressed and noticing no improvement. Will titrate Effexor XR to 150mg QD. ECT #7 scheduled for Monday.   7/31: S/P ECT #7. Reporting modest gains in terms of mood. ECT #8 scheduled for Wednesday 8/2.  8/7: Pt s/p ECT #9, showing modest gains in terms of mod this week. ECT #10 scheduled for Wednesday 8/9.   8/8: Continues to endorse sustained improvement in terms of mood and denies SIIP. Agrees to return to [facility]. Discharge planning in process. ECT #10 scheduled for tomorrow.   8/9: Completed ECT #10. Reporting sustained improvement. Discharge planning in process.     Plan:   1. Legal: admitted on 9.13 voluntary status  2. Safety:  Denies SI/SIB/HI/VI; continue routine observation.  3. Psychiatric:   - Start cross taper from Prozac to Effexor. c/w Prozac to 20mg mg PO daily and c/w Effexor to 75mg PO daily for schizoaffective d/o  -Zyprexa 20 mg PO qHS for schizoaffective d/o  -Klonopin 0.5 mg PO BID for anxiety   	- Per ISTOP: Last prescribed Klonopin 0.5 mg #60 on 5/5/23  - Holding home dose of Ambien 12.5 mg QHS  4. Medical:  a) ECT referral  	- CBC, CMP, EKG ordered for 7/4 wnl. Review labs in [facility] 5/23: TSH, B12, Folate, lipid panel and A1c WNL.   	- Last UA 5/26 UTI completed antibiotics on 5/31/23. Pending UA.   	- ECT consult ordered and completed. ECT #1 on 7/11/23, #2 7/14, #3 on 7/17, #4 7/19, #5 7/24  	- EKG 7/6: WNL.   b) PT consult: fall precautions, recommend ambulating with walker but patient has been refusing.   c) NUT consult: Minced and moist diet with ensure TID.    d) HTN previously on Norvasc 10 mg PO daily. Discontinued on 6/19/23 at [facility] for low BP. On unit BP stable around 130's/90' w/o antihypertensives. Will continue to monitor closely.   5. Collateral: outpatient psychiatrist Dr. Irby on 6/2/23 on [facility] and handoff from Dr. Hernandez. Collateral from sister. See  note.   6. Disposition: When stable.

## 2023-08-09 NOTE — BH INPATIENT PSYCHIATRY PROGRESS NOTE - NSBHCHARTREVIEWVS_PSY_A_CORE FT
Vital Signs Last 24 Hrs  T(C): 36.4 (08-09-23 @ 12:15), Max: 36.7 (08-09-23 @ 10:54)  T(F): 97.6 (08-09-23 @ 12:15), Max: 98 (08-09-23 @ 10:54)  HR: 54 (08-09-23 @ 12:15) (54 - 71)  BP: 139/74 (08-09-23 @ 12:15) (119/75 - 170/75)  BP(mean): --  RR: 17 (08-09-23 @ 12:15) (15 - 18)  SpO2: 98% (08-09-23 @ 12:15) (94% - 99%)    Orthostatic VS  08-09-23 @ 06:40  Lying BP: --/-- HR: --  Sitting BP: 134/79 HR: 62  Standing BP: 140/91 HR: 77  Site: --  Mode: --  Orthostatic VS  08-08-23 @ 07:51  Lying BP: --/-- HR: --  Sitting BP: 124/86 HR: 62  Standing BP: 131/83 HR: 90  Site: --  Mode: --

## 2023-08-09 NOTE — BH INPATIENT PSYCHIATRY PROGRESS NOTE - NSBHFUPINTERVALHXFT_PSY_A_CORE
Chart reviewed. Case discussed with interdisciplinary team. Patient seen and examined for follow up of psychosis and depression. No acute events overnight. No PRN requested or required. Compliant with standing medication and denying side effects. Completed ECT # 10 this morning. Continues to endorse sustained improvement in terms of mood and denies SIIP. Agrees to return to [facility]. Discharge planning in process.

## 2023-08-09 NOTE — BH INPATIENT PSYCHIATRY PROGRESS NOTE - NSBHMETABOLIC_PSY_ALL_CORE_FT
BMI: BMI (kg/m2): 22.7 (07-07-23 @ 17:08)  HbA1c:   Glucose:   BP: 139/74 (08-09-23 @ 12:15) (115/70 - 171/96)  Lipid Panel:

## 2023-08-10 LAB
CULTURE RESULTS: SIGNIFICANT CHANGE UP
SPECIMEN SOURCE: SIGNIFICANT CHANGE UP

## 2023-08-10 PROCEDURE — 99231 SBSQ HOSP IP/OBS SF/LOW 25: CPT

## 2023-08-10 RX ORDER — OLANZAPINE 15 MG/1
15 TABLET, FILM COATED ORAL AT BEDTIME
Refills: 0 | Status: DISCONTINUED | OUTPATIENT
Start: 2023-08-10 | End: 2023-08-21

## 2023-08-10 RX ADMIN — OLANZAPINE 15 MILLIGRAM(S): 15 TABLET, FILM COATED ORAL at 21:14

## 2023-08-10 RX ADMIN — Medication 150 MILLIGRAM(S): at 08:48

## 2023-08-10 NOTE — BH INPATIENT PSYCHIATRY PROGRESS NOTE - CURRENT MEDICATION
MEDICATIONS  (STANDING):  OLANZapine 15 milliGRAM(s) Oral at bedtime  venlafaxine  milliGRAM(s) Oral daily    MEDICATIONS  (PRN):  acetaminophen     Tablet .. 650 milliGRAM(s) Oral every 6 hours PRN Mild Pain (1 - 3), Moderate Pain (4 - 6), Severe Pain (7 - 10)  polyethylene glycol 3350 17 Gram(s) Oral daily PRN constipation  senna 2 Tablet(s) Oral at bedtime PRN constipation

## 2023-08-10 NOTE — BH INPATIENT PSYCHIATRY PROGRESS NOTE - NSBHCHARTREVIEWVS_PSY_A_CORE FT
Vital Signs Last 24 Hrs  T(C): 36.7 (08-10-23 @ 07:46), Max: 36.7 (08-10-23 @ 07:46)  T(F): 98 (08-10-23 @ 07:46), Max: 98 (08-10-23 @ 07:46)  HR: 54 (08-09-23 @ 12:15) (54 - 65)  BP: 139/74 (08-09-23 @ 12:15) (139/74 - 158/86)  BP(mean): --  RR: 17 (08-09-23 @ 12:15) (15 - 17)  SpO2: 98% (08-09-23 @ 12:15) (94% - 98%)    Orthostatic VS  08-10-23 @ 07:46  Lying BP: --/-- HR: --  Sitting BP: 122/82 HR: 60  Standing BP: 119/74 HR: 55  Site: upper left arm  Mode: electronic  Orthostatic VS  08-09-23 @ 06:40  Lying BP: --/-- HR: --  Sitting BP: 134/79 HR: 62  Standing BP: 140/91 HR: 77  Site: --  Mode: --

## 2023-08-10 NOTE — BH INPATIENT PSYCHIATRY PROGRESS NOTE - NSBHFUPINTERVALHXFT_PSY_A_CORE
Chart reviewed. Case discussed with interdisciplinary team. Patient seen and examined for follow up of psychosis and depression. No acute events overnight. No PRN requested or required. Compliant with standing medication and denying side effects. Completed ECT # 10 yesterday morning. Continues to endorse sustained improvement in terms of mood and denies SIIP. Post void residual yesterday afternoon around [id number]. Urinalysis was normal as well as kidney function. Will lower Olanzapine to 15mg and observe for sx stability given mild retention.

## 2023-08-10 NOTE — BH INPATIENT PSYCHIATRY PROGRESS NOTE - NSBHASSESSSUMMFT_PSY_ALL_CORE
Pt is a 69 yo M domiciled in assisted living facility with hx of schizoaffective d/o (bipolar type), multiple past psych hospitalizations, 1 past SA in 1992 by cutting wrists, and remote substance use (alcohol use leading to 3 DUIs and 1 year-long long term sentence, cocaine use 20 years ago), presenting to [facility] 2S as transfer from [facility] for ECT evaluation due to increasing paranoia and poor self-care. Continued inpatient admission required for safety and ECT assessment.     working diagnosis: SAD bipolar type + current depressive episode w/ psychosis.     7/15 dysphoric, withdrawn, superficial, no SI/HI.    7/17: continues to reports depressed mood with fragmented sleep and mostly isolative, though patient is less perseverative about paranoid delusions with no SIIP/HIIP. Tolerating well ECT today #3. Next on 7/19. No physical complaints, VS stable.   7/18: continues to endorse depressed mood with concerns about future living situation though is less perseverative about paranoid delusions with no SIP/HIIP. Tolerating well ECT, next on 7/19. No physical complaints, VS stable.   7/19: reports small improvement in sxs today with better mood and sleep; and less perseveration about paranoid delusions and no SIIP/HIIP. Tolerating well ECT #4 today, next on 7/24. No physical complaints, VS stable.   7/20: reports depressed mood with low energy/motivation, appetite and some hopelessness about future which are related to paranoid delusions. Denies SIIP/HIIP. Tolerating well ECT w/o physical complains, VS stable. Will start Cross tapering from Prozac to Effexor for better management of sxs.   7/21: continue to taper Prozac, patient remains mostly isolative with perseveration about going to long term  7/24: continues to endorse depression and anxiety though with small improvement in sleep and appetite; and less perseveration about going to long term today. Denies SIIP/HIIP. Tolerating well ECT w/o physical complains, VS stable. ECT session #5 today.   7/25: reports small improvements in mood, appetite and sleep, though continues to perseverate about future and living situation. Denies SIIP/HIIP. Tolerating well ECT w/o physical complains, VS stable. Next ECT 7/26.   7/27: Pt reports feeling depressed and noticing no improvement. Will titrate Effexor XR to 150mg QD. ECT #7 scheduled for Monday.   7/31: S/P ECT #7. Reporting modest gains in terms of mood. ECT #8 scheduled for Wednesday 8/2.  8/7: Pt s/p ECT #9, showing modest gains in terms of mod this week. ECT #10 scheduled for Wednesday 8/9.   8/8: Continues to endorse sustained improvement in terms of mood and denies SIIP. Agrees to return to [facility]. Discharge planning in process. ECT #10 scheduled for tomorrow.   8/9: Completed ECT #10. Reporting sustained improvement. Discharge planning in process.   8/10: Completed ECT # 10 yesterday morning. Continues to endorse sustained improvement in terms of mood and denies SIIP. Post void residual yesterday afternoon around 224-265. Urinalysis was normal as well as kidney function. Will lower Olanzapine to 15mg and observe for sx stability given mild retention.    Plan:   1. Legal: admitted on 9.13 voluntary status  2. Safety:  Denies SI/SIB/HI/VI; continue routine observation.  3. Psychiatric:   - Start cross taper from Prozac to Effexor. c/w Prozac to 20mg mg PO daily and c/w Effexor to 75mg PO daily for schizoaffective d/o  -Zyprexa 15 mg PO qHS for schizoaffective d/o  -Klonopin 0.5 mg PO BID for anxiety   	- Per ISTOP: Last prescribed Klonopin 0.5 mg #60 on 5/5/23  - Holding home dose of Ambien 12.5 mg QHS  4. Medical:  a) ECT referral  	- CBC, CMP, EKG ordered for 7/4 wnl. Review labs in [facility] 5/23: TSH, B12, Folate, lipid panel and A1c WNL.   	- Last UA 5/26 UTI completed antibiotics on 5/31/23. Pending UA.   	- ECT consult ordered and completed. ECT #1 on 7/11/23, #2 7/14, #3 on 7/17, #4 7/19, #5 7/24  	- EKG 7/6: WNL.   b) PT consult: fall precautions, recommend ambulating with walker but patient has been refusing.   c) NUT consult: Minced and moist diet with ensure TID.    d) HTN previously on Norvasc 10 mg PO daily. Discontinued on 6/19/23 at [facility] for low BP. On unit BP stable around 130's/90' w/o antihypertensives. Will continue to monitor closely.   5. Collateral: outpatient psychiatrist Dr. Irby on 6/2/23 on [facility] and handoff from Dr. Hernandez. Collateral from sister. See  note.   6. Disposition: When stable.

## 2023-08-10 NOTE — BH INPATIENT PSYCHIATRY PROGRESS NOTE - NSBHMETABOLIC_PSY_ALL_CORE_FT
BMI: BMI (kg/m2): 22.7 (07-07-23 @ 17:08)  HbA1c:   Glucose:   BP: 139/74 (08-09-23 @ 12:15) (119/75 - 170/75)  Lipid Panel:

## 2023-08-11 PROCEDURE — 99231 SBSQ HOSP IP/OBS SF/LOW 25: CPT

## 2023-08-11 RX ADMIN — OLANZAPINE 15 MILLIGRAM(S): 15 TABLET, FILM COATED ORAL at 22:36

## 2023-08-11 RX ADMIN — Medication 150 MILLIGRAM(S): at 08:22

## 2023-08-11 NOTE — BH TREATMENT PLAN - NSTXCAREGIVERPARTICIPATE_PSY_P_CORE
Family/Caregiver participated in identification of needs/problems/goals for treatment/Family/Caregiver participated in defining interventions
Family/Caregiver participated in identification of needs/problems/goals for treatment/Family/Caregiver participated in defining interventions/Family/Caregiver participated in development of after care plan
Family/Caregiver participated in identification of needs/problems/goals for treatment/Family/Caregiver participated in defining interventions/Family/Caregiver participated in development of after care plan
Family/Caregiver participated in identification of needs/problems/goals for treatment/Family/Caregiver participated in defining interventions
Family/Caregiver participated in identification of needs/problems/goals for treatment/Family/Caregiver participated in defining interventions

## 2023-08-11 NOTE — BH TREATMENT PLAN - NSTXCOPEINTERPR_PSY_ALL_CORE
Patient will identify and utilize two coping skills daily to manage his anxiety and depressive symptoms within seven days.
Patient will identify and utilize two coping skills daily to manage his anxiety and depressive symptoms within seven days. Patient will be encouraged to attend two rehab groups daily.
Patient will identify and utilize two coping skills daily to manage his anxiety and depressive symptoms within seven days.
Psychiatric rehabilitation staff will continue to provide ongoing support in efforts to assist the patient in managing symptoms and meeting his treatment goal.

## 2023-08-11 NOTE — BH TREATMENT PLAN - NSTXPSYCHOGOAL_PSY_ALL_CORE
Will identify 1 trigger/stressor that exacerbates thoughts
Will identify 1 trigger/stressor that exacerbates thoughts
Will ask for PRN medication to manage hallucinations
Will be able to report experiencing hallucinations to staff
Will identify 1 trigger/stressor that exacerbates thoughts
Will identify 1 trigger/stressor that exacerbates thoughts

## 2023-08-11 NOTE — BH INPATIENT PSYCHIATRY PROGRESS NOTE - NSBHASSESSSUMMFT_PSY_ALL_CORE
Pt is a 69 yo M domiciled in assisted living facility with hx of schizoaffective d/o (bipolar type), multiple past psych hospitalizations, 1 past SA in 1992 by cutting wrists, and remote substance use (alcohol use leading to 3 DUIs and 1 year-long prison sentence, cocaine use 20 years ago), presenting to [facility] 2S as transfer from [facility] for ECT evaluation due to increasing paranoia and poor self-care. Continued inpatient admission required for safety and ECT assessment.     working diagnosis: SAD bipolar type + current depressive episode w/ psychosis.     7/15 dysphoric, withdrawn, superficial, no SI/HI.    7/17: continues to reports depressed mood with fragmented sleep and mostly isolative, though patient is less perseverative about paranoid delusions with no SIIP/HIIP. Tolerating well ECT today #3. Next on 7/19. No physical complaints, VS stable.   7/18: continues to endorse depressed mood with concerns about future living situation though is less perseverative about paranoid delusions with no SIP/HIIP. Tolerating well ECT, next on 7/19. No physical complaints, VS stable.   7/19: reports small improvement in sxs today with better mood and sleep; and less perseveration about paranoid delusions and no SIIP/HIIP. Tolerating well ECT #4 today, next on 7/24. No physical complaints, VS stable.   7/20: reports depressed mood with low energy/motivation, appetite and some hopelessness about future which are related to paranoid delusions. Denies SIIP/HIIP. Tolerating well ECT w/o physical complains, VS stable. Will start Cross tapering from Prozac to Effexor for better management of sxs.   7/21: continue to taper Prozac, patient remains mostly isolative with perseveration about going to prison  7/24: continues to endorse depression and anxiety though with small improvement in sleep and appetite; and less perseveration about going to prison today. Denies SIIP/HIIP. Tolerating well ECT w/o physical complains, VS stable. ECT session #5 today.   7/25: reports small improvements in mood, appetite and sleep, though continues to perseverate about future and living situation. Denies SIIP/HIIP. Tolerating well ECT w/o physical complains, VS stable. Next ECT 7/26.   7/27: Pt reports feeling depressed and noticing no improvement. Will titrate Effexor XR to 150mg QD. ECT #7 scheduled for Monday.   7/31: S/P ECT #7. Reporting modest gains in terms of mood. ECT #8 scheduled for Wednesday 8/2.  8/7: Pt s/p ECT #9, showing modest gains in terms of mod this week. ECT #10 scheduled for Wednesday 8/9.   8/8: Continues to endorse sustained improvement in terms of mood and denies SIIP. Agrees to return to [facility]. Discharge planning in process. ECT #10 scheduled for tomorrow.   8/9: Completed ECT #10. Reporting sustained improvement. Discharge planning in process.   8/10: Completed ECT # 10 yesterday morning. Continues to endorse sustained improvement in terms of mood and denies SIIP. Post void residual yesterday afternoon around 224-265. Urinalysis was normal as well as kidney function. Will lower Olanzapine to 15mg and observe for sx stability given mild retention.    Plan:   1. Legal: admitted on 9.13 voluntary status  2. Safety:  Denies SI/SIB/HI/VI; continue routine observation.  3. Psychiatric:   - Start cross taper from Prozac to Effexor. c/w Prozac to 20mg mg PO daily and c/w Effexor to 75mg PO daily for schizoaffective d/o  -Zyprexa 15 mg PO qHS for schizoaffective d/o  -Klonopin 0.5 mg PO BID for anxiety   	- Per ISTOP: Last prescribed Klonopin 0.5 mg #60 on 5/5/23  - Holding home dose of Ambien 12.5 mg QHS  4. Medical:  a) ECT referral  	- CBC, CMP, EKG ordered for 7/4 wnl. Review labs in [facility] 5/23: TSH, B12, Folate, lipid panel and A1c WNL.   	- Last UA 5/26 UTI completed antibiotics on 5/31/23. Pending UA.   	- ECT consult ordered and completed. ECT #1 on 7/11/23, #2 7/14, #3 on 7/17, #4 7/19, #5 7/24  	- EKG 7/6: WNL.   b) PT consult: fall precautions, recommend ambulating with walker but patient has been refusing.   c) NUT consult: Minced and moist diet with ensure TID.    d) HTN previously on Norvasc 10 mg PO daily. Discontinued on 6/19/23 at [facility] for low BP. On unit BP stable around 130's/90' w/o antihypertensives. Will continue to monitor closely.   5. Collateral: outpatient psychiatrist Dr. Irby on 6/2/23 on [facility] and handoff from Dr. Hernandez. Collateral from sister. See  note.   6. Disposition: When stable.      Pt is a 67 yo M domiciled in assisted living facility with hx of schizoaffective d/o (bipolar type), multiple past psych hospitalizations, 1 past SA in 1992 by cutting wrists, and remote substance use (alcohol use leading to 3 DUIs and 1 year-long custodial sentence, cocaine use 20 years ago), presenting to [facility] 2S as transfer from [facility] for ECT evaluation due to increasing paranoia and poor self-care. Continued inpatient admission required for safety and ECT assessment.     working diagnosis: SAD bipolar type + current depressive episode w/ psychosis.     7/15 dysphoric, withdrawn, superficial, no SI/HI.    7/17: continues to reports depressed mood with fragmented sleep and mostly isolative, though patient is less perseverative about paranoid delusions with no SIIP/HIIP. Tolerating well ECT today #3. Next on 7/19. No physical complaints, VS stable.   7/18: continues to endorse depressed mood with concerns about future living situation though is less perseverative about paranoid delusions with no SIP/HIIP. Tolerating well ECT, next on 7/19. No physical complaints, VS stable.   7/19: reports small improvement in sxs today with better mood and sleep; and less perseveration about paranoid delusions and no SIIP/HIIP. Tolerating well ECT #4 today, next on 7/24. No physical complaints, VS stable.   7/20: reports depressed mood with low energy/motivation, appetite and some hopelessness about future which are related to paranoid delusions. Denies SIIP/HIIP. Tolerating well ECT w/o physical complains, VS stable. Will start Cross tapering from Prozac to Effexor for better management of sxs.   7/21: continue to taper Prozac, patient remains mostly isolative with perseveration about going to custodial  7/24: continues to endorse depression and anxiety though with small improvement in sleep and appetite; and less perseveration about going to custodial today. Denies SIIP/HIIP. Tolerating well ECT w/o physical complains, VS stable. ECT session #5 today.   7/25: reports small improvements in mood, appetite and sleep, though continues to perseverate about future and living situation. Denies SIIP/HIIP. Tolerating well ECT w/o physical complains, VS stable. Next ECT 7/26.   7/27: Pt reports feeling depressed and noticing no improvement. Will titrate Effexor XR to 150mg QD. ECT #7 scheduled for Monday.   7/31: S/P ECT #7. Reporting modest gains in terms of mood. ECT #8 scheduled for Wednesday 8/2.  8/7: Pt s/p ECT #9, showing modest gains in terms of mod this week. ECT #10 scheduled for Wednesday 8/9.   8/8: Continues to endorse sustained improvement in terms of mood and denies SIIP. Agrees to return to [facility]. Discharge planning in process. ECT #10 scheduled for tomorrow.   8/9: Completed ECT #10. Reporting sustained improvement. Discharge planning in process.   8/10: Completed ECT # 10 yesterday morning. Continues to endorse sustained improvement in terms of mood and denies SIIP. Post void residual yesterday afternoon around 224-265. Urinalysis was normal as well as kidney function. Will lower Olanzapine to 15mg and observe for sx stability given mild retention.  8/11: No interval changes. No paranoia or other delusional thought content on exam. Discharge planning in process.     Plan:   1. Legal: admitted on 9.13 voluntary status  2. Safety:  Denies SI/SIB/HI/VI; continue routine observation.  3. Psychiatric:   - Continue Effexor to 150mg PO daily for schizoaffective d/o  -Zyprexa 15 mg PO qHS for schizoaffective d/o  -Klonopin 0.5 mg PO BID for anxiety   	- Per ISTOP: Last prescribed Klonopin 0.5 mg #60 on 5/5/23  - Holding home dose of Ambien 12.5 mg QHS  4. Medical:  a) ECT referral  	- CBC, CMP, EKG ordered for 7/4 wnl. Review labs in [facility] 5/23: TSH, B12, Folate, lipid panel and A1c WNL.   	- Last UA 5/26 UTI completed antibiotics on 5/31/23. Pending UA.   	- ECT consult ordered and completed. ECT #1 on 7/11/23, #2 7/14, #3 on 7/17, #4 7/19, #5 7/24  	- EKG 7/6: WNL.   b) PT consult: fall precautions, recommend ambulating with walker but patient has been refusing.   c) NUT consult: Minced and moist diet with ensure TID.    d) HTN previously on Norvasc 10 mg PO daily. Discontinued on 6/19/23 at [facility] for low BP. On unit BP stable around 130's/90' w/o antihypertensives. Will continue to monitor closely.   5. Collateral: outpatient psychiatrist Dr. Irby on 6/2/23 on [facility] and handoff from Dr. Hernandez. Collateral from sister. See  note.   6. Disposition: When stable.

## 2023-08-11 NOTE — BH TREATMENT PLAN - NSTXDCOTHRGOAL_PSY_ALL_CORE
Pt will comply with continued ECT, and participate in unit groups with an improvement in symptoms and resumption of baseline functioning
Pt will maintain compliance with ECT, participate in self-care, with improved symptoms and resumption of baseline functioning
Pt will comply with continued ECT with an improvement in symptoms, and resumption of baseline functioning
Pt will comply with treatment, with an improvement in symptoms, helping him to participate in his care, and accepting supports
Pt will comply with continued ECT with an improvement in symptoms, and resumption of baseline functioning
Pt will maintain compliance with ECT, participate in self-care, with improved symptoms and resumption of baseline functioning

## 2023-08-11 NOTE — BH TREATMENT PLAN - NSBHPRIMARYDX_PSY_ALL_CORE
Schizoaffective disorder, bipolar type    

## 2023-08-11 NOTE — BH TREATMENT PLAN - NSTXFALLGOAL_PSY_ALL_CORE
Use non-slip footwear when out of bed
Ask for assistance when getting out of bed/chair and upon ambulation
Use non-slip footwear when out of bed

## 2023-08-11 NOTE — BH TREATMENT PLAN - NSTXFALLINTERMD_PSY_ALL_CORE
Routine checks. PT consult ordered, recommended patient to use walker but refuses. 

## 2023-08-11 NOTE — BH TREATMENT PLAN - NSTXANXINTERMD_PSY_ALL_CORE
c/w Klonopin 0.5 mg PO BID, Trial of Zyprexa and Fluoxetine.   Trial of  ECT. 
c/w Klonopin 0.5 mg PO BID, Trial of Zyprexa and Fluoxetine.   Trial of  ECT. 
c/w Klonopin 0.5 mg PO BID. Trial of Effexor   Trial of  ECT. 
c/w Klonopin 0.5 mg PO BID, Trial of Zyprexa and Fluoxetine.   Planning to start ECT.

## 2023-08-11 NOTE — BH TREATMENT PLAN - NSTXPATIENTPARTICIPATE_PSY_ALL_CORE
Patient participated in identification of needs/problems/goals for treatment/Patient participated in defining interventions
Patient participated in identification of needs/problems/goals for treatment/Patient participated in defining interventions/Patient participated in development of after care plan
Patient participated in identification of needs/problems/goals for treatment/Patient participated in defining interventions
Patient participated in identification of needs/problems/goals for treatment/Patient participated in defining interventions/Patient participated in development of after care plan
Patient participated in identification of needs/problems/goals for treatment/Patient participated in defining interventions/Patient participated in development of after care plan
Patient participated in identification of needs/problems/goals for treatment/Patient participated in defining interventions

## 2023-08-11 NOTE — BH TREATMENT PLAN - NSDCCRITERIA_PSY_ALL_CORE
Reduction in anxiety sx and psychosis, improvement in ability to care for self.

## 2023-08-11 NOTE — BH TREATMENT PLAN - NSTXDCOTHRINTERMD_PSY_ALL_CORE
Continue to work with SW to develop appropriate plan. 

## 2023-08-11 NOTE — BH TREATMENT PLAN - NSTXPLANTHERAPYSESSIONSFT_PSY_ALL_CORE
07-11-23  Type of therapy: Coping skills, Creative arts therapy, Health and fitness, Spirituality, Stress management  Type of session: Group  Level of patient participation: Not engaged  Duration of participation: Less than 15 minutes  Therapy conducted by: Psych rehab  Therapy Summary: In response to the COVID-19 outbreak there has been a shift in hospital wide policies and protocols. As a result it should be noted the unit activities and structure have been temporarily modified to promote safety of patients and staff. Patient over the past week did not make any major gains towards his rehab goals. Patient remains very isolative, anxious and dysphoric. Patient continues to believe he is going to be imprisoned. Despite daily efforts by rehab and other members of the treatment team patient does not participate in any of the group activities. Patient spends most of his day laying in bed. However, patient is compliant with his medications and ambulates independently.    07-11-23  Type of therapy: Other, Physical therapy  Type of session: Individual  Level of patient participation: Participates  --  Therapy conducted by: Other (specify), Physical therapy  Therapy Summary: Patient engaged in functional transfer and gait training within unit  
  07-18-23  Type of therapy: Coping skills, Creative arts therapy, Health and fitness, Spirituality, Stress management  Type of session: Group  Level of patient participation: Not engaged  Duration of participation: Less than 15 minutes  Therapy conducted by: Psych rehab  Therapy Summary: Patient over the past week did not make any major gains towards his rehab goals. Despite daily efforts by rehab and other members of the treatment team patient has not participated in any of the group activities. Patient continues to isolate in his room and spends most of his day laying in bed. Patient continues to endorse feeling anxious and depressed. Patient continues to believe he will be imprisoned once he leaves the hospital. However, patient is compliant with his medications, eats his meals with the community and ambulates independently.    07-20-23  Type of therapy: Other, Physical therapy  Type of session: Individual  Level of patient participation: Participates  --  --  Therapy Summary: Patient engaged in therapeutic activity    07-24-23  Type of therapy: Coping skills, Creative arts therapy, Health and fitness, Spirituality, Stress management  Type of session: Group  Level of patient participation: Not engaged  Duration of participation: Less than 15 minutes  Therapy conducted by: Psych rehab  Therapy Summary: Patient over the past week did not make any major gains towards his rehab goals. Patient continues to state he feels anxious, and dysphoric. Despite daily efforts by writer and other members of the treatment team patient has not participated in any of the group activities. Patient continues to isolate in his room and spends most of his day laying in bed. Patient continues to appear disheveled. However, patient is compliant with his medications, eats his meals with the community and ambulates independently. Today patient was more verbal with writer than in previous weeks.    ***Today patient completed a safety plan with writer.  
  08-08-23  --  Type of session: Individual  Level of patient participation: Attentive, Engaged, Participates  Duration of participation: 15 minutes  Therapy conducted by: Psych rehab  Therapy Summary: Writer met with patient to review progress toward rehabilitation goals and assess current functioning. Patient reported some improvement in symptoms of depression, and rated his depression a 7/10. Patient reported that he believes that the ECT has been working for himPatient maintains appropriate eye contact and his affect is brighter. Patient denied AH/VH/SI/HI.   In regards to rehab goals, patient has been making progress. Patient identified coping skills such as watching television, specifically baseball games.  In regards to groups, patient has not attended groups this week. Patient expressed that he prefers to keep to himself rather than attend activities.     Patient has been compliant with his medication regimen. Patient maintains fair hygiene and grooming. Patient ambulates with unsteady gait, and utilizes a walker.   Patient spoke to writer about his plans to eventually move into his family's [facility] home [facility]. Patient said that he will have to return to [facility] until he speaks to his brother about his plans. Patient informed that he likes the idea of living [facility] where everything is "clean and quiet". Patient also informed writer that he is unhappy in his [facility] due to the fact that he is "younger than everyone" and feels that he cannot relate. Patient also reported that maybe of the residents are "clicky" and do not include him. Writer validated patient. Psych rehab staff will continue to provide ongoing support and encouragement.    08-09-23  Type of therapy: Other, Physical therapy  Type of session: Individual  Level of patient participation: Participates  --  Therapy conducted by: Other (specify), Physical therapy  Therapy Summary: Patient engaged in therapeutic activity and ambulation within unit using RW    08-10-23  Type of therapy: Other, Physical therapy  Type of session: Individual  Level of patient participation: Participates  --  Therapy conducted by: Other (specify), Physical therapy  Therapy Summary: Patient engaged in therapeutic activity and ambulation within unit  
  07-18-23  Type of therapy: Coping skills, Creative arts therapy, Health and fitness, Spirituality, Stress management  Type of session: Group  Level of patient participation: Not engaged  Duration of participation: Less than 15 minutes  Therapy conducted by: Psych rehab  Therapy Summary: Patient over the past week did not make any major gains towards his rehab goals. Despite daily efforts by rehab and other members of the treatment team patient has not participated in any of the group activities. Patient continues to isolate in his room and spends most of his day laying in bed. Patient continues to endorse feeling anxious and depressed. Patient continues to believe he will be imprisoned once he leaves the hospital. However, patient is compliant with his medications, eats his meals with the community and ambulates independently.    07-20-23  Type of therapy: Other, Physical therapy  Type of session: Individual  Level of patient participation: Participates  --  --  Therapy Summary: Patient engaged in therapeutic activity  
  07-31-23  Type of therapy: Coping skills, Creative arts therapy, Health and fitness, Spirituality, Stress management  Type of session: Group  Level of patient participation: Not engaged  Duration of participation: Less than 15 minutes  Therapy conducted by: Psych rehab  Therapy Summary: Patient over the past week made some gains. Patient is more alert and verbal upon approach. Patient appears less lethargic and less anxious than in previous weeks. During 1:1's patient has been less vocal regarding his concerns about being sent to long-term. Patient is compliant with his medications, eats his meals with the community and ambulates independently. However, patient continues to isolate and spends long periods laying in bed. Patient continues to appear disheveled. Despite daily efforts by writer and other members of the treatment team patient has not participated in any of the group activities.

## 2023-08-11 NOTE — BH INPATIENT PSYCHIATRY PROGRESS NOTE - NSBHFUPINTERVALHXFT_PSY_A_CORE
Chart reviewed. Case discussed with interdisciplinary team. S/P ECT #10 last Wednesday. No acute overnight events or prns needed.    Patient seen and examined for follow up of psychosis and depression. Compliant with standing medication and denying side effects. Continues to endorse sustained improvement in terms of mood and denies SIIP.  Chart reviewed. Case discussed with interdisciplinary team. S/P ECT #10 last Wednesday. No acute overnight events or prns needed.    Patient seen and examined for follow up of psychosis and depression. Compliant with standing medication and denying side effects. Continues to endorse sustained improvement in terms of mood and denies SIIP. Utilizing urinal overnight.

## 2023-08-11 NOTE — BH TREATMENT PLAN - NSTXDEPRESDATETRGT_PSY_ALL_CORE
Reason For Visit:   Chief Complaint   Patient presents with     Surgical Followup     5 week post-op. Left Wrist Fusion Plate Removal , DOS: 10-. Hx of Left Wrist Fusion, Lengthening Left Elbow Flexor Wrist Flexor  and Fingers Flexor Left Thumb Adductor Release with Extensor Pollicis Longus Rerouting, DOS: 10-.       Primary MD: Rayo Schafer      Age: 24 year old    ?  No        Pain Assessment  Patient Currently in Pain: Denies    Hand Dominance Evaluation  Hand Dominance: Right            Current Outpatient Prescriptions   Medication Sig Dispense Refill     RAPAMUNE (BRAND) 1 MG PO TABLET Take 2 tablets (2 mg) by mouth daily 60 tablet 3     predniSONE (DELTASONE) 5 MG tablet Take 1 tablet (5 mg) by mouth daily 30 tablet 1     pravastatin (PRAVACHOL) 10 MG tablet Take 1 tablet (10 mg) by mouth daily At bedtime 90 tablet 6     tacrolimus (GENERIC EQUIVALENT) 0.5 MG capsule Take 3.5 mg twice daily 180 capsule 3     tacrolimus (PROGRAF - GENERIC EQUIVALENT) 1 MG capsule Take 3.5 mg twice daily 270 capsule 11     aspirin 81 MG EC tablet Take 2 tablets (162 mg) by mouth daily 60 tablet 99     Cholecalciferol (VITAMIN D3) 2000 UNITS TABS Take 2,000 Units by mouth daily 100 tablet 6     amLODIPine (NORVASC) 5 MG tablet Take 1 tablet (5 mg) by mouth daily 90 tablet 3     mycophenolate (CELLCEPT - GENERIC EQUIVALENT) 500 MG tablet Take 1 tablet (500 mg) by mouth 2 times daily 180 tablet 11       Allergies   Allergen Reactions     Latex Rash     Other [Seasonal Allergies]      Corn-abdominal pain and diarrhea.       Marley Mae LPN    
03-Aug-2023
17-Aug-2023

## 2023-08-11 NOTE — BH TREATMENT PLAN - NSTXCOPEINTERMD_PSY_ALL_CORE
Encourage patient to participate G/M therapy. 

## 2023-08-11 NOTE — BH INPATIENT PSYCHIATRY PROGRESS NOTE - NSBHCHARTREVIEWVS_PSY_A_CORE FT
Vital Signs Last 24 Hrs  T(C): 36.8 (08-11-23 @ 08:34), Max: 36.8 (08-11-23 @ 08:34)  T(F): 98.2 (08-11-23 @ 08:34), Max: 98.2 (08-11-23 @ 08:34)  HR: --  BP: --  BP(mean): --  RR: 18 (08-11-23 @ 08:34) (18 - 18)  SpO2: --    Orthostatic VS  08-11-23 @ 08:34  Lying BP: --/-- HR: --  Sitting BP: 142/92 HR: 62  Standing BP: 137/88 HR: 75  Site: upper left arm  Mode: electronic  Orthostatic VS  08-10-23 @ 07:46  Lying BP: --/-- HR: --  Sitting BP: 122/82 HR: 60  Standing BP: 119/74 HR: 55  Site: upper left arm  Mode: electronic

## 2023-08-11 NOTE — BH TREATMENT PLAN - NSTXPSYCHOINTERMD_PSY_ALL_CORE
Trial of Zyprexa. Planning to start ECT. 
subcutaneous tissue
Trial of Zyprexa. Trial of  ECT. 

## 2023-08-11 NOTE — BH TREATMENT PLAN - NSTXANXGOAL_PSY_ALL_CORE
Report a reduction in panic attacks and improving mood and confidence

## 2023-08-12 RX ADMIN — OLANZAPINE 15 MILLIGRAM(S): 15 TABLET, FILM COATED ORAL at 20:38

## 2023-08-12 RX ADMIN — Medication 150 MILLIGRAM(S): at 10:27

## 2023-08-13 RX ADMIN — Medication 150 MILLIGRAM(S): at 10:35

## 2023-08-13 RX ADMIN — OLANZAPINE 15 MILLIGRAM(S): 15 TABLET, FILM COATED ORAL at 20:16

## 2023-08-14 PROCEDURE — 99231 SBSQ HOSP IP/OBS SF/LOW 25: CPT | Mod: 25

## 2023-08-14 PROCEDURE — 90870 ELECTROCONVULSIVE THERAPY: CPT

## 2023-08-14 RX ORDER — FLUMAZENIL 0.1 MG/ML
0.2 VIAL (ML) INTRAVENOUS ONCE
Refills: 0 | Status: COMPLETED | OUTPATIENT
Start: 2023-08-14 | End: 2023-08-14

## 2023-08-14 RX ADMIN — OLANZAPINE 15 MILLIGRAM(S): 15 TABLET, FILM COATED ORAL at 20:33

## 2023-08-14 RX ADMIN — Medication 0.2 MILLIGRAM(S): at 11:42

## 2023-08-14 RX ADMIN — Medication 150 MILLIGRAM(S): at 14:55

## 2023-08-14 NOTE — BH INPATIENT PSYCHIATRY PROGRESS NOTE - NSBHMETABOLIC_PSY_ALL_CORE_FT
BMI: BMI (kg/m2): 22.7 (07-07-23 @ 17:08)  HbA1c:   Glucose:   BP: 140/93 (08-14-23 @ 12:00) (120/82 - 156/88)  Lipid Panel:

## 2023-08-14 NOTE — BH INPATIENT PSYCHIATRY PROGRESS NOTE - NSBHCHARTREVIEWVS_PSY_A_CORE FT
Vital Signs Last 24 Hrs  T(C): 36.2 (08-14-23 @ 11:47), Max: 36.7 (08-14-23 @ 11:21)  T(F): 97.1 (08-14-23 @ 11:47), Max: 98 (08-14-23 @ 11:21)  HR: 60 (08-14-23 @ 12:00) (54 - 65)  BP: 140/93 (08-14-23 @ 12:00) (120/82 - 156/88)  BP(mean): 100 (08-14-23 @ 12:00) (100 - 106)  RR: 16 (08-14-23 @ 12:00) (16 - 20)  SpO2: 98% (08-14-23 @ 12:00) (96% - 100%)    Orthostatic VS  08-14-23 @ 05:45  Lying BP: --/-- HR: --  Sitting BP: 114/77 HR: 56  Standing BP: 124/80 HR: 70  Site: --  Mode: --  Orthostatic VS  08-13-23 @ 08:11  Lying BP: --/-- HR: --  Sitting BP: 135/79 HR: 60  Standing BP: 123/81 HR: 69  Site: --  Mode: --

## 2023-08-14 NOTE — BH INPATIENT PSYCHIATRY PROGRESS NOTE - NSBHASSESSSUMMFT_PSY_ALL_CORE
Pt is a 69 yo M domiciled in assisted living facility with hx of schizoaffective d/o (bipolar type), multiple past psych hospitalizations, 1 past SA in 1992 by cutting wrists, and remote substance use (alcohol use leading to 3 DUIs and 1 year-long long term sentence, cocaine use 20 years ago), presenting to [facility] 2S as transfer from [facility] ([facility]) for ECT evaluation due to increasing paranoia and poor self-care. Continued inpatient admission required for safety and ECT assessment.     working diagnosis: SAD bipolar type + current depressive episode w/ psychosis.     7/15 dysphoric, withdrawn, superficial, no SI/HI.    7/17: continues to reports depressed mood with fragmented sleep and mostly isolative, though patient is less perseverative about paranoid delusions with no SIIP/HIIP. Tolerating well ECT today #3. Next on 7/19. No physical complaints, VS stable.   7/18: continues to endorse depressed mood with concerns about future living situation though is less perseverative about paranoid delusions with no SIP/HIIP. Tolerating well ECT, next on 7/19. No physical complaints, VS stable.   7/19: reports small improvement in sxs today with better mood and sleep; and less perseveration about paranoid delusions and no SIIP/HIIP. Tolerating well ECT #4 today, next on 7/24. No physical complaints, VS stable.   7/20: reports depressed mood with low energy/motivation, appetite and some hopelessness about future which are related to paranoid delusions. Denies SIIP/HIIP. Tolerating well ECT w/o physical complains, VS stable. Will start Cross tapering from Prozac to Effexor for better management of sxs.   7/21: continue to taper Prozac, patient remains mostly isolative with perseveration about going to long term  7/24: continues to endorse depression and anxiety though with small improvement in sleep and appetite; and less perseveration about going to long term today. Denies SIIP/HIIP. Tolerating well ECT w/o physical complains, VS stable. ECT session #5 today.   7/25: reports small improvements in mood, appetite and sleep, though continues to perseverate about future and living situation. Denies SIIP/HIIP. Tolerating well ECT w/o physical complains, VS stable. Next ECT 7/26.   7/27: Pt reports feeling depressed and noticing no improvement. Will titrate Effexor XR to 150mg QD. ECT #7 scheduled for Monday.   7/31: S/P ECT #7. Reporting modest gains in terms of mood. ECT #8 scheduled for Wednesday 8/2.  8/7: Pt s/p ECT #9, showing modest gains in terms of mod this week. ECT #10 scheduled for Wednesday 8/9.   8/8: Continues to endorse sustained improvement in terms of mood and denies SIIP. Agrees to return to [facility]. Discharge planning in process. ECT #10 scheduled for tomorrow.   8/9: Completed ECT #10. Reporting sustained improvement. Discharge planning in process.   8/10: Completed ECT # 10 yesterday morning. Continues to endorse sustained improvement in terms of mood and denies SIIP. Post void residual yesterday afternoon around 224-265. Urinalysis was normal as well as kidney function. Will lower Olanzapine to 15mg and observe for sx stability given mild retention.  8/11: No interval changes. No paranoia or other delusional thought content on exam. Discharge planning in process.   8/14: No interval changes. Reporting sustained improvement. Discharge planning in process.     Plan:   1. Legal: admitted on 9.13 voluntary status  2. Safety:  Denies SI/SIB/HI/VI; continue routine observation.  3. Psychiatric:   - Continue Effexor to 150mg PO daily for schizoaffective d/o  -Zyprexa 15 mg PO qHS for schizoaffective d/o  -Klonopin 0.5 mg PO BID for anxiety   	- Per ISTOP: Last prescribed Klonopin 0.5 mg #60 on 5/5/23  - Holding home dose of Ambien 12.5 mg QHS  4. Medical:  a) ECT referral  	- CBC, CMP, EKG ordered for 7/4 wnl. Review labs in [facility] 5/23: TSH, B12, Folate, lipid panel and A1c WNL.   	- Last UA 5/26 UTI completed antibiotics on 5/31/23. Pending UA.   	- ECT consult ordered and completed. ECT #1 on 7/11/23, #2 7/14, #3 on 7/17, #4 7/19, #5 7/24  	- EKG 7/6: WNL.   b) PT consult: fall precautions, recommend ambulating with walker but patient has been refusing.   c) NUT consult: Minced and moist diet with ensure TID.    d) HTN previously on Norvasc 10 mg PO daily. Discontinued on 6/19/23 at [facility] for low BP. On unit BP stable around 130's/90' w/o antihypertensives. Will continue to monitor closely.   5. Collateral: outpatient psychiatrist Dr. Irby on 6/2/23 on [facility] and handoff from Dr. Hernandez. Collateral from sister. See  note.   6. Disposition: When stable.

## 2023-08-14 NOTE — BH INPATIENT PSYCHIATRY PROGRESS NOTE - NSBHFUPINTERVALHXFT_PSY_A_CORE
Chart reviewed. Case discussed with interdisciplinary team. Completed ECT #10 today. Compliant with meds, denies side effects. No events reported over the weekend. No PRNs needed/requested.     Patient seen and examined for follow up of psychosis and depression. Compliant with standing medication and denying side effects. Continues to endorse sustained improvement in terms of mood and denies SIIP. Discharge planning back to [facility] in process.

## 2023-08-15 RX ADMIN — Medication 150 MILLIGRAM(S): at 08:12

## 2023-08-15 RX ADMIN — OLANZAPINE 15 MILLIGRAM(S): 15 TABLET, FILM COATED ORAL at 20:05

## 2023-08-15 NOTE — BH INPATIENT PSYCHIATRY PROGRESS NOTE - NSBHMETABOLIC_PSY_ALL_CORE_FT
BMI: BMI (kg/m2): 22.7 (07-07-23 @ 17:08)  HbA1c:   Glucose:   BP: 134/93 (08-14-23 @ 12:30) (120/82 - 156/88)  Lipid Panel:

## 2023-08-15 NOTE — BH INPATIENT PSYCHIATRY PROGRESS NOTE - NSBHCHARTREVIEWVS_PSY_A_CORE FT
Vital Signs Last 24 Hrs  T(C): 36.5 (08-15-23 @ 07:53), Max: 36.5 (08-15-23 @ 07:53)  T(F): 97.7 (08-15-23 @ 07:53), Max: 97.7 (08-15-23 @ 07:53)  HR: --  BP: --  BP(mean): --  RR: --  SpO2: --    Orthostatic VS  08-15-23 @ 07:53  Lying BP: --/-- HR: --  Sitting BP: 119/83 HR: 60  Standing BP: 122/79 HR: 59  Site: upper left arm  Mode: electronic  Orthostatic VS  08-14-23 @ 05:45  Lying BP: --/-- HR: --  Sitting BP: 114/77 HR: 56  Standing BP: 124/80 HR: 70  Site: --  Mode: --

## 2023-08-15 NOTE — BH INPATIENT PSYCHIATRY PROGRESS NOTE - NSBHFUPINTERVALHXFT_PSY_A_CORE
Chart reviewed. Case discussed with interdisciplinary team. Completed ECT #10 this past Monday. Compliant with meds, denies side effects. No events reported over the weekend. No PRNs needed/requested.     Patient seen and examined for follow up of psychosis and depression. Compliant with standing medication and denying side effects. Continues to endorse sustained improvement in terms of mood and denies SIIP. ECt #11 scheduled for tomorrow. Discharge planning back to [facility] in process.

## 2023-08-15 NOTE — BH INPATIENT PSYCHIATRY PROGRESS NOTE - NSBHASSESSSUMMFT_PSY_ALL_CORE
Pt is a 67 yo M domiciled in assisted living facility with hx of schizoaffective d/o (bipolar type), multiple past psych hospitalizations, 1 past SA in 1992 by cutting wrists, and remote substance use (alcohol use leading to 3 DUIs and 1 year-long long term sentence, cocaine use 20 years ago), presenting to [facility] 2S as transfer from [facility] for ECT evaluation due to increasing paranoia and poor self-care. Continued inpatient admission required for safety and ECT assessment.     working diagnosis: SAD bipolar type + current depressive episode w/ psychosis.     7/15 dysphoric, withdrawn, superficial, no SI/HI.    7/17: continues to reports depressed mood with fragmented sleep and mostly isolative, though patient is less perseverative about paranoid delusions with no SIIP/HIIP. Tolerating well ECT today #3. Next on 7/19. No physical complaints, VS stable.   7/18: continues to endorse depressed mood with concerns about future living situation though is less perseverative about paranoid delusions with no SIP/HIIP. Tolerating well ECT, next on 7/19. No physical complaints, VS stable.   7/19: reports small improvement in sxs today with better mood and sleep; and less perseveration about paranoid delusions and no SIIP/HIIP. Tolerating well ECT #4 today, next on 7/24. No physical complaints, VS stable.   7/20: reports depressed mood with low energy/motivation, appetite and some hopelessness about future which are related to paranoid delusions. Denies SIIP/HIIP. Tolerating well ECT w/o physical complains, VS stable. Will start Cross tapering from Prozac to Effexor for better management of sxs.   7/21: continue to taper Prozac, patient remains mostly isolative with perseveration about going to long term  7/24: continues to endorse depression and anxiety though with small improvement in sleep and appetite; and less perseveration about going to long term today. Denies SIIP/HIIP. Tolerating well ECT w/o physical complains, VS stable. ECT session #5 today.   7/25: reports small improvements in mood, appetite and sleep, though continues to perseverate about future and living situation. Denies SIIP/HIIP. Tolerating well ECT w/o physical complains, VS stable. Next ECT 7/26.   7/27: Pt reports feeling depressed and noticing no improvement. Will titrate Effexor XR to 150mg QD. ECT #7 scheduled for Monday.   7/31: S/P ECT #7. Reporting modest gains in terms of mood. ECT #8 scheduled for Wednesday 8/2.  8/7: Pt s/p ECT #9, showing modest gains in terms of mod this week. ECT #10 scheduled for Wednesday 8/9.   8/8: Continues to endorse sustained improvement in terms of mood and denies SIIP. Agrees to return to [facility]. Discharge planning in process. ECT #10 scheduled for tomorrow.   8/9: Completed ECT #10. Reporting sustained improvement. Discharge planning in process.   8/10: Completed ECT # 10 yesterday morning. Continues to endorse sustained improvement in terms of mood and denies SIIP. Post void residual yesterday afternoon around 224-265. Urinalysis was normal as well as kidney function. Will lower Olanzapine to 15mg and observe for sx stability given mild retention.  8/11: No interval changes. No paranoia or other delusional thought content on exam. Discharge planning in process.   8/14: No interval changes. Reporting sustained improvement. Discharge planning in process.     Plan:   1. Legal: admitted on 9.13 voluntary status  2. Safety:  Denies SI/SIB/HI/VI; continue routine observation.  3. Psychiatric:   - Continue Effexor to 150mg PO daily for schizoaffective d/o  -Zyprexa 15 mg PO qHS for schizoaffective d/o  -Klonopin 0.5 mg PO BID for anxiety   	- Per ISTOP: Last prescribed Klonopin 0.5 mg #60 on 5/5/23  - Holding home dose of Ambien 12.5 mg QHS  4. Medical:  a) ECT referral  	- CBC, CMP, EKG ordered for 7/4 wnl. Review labs in [facility] 5/23: TSH, B12, Folate, lipid panel and A1c WNL.   	- Last UA 5/26 UTI completed antibiotics on 5/31/23. Pending UA.   	- ECT consult ordered and completed. ECT #1 on 7/11/23, #2 7/14, #3 on 7/17, #4 7/19, #5 7/24  	- EKG 7/6: WNL.   b) PT consult: fall precautions, recommend ambulating with walker but patient has been refusing.   c) NUT consult: Minced and moist diet with ensure TID.    d) HTN previously on Norvasc 10 mg PO daily. Discontinued on 6/19/23 at [facility] for low BP. On unit BP stable around 130's/90' w/o antihypertensives. Will continue to monitor closely.   5. Collateral: outpatient psychiatrist Dr. Irby on 6/2/23 on [facility] and handoff from Dr. Hernandez. Collateral from sister. See  note.   6. Disposition: When stable.

## 2023-08-16 PROCEDURE — 90870 ELECTROCONVULSIVE THERAPY: CPT

## 2023-08-16 PROCEDURE — 99231 SBSQ HOSP IP/OBS SF/LOW 25: CPT | Mod: 25

## 2023-08-16 RX ORDER — FLUMAZENIL 0.1 MG/ML
0.2 VIAL (ML) INTRAVENOUS ONCE
Refills: 0 | Status: COMPLETED | OUTPATIENT
Start: 2023-08-16 | End: 2023-08-16

## 2023-08-16 RX ADMIN — Medication 0.2 MILLIGRAM(S): at 10:48

## 2023-08-16 RX ADMIN — Medication 150 MILLIGRAM(S): at 09:10

## 2023-08-16 RX ADMIN — OLANZAPINE 15 MILLIGRAM(S): 15 TABLET, FILM COATED ORAL at 20:05

## 2023-08-16 NOTE — BH INPATIENT PSYCHIATRY PROGRESS NOTE - NSBHASSESSSUMMFT_PSY_ALL_CORE
Pt is a 67 yo M domiciled in assisted living facility with hx of schizoaffective d/o (bipolar type), multiple past psych hospitalizations, 1 past SA in 1992 by cutting wrists, and remote substance use (alcohol use leading to 3 DUIs and 1 year-long [facility] sentence, cocaine use 20 years ago), presenting to [facility] as transfer from [facility] for ECT evaluation due to increasing paranoia and poor self-care. Continued inpatient admission required for safety and ECT assessment.     working diagnosis: SAD bipolar type + current depressive episode w/ psychosis.     7/15 dysphoric, withdrawn, superficial, no SI/HI.    7/17: continues to reports depressed mood with fragmented sleep and mostly isolative, though patient is less perseverative about paranoid delusions with no SIIP/HIIP. Tolerating well ECT today #3. Next on 7/19. No physical complaints, VS stable.   7/18: continues to endorse depressed mood with concerns about future living situation though is less perseverative about paranoid delusions with no SIP/HIIP. Tolerating well ECT, next on 7/19. No physical complaints, VS stable.   7/19: reports small improvement in sxs today with better mood and sleep; and less perseveration about paranoid delusions and no SIIP/HIIP. Tolerating well ECT #4 today, next on 7/24. No physical complaints, VS stable.   7/20: reports depressed mood with low energy/motivation, appetite and some hopelessness about future which are related to paranoid delusions. Denies SIIP/HIIP. Tolerating well ECT w/o physical complains, VS stable. Will start Cross tapering from Prozac to Effexor for better management of sxs.   7/21: continue to taper Prozac, patient remains mostly isolative with perseveration about going to [facility]  7/24: continues to endorse depression and anxiety though with small improvement in sleep and appetite; and less perseveration about going to [facility] today. Denies SIIP/HIIP. Tolerating well ECT w/o physical complains, VS stable. ECT session #5 today.   7/25: reports small improvements in mood, appetite and sleep, though continues to perseverate about future and living situation. Denies SIIP/HIIP. Tolerating well ECT w/o physical complains, VS stable. Next ECT 7/26.   7/27: Pt reports feeling depressed and noticing no improvement. Will titrate Effexor XR to 150mg QD. ECT #7 scheduled for Monday.   7/31: S/P ECT #7. Reporting modest gains in terms of mood. ECT #8 scheduled for Wednesday 8/2.  8/7: Pt s/p ECT #9, showing modest gains in terms of mod this week. ECT #10 scheduled for Wednesday 8/9.   8/8: Continues to endorse sustained improvement in terms of mood and denies SIIP. Agrees to return to [facility]. Discharge planning in process. ECT #10 scheduled for tomorrow.   8/9: Completed ECT #10. Reporting sustained improvement. Discharge planning in process.   8/10: Completed ECT # 10 yesterday morning. Continues to endorse sustained improvement in terms of mood and denies SIIP. Post void residual yesterday afternoon around 224-265. Urinalysis was normal as well as kidney function. Will lower Olanzapine to 15mg and observe for sx stability given mild retention.  8/11: No interval changes. No paranoia or other delusional thought content on exam. Discharge planning in process.   8/14: No interval changes. Reporting sustained improvement. Discharge planning in process.     Plan:   1. Legal: admitted on 9.13 voluntary status  2. Safety:  Denies SI/SIB/HI/VI; continue routine observation.  3. Psychiatric:   - Continue Effexor to 150mg PO daily for schizoaffective d/o  -Zyprexa 15 mg PO qHS for schizoaffective d/o  -Klonopin 0.5 mg PO BID for anxiety   	- Per ISTOP: Last prescribed Klonopin 0.5 mg #60 on 5/5/23  - Holding home dose of Ambien 12.5 mg QHS  4. Medical:  a) ECT referral  	- CBC, CMP, EKG ordered for 7/4 wnl. Review labs in [facility] 5/23: TSH, B12, Folate, lipid panel and A1c WNL.   	- Last UA 5/26 UTI completed antibiotics on 5/31/23. Pending UA.   	- ECT consult ordered and completed. ECT #1 on 7/11/23, #2 7/14, #3 on 7/17, #4 7/19, #5 7/24  	- EKG 7/6: WNL.   b) PT consult: fall precautions, recommend ambulating with walker but patient has been refusing.   c) NUT consult: Minced and moist diet with ensure TID.    d) HTN previously on Norvasc 10 mg PO daily. Discontinued on 6/19/23 at [facility] for low BP. On unit BP stable around 130's/90' w/o antihypertensives. Will continue to monitor closely.   5. Collateral: outpatient psychiatrist Dr. Irby on 6/2/23 on [facility] and handoff from Dr. Hernandez. Collateral from sister. See  note.   6. Disposition: When stable.

## 2023-08-16 NOTE — BH INPATIENT PSYCHIATRY PROGRESS NOTE - NSBHFUPINTERVALHXFT_PSY_A_CORE
Chart reviewed. Case discussed with interdisciplinary team. Completed ECT #11 today. Compliant with meds, denies side effects. No events reported over the weekend. No PRNs needed/requested.     Patient seen and examined for follow up of psychosis and depression. Compliant with standing medication and denying side effects. Continues to endorse sustained improvement in terms of mood and denies SIIP. Discharge planning back to [facility] in process.

## 2023-08-16 NOTE — BH INPATIENT PSYCHIATRY PROGRESS NOTE - NSBHCHARTREVIEWVS_PSY_A_CORE FT
Vital Signs Last 24 Hrs  T(C): 36.2 (08-16-23 @ 11:40), Max: 36.9 (08-16-23 @ 05:35)  T(F): 97.1 (08-16-23 @ 11:40), Max: 98.5 (08-16-23 @ 05:35)  HR: 55 (08-16-23 @ 11:40) (52 - 63)  BP: 136/89 (08-16-23 @ 11:40) (123/63 - 160/98)  BP(mean): --  RR: 16 (08-16-23 @ 11:40) (16 - 17)  SpO2: 98% (08-16-23 @ 11:40) (93% - 98%)    Orthostatic VS  08-16-23 @ 05:35  Lying BP: --/-- HR: --  Sitting BP: 143/85 HR: 59  Standing BP: 130/86 HR: 59  Site: --  Mode: --  Orthostatic VS  08-15-23 @ 07:53  Lying BP: --/-- HR: --  Sitting BP: 119/83 HR: 60  Standing BP: 122/79 HR: 59  Site: upper left arm  Mode: electronic

## 2023-08-16 NOTE — BH INPATIENT PSYCHIATRY PROGRESS NOTE - NSBHMETABOLIC_PSY_ALL_CORE_FT
BMI: BMI (kg/m2): 22.7 (07-07-23 @ 17:08)  HbA1c:   Glucose:   BP: 136/89 (08-16-23 @ 11:40) (120/82 - 160/98)  Lipid Panel:

## 2023-08-17 PROCEDURE — 99231 SBSQ HOSP IP/OBS SF/LOW 25: CPT

## 2023-08-17 RX ADMIN — OLANZAPINE 15 MILLIGRAM(S): 15 TABLET, FILM COATED ORAL at 20:57

## 2023-08-17 RX ADMIN — Medication 150 MILLIGRAM(S): at 09:16

## 2023-08-17 NOTE — BH INPATIENT PSYCHIATRY PROGRESS NOTE - NSBHFUPINTERVALHXFT_PSY_A_CORE
Chart reviewed. Case discussed with interdisciplinary team. Completed ECT #11 yesterday. Compliant with meds, denies side effects. No events reported over the weekend. No PRNs needed/requested. On encounter is calm and cooperative. Reports sustained improvement in terms of depressive sx and psychosis. Denies paranoid thoughts. Denies AH/VH/SI/HI. Future oriented and able to contract for safety. Discharge planning for next Monday back to [facility].

## 2023-08-17 NOTE — BH INPATIENT PSYCHIATRY PROGRESS NOTE - NSBHMETABOLIC_PSY_ALL_CORE_FT
BMI: BMI (kg/m2): 22.7 (07-07-23 @ 17:08)  HbA1c:   Glucose:   BP: 136/89 (08-16-23 @ 11:40) (123/63 - 160/98)  Lipid Panel:

## 2023-08-17 NOTE — CHART NOTE - NSCHARTNOTEFT_GEN_A_CORE
Nutrition follow up assessment for length of stay. Pt is a 67 y/o male with PMH of Hyperlipidemia, OA, HTN, Mesothelioma, PPHx of Schizoaffective disorder, depressive type. Pt domiciled in [facility] since September 2022. Pt presents to [facility] from [facility] for evaluation of ECT Treatment d/t increased paranoia. Patient ambulates with walker. Receiving ECT 2x/week per MD.    Met with patient in his room today. Patient reports his appetite has much improved with excellent PO intake at meals. Patient with missing teeth and states he has been tolerated current Minced and Moist diet well, denies chewing/swallowing difficulties on current diet order. Patient has no food preferences voiced today. States he has not been taking Ensure Plus supplement as he eats well at meals. Noted patient is on olanzapine rx per MD order, with wt gain over the past 1 month with current wt increased back to his reported UBW. Patient feels he has gained weight. Writer discussed healthy & balanced diet and portion size control with patient today who verbalized understanding. May benefit from d/c Ensure Plus HP order as it is no longer indicated at this time. Patient denies GI distress (nausea/vomiting/diarrhea/ constipation) today.    Diet : Diet, Regular:   DASH/TLC {Sodium & Cholesterol Restricted} (DASH)  Minced and Moist (MINCEDMOIST)  Supplement Feeding Modality:  Oral  Ensure Plus High Protein Cans or Servings Per Day:  1       Frequency:  Three Times a day (07-05-23 @ 14:47) [Active]    PO intake:  [ ] < 50%  [ ] 50-75%  [X] %  [ ] other :    Height: 180.3cm  Weight: 163lb (7/7)-->200lb (8/12)  -- gained 22% in 1 month, ? wt accuracy vs improved PO intake,   BMI: 27.9 (8/12)  UBW: 200lb    Skin: no pressure injuries     Edema: no edema    Pertinent Medications: MEDICATIONS  (STANDING):  OLANZapine 15 milliGRAM(s) Oral at bedtime  venlafaxine  milliGRAM(s) Oral daily    MEDICATIONS  (PRN):  acetaminophen     Tablet .. 650 milliGRAM(s) Oral every 6 hours PRN Mild Pain (1 - 3), Moderate Pain (4 - 6), Severe Pain (7 - 10)  polyethylene glycol 3350 17 Gram(s) Oral daily PRN constipation  senna 2 Tablet(s) Oral at bedtime PRN constipation    Pertinent Labs:  8/9/23 BUN 28 -- encouraged adequate fluid intake      Estimated Needs:   [X] no change since previous assessment  [ ] recalculated:       Previous Nutrition Diagnosis: Severe malnutrition    Nutrition Diagnosis is [ ] ongoing  [X] resolved [ ] not applicable        New Nutrition Diagnosis:  Unintended Weight Gain     Related to: increased energy intake, on antipsychotic medication     As evidenced by: gained 22% in 1 month     Goal: adhere to diet recommendations for weight management      Recommendations:  1. Continue current diet order per MD.   2. D/C Ensure Plus HP order as it is no longer indicated at this time.  3. Encourage diet adherence and portion control.  4. Monitor PO intake/tolerance, weights, labs, BM's, and skin integrity.     -- Molly Coppola, MS, RDN, Pager # [phone]

## 2023-08-17 NOTE — BH INPATIENT PSYCHIATRY PROGRESS NOTE - NSBHASSESSSUMMFT_PSY_ALL_CORE
Pt is a 67 yo M domiciled in assisted living facility with hx of schizoaffective d/o (bipolar type), multiple past psych hospitalizations, 1 past SA in 1992 by cutting wrists, and remote substance use (alcohol use leading to 3 DUIs and 1 year-long half-way sentence, cocaine use 20 years ago), presenting to [facility] 2[facility] as transfer from [facility] for ECT evaluation due to increasing paranoia and poor self-care. Continued inpatient admission required for safety and ECT assessment.     working diagnosis: SAD bipolar type + current depressive episode w/ psychosis.     7/15 dysphoric, withdrawn, superficial, no SI/HI.    7/17: continues to reports depressed mood with fragmented sleep and mostly isolative, though patient is less perseverative about paranoid delusions with no SIIP/HIIP. Tolerating well ECT today #3. Next on 7/19. No physical complaints, VS stable.   7/18: continues to endorse depressed mood with concerns about future living situation though is less perseverative about paranoid delusions with no SIP/HIIP. Tolerating well ECT, next on 7/19. No physical complaints, VS stable.   7/19: reports small improvement in sxs today with better mood and sleep; and less perseveration about paranoid delusions and no SIIP/HIIP. Tolerating well ECT #4 today, next on 7/24. No physical complaints, VS stable.   7/20: reports depressed mood with low energy/motivation, appetite and some hopelessness about future which are related to paranoid delusions. Denies SIIP/HIIP. Tolerating well ECT w/o physical complains, VS stable. Will start Cross tapering from Prozac to Effexor for better management of sxs.   7/21: continue to taper Prozac, patient remains mostly isolative with perseveration about going to half-way  7/24: continues to endorse depression and anxiety though with small improvement in sleep and appetite; and less perseveration about going to half-way today. Denies SIIP/HIIP. Tolerating well ECT w/o physical complains, VS stable. ECT session #5 today.   7/25: reports small improvements in mood, appetite and sleep, though continues to perseverate about future and living situation. Denies SIIP/HIIP. Tolerating well ECT w/o physical complains, VS stable. Next ECT 7/26.   7/27: Pt reports feeling depressed and noticing no improvement. Will titrate Effexor XR to 150mg QD. ECT #7 scheduled for Monday.   7/31: S/P ECT #7. Reporting modest gains in terms of mood. ECT #8 scheduled for Wednesday 8/2.  8/7: Pt s/p ECT #9, showing modest gains in terms of mod this week. ECT #10 scheduled for Wednesday 8/9.   8/8: Continues to endorse sustained improvement in terms of mood and denies SIIP. Agrees to return to [facility]. Discharge planning in process. ECT #10 scheduled for tomorrow.   8/9: Completed ECT #10. Reporting sustained improvement. Discharge planning in process.   8/10: Completed ECT # 10 yesterday morning. Continues to endorse sustained improvement in terms of mood and denies SIIP. Post void residual yesterday afternoon around 224-265. Urinalysis was normal as well as kidney function. Will lower Olanzapine to 15mg and observe for sx stability given mild retention.  8/11: No interval changes. No paranoia or other delusional thought content on exam. Discharge planning in process.   8/14: No interval changes. Reporting sustained improvement. Discharge planning in process.   8/17: Reports sustained improvement in terms of depressive sx and psychosis. Denies paranoid thoughts. Denies AH/VH/SI/HI. Future oriented and able to contract for safety. Discharge planning for next Monday back to [facility].     Plan:   1. Legal: admitted on 9.13 voluntary status  2. Safety:  Denies SI/SIB/HI/VI; continue routine observation.  3. Psychiatric:   - Continue Effexor to 150mg PO daily for schizoaffective d/o  -Zyprexa 15 mg PO qHS for schizoaffective d/o  -Klonopin 0.5 mg PO BID for anxiety   	- Per ISTOP: Last prescribed Klonopin 0.5 mg #60 on 5/5/23  - Holding home dose of Ambien 12.5 mg QHS  4. Medical:  a) ECT referral  	- CBC, CMP, EKG ordered for 7/4 wnl. Review labs in [facility] 5/23: TSH, B12, Folate, lipid panel and A1c WNL.   	- Last UA 5/26 UTI completed antibiotics on 5/31/23. Pending UA.   	- ECT consult ordered and completed. ECT #1 on 7/11/23, #2 7/14, #3 on 7/17, #4 7/19, #5 7/24  	- EKG 7/6: WNL.   b) PT consult: fall precautions, recommend ambulating with walker but patient has been refusing.   c) NUT consult: Minced and moist diet with ensure TID.    d) HTN previously on Norvasc 10 mg PO daily. Discontinued on 6/19/23 at [facility] for low BP. On unit BP stable around 130's/90' w/o antihypertensives. Will continue to monitor closely.   5. Collateral: outpatient psychiatrist Dr. Irby on 6/2/23 on [facility] and handoff from Dr. Hernandez. Collateral from sister. See  note.   6. Disposition: When stable.

## 2023-08-17 NOTE — BH INPATIENT PSYCHIATRY PROGRESS NOTE - NSBHCHARTREVIEWVS_PSY_A_CORE FT
Vital Signs Last 24 Hrs  T(C): 36.6 (08-17-23 @ 07:48), Max: 36.6 (08-17-23 @ 07:48)  T(F): 97.8 (08-17-23 @ 07:48), Max: 97.8 (08-17-23 @ 07:48)  HR: --  BP: --  BP(mean): --  RR: --  SpO2: --    Orthostatic VS  08-17-23 @ 07:48  Lying BP: --/-- HR: --  Sitting BP: 120/84 HR: 92  Standing BP: 115/87 HR: 94  Site: upper left arm  Mode: electronic  Orthostatic VS  08-16-23 @ 05:35  Lying BP: --/-- HR: --  Sitting BP: 143/85 HR: 59  Standing BP: 130/86 HR: 59  Site: --  Mode: --

## 2023-08-18 PROBLEM — R26.9 UNSPECIFIED ABNORMALITIES OF GAIT AND MOBILITY: Chronic | Status: ACTIVE | Noted: 2023-07-03

## 2023-08-18 PROBLEM — I10 ESSENTIAL (PRIMARY) HYPERTENSION: Chronic | Status: ACTIVE | Noted: 2023-07-03

## 2023-08-18 PROBLEM — E78.5 HYPERLIPIDEMIA, UNSPECIFIED: Chronic | Status: ACTIVE | Noted: 2023-07-03

## 2023-08-18 PROBLEM — M19.90 UNSPECIFIED OSTEOARTHRITIS, UNSPECIFIED SITE: Chronic | Status: ACTIVE | Noted: 2023-07-03

## 2023-08-18 PROCEDURE — 99231 SBSQ HOSP IP/OBS SF/LOW 25: CPT

## 2023-08-18 RX ORDER — VENLAFAXINE HCL 75 MG
1 CAPSULE, EXT RELEASE 24 HR ORAL
Qty: 30 | Refills: 0
Start: 2023-08-18 | End: 2023-09-16

## 2023-08-18 RX ORDER — ZOLPIDEM TARTRATE 10 MG/1
1 TABLET ORAL
Refills: 0 | DISCHARGE

## 2023-08-18 RX ORDER — QUETIAPINE FUMARATE 200 MG/1
1 TABLET, FILM COATED ORAL
Refills: 0 | DISCHARGE

## 2023-08-18 RX ORDER — OLANZAPINE 15 MG/1
1 TABLET, FILM COATED ORAL
Qty: 30 | Refills: 0
Start: 2023-08-18 | End: 2023-09-16

## 2023-08-18 RX ORDER — SENNA PLUS 8.6 MG/1
2 TABLET ORAL
Qty: 60 | Refills: 0
Start: 2023-08-18 | End: 2023-09-16

## 2023-08-18 RX ORDER — OXCARBAZEPINE 300 MG/1
1 TABLET, FILM COATED ORAL
Refills: 0 | DISCHARGE

## 2023-08-18 RX ORDER — FLUOXETINE HCL 10 MG
1 CAPSULE ORAL
Refills: 0 | DISCHARGE

## 2023-08-18 RX ORDER — CLONAZEPAM 1 MG
1 TABLET ORAL
Refills: 0 | DISCHARGE

## 2023-08-18 RX ORDER — AMLODIPINE BESYLATE 2.5 MG/1
1 TABLET ORAL
Refills: 0 | DISCHARGE

## 2023-08-18 RX ADMIN — OLANZAPINE 15 MILLIGRAM(S): 15 TABLET, FILM COATED ORAL at 21:04

## 2023-08-18 RX ADMIN — Medication 150 MILLIGRAM(S): at 08:51

## 2023-08-18 NOTE — BH INPATIENT PSYCHIATRY PROGRESS NOTE - NSBHCHARTREVIEWINVESTIGATE_PSY_A_CORE FT
7/6 WNL with QTc <500. 

## 2023-08-18 NOTE — BH INPATIENT PSYCHIATRY PROGRESS NOTE - NSTXPSYCHOINTERMD_PSY_ALL_CORE
Trial of Zyprexa. Trial of  ECT. 
Trial of Zyprexa. Planning to start ECT. 
Trial of Zyprexa. Trial of  ECT. 
Trial of Zyprexa. Planning to start ECT. 
Trial of Zyprexa. Planning to start ECT. 
Trial of Zyprexa. Trial of  ECT. 
Trial of Zyprexa. Planning to start ECT. 
Trial of Zyprexa. Trial of  ECT. 
Trial of Zyprexa. Planning to start ECT. 
Trial of Zyprexa. Trial of  ECT. 
Trial of Zyprexa. Trial of  ECT. 
Trial of Zyprexa. Planning to start ECT. 
Trial of Zyprexa. Planning to start ECT. 
Trial of Zyprexa. Trial of  ECT. 
Trial of Zyprexa. Trial of  ECT. 
Trial of Zyprexa. Planning to start ECT. 
Trial of Zyprexa. Trial of  ECT. 

## 2023-08-18 NOTE — BH INPATIENT PSYCHIATRY PROGRESS NOTE - NSTXPROBANX_PSY_ALL_CORE
ANXIETY/PANIC/FEAR

## 2023-08-18 NOTE — BH INPATIENT PSYCHIATRY PROGRESS NOTE - NSTXDEPRESDATETRGT_PSY_ALL_CORE
17-Aug-2023
10-Aug-2023
17-Aug-2023
03-Aug-2023
17-Aug-2023
03-Aug-2023
10-Aug-2023
17-Aug-2023
10-Aug-2023
17-Aug-2023
10-Aug-2023
17-Aug-2023
10-Aug-2023

## 2023-08-18 NOTE — BH INPATIENT PSYCHIATRY PROGRESS NOTE - NSTXDISORGGOAL_PSY_ALL_CORE
Will identify 2 coping skills that assist in organizing

## 2023-08-18 NOTE — BH INPATIENT PSYCHIATRY PROGRESS NOTE - NSBHANTIPSYCHOTIC_PSY_ALL_CORE
Yes...

## 2023-08-18 NOTE — BH INPATIENT PSYCHIATRY PROGRESS NOTE - NSTXDISORGDATETRGT_PSY_ALL_CORE
10-Aug-2023
03-Aug-2023
17-Aug-2023
10-Aug-2023
17-Aug-2023
17-Aug-2023
10-Aug-2023
17-Aug-2023
03-Aug-2023
03-Aug-2023
10-Aug-2023
03-Aug-2023
10-Aug-2023
17-Aug-2023
17-Aug-2023

## 2023-08-18 NOTE — BH INPATIENT PSYCHIATRY PROGRESS NOTE - NSBHROSSYSTEMS_PSY_ALL_CORE
Psychiatric

## 2023-08-18 NOTE — BH INPATIENT PSYCHIATRY PROGRESS NOTE - MSE OPTIONS
Unstructured MSE

## 2023-08-18 NOTE — BH INPATIENT PSYCHIATRY PROGRESS NOTE - NSICDXBHPRIMARYDX_PSY_ALL_CORE
Schizoaffective disorder, bipolar type   F25.0  

## 2023-08-18 NOTE — BH INPATIENT PSYCHIATRY PROGRESS NOTE - NSTXDCOTHRINTERMD_PSY_ALL_CORE
Continue to work with SW to develop appropriate plan. 

## 2023-08-18 NOTE — BH INPATIENT PSYCHIATRY PROGRESS NOTE - NSTXDISORGDATEEST_PSY_ALL_CORE
30-Jul-2023
10-Aug-2023
30-Jul-2023
10-Aug-2023
30-Jul-2023
10-Aug-2023
30-Jul-2023
10-Aug-2023
10-Aug-2023
30-Jul-2023
10-Aug-2023

## 2023-08-18 NOTE — BH INPATIENT PSYCHIATRY PROGRESS NOTE - NSTXPROBCOPE_PSY_ALL_CORE
COPING, INEFFECTIVE

## 2023-08-18 NOTE — BH INPATIENT PSYCHIATRY PROGRESS NOTE - NSBHFUPINTERVALHXFT_PSY_A_CORE
Chart reviewed. Case discussed with interdisciplinary team. Completed ECT #11 yesterday, scheduled for ECT #12 next Monday and will later transition to weekly maintenance. He is compliant with meds, denies side effects. No behavioral events reported overnight. No PRNs needed/requested. On encounter this morning pt denies any complaints, is calm and cooperative. Reports sustained gains in terms of depressive sx and psychosis. Denies paranoid thoughts or beliefs he will end up in [facility]. Pt is aware he will be discharged back to his [facility] on Monday. Denies AH/VH/SI/HI. Future oriented and able to contract for safety. Signed outpatient ECT treatment plan. Discharge planning for next Monday back to [facility].

## 2023-08-18 NOTE — BH INPATIENT PSYCHIATRY PROGRESS NOTE - NSTXDCOTHRDATEEST_PSY_ALL_CORE
13-Jul-2023
13-Jul-2023
06-Jul-2023
02-Aug-2023
13-Jul-2023
02-Aug-2023
13-Jul-2023
02-Aug-2023
06-Jul-2023
13-Jul-2023
02-Aug-2023
16-Aug-2023
27-Jul-2023
02-Aug-2023
06-Jul-2023
02-Aug-2023
02-Aug-2023
13-Jul-2023
27-Jul-2023
06-Jul-2023
21-Jul-2023
06-Jul-2023
27-Jul-2023
02-Aug-2023
13-Jul-2023
02-Aug-2023
21-Jul-2023
02-Aug-2023
06-Jul-2023
13-Jul-2023
21-Jul-2023
06-Jul-2023
16-Aug-2023
27-Jul-2023
02-Aug-2023

## 2023-08-18 NOTE — BH INPATIENT PSYCHIATRY PROGRESS NOTE - NSTXCOPEPROGRES_PSY_ALL_CORE
No Change
Improving
No Change
Improving
No Change
No Change
Improving
Improving
No Change
Improving
No Change
Improving
Improving
No Change
Improving
No Change
Improving
No Change
No Change
Improving

## 2023-08-18 NOTE — BH INPATIENT PSYCHIATRY PROGRESS NOTE - NSTXANXGOAL_PSY_ALL_CORE
Report a reduction in panic attacks and improving mood and confidence

## 2023-08-18 NOTE — BH INPATIENT PSYCHIATRY PROGRESS NOTE - NSTXPSYCHOGOAL_PSY_ALL_CORE
Will identify 1 trigger/stressor that exacerbates thoughts
Will be able to report experiencing hallucinations to staff
Will identify 1 trigger/stressor that exacerbates thoughts
Will identify 1 trigger/stressor that exacerbates thoughts
Will be able to report experiencing hallucinations to staff
Will identify 1 trigger/stressor that exacerbates thoughts
Will ask for PRN medication to manage hallucinations
Will identify 1 trigger/stressor that exacerbates thoughts
Will ask for PRN medication to manage hallucinations
Will be able to report experiencing hallucinations to staff
Will identify 1 trigger/stressor that exacerbates thoughts
Will ask for PRN medication to manage hallucinations
Will be able to report experiencing hallucinations to staff
Will identify 1 trigger/stressor that exacerbates thoughts
Will ask for PRN medication to manage hallucinations
Will be able to report experiencing hallucinations to staff
Will be able to report experiencing hallucinations to staff
Will ask for PRN medication to manage hallucinations
Will be able to report experiencing hallucinations to staff
Will ask for PRN medication to manage hallucinations

## 2023-08-18 NOTE — BH INPATIENT PSYCHIATRY PROGRESS NOTE - NSTXCOPEGOAL_PSY_ALL_CORE
Identify and utilize 2 coping skills that meet their needs

## 2023-08-18 NOTE — BH INPATIENT PSYCHIATRY PROGRESS NOTE - NSTXFALLDATEEST_PSY_ALL_CORE
03-Jul-2023
10-Aug-2023
03-Jul-2023
30-Jul-2023
03-Jul-2023
30-Jul-2023
03-Jul-2023
30-Jul-2023
03-Jul-2023
10-Aug-2023
30-Jul-2023
30-Jul-2023
03-Jul-2023
10-Aug-2023
03-Jul-2023
03-Jul-2023
30-Jul-2023
03-Jul-2023
10-Aug-2023
30-Jul-2023
30-Jul-2023
10-Aug-2023
03-Jul-2023
03-Jul-2023
10-Aug-2023
03-Jul-2023
30-Jul-2023

## 2023-08-18 NOTE — BH INPATIENT PSYCHIATRY PROGRESS NOTE - NSTXCOPEDATETRGT_PSY_ALL_CORE
11-Jul-2023
18-Jul-2023
03-Aug-2023
07-Aug-2023
11-Jul-2023
21-Aug-2023
25-Jul-2023
15-Aug-2023
31-Jul-2023
11-Jul-2023
11-Jul-2023
21-Aug-2023
07-Aug-2023
18-Jul-2023
15-Aug-2023
31-Jul-2023
18-Jul-2023
21-Aug-2023
31-Jul-2023
18-Jul-2023
07-Aug-2023
11-Jul-2023
10-Aug-2023
15-Aug-2023
21-Aug-2023
25-Jul-2023
31-Jul-2023
11-Jul-2023
18-Jul-2023
18-Jul-2023
15-Aug-2023
25-Jul-2023
11-Jul-2023
31-Jul-2023
18-Jul-2023
21-Aug-2023
10-Aug-2023

## 2023-08-18 NOTE — BH INPATIENT PSYCHIATRY PROGRESS NOTE - NSBHPROGSUIC_PSY_A_CORE
no

## 2023-08-18 NOTE — BH INPATIENT PSYCHIATRY PROGRESS NOTE - NSICDXBHSECONDARYDX_PSY_ALL_CORE
Gait abnormality   R26.9  

## 2023-08-18 NOTE — BH INPATIENT PSYCHIATRY PROGRESS NOTE - NSTXFALLINTERMD_PSY_ALL_CORE
Routine checks. PT consult ordered, recommended patient to use walker but refuses. 
Routine checks
Routine checks. PT consult ordered, recommended patient to use walker but refuses. 
Routine checks
Routine checks. PT consult ordered, recommended patient to use walker but refuses. 
Routine checks
Routine checks. PT consult ordered, recommended patient to use walker but refuses. 
Routine checks. PT consult ordered, recommended patient to use walker but refuses. 
Routine checks
Routine checks. PT consult ordered, recommended patient to use walker but refuses. 

## 2023-08-18 NOTE — BH INPATIENT PSYCHIATRY PROGRESS NOTE - NSTXANXINTERMD_PSY_ALL_CORE
c/w Klonopin 0.5 mg PO BID. Trial of Effexor   Trial of  ECT. 
c/w Klonopin 0.5 mg PO BID, Trial of Zyprexa and Fluoxetine.   Planning to start ECT. 
c/w Klonopin 0.5 mg PO BID. Trial of Effexor   Trial of  ECT. 
c/w Klonopin 0.5 mg PO BID. Trial of Effexor   Trial of  ECT. 
c/w Klonopin 0.5 mg PO BID, Trial of Zyprexa and Fluoxetine.   Trial of  ECT. 
c/w Klonopin 0.5 mg PO BID, Trial of Zyprexa and Fluoxetine.   Planning to start ECT. 
c/w Klonopin 0.5 mg PO BID
c/w Klonopin 0.5 mg PO BID, Trial of Zyprexa and Fluoxetine.   Planning to start ECT. 
c/w Klonopin 0.5 mg PO BID. Trial of Effexor   Trial of  ECT. 
c/w Klonopin 0.5 mg PO BID, Trial of Zyprexa and Fluoxetine.   Trial of  ECT. 
c/w Klonopin 0.5 mg PO BID
c/w Klonopin 0.5 mg PO BID, Trial of Zyprexa and Fluoxetine.   Trial of  ECT. 
c/w Klonopin 0.5 mg PO BID, Trial of Zyprexa and Fluoxetine.   Trial of  ECT. 
c/w Klonopin 0.5 mg PO BID
c/w Klonopin 0.5 mg PO BID, Trial of Zyprexa and Fluoxetine.   Trial of  ECT. 
c/w Klonopin 0.5 mg PO BID. Trial of Effexor   Trial of  ECT. 
c/w Klonopin 0.5 mg PO BID
c/w Klonopin 0.5 mg PO BID, Trial of Zyprexa and Fluoxetine.   Planning to start ECT. 
c/w Klonopin 0.5 mg PO BID, Trial of Zyprexa and Fluoxetine.   Trial of  ECT. 
c/w Klonopin 0.5 mg PO BID, Trial of Zyprexa and Fluoxetine.   Trial of  ECT. 
c/w Klonopin 0.5 mg PO BID. Trial of Effexor   Trial of  ECT. 
c/w Klonopin 0.5 mg PO BID. Trial of Effexor   Trial of  ECT. 
c/w Klonopin 0.5 mg PO BID, Trial of Zyprexa and Fluoxetine.   Planning to start ECT. 
c/w Klonopin 0.5 mg PO BID. Trial of Effexor   Trial of  ECT. 
c/w Klonopin 0.5 mg PO BID, Trial of Zyprexa and Fluoxetine.   Planning to start ECT. 
c/w Klonopin 0.5 mg PO BID. Trial of Effexor   Trial of  ECT. 
c/w Klonopin 0.5 mg PO BID, Trial of Zyprexa and Fluoxetine.   Trial of  ECT. 
c/w Klonopin 0.5 mg PO BID. Trial of Effexor   Trial of  ECT. 
c/w Klonopin 0.5 mg PO BID, Trial of Zyprexa and Fluoxetine.   Planning to start ECT. 
c/w Klonopin 0.5 mg PO BID, Trial of Zyprexa and Fluoxetine.   Planning to start ECT. 
c/w Klonopin 0.5 mg PO BID. Trial of Effexor   Trial of  ECT. 

## 2023-08-18 NOTE — BH INPATIENT PSYCHIATRY PROGRESS NOTE - NSTXFALLGOAL_PSY_ALL_CORE
Ask for assistance when getting out of bed/chair and upon ambulation
Use non-slip footwear when out of bed
Use non-slip footwear when out of bed
Ask for assistance when getting out of bed/chair and upon ambulation
Use non-slip footwear when out of bed
Ask for assistance when getting out of bed/chair and upon ambulation
Use non-slip footwear when out of bed
Ask for assistance when getting out of bed/chair and upon ambulation
Use non-slip footwear when out of bed
Ask for assistance when getting out of bed/chair and upon ambulation
Ask for assistance when getting out of bed/chair and upon ambulation
Use non-slip footwear when out of bed
Ask for assistance when getting out of bed/chair and upon ambulation
Ask for assistance when getting out of bed/chair and upon ambulation
Use non-slip footwear when out of bed
Use non-slip footwear when out of bed
Ask for assistance when getting out of bed/chair and upon ambulation
Use non-slip footwear when out of bed
Use non-slip footwear when out of bed
Ask for assistance when getting out of bed/chair and upon ambulation

## 2023-08-18 NOTE — BH INPATIENT PSYCHIATRY PROGRESS NOTE - NSTXCOPEDATEEST_PSY_ALL_CORE
14-Aug-2023
04-Jul-2023
04-Jul-2023
30-Jul-2023
31-Jul-2023
31-Jul-2023
18-Jul-2023
18-Jul-2023
04-Jul-2023
11-Jul-2023
31-Jul-2023
14-Aug-2023
24-Jul-2023
31-Jul-2023
31-Jul-2023
11-Jul-2023
14-Aug-2023
11-Jul-2023
31-Jul-2023
04-Jul-2023
24-Jul-2023
24-Jul-2023
04-Jul-2023
11-Jul-2023
14-Aug-2023
18-Jul-2023
04-Jul-2023
11-Jul-2023
31-Jul-2023
14-Aug-2023
04-Jul-2023
24-Jul-2023
24-Jul-2023
11-Jul-2023
11-Jul-2023
31-Jul-2023
31-Jul-2023

## 2023-08-18 NOTE — BH INPATIENT PSYCHIATRY PROGRESS NOTE - NSTXDEPRESDATEEST_PSY_ALL_CORE
30-Jul-2023
30-Jul-2023
10-Aug-2023
30-Jul-2023
30-Jul-2023
10-Aug-2023
10-Aug-2023
30-Jul-2023
10-Aug-2023
30-Jul-2023
30-Jul-2023
10-Aug-2023
30-Jul-2023
30-Jul-2023
10-Aug-2023

## 2023-08-18 NOTE — BH INPATIENT PSYCHIATRY PROGRESS NOTE - NS ED [facility] MED ROS PSYCHIATRIC
See HPI

## 2023-08-18 NOTE — BH INPATIENT PSYCHIATRY PROGRESS NOTE - NSTXDEPRESGOAL_PSY_ALL_CORE
Exhibit improvements in self-grooming, hygiene, sleep and appetite

## 2023-08-18 NOTE — BH INPATIENT PSYCHIATRY PROGRESS NOTE - NSTXCOPEINTERMD_PSY_ALL_CORE
Encourage patient to participate G/M therapy. 
VIEW ALL
Encourage patient to participate G/M therapy. 

## 2023-08-18 NOTE — BH INPATIENT PSYCHIATRY PROGRESS NOTE - NSTXPROBPSYCHO_PSY_ALL_CORE
PSYCHOTIC SYMPTOMS

## 2023-08-18 NOTE — BH INPATIENT PSYCHIATRY PROGRESS NOTE - NSTXDCOTHRDATETRGT_PSY_ALL_CORE
09-Aug-2023
28-Jul-2023
20-Jul-2023
20-Jul-2023
13-Jul-2023
09-Aug-2023
09-Aug-2023
20-Jul-2023
03-Aug-2023
09-Aug-2023
03-Aug-2023
13-Jul-2023
09-Aug-2023
20-Jul-2023
13-Jul-2023
20-Jul-2023
09-Aug-2023
03-Aug-2023
20-Jul-2023
23-Aug-2023
28-Jul-2023
23-Aug-2023
09-Aug-2023
13-Jul-2023
20-Jul-2023
13-Jul-2023
28-Jul-2023
13-Jul-2023
13-Jul-2023
20-Jul-2023
03-Aug-2023
09-Aug-2023

## 2023-08-18 NOTE — BH INPATIENT PSYCHIATRY PROGRESS NOTE - NSBHASSESSSUMMFT_PSY_ALL_CORE
Pt is a 67 yo M domiciled in assisted living facility with hx of schizoaffective d/o (bipolar type), multiple past psych hospitalizations, 1 past SA in 1992 by cutting wrists, and remote substance use (alcohol use leading to 3 DUIs and 1 year-long [facility] sentence, cocaine use 20 years ago), presenting to [facility] as transfer from [facility] for ECT evaluation due to increasing paranoia and poor self-care. Continued inpatient admission required for safety and ECT assessment.     working diagnosis: SAD bipolar type + current depressive episode w/ psychosis.     7/15 dysphoric, withdrawn, superficial, no SI/HI.    7/17: continues to reports depressed mood with fragmented sleep and mostly isolative, though patient is less perseverative about paranoid delusions with no SIIP/HIIP. Tolerating well ECT today #3. Next on 7/19. No physical complaints, VS stable.   7/18: continues to endorse depressed mood with concerns about future living situation though is less perseverative about paranoid delusions with no SIP/HIIP. Tolerating well ECT, next on 7/19. No physical complaints, VS stable.   7/19: reports small improvement in sxs today with better mood and sleep; and less perseveration about paranoid delusions and no SIIP/HIIP. Tolerating well ECT #4 today, next on 7/24. No physical complaints, VS stable.   7/20: reports depressed mood with low energy/motivation, appetite and some hopelessness about future which are related to paranoid delusions. Denies SIIP/HIIP. Tolerating well ECT w/o physical complains, VS stable. Will start Cross tapering from Prozac to Effexor for better management of sxs.   7/21: continue to taper Prozac, patient remains mostly isolative with perseveration about going to [facility]  7/24: continues to endorse depression and anxiety though with small improvement in sleep and appetite; and less perseveration about going to [facility] today. Denies SIIP/HIIP. Tolerating well ECT w/o physical complains, VS stable. ECT session #5 today.   7/25: reports small improvements in mood, appetite and sleep, though continues to perseverate about future and living situation. Denies SIIP/HIIP. Tolerating well ECT w/o physical complains, VS stable. Next ECT 7/26.   7/27: Pt reports feeling depressed and noticing no improvement. Will titrate Effexor XR to 150mg QD. ECT #7 scheduled for Monday.   7/31: S/P ECT #7. Reporting modest gains in terms of mood. ECT #8 scheduled for Wednesday 8/2.  8/7: Pt s/p ECT #9, showing modest gains in terms of mod this week. ECT #10 scheduled for Wednesday 8/9.   8/8: Continues to endorse sustained improvement in terms of mood and denies SIIP. Agrees to return to [facility]. Discharge planning in process. ECT #10 scheduled for tomorrow.   8/9: Completed ECT #10. Reporting sustained improvement. Discharge planning in process.   8/10: Completed ECT # 10 yesterday morning. Continues to endorse sustained improvement in terms of mood and denies SIIP. Post void residual yesterday afternoon around [id number]. Urinalysis was normal as well as kidney function. Will lower Olanzapine to 15mg and observe for sx stability given mild retention.  8/11: No interval changes. No paranoia or other delusional thought content on exam. Discharge planning in process.   8/14: No interval changes. Reporting sustained improvement. Discharge planning in process.   8/17: Reports sustained improvement in terms of depressive sx and psychosis. Denies paranoid thoughts. Denies AH/VH/SI/HI. Future oriented and able to contract for safety. Discharge planning for next Monday back to [facility].   8/18: No interval changes. Discharge planning for Monday.  Plan:   1. Legal: admitted on 9.13 voluntary status  2. Safety:  Denies SI/SIB/HI/VI; continue routine observation.  3. Psychiatric:   - Continue Effexor to 150mg PO daily for schizoaffective d/o  -Zyprexa 15 mg PO qHS for schizoaffective d/o  -Klonopin 0.5 mg PO BID for anxiety   	- Per ISTOP: Last prescribed Klonopin 0.5 mg #60 on 5/5/23  - Holding home dose of Ambien 12.5 mg QHS  4. Medical:  a) ECT referral  	- CBC, CMP, EKG ordered for 7/4 wnl. Review labs in [facility] 5/23: TSH, B12, Folate, lipid panel and A1c WNL.   	- Last UA 5/26 UTI completed antibiotics on 5/31/23. Pending UA.   	- ECT consult ordered and completed. ECT #1 on 7/11/23, #2 7/14, #3 on 7/17, #4 7/19, #5 7/24  	- EKG 7/6: WNL.   b) PT consult: fall precautions, recommend ambulating with walker but patient has been refusing.   c) NUT consult: Minced and moist diet with ensure TID.    d) HTN previously on Norvasc 10 mg PO daily. Discontinued on 6/19/23 at [facility] for low BP. On unit BP stable around 130's/90' w/o antihypertensives. Will continue to monitor closely.   5. Collateral: outpatient psychiatrist Dr. Irby on 6/2/23 on [facility] and handoff from Dr. Hernandez. Collateral from sister. See  note.   6. Disposition: When stable.

## 2023-08-18 NOTE — BH INPATIENT PSYCHIATRY PROGRESS NOTE - NSBHCHARTREVIEWVS_PSY_A_CORE FT
Vital Signs Last 24 Hrs  T(C): 36.4 (08-18-23 @ 07:53), Max: 36.4 (08-18-23 @ 07:53)  T(F): 97.6 (08-18-23 @ 07:53), Max: 97.6 (08-18-23 @ 07:53)  HR: --  BP: --  BP(mean): --  RR: --  SpO2: --    Orthostatic VS  08-18-23 @ 07:53  Lying BP: --/-- HR: --  Sitting BP: 124/84 HR: 55  Standing BP: 125/85 HR: 67  Site: upper left arm  Mode: electronic  Orthostatic VS  08-17-23 @ 07:48  Lying BP: --/-- HR: --  Sitting BP: 120/84 HR: 92  Standing BP: 115/87 HR: 94  Site: upper left arm  Mode: electronic

## 2023-08-18 NOTE — BH INPATIENT PSYCHIATRY PROGRESS NOTE - NSTXPROBFALL_PSY_ALL_CORE
FALL RISK

## 2023-08-18 NOTE — BH INPATIENT PSYCHIATRY PROGRESS NOTE - NSTXPSYCHODATETRGT_PSY_ALL_CORE
17-Aug-2023
10-Aug-2023
17-Aug-2023
03-Aug-2023
03-Aug-2023
17-Aug-2023
03-Aug-2023
17-Aug-2023
17-Aug-2023
03-Aug-2023
10-Aug-2023
17-Aug-2023
10-Aug-2023

## 2023-08-18 NOTE — BH INPATIENT PSYCHIATRY PROGRESS NOTE - NSDCCRITERIA_PSY_ALL_CORE
Reduction in anxiety sx and psychosis, improvement in ability to care for self.

## 2023-08-18 NOTE — BH INPATIENT PSYCHIATRY PROGRESS NOTE - NSBHCONSULTIPREASON_PSY_A_CORE
other reason

## 2023-08-18 NOTE — BH INPATIENT PSYCHIATRY PROGRESS NOTE - NSBHATTESTTYPEVISIT_PSY_A_CORE
Attending Only
Attending with Resident/Fellow/Student
Attending with Resident/Fellow/Student
Attending Only
Attending with Resident/Fellow/Student
Attending Only
Attending with Resident/Fellow/Student
Attending Only
Attending Only
Attending with Resident/Fellow/Student
Attending Only
Attending with Resident/Fellow/Student
Attending Only
Attending with Resident/Fellow/Student
Attending Only
Attending with Resident/Fellow/Student
Attending Only
Attending with Resident/Fellow/Student
Attending Only

## 2023-08-18 NOTE — BH INPATIENT PSYCHIATRY PROGRESS NOTE - NSTXFALLDATETRGT_PSY_ALL_CORE
10-Aug-2023
17-Aug-2023
10-Aug-2023
03-Aug-2023
03-Aug-2023
17-Aug-2023
17-Aug-2023
03-Aug-2023
17-Aug-2023
03-Aug-2023
17-Aug-2023
10-Aug-2023
17-Aug-2023

## 2023-08-18 NOTE — BH INPATIENT PSYCHIATRY PROGRESS NOTE - NSTXANXDATEEST_PSY_ALL_CORE
03-Jul-2023

## 2023-08-18 NOTE — BH INPATIENT PSYCHIATRY PROGRESS NOTE - NS ED [facility] REVIEW OF ED CHART AVAILABLE INVESTIGATIONS REVIEWED
Yes

## 2023-08-18 NOTE — BH INPATIENT PSYCHIATRY PROGRESS NOTE - NSTXPSYCHODATEEST_PSY_ALL_CORE
03-Jul-2023
30-Jul-2023
03-Jul-2023
10-Aug-2023
30-Jul-2023
10-Aug-2023
03-Jul-2023
30-Jul-2023
10-Aug-2023
30-Jul-2023
30-Jul-2023
10-Aug-2023
03-Jul-2023
30-Jul-2023
03-Jul-2023
30-Jul-2023
10-Aug-2023
10-Aug-2023
30-Jul-2023
30-Jul-2023

## 2023-08-18 NOTE — BH INPATIENT PSYCHIATRY PROGRESS NOTE - NSTXDCOTHRGOAL_PSY_ALL_CORE
Pt will maintain compliance with ECT, participate in self-care, with improved symptoms and resumption of baseline functioning
Pt will maintain compliance with ECT, participate in self-care, with improved symptoms and resumption of baseline functioning
Pt will comply with continued ECT with an improvement in symptoms, and resumption of baseline functioning
Pt will comply with treatment, with an improvement in symptoms, helping him to participate in his care, and accepting supports
Pt will comply with continued ECT, and participate in unit groups with an improvement in symptoms and resumption of baseline functioning
Pt will maintain compliance with ECT course, with sustained improvement in symptoms, helping him to participate in his care at his [facility]
Pt will maintain compliance with ECT, participate in self-care, with improved symptoms and resumption of baseline functioning
Pt will comply with continued ECT with an improvement in symptoms, and resumption of baseline functioning
Pt will comply with treatment, with an improvement in symptoms, helping him to participate in his care, and accepting supports
Pt will comply with continued ECT, and participate in unit groups with an improvement in symptoms and resumption of baseline functioning
Pt will maintain compliance with ECT, participate in self-care, with improved symptoms and resumption of baseline functioning
Pt will comply with continued ECT with an improvement in symptoms, and resumption of baseline functioning
Pt will maintain compliance with ECT, participate in self-care, with improved symptoms and resumption of baseline functioning
Pt will comply with continued ECT with an improvement in symptoms, and resumption of baseline functioning
Pt will comply with treatment, with an improvement in symptoms, helping him to participate in his care, and accepting supports
Pt will maintain compliance with ECT, participate in self-care, with improved symptoms and resumption of baseline functioning
Pt will maintain compliance with ECT course, with sustained improvement in symptoms, helping him to participate in his care at his care home
Pt will maintain compliance with ECT, participate in self-care, with improved symptoms and resumption of baseline functioning
Pt will comply with continued ECT with an improvement in symptoms, and resumption of baseline functioning
Pt will comply with treatment, with an improvement in symptoms, helping him to participate in his care, and accepting supports
Pt will comply with treatment, with an improvement in symptoms, helping him to participate in his care, and accepting supports
Pt will comply with continued ECT with an improvement in symptoms, and resumption of baseline functioning
Pt will maintain compliance with ECT, participate in self-care, with improved symptoms and resumption of baseline functioning
Pt will maintain compliance with ECT, participate in self-care, with improved symptoms and resumption of baseline functioning
Pt will comply with continued ECT with an improvement in symptoms, and resumption of baseline functioning
Pt will maintain compliance with ECT, participate in self-care, with improved symptoms and resumption of baseline functioning
Pt will comply with treatment, with an improvement in symptoms, helping him to participate in his care, and accepting supports
Pt will comply with continued ECT, and participate in unit groups with an improvement in symptoms and resumption of baseline functioning
Pt will comply with treatment, with an improvement in symptoms, helping him to participate in his care, and accepting supports
Pt will comply with continued ECT with an improvement in symptoms, and resumption of baseline functioning
Pt will maintain compliance with ECT, participate in self-care, with improved symptoms and resumption of baseline functioning
Pt will maintain compliance with ECT, participate in self-care, with improved symptoms and resumption of baseline functioning

## 2023-08-18 NOTE — BH INPATIENT PSYCHIATRY PROGRESS NOTE - NSTXDCOTHRPROGRES_PSY_ALL_CORE
No Change

## 2023-08-19 RX ADMIN — Medication 150 MILLIGRAM(S): at 08:12

## 2023-08-19 RX ADMIN — OLANZAPINE 15 MILLIGRAM(S): 15 TABLET, FILM COATED ORAL at 20:18

## 2023-08-20 RX ADMIN — Medication 150 MILLIGRAM(S): at 08:32

## 2023-08-20 RX ADMIN — OLANZAPINE 15 MILLIGRAM(S): 15 TABLET, FILM COATED ORAL at 20:29

## 2023-08-21 VITALS
SYSTOLIC BLOOD PRESSURE: 145 MMHG | RESPIRATION RATE: 16 BRPM | HEART RATE: 51 BPM | OXYGEN SATURATION: 98 % | DIASTOLIC BLOOD PRESSURE: 78 MMHG | TEMPERATURE: 97 F

## 2023-08-21 PROCEDURE — 90870 ELECTROCONVULSIVE THERAPY: CPT

## 2023-08-21 PROCEDURE — 99238 HOSP IP/OBS DSCHRG MGMT 30/<: CPT | Mod: 25

## 2023-08-21 RX ORDER — FLUMAZENIL 0.1 MG/ML
0.2 VIAL (ML) INTRAVENOUS ONCE
Refills: 0 | Status: COMPLETED | OUTPATIENT
Start: 2023-08-21 | End: 2023-08-21

## 2023-08-21 RX ADMIN — Medication 0.2 MILLIGRAM(S): at 10:47

## 2023-08-21 RX ADMIN — Medication 150 MILLIGRAM(S): at 08:50

## 2023-08-21 NOTE — ECT PRE-PROCEDURE CHECKLIST - NSBENZOLAST24HRS_PSY_ALL_CORE
yes (notify psychiatrist)

## 2023-08-21 NOTE — ECT PRE-PROCEDURE CHECKLIST - PATIENT PROBLEMS/NEEDS
Patient expressed no known problems or needs

## 2023-08-21 NOTE — ECT PRE-PROCEDURE CHECKLIST - NSADULTACCOMPPHNUMBER_PSY_ALL_CORE
[address]

## 2023-08-21 NOTE — ECT PRE-PROCEDURE CHECKLIST - NSPROEDALEARNPREF_GEN_A_NUR
verbal instruction

## 2023-08-21 NOTE — ECT PRE-PROCEDURE CHECKLIST - INV PERIPH IV LOCATION
Right:/median cubital vein
Right:/median cubital vein
Right:/metacarpal vein
Right:/median vein
Right:/median cubital vein
Right:/basilic vein
Right:/median cubital vein
Right:/metacarpal vein

## 2023-08-21 NOTE — ECT TREATMENT NOTE - NSECTTXELECPLACE_PSY_ALL_CORE
Bifrontal

## 2023-08-21 NOTE — ECT PRE-PROCEDURE CHECKLIST - NSPROPTRIGHTSUPPORTPHONE_GEN_A_NUR
[phone]
[phone]
[phone]
[phone]
[phone]
[phone]
[phone]
[phone]
[phone]
[phone]
[phone]
[phone]
[phone]

## 2023-08-21 NOTE — ECT PRE-PROCEDURE CHECKLIST - NSMEDSDOSETAKEN_PSY_ALL_CORE
See MAR

## 2023-08-21 NOTE — ECT TREATMENT NOTE - NSICDXBHSECONDARYDX_PSY_ALL_CORE
Gait abnormality   R26.9  

## 2023-08-21 NOTE — ECT PRE-PROCEDURE CHECKLIST - NSPROEDAREADYLEARN_GEN_A_NUR
anxiety/depression/fatigue

## 2023-08-21 NOTE — BH DISCHARGE NOTE NURSING/SOCIAL WORK/PSYCH REHAB - NSDCPRGOAL_PSY_ALL_CORE
Patient completed safety plan prior to his date of discharge. Discharge summary will be completed based upon collateral obtained via patient’s chart and previous observation made on unit. Patient was noted to have made gains in depressive symptoms and psychosis evidenced by denying all paranoid thoughts and increased future oriented thoughts. Per chart, patient is able to make his needs known and contact for safety. Patient was noted to have report his improved mood, sleep, and appetite. Patient attended and participated in groups with moderate encouragement. Patient has been compliant with medication and ECT. Patient became increasingly visible in the milieu and more verbal with peers/staff. Patient is recommended to utilize coping skills that he acquired during the stay.

## 2023-08-21 NOTE — ECT PRE-PROCEDURE CHECKLIST - NSPROPTRIGHTSUPPORTNAME_GEN_A_NUR
Sabino Romero

## 2023-08-21 NOTE — BH INPATIENT PSYCHIATRY DISCHARGE NOTE - NSBHFUPINTERVALHXFT_PSY_A_CORE
Chart reviewed. Case discussed with interdisciplinary team. Completed ECT #11 yesterday, scheduled for ECT #12 next Monday and will later transition to weekly maintenance. He is compliant with meds, denies side effects. No behavioral events reported overnight. No PRNs needed/requested. On encounter this morning pt denies any complaints, is calm and cooperative. Reports sustained gains in terms of depressive sx and psychosis. Denies paranoid thoughts or beliefs he will end up in [facility]. Pt is aware he will be discharged back to his [facility] on Monday. Denies AH/VH/SI/HI. Future oriented and able to contract for safety. Signed outpatient ECT treatment plan. Discharge planning for next Monday back to [facility].  Chart reviewed. Case discussed with interdisciplinary team. No incidents reported over the weekend. VSS. No PRNs needed/requested. Pt had a short seizure duration during ECT treatment #12 this morning. On encounter this morning pt denies any complaints, is calm and cooperative. Reports sustained gains in terms of depressive sx and psychosis. Denies paranoid thoughts or beliefs he will [facility]. Denies AH/VH/SI/HI. Future oriented and able to contract for safety. Pt showing full sx remission and no longer meets criteria for inpatient level of care. Tolerating medication regimen (Effezor XR and Olanzapine) and ECT. Pt and family in agreement with discharge plan back to [facility] with maintenance ECT.

## 2023-08-21 NOTE — ECT TREATMENT NOTE - NSECTIMPPLAN_PSY_ALL_CORE
Assessment today offers no contraindications to continue plan of treatment with ECT.

## 2023-08-21 NOTE — BH INPATIENT PSYCHIATRY DISCHARGE NOTE - HPI (INCLUDE ILLNESS QUALITY, SEVERITY, DURATION, TIMING, CONTEXT, MODIFYING FACTORS, ASSOCIATED SIGNS AND SYMPTOMS)
Pt is a 69 yo M domiciled at assisted living facility with hx of schizoaffective disorder (bipolar type with psychotic features), one past suicide attempt in 1992 via cutting wrists (found by mother and brought in to hospital), multiple past psychiatric hospitalizations, and remote substance use hx (alcohol last used 1 year ago, cocaine last used 20 yrs ago), who presents to [facility] from [facility] for evaluation of possible ECT treatment due to increasing paranoia and poor self care. Pt reports feeling "nervous and scared" that he will go to shelter for "a lot of things, like threatening people," so much so that it is impacting his ability to sleep at night. Reporting racing thoughts about going to shelter. Pt has been taking medications as prescribed at [facility]; denies medication side effects at this time. Reports poor appetite due to anxiety about going to shelter, and also states that he is missing some teeth, which makes chewing solid foods difficult. Denies any passive or active SI/HI currently. Denies any auditory or visual hallucinations, though fixates on the idea that he will be going to shelter. Denies belief that anyone is coming after him. Pt is at increased fall risk due to arthritis in R hip causing antalgic gait.    From ED note at [facility]:  Patient is a 68 year-old male, single, domiciled at [facility] in [place], [facility] htn, mesothelioma, pph of schizoaffective d/o, depression, hx of suicide attempt, BIBEMS from assisted living facility for worsening hallucinations and paranoia. Telepsychiatry consulted for mental health evaluation.    Patient is alert and oriented to person, place, time and situation. States that he came to the ED due to feeling nervous and uptight, which he has been feeling a lot more lately. Has noticed change since September 2022 when he arrived at his assisted living facility. Prior to going to assisted living facility, pt had an argument with his super who got an order of protection against him. A case date followed but case is now closed (per collateral); however, patient is very nervous that he will go to shelter due to having an upcoming court date (delusion).     Patient describes increased anxiety and feelings of paranoia, states he is nervous about the police, feels as if his phone is tapped by facility, feels there are cameras in his room including one right over his head. States that he feels safe but nervous that he is being monitored.     Patient describes feeling groggy upon awakening and doesn't sleep well, has had a decreased appetite (having lost about 50 lbs since Sept), has low energy, difficulty with concentration, feelings of hopeless/worthlessness, denies suicidal ideation, homicidal ideation. Denies AVH. Denies current substance use.    States that he has seen his psychiatrist 3 times since Sept and has not been able to express the severity of his sxs. Last IPP admission was in September at an outside hospital.    Collateral - Psychiatrist, Dr. Oral Irby, [phone] - contacted, left voicemail    Collateral - Saritha, patient's sister, [phone]   Patient has struggled with mental health illness all his life. States his condition is getting much worse since January. Patient is extremely paranoid, thinks people are coming to get him, that he did bad things, thinks smoke detectors and fire alarms are cameras monitoring him. States pt has delusions that he will go to shelter and that he has a court date pending, but all cases are closed and he does not have legal issues currently. Since Jan, lost over 50 lbs, won't eat or drink despite the food being good, does not report pt discussed food being poisoned. Says patient has racing thoughts, delusions, paranoia and is constantly wringing hands.    Group home also contacted and support admission for decompensation.

## 2023-08-21 NOTE — ECT TREATMENT NOTE - NSECTPTEVAL_PSY_ALL_CORE
Patient evaluated and History and Physical reviewed prior to ECT. There are no significant changes to the patient's condition unless specified.

## 2023-08-21 NOTE — ECT PRE-PROCEDURE CHECKLIST - NSECTNPODT_PSY_ALL_CORE
09-Aug-2023 00:00
11-Jul-2023 00:00
14-Jul-2023 00:00
19-Jul-2023 00:00
14-Aug-2023 00:00
16-Jul-2023 23:00
16-Aug-2023 00:00
31-Jul-2023 00:00
21-Aug-2023 00:00
07-Aug-2023 07:30
23-Jul-2023 17:00
26-Jul-2023 00:00
02-Aug-2023 00:00

## 2023-08-21 NOTE — ECT PRE-PROCEDURE CHECKLIST - AS BP NONINV SITE
left upper arm
left upper arm
left lower arm
left upper arm
left upper arm
right upper arm
left upper arm
right upper arm
left upper arm

## 2023-08-21 NOTE — BH DISCHARGE NOTE NURSING/SOCIAL WORK/PSYCH REHAB - NSDCPRRECOMMEND_PSY_ALL_CORE
Psychiatric rehabilitation team recommends patient to continue utilizing coping skills that he acquired during the stay.

## 2023-08-21 NOTE — ECT PRE-PROCEDURE CHECKLIST - NSPROPTRIGHTNOTIFY_GEN_A_NUR
declines

## 2023-08-21 NOTE — BH INPATIENT PSYCHIATRY DISCHARGE NOTE - NSBHASSESSSUMMFT_PSY_ALL_CORE
Pt is a 69 yo M domiciled in assisted living facility with hx of schizoaffective d/o (bipolar type), multiple past psych hospitalizations, 1 past SA in 1992 by cutting wrists, and remote substance use (alcohol use leading to 3 DUIs and 1 year-long [facility] sentence, cocaine use 20 years ago), presenting to [facility] as transfer from [facility] for ECT evaluation due to increasing paranoia and poor self-care. Continued inpatient admission required for safety and ECT assessment.     working diagnosis: SAD bipolar type + current depressive episode w/ psychosis.     7/15 dysphoric, withdrawn, superficial, no SI/HI.    7/17: continues to reports depressed mood with fragmented sleep and mostly isolative, though patient is less perseverative about paranoid delusions with no SIIP/HIIP. Tolerating well ECT today #3. Next on 7/19. No physical complaints, VS stable.   7/18: continues to endorse depressed mood with concerns about future living situation though is less perseverative about paranoid delusions with no SIP/HIIP. Tolerating well ECT, next on 7/19. No physical complaints, VS stable.   7/19: reports small improvement in sxs today with better mood and sleep; and less perseveration about paranoid delusions and no SIIP/HIIP. Tolerating well ECT #4 today, next on 7/24. No physical complaints, VS stable.   7/20: reports depressed mood with low energy/motivation, appetite and some hopelessness about future which are related to paranoid delusions. Denies SIIP/HIIP. Tolerating well ECT w/o physical complains, VS stable. Will start Cross tapering from Prozac to Effexor for better management of sxs.   7/21: continue to taper Prozac, patient remains mostly isolative with perseveration about going to [facility]  7/24: continues to endorse depression and anxiety though with small improvement in sleep and appetite; and less perseveration about going to [facility] today. Denies SIIP/HIIP. Tolerating well ECT w/o physical complains, VS stable. ECT session #5 today.   7/25: reports small improvements in mood, appetite and sleep, though continues to perseverate about future and living situation. Denies SIIP/HIIP. Tolerating well ECT w/o physical complains, VS stable. Next ECT 7/26.   7/27: Pt reports feeling depressed and noticing no improvement. Will titrate Effexor XR to 150mg QD. ECT #7 scheduled for Monday.   7/31: S/P ECT #7. Reporting modest gains in terms of mood. ECT #8 scheduled for Wednesday 8/2.  8/7: Pt s/p ECT #9, showing modest gains in terms of mod this week. ECT #10 scheduled for Wednesday 8/9.   8/8: Continues to endorse sustained improvement in terms of mood and denies SIIP. Agrees to return to [facility]. Discharge planning in process. ECT #10 scheduled for tomorrow.   8/9: Completed ECT #10. Reporting sustained improvement. Discharge planning in process.   8/10: Completed ECT # 10 yesterday morning. Continues to endorse sustained improvement in terms of mood and denies SIIP. Post void residual yesterday afternoon around 224-265. Urinalysis was normal as well as kidney function. Will lower Olanzapine to 15mg and observe for sx stability given mild retention.  8/11: No interval changes. No paranoia or other delusional thought content on exam. Discharge planning in process.   8/14: No interval changes. Reporting sustained improvement. Discharge planning in process.   8/17: Reports sustained improvement in terms of depressive sx and psychosis. Denies paranoid thoughts. Denies AH/VH/SI/HI. Future oriented and able to contract for safety. Discharge planning for next Monday back to [facility].   8/18: No interval changes. Discharge planning for Monday.  Plan:   1. Legal: admitted on 9.13 voluntary status  2. Safety:  Denies SI/SIB/HI/VI; continue routine observation.  3. Psychiatric:   - Continue Effexor to 150mg PO daily for schizoaffective d/o  -Zyprexa 15 mg PO qHS for schizoaffective d/o  -Klonopin 0.5 mg PO BID for anxiety   	- Per ISTOP: Last prescribed Klonopin 0.5 mg #60 on 5/5/23  - Holding home dose of Ambien 12.5 mg QHS  4. Medical:  a) ECT referral  	- CBC, CMP, EKG ordered for 7/4 wnl. Review labs in [facility] 5/23: TSH, B12, Folate, lipid panel and A1c WNL.   	- Last UA 5/26 UTI completed antibiotics on 5/31/23. Pending UA.   	- ECT consult ordered and completed. ECT #1 on 7/11/23, #2 7/14, #3 on 7/17, #4 7/19, #5 7/24  	- EKG 7/6: WNL.   b) PT consult: fall precautions, recommend ambulating with walker but patient has been refusing.   c) NUT consult: Minced and moist diet with ensure TID.    d) HTN previously on Norvasc 10 mg PO daily. Discontinued on 6/19/23 at [facility] for low BP. On unit BP stable around 130's/90' w/o antihypertensives. Will continue to monitor closely.   5. Collateral: outpatient psychiatrist Dr. Irby on 6/2/23 on [facility] and handoff from Dr. Hernandez. Collateral from sister. See  note.   6. Disposition: When stable.      Pt is a 67 yo man, domiciled in assisted living facility with hx of schizoaffective d/o (bipolar type), multiple past psych hospitalizations, 1 past SA in 1992 by cutting wrists, and remote substance use (alcohol use leading to 3 DUIs and 1 year-long correction sentence, cocaine use 20 years ago), presenting to [facility] as transfer from [facility]] for ECT evaluation due to increasing paranoia and poor self-care.   Patient's Prozac was tapered off due to poor response. He was started on Effexor as next line agent for management of depression, titrated to 150mg QD. Olanzapine was started and titrated up to 20mg QHS for treatment of mood congruent delusions of incarceration/impoverishment. Olanzapine later lowered to 15mg QHS due to mild urinary retention. Pt tolerated medication changes well. Pt also received a full ECT course to treat depressed mood and delusions, completed 12 treatments in the inpatient unit. Pt experienced some post-ECT confusion but tolerated it well, side effects waned by the next morning.   Patient's symptoms slowly improved, however it took some time to see a sustained response. Mood started improving week by week, gradually becoming brighter and showing more motivation. Delusional thoughts about incarceration appeared to wane after ECT but would reappear a day or two after treatment. Symptom response became more sustained after his 7th and 8th ECTs. Pt currently denying symptoms of depression such as low mood, anhedonia, suicidal ideation and appears motivated and future oriented. No longer endorsing delusional thoughts about incarceration. Amenable to returning to his assisted living facility.    Aftercare:    Onsite psychiatric consultant at [facility], Dr. Oral Irby  23-Aug-2023 10:00  [address] [place]  [phone]  Mental Health Treatment    [facility] Outpatient ECT  29-Aug-2023 10:30  [address] [place] [address]  [id number] [phone]

## 2023-08-21 NOTE — ECT TREATMENT NOTE - NSECTTXPERFDATETIME_PSY_ALL_CORE
16-Aug-2023 10:54
14-Jul-2023 08:13
09-Aug-2023 11:35
31-Jul-2023 08:02
17-Jul-2023 08:06
19-Jul-2023 08:14
02-Aug-2023 11:13
07-Aug-2023 08:49
11-Jul-2023 11:30
24-Jul-2023 11:20
14-Aug-2023 11:47
26-Jul-2023 08:40
21-Aug-2023 10:56

## 2023-08-21 NOTE — ECT PRE-PROCEDURE CHECKLIST - ALLERGIES
Allergies:-  No Known Allergies      

## 2023-08-21 NOTE — ECT TREATMENT NOTE - NSECTCARDIOCOMMENT_PSY_ALL_CORE
HTN, HLD
HTN
HTN, HLD

## 2023-08-21 NOTE — ECT PRE-PROCEDURE CHECKLIST - NSPTSENTVIA_PSY_ALL_CORE
wheelchair

## 2023-08-21 NOTE — ECT TREATMENT NOTE - NSECTOTHERCOMMENT_PSY_ALL_CORE
osteoarthritis of hip

## 2023-08-21 NOTE — ECT PRE-PROCEDURE CHECKLIST - PATIENT REPRESENTATIVE: ( YOU CAN CHOOSE ANY PERSON THAT CAN ASSIST YOU WITH YOUR HEALTH CARE PREFERENCES, DOES NOT HAVE TO BE A SPOUSE, IMMEDIATE FAMILY OR SIGNIFICANT OTHER/PARTNER)
same name as above

## 2023-08-21 NOTE — ECT TREATMENT NOTE - NSECTMACHPARA1ST_PSY_ALL_CORE
Thymatron

## 2023-08-21 NOTE — ECT PRE-PROCEDURE CHECKLIST - HAND OFF
Holding RN to OR RN

## 2023-08-21 NOTE — BH INPATIENT PSYCHIATRY DISCHARGE NOTE - NSBHDCMEDICALFT_PSY_A_CORE
Patient used to take Norvasc 10mg QD for HTN. This was discontinued in [facility] prior to coming to [facility] due to hypotension. Patient's BP remained stable in the systolic 130s. Should continue follow up with primary care doctor.

## 2023-08-21 NOTE — BH DISCHARGE NOTE NURSING/SOCIAL WORK/PSYCH REHAB - NSCDUDCCRISIS_PSY_A_CORE
.National Suicide Prevention Lifeline 8 [phone] 581-0336/.  [facility]  [address], [place] [address] [phone]   Hours:  Monday through Friday from 9 AM to 3 PM/988 Suicide and Crisis Lifeline

## 2023-08-21 NOTE — BH INPATIENT PSYCHIATRY DISCHARGE NOTE - NSBHDCHANDOFFFT_PSY_ALL_CORE
Handoff and copies of discharge summary given to Dr. Oral Irby, outpatient psychiatrist. Contact information for writer given in the event future questions arise.

## 2023-08-21 NOTE — ECT PRE-PROCEDURE CHECKLIST - PERIPHERAL IV: INSERTION DATE
11-Jul-2023
21-Aug-2023
24-Jul-2023
02-Aug-2023
09-Aug-2023
07-Aug-2023
26-Jul-2023
16-Aug-2023
14-Aug-2023
14-Jul-2023
17-Jul-2023
31-Jul-2023
19-Jul-2023

## 2023-08-21 NOTE — BH INPATIENT PSYCHIATRY DISCHARGE NOTE - NSDCMRMEDTOKEN_GEN_ALL_CORE_FT
1 Adult Rolling Walker: As Directed.  Innerclean oral tablet: 2 tab(s) orally once a day (at bedtime) as needed for constipation  OLANZapine 15 mg oral tablet: 1 tab(s) orally once a day (at bedtime)  Tylenol 325 mg oral tablet: 2 orally 3 times a day  venlafaxine 150 mg oral capsule, extended release: 1 cap(s) orally once a day

## 2023-08-21 NOTE — BH INPATIENT PSYCHIATRY DISCHARGE NOTE - NSDCCPCAREPLAN_GEN_ALL_CORE_FT
PRINCIPAL DISCHARGE DIAGNOSIS  Diagnosis: Schizoaffective disorder  Assessment and Plan of Treatment:

## 2023-08-21 NOTE — ECT PRE-PROCEDURE CHECKLIST - NSDISPOFBELONG_PSY_ALL_CORE
not applicable

## 2023-08-21 NOTE — ECT TREATMENT NOTE - NSECTRECTXSCHED_PSY_ALL_CORE
Acute Course 3X per week

## 2023-08-21 NOTE — ECT TREATMENT NOTE - NSICDXBHPRIMARYDX_PSY_ALL_CORE
Schizoaffective disorder, bipolar type   F25.0  

## 2023-08-21 NOTE — ECT TREATMENT NOTE - NSECTROSNEGAT_PSY_ALL_CORE
Review of Systems negative/unchanged from previous exam except as noted below

## 2023-08-21 NOTE — ECT PRE-PROCEDURE CHECKLIST - ALLERGY BAND ON
no known allergies

## 2023-08-21 NOTE — ECT TREATMENT NOTE - NSECTREVSYS_PSY_ALL_CORE
Cardiovascular/Other
Cardiovascular
Cardiovascular/Other

## 2023-08-21 NOTE — BH INPATIENT PSYCHIATRY DISCHARGE NOTE - LEGAL HISTORY
Per [facility] collateral note with Ezequiel Cuevas, pt has hx of 3 DUIs secondary to alcohol use and one year long [facility] sentence years ago.

## 2023-08-21 NOTE — BH DISCHARGE NOTE NURSING/SOCIAL WORK/PSYCH REHAB - PATIENT PORTAL LINK FT
You can access the FollowMyHealth Patient Portal offered by [facility] by registering at the following website: [email or web address]. By joining [employer]’s FollowMyHealth portal, you will also be able to view your health information using other applications (apps) compatible with our system.

## 2023-08-21 NOTE — BH DISCHARGE NOTE NURSING/SOCIAL WORK/PSYCH REHAB - DISCHARGE INSTRUCTIONS AFTERCARE APPOINTMENTS
In order to check the location, date, or time of your aftercare appointment, please refer to your Discharge Instructions Document given to you upon leaving the hospital.  If you have lost the instructions please call [phone]

## 2023-08-21 NOTE — ECT TREATMENT NOTE - NSECTPOSTTXSUMMARY_PSY_ALL_CORE
The patient had a well modified grand mal seizure under general anesthesia and muscle relaxant. The patient is alert, responsive, in no acute distress.  Recovery uneventful.

## 2023-08-21 NOTE — ECT TREATMENT NOTE - NSECTFLUMAZ_PSY_ALL_CORE
0.2 mg IV Push

## 2023-08-21 NOTE — ECT TREATMENT NOTE - NSECTCPTCODE_PSY_ALL_CORE
98025 (ECT-Electroconvulsive Therapy)
42453 (ECT-Electroconvulsive Therapy)
64840 (ECT-Electroconvulsive Therapy)
67600 (ECT-Electroconvulsive Therapy)
47115 (ECT-Electroconvulsive Therapy)
81514 (ECT-Electroconvulsive Therapy)
15561 (ECT-Electroconvulsive Therapy)
78440 (ECT-Electroconvulsive Therapy)
30854 (ECT-Electroconvulsive Therapy)
17285 (ECT-Electroconvulsive Therapy)
54973 (ECT-Electroconvulsive Therapy)
31584 (ECT-Electroconvulsive Therapy)
37069 (ECT-Electroconvulsive Therapy)

## 2023-08-21 NOTE — BH INPATIENT PSYCHIATRY DISCHARGE NOTE - DESCRIPTION
Lives in assisted living facility, pt reports no family or children; collateral obtained at [facility]: long-time friend of 30 years, Ezequiel Cuevas ([phone]).

## 2023-08-21 NOTE — BH INPATIENT PSYCHIATRY DISCHARGE NOTE - NSBHMETABOLIC_PSY_ALL_CORE_FT
BMI: BMI (kg/m2): 22.7 (07-07-23 @ 17:08)  HbA1c:   Glucose:   BP: 145/78 (08-21-23 @ 11:35) (135/93 - 159/88)  Lipid Panel:

## 2023-08-21 NOTE — ECT PRE-PROCEDURE CHECKLIST - TO WHOM
ECT Procedure Room RN

## 2023-08-21 NOTE — ECT PRE-PROCEDURE CHECKLIST - NSPTSENTTO_PSY_ALL_CORE
procedural room
holding area
procedural room
holding area
procedural room

## 2023-08-21 NOTE — ECT PRE-PROCEDURE CHECKLIST - NSECTCONSENT_PSY_ALL_CORE
ECT BFA 7/6/23  Anesthesia consent expires 10/11/23/yes

## 2023-08-21 NOTE — BH INPATIENT PSYCHIATRY DISCHARGE NOTE - MSE UNSTRUCTURED FT
APPEARANCE: Adult male who appears stated age, in no acute distress; dressed in hospital attire and somewhat disheveled.   BEHAVIOR: Calm and cooperative; fair eye contact, superficially related.  SPEECH: relevant and fluent; normal rate, rhythm, tone, and volume.   MOTOR: mild psychomotor retardation; no tremor; no abnormal movements. Unstable gait with R hip abnormality, refuses to use a walker.   MOOD: "50/50"  AFFECT: depressed and constricted affect.  THOUGHT PROCESS: Linear and goal directed.   THOUGHT CONTENT: Themes around future and living situation. Denies suicidal or homicidal ideation, intent, or plan.  PERCEPTION: Denies auditory or visual hallucinations.  COGNITION: Grossly intact.  INSIGHT: Poor.  JUDGMENT: Poor.  IMPULSE CONTROL: Fair. APPEARANCE: Adult male who appears stated age, in no acute distress; dressed in street clothes.   BEHAVIOR: Calm and cooperative; fair eye contact, well related.  SPEECH: relevant and fluent; normal rate, rhythm, tone, and volume.   MOTOR: No psychomotor retardation; no tremor; no abnormal movements. Unstable gait with R hip abnormality, ambulating with rolling walker.  MOOD: "Good, Im ready to go"  AFFECT: Reactive  THOUGHT PROCESS: Linear and goal directed.   THOUGHT CONTENT: Devoid of paranoid thoughts or delusions of impoverishment/incarceration. Denies suicidal or homicidal ideation, intent, or plan.  PERCEPTION: Denies auditory or visual hallucinations. Does not appear internally preoccupied or responding to internal stimuli.   COGNITION: Grossly intact.  INSIGHT: Good	  JUDGMENT: Good  IMPULSE CONTROL: Intact

## 2023-08-21 NOTE — BH INPATIENT PSYCHIATRY DISCHARGE NOTE - OTHER PAST PSYCHIATRIC HISTORY (INCLUDE DETAILS REGARDING ONSET, COURSE OF ILLNESS, INPATIENT/OUTPATIENT TREATMENT)
As per the medical record and interview with the patient on the psychiatric johnson today, "69 yo M w/ PMHx HTN (stable no in tx), HLD, osteoarthritis on the hip, w/ hx of schizoaffective d/o (bipolar type), presenting with ongoing delusion of "going to group home for life" with subsequent anxiety and depression leading to poor functioning, unable to take care of self and SI w/o plan." Patient was transferred from [facility] for ECT treatment at [facility].     On initial ED evaluation 5/24/2023, "Patient is a 68 year-old male, single, domiciled at [facility] in [place], [facility] htn, mesothelioma, pph of schizoaffective d/o, depression, hx of suicide attempt, BIBEMS from assisted living facility for worsening hallucinations and paranoia. Telepsychiatry consulted for mental health evaluation.    "Patient is alert and oriented to person, place, time and situation. States that he came to the ED due to feeling nervous and uptight, which he has been feeling a lot more lately. Has noticed change since September 2022 when he arrived at his assisted living facility. Prior to going to assisted living facility, pt had an argument with his super who got an order of protection against him. A case date followed but case is now closed (per collateral); however, patient is very nervous that he will go to group home due to having an upcoming court date (delusion).     "Patient describes increased anxiety and feelings of paranoia, states he is nervous about the police, feels as if his phone is tapped by facility, feels there are cameras in his room including one right over his head. States that he feels safe but nervous that he is being monitored.     "Patient describes feeling groggy upon awakening and doesn't sleep well, has had a decreased appetite (having lost about 50 lbs since Sept), has low energy, difficulty with concentration, feelings of hopeless/worthlessness, denies suicidal ideation, homicidal ideation. Denies AVH. Denies current substance use.    "States that he has seen his psychiatrist 3 times since Sept and has not been able to express the severity of his sxs. Last IPP admission was in September at an outside hospital...    "Collateral - Saritha, patient's sister, [phone]   Patient has struggled with mental health illness all his life. States his condition is getting much worse since January. Patient is extremely paranoid, thinks people are coming to get him, that he did bad things, thinks smoke detectors and fire alarms are cameras monitoring him. States pt has delusions that he will go to group home and that he has a court date pending, but all cases are closed and he does not have legal issues currently. Since Jan, lost over 50 lbs, won't eat or drink despite the food being good, does not report pt discussed food being poisoned. Says patient has racing thoughts, delusions, paranoia and is constantly wringing hands."    Upon admission, he "...presents to [facility] from [facility] for evaluation of possible ECT treatment due to increasing paranoia and poor self care. Pt reports feeling "nervous and scared" that he will go to group home for "a lot of things, like threatening people," so much so that it is impacting his ability to sleep at night. Reporting racing thoughts about going to group home. Pt has been taking medications as prescribed at [facility]; denies medication side effects at this time. Reports poor appetite due to anxiety about going to group home, and also states that he is missing some teeth, which makes chewing solid foods difficult. Denies any passive or active SI/HI currently. Denies any auditory or visual hallucinations, though fixates on the idea that he will be going to group home. Denies belief that anyone is coming after him. Pt is at increased fall risk due to arthritis in R hip causing antalgic gait."    Today's inpatient psychiatry PN noted, "...Compliant with standing medication. No PRNs required or requested. Per sleep log, fair sleep.    "Patient remains isolative in his room.  Patient continues to reports depressive mood with poor sleep and appetite related to beliefs he will end up in group home after being discharge from the hospital. Patient is perseverative about "going to group home", and is unable to engage in any meaningful conversation. Patient continues to endorse SI w/o plan or intent on the unit.      "Decrease appetite and PO intake, but has been taking ensure. Reports poor sleep due to perseveration about going to group home. No physical complaints. No side effects to medications reported."    Patient confirmed above ongoing symptoms and remains focused on his fears of going to group home for life. However, he was fully amenable to discussion of ECT risks/benefits and alternatives, participated reasonably in the conversation and requested ECT treatment.

## 2023-08-21 NOTE — ECT PRE-PROCEDURE CHECKLIST - INV PERIPH IV SIZE
22 gauge

## 2023-08-21 NOTE — ECT PRE-PROCEDURE CHECKLIST - NSPROPTRIGHTBILLOFRIGHTS_GEN_A_NUR
patient

## 2023-08-21 NOTE — ECT PRE-PROCEDURE CHECKLIST - PRO INTERPRETER NEED 2
English

## 2023-08-21 NOTE — ECT TREATMENT NOTE - NSECTPOSTTXCOMMENTS_PSY_ALL_CORE
Consider Etomidate
SUGGEST INCREASED ENERGY AT NEXT TX FOR THRESHOLD SZ/DELAYED PROGRESSION
use delay next TX

## 2023-08-21 NOTE — ECT TREATMENT NOTE - NSECTTHYPULSE1ST_PSY_ALL_CORE
1 ms (DGX)

## 2023-08-25 ENCOUNTER — OUTPATIENT (OUTPATIENT)
Dept: OUTPATIENT SERVICES | Facility: HOSPITAL | Age: 68
LOS: 1 days | Discharge: HOME | End: 2023-08-25
Payer: MEDICARE

## 2023-08-29 VITALS
HEART RATE: 52 BPM | RESPIRATION RATE: 14 BRPM | DIASTOLIC BLOOD PRESSURE: 88 MMHG | TEMPERATURE: 98 F | OXYGEN SATURATION: 98 % | SYSTOLIC BLOOD PRESSURE: 148 MMHG

## 2023-08-29 PROCEDURE — 90870 ELECTROCONVULSIVE THERAPY: CPT

## 2023-08-29 RX ORDER — FLUMAZENIL 0.1 MG/ML
0.2 VIAL (ML) INTRAVENOUS ONCE
Refills: 0 | Status: COMPLETED | OUTPATIENT
Start: 2023-08-29 | End: 2023-08-29

## 2023-08-29 RX ADMIN — Medication 0.2 MILLIGRAM(S): at 10:56

## 2023-08-29 NOTE — ECT AMBULATORY DISCHARGE PLAN - PATIENT PORTAL LINK FT
You can access the FollowMyHealth Patient Portal offered by [facility] by registering at the following website: [email or web address]. By joining [employer]’s FollowMyHealth portal, you will also be able to view your health information using other applications (apps) compatible with our system.

## 2023-08-29 NOTE — ECT AMBULATORY DISCHARGE PLAN - NSDCPEFALRISK_GEN_ALL_CORE
For information on Fall & Injury Prevention, visit: https://www.[facility]/news/fall-prevention-protects-and-maintains-health-and-mobility OR  https://www.[facility]/news/fall-prevention-tips-to-avoid-injury OR  https://www.cdc.gov/steadi/patient.html

## 2023-08-29 NOTE — ECT TREATMENT NOTE - NSECTCOMMENTS_PSY_ALL_CORE
1st in-to-out treatment   He reports he is happy to be at the [facility]. Gets along well with his co-residents. Sleeping well, eating well  Will continue with weekly treatments

## 2023-09-05 PROCEDURE — 90870 ELECTROCONVULSIVE THERAPY: CPT

## 2023-09-05 RX ORDER — FLUMAZENIL 0.1 MG/ML
0.2 VIAL (ML) INTRAVENOUS ONCE
Refills: 0 | Status: COMPLETED | OUTPATIENT
Start: 2023-09-05 | End: 2023-09-05

## 2023-09-05 RX ADMIN — Medication 0.2 MILLIGRAM(S): at 08:47

## 2023-09-05 NOTE — ECT AMBULATORY DISCHARGE PLAN - PATIENT PORTAL LINK FT
You can access the FollowMyHealth Patient Portal offered by [facility] by registering at the following website: [email or web address]. By joining [employer]’s FollowMyHealth portal, you will also be able to view your health information using other applications (apps) compatible with our system.

## 2023-09-05 NOTE — ECT AMBULATORY DISCHARGE PLAN - NSPOSTECTCALLBEFORE_PSY_ALL_CORE
[facility] ([facility]) scheduling office at [phone]

## 2023-09-05 NOTE — ECT AMBULATORY DISCHARGE PLAN - NSDCPEFALRISK_GEN_ALL_CORE
For information on Fall & Injury Prevention, visit: https://www.[facility]/news/fall-prevention-protects-and-maintains-health-and-mobility OR  https://www.[facility]/news/fall-prevention-tips-to-avoid-injury OR  https://www.cdc.gov/steadi/patient.html

## 2023-09-05 NOTE — ECT TREATMENT NOTE - NSECTCOMMENTS_PSY_ALL_CORE
Mr. Arias reports doing well. Today is the second weekly treatment. Sleep: normal, appetite; good. Energy: normal. Denies feeling sad or depressed. He is able to enjoy things. No death wish/suicidal ideation/intent/plan.     We will need outpatient psychiatrist's treatment plan. If Dr. Shipman is in agreement, consider continuing weekly ECT for 4 treatments and then consider increasing the interval.

## 2023-09-06 DIAGNOSIS — F25.0 SCHIZOAFFECTIVE DISORDER, BIPOLAR TYPE: ICD-10-CM

## 2023-09-12 PROCEDURE — 90870 ELECTROCONVULSIVE THERAPY: CPT

## 2023-09-12 RX ORDER — FLUMAZENIL 0.1 MG/ML
0.2 VIAL (ML) INTRAVENOUS ONCE
Refills: 0 | Status: COMPLETED | OUTPATIENT
Start: 2023-09-12 | End: 2023-09-12

## 2023-09-12 RX ADMIN — Medication 0.2 MILLIGRAM(S): at 11:16

## 2023-09-12 NOTE — ECT AMBULATORY DISCHARGE PLAN - PATIENT PORTAL LINK FT
You can access the FollowMyHealth Patient Portal offered by [facility] by registering at the following website: [email or web address]. By joining [employer]’s FollowMyHealth portal, you will also be able to view your health information using other applications (apps) compatible with our system.

## 2023-09-12 NOTE — ECT TREATMENT NOTE - NSECTCOMMENTS_PSY_ALL_CORE
3rd weekly treatment, Patient stable through the entire treatment interval without recurrence of presenting symptoms. Mood, energy, sleep, and appetite were within normal limits. No cognitive concerns. We will continue maintenance ECT to help reduce the chances of relapse.

## 2023-09-19 PROCEDURE — 90870 ELECTROCONVULSIVE THERAPY: CPT

## 2023-09-19 RX ORDER — FLUMAZENIL 0.1 MG/ML
0.2 VIAL (ML) INTRAVENOUS ONCE
Refills: 0 | Status: DISCONTINUED | OUTPATIENT
Start: 2023-09-19 | End: 2023-09-19

## 2023-09-19 NOTE — ECT TREATMENT NOTE - NSECTCOMMENTS_PSY_ALL_CORE
3rd weekly treatment, Patient stable through the entire treatment interval without recurrence of presenting symptoms. Mood, energy, sleep, and appetite were within normal limits. We will continue maintenance ECT to help reduce the chances of relapse but increase interval to t2dxyfm.

## 2023-09-19 NOTE — ECT AMBULATORY DISCHARGE PLAN - NSDCPEFALRISK_GEN_ALL_CORE
For information on Fall & Injury Prevention, visit: https://www.[facility]/news/fall-prevention-protects-and-maintains-health-and-mobility OR  https://www.[facility]/news/fall-prevention-tips-to-avoid-injury OR  https://www.cdc.gov/steadi/patient.html

## 2023-09-19 NOTE — ECT AMBULATORY DISCHARGE PLAN - PATIENT PORTAL LINK FT
You can access the FollowMyHealth Patient Portal offered by [facility] by registering at the following website: [email or web address]. By joining [employer]’s FollowMyHealth portal, you will also be able to view your health information using other applications (apps) compatible with our system.

## 2023-10-03 PROCEDURE — 90870 ELECTROCONVULSIVE THERAPY: CPT

## 2023-10-03 NOTE — ECT AMBULATORY DISCHARGE PLAN - PATIENT PORTAL LINK FT
You can access the FollowMyHealth Patient Portal offered by [facility] by registering at the following website: [email or web address]. By joining [employer]’s FollowMyHealth portal, you will also be able to view your health information using other applications (apps) compatible with our system.

## 2023-10-03 NOTE — ECT AMBULATORY DISCHARGE PLAN - NSDCPEFALRISK_GEN_ALL_CORE
For information on Fall & Injury Prevention, visit: https://www.[facility]/news/fall-prevention-protects-and-maintains-health-and-mobility OR  https://www.[facility]/news/fall-prevention-tips-to-avoid-injury OR  https://www.cdc.gov/steadi/patient.html

## 2023-10-03 NOTE — ECT TREATMENT NOTE - NSECTCOMMENTS_PSY_ALL_CORE
Patient reports his mood has been good. Sleeping well, eating well. Tolerated spacing well and no relapse of symptoms  Will continue with treatment every 2 weeks.

## 2023-10-17 PROCEDURE — 90870 ELECTROCONVULSIVE THERAPY: CPT

## 2023-10-17 NOTE — ECT AMBULATORY DISCHARGE PLAN - NSPOSTECTCALLZHH_PSY_ALL_CORE
Amor Bryan   MRN: [id number]    Department:  [facility] Emergency Department   Date of Visit:  5/20/2019           Disclosure     Insurance plans vary and the physician(s) referred by the ER may not be covered by your plan.  Please contact you CARE PHYSICIAN AT ONCE OR RETURN IMMEDIATELY TO THE EMERGENCY DEPARTMENT. If you have been prescribed any medication(s), please fill your prescription right away and begin taking the medication(s) as directed.   If you believe that any of the medications Call the [facility] ECT suite at [phone]

## 2023-10-17 NOTE — ECT AMBULATORY DISCHARGE PLAN - NSDCPEFALRISK_GEN_ALL_CORE
For information on Fall & Injury Prevention, visit: https://www.[facility]/news/fall-prevention-protects-and-maintains-health-and-mobility OR  https://www.[facility]/news/fall-prevention-tips-to-avoid-injury OR  https://www.cdc.gov/steadi/patient.html

## 2023-10-17 NOTE — ECT AMBULATORY DISCHARGE PLAN - PATIENT PORTAL LINK FT
You can access the FollowMyHealth Patient Portal offered by [facility] by registering at the following website: [email or web address]. By joining [employer]’s FollowMyHealth portal, you will also be able to view your health information using other applications (apps) compatible with our system.

## 2023-10-17 NOTE — ECT AMBULATORY DISCHARGE PLAN - NSPOSTECTCALLBEFORE_PSY_ALL_CORE
[facility] ([facility]) scheduling office at [phone]

## 2023-10-17 NOTE — ECT PRE-PROCEDURE CHECKLIST - NSADULTACCOMPPHNUMBER_PSY_ALL_CORE
Thank you for choosing [facility] for your healthcare needs.  It was our pleasure to take care of you today!  Please follow the instructions provided to you after your procedure.    YOUR CARE TEAM TODAY WAS: Genet FLORES    HANDOUTS GIVEN:  Overview of Your Anesthesia    If you have any questions or concerns, please call the Outpatient Surgery unit at [phone] Monday through Friday 6 AM to 6 PM. If you are unable to reach someone, please call your physician's office.     You may receive a patient satisfaction survey in the mail or by email following your visit.  Please take the time to complete this, as your feedback is very important to us.  We strive to make your experience exceptional and your comments help us with that goal.  We look forward to hearing from you!    [phone]

## 2023-10-17 NOTE — ECT TREATMENT NOTE - NSECTCOMMENTS_PSY_ALL_CORE
Patient reports his mood has been good. He denies any problems with sleep or appetite. Patient has been tolerating treatments every two weeks and denies any recurrence of symptoms. Patient agrees to tapering treatment to every 3 weeks. Continue maintenance ECT.

## 2023-10-17 NOTE — ECT AMBULATORY DISCHARGE PLAN - NSPOSTECTCONTPROV_PSY_ALL_CORE
[facility] ([facility]) ECT Suite at [phone]

## 2023-11-07 PROCEDURE — 90870 ELECTROCONVULSIVE THERAPY: CPT

## 2023-11-07 NOTE — ECT TREATMENT NOTE - NSECTCOMMENTS_PSY_ALL_CORE
Patient was fairly kempt, pleasant, smiling, cooperative.  He reports his mood continues to be good. Fair sleep and appetite. Continues to have hip pain so has some trouble with mobility.   Agreeable to taper to monthly sessions. Will call for a sooner appointment should symptoms worsen.

## 2023-11-07 NOTE — ECT AMBULATORY DISCHARGE PLAN - PATIENT PORTAL LINK FT
You can access the FollowMyHealth Patient Portal offered by [facility] by registering at the following website: [email or web address]. By joining [employer]’s FollowMyHealth portal, you will also be able to view your health information using other applications (apps) compatible with our system.

## 2023-11-07 NOTE — ECT AMBULATORY DISCHARGE PLAN - NSPOSTECTCALLBEFORE_PSY_ALL_CORE
[facility] ([facility]) scheduling office at [phone]

## 2023-11-07 NOTE — ECT AMBULATORY DISCHARGE PLAN - NSDCPEFALRISK_GEN_ALL_CORE
For information on Fall & Injury Prevention, visit: https://www.[facility]/news/fall-prevention-protects-and-maintains-health-and-mobility OR  https://www.[facility]/news/fall-prevention-tips-to-avoid-injury OR  https://www.cdc.gov/steadi/patient.html

## 2023-12-05 PROCEDURE — 90870 ELECTROCONVULSIVE THERAPY: CPT

## 2023-12-05 NOTE — ECT AMBULATORY DISCHARGE PLAN - NSDCPEFALRISK_GEN_ALL_CORE
For information on Fall & Injury Prevention, visit: https://www.[facility]/news/fall-prevention-protects-and-maintains-health-and-mobility OR  https://www.[facility]/news/fall-prevention-tips-to-avoid-injury OR  https://www.cdc.gov/steadi/patient.html For information on Fall & Injury Prevention, visit: https://www.[facility]/news/fall-prevention-protects-and-maintains-health-and-mobility OR  https://www.[facility]/news/fall-prevention-tips-to-avoid-injury OR  https://www.cdc.gov/steadi/patient.html

## 2023-12-05 NOTE — ECT AMBULATORY DISCHARGE PLAN - PATIENT PORTAL LINK FT
You can access the FollowMyHealth Patient Portal offered by [facility] by registering at the following website: [email or web address]. By joining [employer]’s FollowMyHealth portal, you will also be able to view your health information using other applications (apps) compatible with our system. You can access the FollowMyHealth Patient Portal offered by [facility] by registering at the following website: [email or web address]. By joining [employer]s FollowMyHealth portal, you will also be able to view your health information using other applications (apps) compatible with our system.

## 2023-12-05 NOTE — ECT AMBULATORY DISCHARGE PLAN - NSPOSTECTCALLZHH_PSY_ALL_CORE
Call the [facility] ECT suite at [phone] Call the [facility] ECT suite at [phone]

## 2023-12-05 NOTE — ECT TREATMENT NOTE - NSECTCOMMENTS_PSY_ALL_CORE
Patient presents for monthly maintenance treatment. He presents as calm, cooperative. Mood, sleep and appetite reported within normal limits. Patient states he is scheduled for hip replacement next week at [facility] and is optimistic to find another place after completing rehabilitation post surgery. He is currently living in custodial but feels they are geared to an "older population". We will continue maintenance ECT monthly to help reduce the chances of relapse.  Patient presents for monthly maintenance treatment. He presents as calm, cooperative. Mood, sleep and appetite reported within normal limits. Patient states he is scheduled for hip replacement next week at [facility] and is optimistic to find another place after completing rehabilitation post surgery. He is currently living in correction but feels they are geared to an "older population". We will continue maintenance ECT monthly to help reduce the chances of relapse.

## 2023-12-05 NOTE — ECT AMBULATORY DISCHARGE PLAN - NSPOSTECTCONTPROV_PSY_ALL_CORE
[facility] ([facility]) ECT Suite at [phone] [facility] ([facility]) ECT Suite at [phone]

## 2023-12-05 NOTE — ECT AMBULATORY DISCHARGE PLAN - NSPOSTECTCALLBEFORE_PSY_ALL_CORE
[facility] ([facility]) scheduling office at [phone] [facility] ([facility]) scheduling office at [phone]

## 2023-12-22 NOTE — ED PROVIDER NOTE - ATTENDING SHARED VISIT SELECTORS
Evenity injection was successfully administered subcutaneously into each of patient's arms, which were tolerated without any adverse event.     Medical Decision Making

## 2024-01-17 PROCEDURE — 90870 ELECTROCONVULSIVE THERAPY: CPT

## 2024-01-17 NOTE — ECT AMBULATORY DISCHARGE PLAN - NSPOSTECTCALLBEFORE_PSY_ALL_CORE
[facility] ([facility]) scheduling office at [phone]

## 2024-01-17 NOTE — ECT AMBULATORY DISCHARGE PLAN - PATIENT PORTAL LINK FT
You can access the FollowMyHealth Patient Portal offered by [facility] by registering at the following website: [email or web address]. By joining [employer]’s FollowMyHealth portal, you will also be able to view your health information using other applications (apps) compatible with our system.

## 2024-01-17 NOTE — ECT TREATMENT NOTE - NSECTCOMMENTS_PSY_ALL_CORE
Pt reported stable and good mental state, no signs of relapse, we continue with monthly maintenance.

## 2024-02-21 PROCEDURE — 90870 ELECTROCONVULSIVE THERAPY: CPT

## 2024-02-21 NOTE — ECT AMBULATORY DISCHARGE PLAN - NSDCPEFALRISK_GEN_ALL_CORE
For information on Fall & Injury Prevention, visit: https://www.[facility]/news/fall-prevention-protects-and-maintains-health-and-mobility OR  https://www.[facility]/news/fall-prevention-tips-to-avoid-injury OR  https://www.cdc.gov/steadi/patient.html

## 2024-02-21 NOTE — ECT TREATMENT NOTE - NSECTCOMMENTS_PSY_ALL_CORE
Patient pleasant and cooperative during prechecks. He reports doing well, no immediate complaints, his appetite and sleep are very good. Will continue monthly maintenance treatments to prevent relapse.

## 2024-02-21 NOTE — ECT AMBULATORY DISCHARGE PLAN - PATIENT PORTAL LINK FT
You can access the FollowMyHealth Patient Portal offered by [facility] by registering at the following website: [email or web address]. By joining [employer]’s FollowMyHealth portal, you will also be able to view your health information using other applications (apps) compatible with our system.

## 2024-03-19 PROCEDURE — 90870 ELECTROCONVULSIVE THERAPY: CPT

## 2024-03-19 NOTE — ECT TREATMENT NOTE - NSECTCOMMENTS_PSY_ALL_CORE
Procedure start right Breast MM RF tag placement   Patient pleasant and cooperative on approach. He reports doing well, no complaints. He is somewhat anxious ahead of the treatment. No interval events reported. Will continue monthly treatments to prevent relapse.

## 2024-03-19 NOTE — ECT PRE-PROCEDURE CHECKLIST - NSECTCONSENT_PSY_ALL_CORE
ECT BFM exp 3/5/24  Anesthesia Consent expires 5/21/24/yes ECT BFM exp 9/19/24  Anesthesia Consent expires 5/21/24/yes

## 2024-03-19 NOTE — ECT AMBULATORY DISCHARGE PLAN - NSPOSTECTCONTPROV_PSY_ALL_CORE
[facility] ([facility]) ECT Suite at [phone]

## 2024-03-19 NOTE — ECT AMBULATORY DISCHARGE PLAN - PATIENT PORTAL LINK FT
You can access the FollowMyHealth Patient Portal offered by [facility] by registering at the following website: [email or web address]. By joining [employer]’s FollowMyHealth portal, you will also be able to view your health information using other applications (apps) compatible with our system.

## 2024-04-16 PROCEDURE — 90870 ELECTROCONVULSIVE THERAPY: CPT

## 2024-04-16 NOTE — ECT AMBULATORY DISCHARGE PLAN - PATIENT PORTAL LINK FT
You can access the FollowMyHealth Patient Portal offered by [facility] by registering at the following website: [email or web address]. By joining [employer]’s FollowMyHealth portal, you will also be able to view your health information using other applications (apps) compatible with our system.

## 2024-04-16 NOTE — ECT AMBULATORY DISCHARGE PLAN - NSPOSTECTCALLBEFORE_PSY_ALL_CORE
[facility] ([facility]) scheduling office at [phone]

## 2024-04-16 NOTE — ECT TREATMENT NOTE - NSECTCOMMENTS_PSY_ALL_CORE
Pt reports that he is doing very well, stable mood, recently did his right hip and working well, only complains some left knee pain that started after his hip was fixed, but otherwise active. We keep monthly maintenance.

## 2024-05-14 PROCEDURE — 90870 ELECTROCONVULSIVE THERAPY: CPT

## 2024-05-14 NOTE — ECT TREATMENT NOTE - NSECTCOMMENTS_PSY_ALL_CORE
Pt and brother reported worsening mood, increased anxiety after a stable period in the past. No triggering events, and brother (Sabino, [phone]) denies any reemergence of psychotic symptoms. Pt's brother admit that pt's old psychiatrist is no longer willing to see patient and currently there is nobody who could adjust medications. We discussed option to call central intake [phone] to have intake at our clinic. While this take place we recommended 2x/week rescue treatments, which patient and brother accepted. Plan is to treat this week and next week 2x/week and reevaluate. Also mentioned brother to visit [facility] to adjust HTN medications as pt was running with elevated BP before treatment.

## 2024-05-14 NOTE — ECT AMBULATORY DISCHARGE PLAN - PATIENT PORTAL LINK FT
You can access the FollowMyHealth Patient Portal offered by [facility] by registering at the following website: [email or web address]. By joining [employer]’s FollowMyHealth portal, you will also be able to view your health information using other applications (apps) compatible with our system.

## 2024-05-16 VITALS
RESPIRATION RATE: 17 BRPM | SYSTOLIC BLOOD PRESSURE: 122 MMHG | DIASTOLIC BLOOD PRESSURE: 88 MMHG | TEMPERATURE: 98 F | HEART RATE: 61 BPM | OXYGEN SATURATION: 95 %

## 2024-05-16 PROCEDURE — 90870 ELECTROCONVULSIVE THERAPY: CPT

## 2024-05-16 NOTE — ECT PRE-PROCEDURE CHECKLIST - NSPROEDALEARNPREF_GEN_A_NUR
verbal instruction

## 2024-05-16 NOTE — ECT AMBULATORY DISCHARGE PLAN - NS TRANSFER DISPOSITION PATIENT BELONGINGS
not applicable
with patient
not applicable
not applicable
with patient
with patient

## 2024-05-16 NOTE — ECT PRE-PROCEDURE CHECKLIST - NSPROEDAABILITYLEARN_GEN_A_NUR
anxiety

## 2024-05-16 NOTE — ECT TREATMENT NOTE - NSSUICPROTFACT_PSY_ALL_CORE
Identifies reasons for living/Supportive social network of family or friends

## 2024-05-16 NOTE — ECT TREATMENT NOTE - NSECTCARDIOCOMMENT_PSY_ALL_CORE
HTN, HLD

## 2024-05-16 NOTE — ECT PRE-PROCEDURE CHECKLIST - AS BP NONINV METHOD
electronic

## 2024-05-16 NOTE — ECT AMBULATORY DISCHARGE PLAN - NSPOSTECTSYMPTOMS_PSY_ALL_CORE
Excessive Diarrhea/Fever/Inability to tolerate liquids or foods/Increased irritability or sluggishness/Nausea and vomiting that does not stop/Pain not relieved by medications/Unable to urinate

## 2024-05-16 NOTE — ECT PRE-PROCEDURE CHECKLIST - PATIENT'S SEXUAL ORIENTATION
Heterosexual

## 2024-05-16 NOTE — ECT PRE-PROCEDURE CHECKLIST - 1.
Right Hip Replacement 12/15/23
Right Hip Replacement 12/15/23
unsteady gait
Right Hip Replacement 12/15/23
unsteady gait

## 2024-05-16 NOTE — ECT TREATMENT NOTE - NSECTTXPERFDATETIME_PSY_ALL_CORE
21-Feb-2024 09:34
16-May-2024 14:55
03-Oct-2023 11:35
07-Nov-2023 11:12
17-Oct-2023 09:38
12-Sep-2023 11:17
19-Sep-2023 11:35
05-Sep-2023 08:57
16-Apr-2024 11:19
17-Jan-2024 09:30
19-Mar-2024 10:05
29-Aug-2023 10:39
14-May-2024 11:19
05-Dec-2023 11:20

## 2024-05-16 NOTE — ECT PRE-PROCEDURE CHECKLIST - NSPROPTRIGHTSUPPORTNAME_GEN_A_NUR
Sabino Romero

## 2024-05-16 NOTE — ECT PRE-PROCEDURE CHECKLIST - INV PERIPH IV SIZE
22 gauge
22 gauge
22 gauge/24 gauge
22 gauge

## 2024-05-16 NOTE — ECT PRE-PROCEDURE CHECKLIST - ALLERGIES
Allergies:-  No Known Allergies      

## 2024-05-16 NOTE — ECT AMBULATORY DISCHARGE PLAN - NSECTPROCEDUREDATE_PSY_ALL_CORE
16-Apr-2024
29-Aug-2023
19-Mar-2024
19-Sep-2023
05-Sep-2023
17-Oct-2023
21-Feb-2024
03-Oct-2023
07-Nov-2023
12-Sep-2023
14-May-2024
Self
16-May-2024
17-Jan-2024
05-Dec-2023

## 2024-05-16 NOTE — ECT PRE-PROCEDURE CHECKLIST - NSPROEDALEARNPREFOTH_GEN_A_NUR
verbal instruction/written material

## 2024-05-16 NOTE — ECT AMBULATORY DISCHARGE PLAN - NSDCPNDISPN_GEN_ALL_CORE
Education provided on the pain management plan of care

## 2024-05-16 NOTE — ECT TREATMENT NOTE - NSECTOTHERCOMMENT_PSY_ALL_CORE
osteoarthritis of hip

## 2024-05-16 NOTE — ECT PRE-PROCEDURE CHECKLIST - TO WHOM
ECT Procedure Room RN
ECT Pre-assessment RN to Procedure Room RN
ECT Pre-assessment RN to Procedure Room RN
Procedure Room RN
ECT Procedure Room RN
Pre-ECT RN toECT Procedure Room RN
ECT Procedure Room RN
ECT Pre-assessment RN to Procedure Room RN
ECT Procedure Room RN

## 2024-05-16 NOTE — ECT TREATMENT NOTE - NSECTREVSYS_PSY_ALL_CORE
Cardiovascular/Other

## 2024-05-16 NOTE — ECT PRE-PROCEDURE CHECKLIST - INV PERIPH IV DEVICE
Angiocath

## 2024-05-16 NOTE — ECT PRE-PROCEDURE CHECKLIST - NSECTNPODT_PSY_ALL_CORE
20-Feb-2024 17:30
06-Nov-2023 20:00
29-Aug-2023 09:00
16-May-2024 07:00
15-Apr-2024 17:00
02-Oct-2023 17:30
18-Sep-2023 18:30
13-May-2024 17:30
16-Jan-2024 17:30
12-Sep-2023 07:00
04-Sep-2023 17:30
18-Mar-2024 17:30
16-Oct-2023 17:00
04-Dec-2023 17:30

## 2024-05-16 NOTE — ECT TREATMENT NOTE - NSECTIMPPLAN_PSY_ALL_CORE
Assessment today offers no contraindications to continue plan of treatment with ECT.
Qbrexza Pregnancy And Lactation Text: There is no available data on Qbrexza use in pregnant women.  There is no available data on Qbrexza use in lactation.

## 2024-05-16 NOTE — ECT AMBULATORY DISCHARGE PLAN - NSDCPEFALRISK_GEN_ALL_CORE
For information on Fall & Injury Prevention, visit: [email or web address] OR  [email or web address] OR  https://www.cdc.gov/steadi/patient.html

## 2024-05-16 NOTE — ECT AMBULATORY DISCHARGE PLAN - NSPOSTECTNEXTSCHTXDT_PSY_ALL_CORE
14-May-2024 10:15
07-Nov-2023 10:15
19-Sep-2023 11:00
19-Mar-2024 09:15
05-Dec-2023 10:30
03-Oct-2023 10:30
16-May-2024 14:00
13-Feb-2024 08:45
16-Apr-2024 10:00
06-Sep-2023 10:30
12-Sep-2023 10:45
17-Oct-2023 08:45
21-May-2024 11:30
05-Dec-2023 11:00

## 2024-05-16 NOTE — ECT AMBULATORY DISCHARGE PLAN - NSPOSTECTPROVEDUCFT_PSY_ALL_CORE
Covid instructions
Covid teaching and D/C instructions 
Covid teaching and D/C instructions 
post ECT discharge instructions, covid education
Covid teaching and D/C instructions 
ECT Instruction 
Covid instructions
Post ECT Instructions, covid Education
Discharge Instructions Reinforced
Covid Education, Post ECT Instructions
discharge instructions
written and verbal discharge instructions
ect teaching  covid teaching
Covid teaching and D/C instructions

## 2024-05-16 NOTE — ECT AMBULATORY DISCHARGE PLAN - NSPOSTECTRESTRIC_PSY_ALL_CORE
Drive a car, operate power tools or machinery./Drink alcohol, beer, or wine./Make important personal and business decisions./If you have had any type of sedation, you may experience lightheadedness, dizziness, or sleepiness following your procedure.  A responsible adult should stay with you for at least 24 hours following your procedure.

## 2024-05-16 NOTE — ECT TREATMENT NOTE - NSECTCOMMENTS_PSY_ALL_CORE
Pt arrives today similar presentation as last time seen by same psychiatrist, as per plan we continue with rescue treatments and reevaluate end of next week.

## 2024-05-16 NOTE — ECT PRE-PROCEDURE CHECKLIST - INV PERIPH IV LOCATION
Right:/median cubital vein
Right:/median cubital vein
Right:/metacarpal vein
Right:/median cubital vein
Right:/cephalic vein
Right:/median cubital vein

## 2024-05-16 NOTE — ECT TREATMENT NOTE - NSICDXBHPRIMARYDX_PSY_ALL_CORE
Schizoaffective disorder, bipolar type   F25.0  

## 2024-05-16 NOTE — ECT PRE-PROCEDURE CHECKLIST - NSADULTACCOMPNAME_PSY_ALL_CORE
Sabino/Brother
Sabino
Kourtney
Sabino
Sabino
Sabino/Brother
Sabino
Sabino/Brother
Sabino
Sabino/Brother

## 2024-05-16 NOTE — ECT PRE-PROCEDURE CHECKLIST - AS BP NONINV SITE
left upper arm

## 2024-05-16 NOTE — ECT AMBULATORY DISCHARGE PLAN - PATIENT PORTAL LINK FT
You can access the FollowMyHealth Patient Portal offered by [facility] by registering at the following website: [email or web address]. By joining [employer]’s FollowMyHealth portal, you will also be able to view your health information using other applications (apps) compatible with our system.

## 2024-05-16 NOTE — ECT AMBULATORY DISCHARGE PLAN - NSPOSTECTDIET_PSY_ALL_CORE
Gradually resume your regular diet/Increase fluids

## 2024-05-16 NOTE — ECT PRE-PROCEDURE CHECKLIST - PERIPHERAL IV: INSERTION DATE
16-May-2024
17-Oct-2023
03-Oct-2023
12-Sep-2023
19-Sep-2023
21-Feb-2024
14-May-2024
19-Mar-2024
29-Aug-2023
05-Sep-2023
17-Jan-2024
07-Nov-2023
16-Apr-2024

## 2024-05-16 NOTE — ECT PRE-PROCEDURE CHECKLIST - NSPTSENTVIA_PSY_ALL_CORE
ambulate
wheelchair
ambulate
ambulate
wheelchair

## 2024-05-17 ENCOUNTER — OUTPATIENT (OUTPATIENT)
Dept: OUTPATIENT SERVICES | Facility: HOSPITAL | Age: 69
LOS: 1 days | Discharge: HOME | End: 2024-05-17
Payer: MEDICARE

## 2024-05-17 PROCEDURE — 90792 PSYCH DIAG EVAL W/MED SRVCS: CPT

## 2024-05-21 DIAGNOSIS — F25.0 SCHIZOAFFECTIVE DISORDER, BIPOLAR TYPE: ICD-10-CM

## 2024-09-11 NOTE — ECT OUTPATIENT PROGRAM DISCHARGE SUMMARY - NSECTTREATMENTSUMMARY_PSY_ALL_CORE
The patient underwent a grand total of 26 bifrontal ECT and was undergoing rescue treatment 2x/week when he ceased receiving ECT care in this episode.  [facility] Cognition Score was 10/10 and the patient had no cognitive complaints noted on last visit. Discharged in stable condition.

## 2024-09-11 NOTE — ECT OUTPATIENT PROGRAM DISCHARGE SUMMARY - NSECTDISCHARGEECTPROGRAM_PSY_ALL_CORE
Patient to follow up closely with new outpatient psychiatrist, referred to central intake on 5/14/24.
01-Oct-2022 03:32

## 2024-09-14 ENCOUNTER — INPATIENT (INPATIENT)
Facility: HOSPITAL | Age: 69
LOS: 15 days | Discharge: PSYCHIATRIC OR SUBSTANCE USE FACILITY | End: 2024-09-30
Attending: STUDENT IN AN ORGANIZED HEALTH CARE EDUCATION/TRAINING PROGRAM | Admitting: STUDENT IN AN ORGANIZED HEALTH CARE EDUCATION/TRAINING PROGRAM
Payer: MEDICARE

## 2024-09-14 VITALS
RESPIRATION RATE: 16 BRPM | TEMPERATURE: 98 F | DIASTOLIC BLOOD PRESSURE: 89 MMHG | SYSTOLIC BLOOD PRESSURE: 123 MMHG | WEIGHT: 220.02 LBS | OXYGEN SATURATION: 95 % | HEIGHT: 70 IN | HEART RATE: 101 BPM

## 2024-09-14 DIAGNOSIS — I82.409 ACUTE EMBOLISM AND THROMBOSIS OF UNSPECIFIED DEEP VEINS OF UNSPECIFIED LOWER EXTREMITY: ICD-10-CM

## 2024-09-14 DIAGNOSIS — I10 ESSENTIAL (PRIMARY) HYPERTENSION: ICD-10-CM

## 2024-09-14 DIAGNOSIS — E78.5 HYPERLIPIDEMIA, UNSPECIFIED: ICD-10-CM

## 2024-09-14 DIAGNOSIS — F25.0 SCHIZOAFFECTIVE DISORDER, BIPOLAR TYPE: ICD-10-CM

## 2024-09-14 DIAGNOSIS — F10.11 ALCOHOL ABUSE, IN REMISSION: ICD-10-CM

## 2024-09-14 LAB
ALBUMIN SERPL ELPH-MCNC: 4 G/DL — SIGNIFICANT CHANGE UP (ref 3.3–5)
ALP SERPL-CCNC: 113 U/L — SIGNIFICANT CHANGE UP (ref 40–120)
ALT FLD-CCNC: 16 U/L — SIGNIFICANT CHANGE UP (ref 4–41)
AMPHET UR-MCNC: NEGATIVE — SIGNIFICANT CHANGE UP
ANION GAP SERPL CALC-SCNC: 18 MMOL/L — HIGH (ref 7–14)
APAP SERPL-MCNC: <10 UG/ML — LOW (ref 15–25)
APPEARANCE UR: ABNORMAL
AST SERPL-CCNC: 16 U/L — SIGNIFICANT CHANGE UP (ref 4–40)
BACTERIA # UR AUTO: ABNORMAL /HPF
BARBITURATES UR SCN-MCNC: NEGATIVE — SIGNIFICANT CHANGE UP
BASOPHILS # BLD AUTO: 0.04 K/UL — SIGNIFICANT CHANGE UP (ref 0–0.2)
BASOPHILS NFR BLD AUTO: 0.4 % — SIGNIFICANT CHANGE UP (ref 0–2)
BENZODIAZ UR-MCNC: NEGATIVE — SIGNIFICANT CHANGE UP
BILIRUB SERPL-MCNC: 0.8 MG/DL — SIGNIFICANT CHANGE UP (ref 0.2–1.2)
BILIRUB UR-MCNC: ABNORMAL
BUN SERPL-MCNC: 14 MG/DL — SIGNIFICANT CHANGE UP (ref 7–23)
CALCIUM SERPL-MCNC: 9.3 MG/DL — SIGNIFICANT CHANGE UP (ref 8.4–10.5)
CAST: 4 /LPF — SIGNIFICANT CHANGE UP (ref 0–4)
CHLORIDE SERPL-SCNC: 104 MMOL/L — SIGNIFICANT CHANGE UP (ref 98–107)
CO2 SERPL-SCNC: 17 MMOL/L — LOW (ref 22–31)
COCAINE METAB.OTHER UR-MCNC: NEGATIVE — SIGNIFICANT CHANGE UP
COLOR SPEC: SIGNIFICANT CHANGE UP
CREAT SERPL-MCNC: 0.92 MG/DL — SIGNIFICANT CHANGE UP (ref 0.5–1.3)
CREATININE URINE RESULT, DAU: 287 MG/DL — SIGNIFICANT CHANGE UP
DIFF PNL FLD: NEGATIVE — SIGNIFICANT CHANGE UP
EGFR: 91 ML/MIN/1.73M2 — SIGNIFICANT CHANGE UP
EOSINOPHIL # BLD AUTO: 0.06 K/UL — SIGNIFICANT CHANGE UP (ref 0–0.5)
EOSINOPHIL NFR BLD AUTO: 0.6 % — SIGNIFICANT CHANGE UP (ref 0–6)
ETHANOL SERPL-MCNC: <10 MG/DL — SIGNIFICANT CHANGE UP
FENTANYL UR QL SCN: NEGATIVE — SIGNIFICANT CHANGE UP
GLUCOSE SERPL-MCNC: 107 MG/DL — HIGH (ref 70–99)
GLUCOSE UR QL: NEGATIVE MG/DL — SIGNIFICANT CHANGE UP
HCT VFR BLD CALC: 47.1 % — SIGNIFICANT CHANGE UP (ref 39–50)
HGB BLD-MCNC: 16.2 G/DL — SIGNIFICANT CHANGE UP (ref 13–17)
IANC: 7.1 K/UL — SIGNIFICANT CHANGE UP (ref 1.8–7.4)
IMM GRANULOCYTES NFR BLD AUTO: 0.3 % — SIGNIFICANT CHANGE UP (ref 0–0.9)
KETONES UR-MCNC: 15 MG/DL
LEUKOCYTE ESTERASE UR-ACNC: ABNORMAL
LYMPHOCYTES # BLD AUTO: 1.6 K/UL — SIGNIFICANT CHANGE UP (ref 1–3.3)
LYMPHOCYTES # BLD AUTO: 16.6 % — SIGNIFICANT CHANGE UP (ref 13–44)
MCHC RBC-ENTMCNC: 29.4 PG — SIGNIFICANT CHANGE UP (ref 27–34)
MCHC RBC-ENTMCNC: 34.4 GM/DL — SIGNIFICANT CHANGE UP (ref 32–36)
MCV RBC AUTO: 85.5 FL — SIGNIFICANT CHANGE UP (ref 80–100)
METHADONE UR-MCNC: NEGATIVE — SIGNIFICANT CHANGE UP
MONOCYTES # BLD AUTO: 0.81 K/UL — SIGNIFICANT CHANGE UP (ref 0–0.9)
MONOCYTES NFR BLD AUTO: 8.4 % — SIGNIFICANT CHANGE UP (ref 2–14)
NEUTROPHILS # BLD AUTO: 7.1 K/UL — SIGNIFICANT CHANGE UP (ref 1.8–7.4)
NEUTROPHILS NFR BLD AUTO: 73.7 % — SIGNIFICANT CHANGE UP (ref 43–77)
NITRITE UR-MCNC: NEGATIVE — SIGNIFICANT CHANGE UP
NRBC # BLD: 0 /100 WBCS — SIGNIFICANT CHANGE UP (ref 0–0)
NRBC # FLD: 0 K/UL — SIGNIFICANT CHANGE UP (ref 0–0)
OPIATES UR-MCNC: NEGATIVE — SIGNIFICANT CHANGE UP
OXYCODONE UR-MCNC: NEGATIVE — SIGNIFICANT CHANGE UP
PCP SPEC-MCNC: SIGNIFICANT CHANGE UP
PCP UR-MCNC: POSITIVE
PH UR: 6 — SIGNIFICANT CHANGE UP (ref 5–8)
PLATELET # BLD AUTO: 301 K/UL — SIGNIFICANT CHANGE UP (ref 150–400)
POTASSIUM SERPL-MCNC: 3.6 MMOL/L — SIGNIFICANT CHANGE UP (ref 3.5–5.3)
POTASSIUM SERPL-SCNC: 3.6 MMOL/L — SIGNIFICANT CHANGE UP (ref 3.5–5.3)
PROT SERPL-MCNC: 7.3 G/DL — SIGNIFICANT CHANGE UP (ref 6–8.3)
PROT UR-MCNC: 30 MG/DL
RBC # BLD: 5.51 M/UL — SIGNIFICANT CHANGE UP (ref 4.2–5.8)
RBC # FLD: 14.7 % — HIGH (ref 10.3–14.5)
RBC CASTS # UR COMP ASSIST: 3 /HPF — SIGNIFICANT CHANGE UP (ref 0–4)
REVIEW: SIGNIFICANT CHANGE UP
SALICYLATES SERPL-MCNC: <0.3 MG/DL — LOW (ref 15–30)
SARS-COV-2 RNA SPEC QL NAA+PROBE: SIGNIFICANT CHANGE UP
SODIUM SERPL-SCNC: 139 MMOL/L — SIGNIFICANT CHANGE UP (ref 135–145)
SP GR SPEC: 1.03 — SIGNIFICANT CHANGE UP (ref 1–1.03)
SQUAMOUS # UR AUTO: 7 /HPF — HIGH (ref 0–5)
THC UR QL: NEGATIVE — SIGNIFICANT CHANGE UP
TOXICOLOGY SCREEN, DRUGS OF ABUSE, SERUM RESULT: SIGNIFICANT CHANGE UP
TSH SERPL-MCNC: 2.87 UIU/ML — SIGNIFICANT CHANGE UP (ref 0.27–4.2)
UROBILINOGEN FLD QL: 2 MG/DL (ref 0.2–1)
WBC # BLD: 9.64 K/UL — SIGNIFICANT CHANGE UP (ref 3.8–10.5)
WBC # FLD AUTO: 9.64 K/UL — SIGNIFICANT CHANGE UP (ref 3.8–10.5)
WBC UR QL: 6 /HPF — HIGH (ref 0–5)

## 2024-09-14 PROCEDURE — 93970 EXTREMITY STUDY: CPT | Mod: 26

## 2024-09-14 PROCEDURE — 99285 EMERGENCY DEPT VISIT HI MDM: CPT | Mod: GC

## 2024-09-14 PROCEDURE — 99285 EMERGENCY DEPT VISIT HI MDM: CPT

## 2024-09-14 PROCEDURE — 99223 1ST HOSP IP/OBS HIGH 75: CPT

## 2024-09-14 RX ORDER — ONDANSETRON HCL/PF 4 MG/2 ML
4 VIAL (ML) INJECTION EVERY 8 HOURS
Refills: 0 | Status: DISCONTINUED | OUTPATIENT
Start: 2024-09-14 | End: 2024-09-30

## 2024-09-14 RX ORDER — VENLAFAXINE HCL 75 MG
225 TABLET ORAL DAILY
Refills: 0 | Status: DISCONTINUED | OUTPATIENT
Start: 2024-09-14 | End: 2024-09-30

## 2024-09-14 RX ORDER — AMLODIPINE BESYLATE 5 MG
10 TABLET ORAL
Refills: 0 | Status: DISCONTINUED | OUTPATIENT
Start: 2024-09-15 | End: 2024-09-30

## 2024-09-14 RX ORDER — SENNOSIDES 8.6 MG
2 TABLET ORAL AT BEDTIME
Refills: 0 | Status: DISCONTINUED | OUTPATIENT
Start: 2024-09-14 | End: 2024-09-30

## 2024-09-14 RX ORDER — MAG HYDROX/ALUMINUM HYD/SIMETH 200-200-20
30 SUSPENSION, ORAL (FINAL DOSE FORM) ORAL EVERY 4 HOURS
Refills: 0 | Status: DISCONTINUED | OUTPATIENT
Start: 2024-09-14 | End: 2024-09-30

## 2024-09-14 RX ORDER — SENNOSIDES 8.6 MG
2 TABLET ORAL
Refills: 0 | DISCHARGE

## 2024-09-14 RX ORDER — CEFTRIAXONE SODIUM 1 G
1000 VIAL (EA) INJECTION ONCE
Refills: 0 | Status: COMPLETED | OUTPATIENT
Start: 2024-09-14 | End: 2024-09-14

## 2024-09-14 RX ORDER — AMLODIPINE BESYLATE 5 MG
1 TABLET ORAL
Refills: 0 | DISCHARGE

## 2024-09-14 RX ORDER — OLANZAPINE 2.5 MG
20 TABLET ORAL AT BEDTIME
Refills: 0 | Status: DISCONTINUED | OUTPATIENT
Start: 2024-09-14 | End: 2024-09-18

## 2024-09-14 RX ORDER — SODIUM CHLORIDE IRRIG SOLUTION 0.9 %
1000 SOLUTION, IRRIGATION IRRIGATION
Refills: 0 | Status: DISCONTINUED | OUTPATIENT
Start: 2024-09-14 | End: 2024-09-30

## 2024-09-14 RX ORDER — RIVAROXABAN 10 MG/1
15 TABLET, FILM COATED ORAL
Refills: 0 | Status: DISCONTINUED | OUTPATIENT
Start: 2024-09-14 | End: 2024-09-21

## 2024-09-14 RX ORDER — ACETAMINOPHEN 325 MG
650 TABLET ORAL EVERY 6 HOURS
Refills: 0 | Status: DISCONTINUED | OUTPATIENT
Start: 2024-09-14 | End: 2024-09-30

## 2024-09-14 RX ORDER — 5-HYDROXYTRYPTOPHAN (5-HTP) 100 MG
3 TABLET,DISINTEGRATING ORAL AT BEDTIME
Refills: 0 | Status: DISCONTINUED | OUTPATIENT
Start: 2024-09-14 | End: 2024-09-30

## 2024-09-14 RX ORDER — VENLAFAXINE HCL 75 MG
1 TABLET ORAL
Refills: 0 | DISCHARGE

## 2024-09-14 RX ADMIN — Medication 100 MILLIGRAM(S): at 23:46

## 2024-09-14 RX ADMIN — Medication 20 MILLIGRAM(S): at 23:46

## 2024-09-14 RX ADMIN — Medication 225 MILLIGRAM(S): at 23:46

## 2024-09-14 NOTE — ED ADULT NURSE NOTE - NS ED NOTE ABUSE RESPONSE YN
Collin Dorsey was seen and treated in our emergency department on 7/10/2023. Diagnosis:     Ian To  may return to gym class or sports on return date. He may return on this date: If you have any questions or concerns, please don't hesitate to call.       Madelin Guillen MD    ______________________________           _______________          _______________  Hospital Representative                              Date                                Time Yes

## 2024-09-14 NOTE — H&P ADULT - NSHPPHYSICALEXAM_GEN_ALL_CORE
PHYSICAL EXAM:  GENERAL: NAD, comfortable at bedside   HEAD:  Atraumatic, Normocephalic  EYES: EOMI, PERRL, conjunctiva and sclera clear  NECK: Supple, No JVD  CHEST/LUNG: Clear to auscultation bilaterally; No wheezes, rales or rhonchi  HEART: Regular rate and rhythm; No murmurs, rubs, or gallops, (+)S1, S2  ABDOMEN: Soft, Nontender, Nondistended; Normal Bowel sounds   EXTREMITIES:  2+ Peripheral Pulses, No clubbing, cyanosis, pitting edema of LLE with increased circumference   PSYCH: responds appropriately, knows auditory hallucinations are not supposed to be there  NEUROLOGY: AAOx3, moving all extremities spontaneously   SKIN: No rashes or lesions

## 2024-09-14 NOTE — ED BEHAVIORAL HEALTH ASSESSMENT NOTE - NSPRESENTSXS_PSY_ALL_CORE
Psychosis/Severe anxiety, agitation or panic Depressed mood/Anhedonia/Psychosis/Hopelessness or despair/Severe anxiety, agitation or panic

## 2024-09-14 NOTE — ED BEHAVIORAL HEALTH ASSESSMENT NOTE - NSBHATTESTCOMMENTATTENDFT_PSY_A_CORE
70 yo M, domiciled at [facility], , no children, retired from job at [employer] in 40s, with PPH of schizoaffective disorder (bipolar type with psychotic features), one past suicide attempt in 1992 via cutting wrists (found by mother and brought in to hospital), multiple past psychiatric hospitalizations (last [facility] 2[facility] June - Aug 2023), substance use hx (ETOH-last used April 2024, h/o 3 DUIs in 40s; cocaine- last used 20 yrs ago), and PMH of HTN who presents to [facility] ED BIB sister from [facility] due to 2 months of worsening depression, psychosis (AH, paranoia), and poor attention to ADLs.    The patient presents with worsening depression and psychosis (CAH, paranoia, delusional ideas) resulting in poor ADLs, social isolation, and passive SI. Due to symptom burden, patient is unable to care for himself. He would benefit from admission for stabilization of symptoms. He would also likely benefit from resuming ECT as this was an effective treatment for him in the past. Currently there are no available inpatient beds, will coordinate with ED re: possible medical admission as patient waits for transfer to inpatient psychiatry. Will continue home medications (Effexor, Zyprexa, and Ativan PRN) for now.

## 2024-09-14 NOTE — H&P ADULT - PROBLEM SELECTOR PLAN 4
Chronic stable  Pt endorses intermittent alcohol cravings  Consider naloxone Chronic stable  Not on a statin   A1c and lipid panel in AM

## 2024-09-14 NOTE — ED PROVIDER NOTE - CARE PLAN
1 Principal Discharge DX:	Schizoaffective disorder, bipolar type  Secondary Diagnosis:	Alcohol use disorder, mild, in early remission

## 2024-09-14 NOTE — H&P ADULT - NSHPSOCIALHISTORY_GEN_ALL_CORE
Does not use tobacco products, consume alcohol (prior alcohol misuse) or partake in illicit drug use   Resides at [facility]

## 2024-09-14 NOTE — ED BEHAVIORAL HEALTH ASSESSMENT NOTE - RISK ASSESSMENT
Risk factors: h/o SA (1992), h/o psych admissions, past substance abuse (alcohol and remote cocaine use), unable to care for self 2/2 psychiatric illness and paranoid delusions, social isolation,  10 years ago.    Protective factors: no current SIIP/HIIP, no access to weapons, no active substance abuse, domiciled in assisted living facility, engaged in treatment, compliant with treatment.    Overall, pt is at moderate risk of harm to self and meets criteria for psychiatric admission. Pt is at elevated acute risk of harm to self due to worsening depression, psychosis, and passive SI. Acute risk factors: hopelessness, recent med changes, social isolation. Chronic risk factors include elderly age, male gender, h/o SA (1992), h/o >10 psych admissions, past substance abuse (alcohol and remote cocaine use), He has protective factors of no active SIIP, no HIIP, stable housing, supportive family and good health overall. Overall, pt is unable to care for self 2/2 psychiatric illness, paranoid delusions, poor hygiene, Pt is at elevated acute risk of harm to self and meets criteria for psychiatric admission.

## 2024-09-14 NOTE — ED PROVIDER NOTE - CLINICAL SUMMARY MEDICAL DECISION MAKING FREE TEXT BOX
68 yo M PMH Bipolar, Schizoaffective, HTN p/w increased command AH, believes everyone is against him. Patient admits to having increased racing thoughts, trouble sleeping, trouble with hygiene, increased paranoia and agitation. Patient reports he is from an assisted living facility. Reports compliance with medications and therapy. Denies fever, headache, dizziness, SI/HI/VH, chills, chest pain, shortness of breath, abdominal pain, sick contact or recent travel. Denies alcohol use or other drugs.   Labs, EKG, COVID  SW collateral  Psych consult  Dispo as per consult

## 2024-09-14 NOTE — ED BEHAVIORAL HEALTH ASSESSMENT NOTE - DESCRIPTION
Lives in assisted living facility, pt reports no family or children; collateral obtained at [facility]: long-time friend of 30 years, Ezequiel Cuevas ([phone]).  Calm and cooperative. No PRNs required.    T(C): 36.7 (09-14-24 @ 12:49), Max: 36.7 (09-14-24 @ 12:49)  T(F): 98 (09-14-24 @ 12:49), Max: 98 (09-14-24 @ 12:49)  HR: 101 (09-14-24 @ 12:49) (101 - 101)  BP: 123/89 (09-14-24 @ 12:49) (123/89 - 123/89)  RR: 16 (09-14-24 @ 12:49) (16 - 16)  SpO2: 95% (09-14-24 @ 12:49) (95% - 95%)  Wt(kg): -- Lives in assisted living facility, pt reports no family or children; has living sister, brother, and father; long-time friend of 30 years, Ezequiel Cuevas ([phone]). HTN, L knee pain (no assistive devices)

## 2024-09-14 NOTE — ED BEHAVIORAL HEALTH ASSESSMENT NOTE - LEGAL HISTORY
Per [facility] collateral note with Ezequiel Cuevas, pt has hx of 3 DUIs secondary to alcohol use and one year long nursing home sentence years ago. Per chart, pt has hx of 3 DUIs secondary to alcohol use and one year long snf sentence years ago.

## 2024-09-14 NOTE — ED PROVIDER NOTE - NSFOLLOWUPINSTRUCTIONS_ED_ALL_ED_FT
Follow up with your PMD within 48-72 hrs. Show copies of your reports given to you.   Worsening, continued or new concerning symptoms return to the emergency department.    You have been given information necessary to follow up with the  [facility] ([facility]) Crisis center & other outpatient  psychiatric clinics within your community    • [facility] walk in Crisis centre  [address] [place] [address]  [phone] [email or web address]  Hours of operation:  Day	                                        Hours  Sunday                                  Closed  Monday                                9am - 3pm  Tuesday                                9am - 3pm  Wednesday                          9am - 3pm  Thursday                               9am - 3pm  Friday                                    9am - 3pm  Saturday                                Closed

## 2024-09-14 NOTE — ED BEHAVIORAL HEALTH ASSESSMENT NOTE - NS ED BHA PLAN NEED FOR 1 TO 1 ON INPATIENT UNIT YN
Additional Notes: Patient's consent was obtained to proceed with the visit and recommended plan of care after discussion of all risks and benefits, including the risks of COVID-19 exposure.
Detail Level: Simple
Render Risk Assessment In Note?: no
Additional Notes: Both areas are healing well.  Advised patient and mom to continue with the mupirocin ointment for an additional week and then discontinue with the topical.  RTO in 2-4 months to recheck both sites and to determine if additional treatment is needed.
No

## 2024-09-14 NOTE — ED ADULT NURSE NOTE - CHIEF COMPLAINT QUOTE
Phx Bipolar, Manic, schizoaffective disorder, HTN. Pt was ar Brought in by sister stating pt has not been sleeping, eating, increase agitation, paranoid, psychosis , not interested in doing anything. Pt calm cooperative in triage, does not appear anxious at present. Denies SI/HI. Filomena VILLALOBOS NP made aware in .

## 2024-09-14 NOTE — ED BEHAVIORAL HEALTH ASSESSMENT NOTE - PAST PSYCHOTROPIC MEDICATION
Prozac 40 mg PO qAM -> 60 mg PO qD on 6/6/23 -> 80 mg PO qD  Seroquel 100 mg PO qAM and 300 mg PO qHS (discontinued with last dose on 6/4/23)  Zyprexa 5 mg PO qHS on 5/31/23 -> 7.5 mg PO qHS on 6/2/23 -> 10 mg PO qHS on 6/5/23 -> 12.5 mg PO qHS on 6/14/23 -> 15 mg PO qHS on 6/22/23 -> 17.5 mg PO qHS on 6/27/23 -> 20 mg qHS on 6/30/23  Trileptal 300 mg PO BID -> 150 mg PO qAM and 300 mg PO qHS on 6/10/23 -> 150 mg PO BID on 6/19/23 -> 150 mg PO qHS on 6/29/23 Prozac up to 80 mg (poor effect), Seroquel 100 mg PO qAM and 300 mg PO qHS (discontinued with last dose on 6/4/23), Zyprexa 20 mg qHS (h/o urinary retention), trileptal 150 mg PO qAM and 300 mg PO qHS on 6/10/23, klonopin 0.5 mg BID

## 2024-09-14 NOTE — ED ADULT NURSE NOTE - NS ED NURSE NOTE DISPO AOU DETAILS FT
Report given to LESLIE Del Real. Pt transported to [facility]. Pt calm and cooperative. NAD, resp equal and unlabored. 20G to L ac.

## 2024-09-14 NOTE — ED BEHAVIORAL HEALTH ASSESSMENT NOTE - NS ED BHA PLAN ADMIT TO PSYCHIATRY BH CONTACTED FT
Left  for Dr. Sharma at [facility] Psychiatrist Dr. Sharma, [phone] - contacted, left voicemail

## 2024-09-14 NOTE — H&P ADULT - NSHPLABSRESULTS_GEN_ALL_CORE
16.2   9.64  )-----------( 301      ( 14 Sep 2024 14:00 )             47.1     139  |  104  |  14  ----------------------------<  107<H>       3.6   |  17<L>  |  0.92    Ca    9.3      14 Sep 2024 14:00    TPro  7.3  /  Alb  4.0  /  TBili  0.8  /  DBili  x   /  AST  16  /  ALT  16  /  AlkPhos  113  14        Urinalysis Basic - ( 14 Sep 2024 14:04 )  Color: Dark Yellow / Appearance: Cloudy / S.026 / pH: 6.0  Gluc: Negative mg/dL / Ketone: 15 mg/dL  / Bili: Moderate / Urobili: 2.0 mg/dL   Blood: Negative / Protein: 30 mg/dL / Nitrite: Negative   Leuk Esterase: Trace / RBC: 3 /HPF / WBC 6 /HPF   Sq Epi: 7 /HPF / Bacteria: Few /HPF  Hyaline Casts: x/WBC Casts: x      EKG interpreted by myself: nonischemic

## 2024-09-14 NOTE — H&P ADULT - PROBLEM SELECTOR PLAN 2
Chronic stable  /82  Continue amlodipine 10mg daily New  LLE>RLE  LLE doppler-> contacted by Radiology-> left peroneal DVT  Will start Xarelto

## 2024-09-14 NOTE — ED BEHAVIORAL HEALTH ASSESSMENT NOTE - NSBHSAALC_PSY_A_CORE FT
Last used 1 year ago Last used in April 2022 on his birthday, states he drank 1 bottle of liquor due to anxiety, endorses feeling drunk but denies blackout or withdrawal, no recent use since then

## 2024-09-14 NOTE — ED BEHAVIORAL HEALTH ASSESSMENT NOTE - HPI (INCLUDE ILLNESS QUALITY, SEVERITY, DURATION, TIMING, CONTEXT, MODIFYING FACTORS, ASSOCIATED SIGNS AND SYMPTOMS)
68 yo M, domiciled at [facility], retired fro      with hx of schizoaffective disorder (bipolar type with psychotic features), one past suicide attempt in 1992 via cutting wrists (found by mother and brought in to hospital), multiple past psychiatric hospitalizations, and remote substance use hx (alcohol last used 1 year ago, cocaine last used 20 yrs ago), who presents to [facility] from [facility] for evaluation of possible ECT treatment due to increasing paranoia and poor self care. Pt reports feeling "nervous and scared" that he will go to long-term for "a lot of things, like threatening people," so much so that it is impacting his ability to sleep at night. Reporting racing thoughts about going to long-term. Pt has been taking medications as prescribed at [facility]; denies medication side effects at this time. Reports poor appetite due to anxiety about going to long-term, and also states that he is missing some teeth, which makes chewing solid foods difficult. Denies any passive or active SI/HI currently. Denies any auditory or visual hallucinations, though fixates on the idea that he will be going to long-term. Denies belief that anyone is coming after him. Pt is at increased fall risk due to arthritis in R hip causing antalgic gait.    From ED note at [place]:  Patient is a 68 year-old male, single, domiciled at [facility] in [facility] htn, mesothelioma, pph of schizoaffective d/o, depression, hx of suicide attempt, BIBEMS from assisted living facility for worsening hallucinations and paranoia. Telepsychiatry consulted for mental health evaluation.    Patient is alert and oriented to person, place, time and situation. States that he came to the ED due to feeling nervous and uptight, which he has been feeling a lot more lately. Has noticed change since September 2022 when he arrived at his assisted living facility. Prior to going to assisted living facility, pt had an argument with his super who got an order of protection against him. A case date followed but case is now closed (per collateral); however, patient is very nervous that he will go to long-term due to having an upcoming court date (delusion).     Patient describes increased anxiety and feelings of paranoia, states he is nervous about the police, feels as if his phone is tapped by facility, feels there are cameras in his room including one right over his head. States that he feels safe but nervous that he is being monitored.     Patient describes feeling groggy upon awakening and doesn't sleep well, has had a decreased appetite (having lost about 50 lbs since Sept), has low energy, difficulty with concentration, feelings of hopeless/worthlessness, denies suicidal ideation, homicidal ideation. Denies AVH. Denies current substance use.    States that he has seen his psychiatrist 3 times since Sept and has not been able to express the severity of his sxs. Last IPP admission was in September at an outside hospital.    Collateral - Psychiatrist, Dr. Oral Irby, [phone] - contacted, left voicemail    Collateral - Saritha, patient's sister, [phone]   Patient has struggled with mental health illness all his life. States his condition is getting much worse since January. Patient is extremely paranoid, thinks people are coming to get him, that he did bad things, thinks smoke detectors and fire alarms are cameras monitoring him. States pt has delusions that he will go to long-term and that he has a court date pending, but all cases are closed and he does not have legal issues currently. Since Jan, lost over 50 lbs, won't eat or drink despite the food being good, does not report pt discussed food being poisoned. Says patient has racing thoughts, delusions, paranoia and is constantly wringing hands.    Group home also contacted and support admission for decompensation. 70 yo M, domiciled at [facility], , no children, retired from job at [employer] in 40s, with PPH of schizoaffective disorder (bipolar type with psychotic features), one past suicide attempt in 1992 via cutting wrists (found by mother and brought in to hospital), multiple past psychiatric hospitalizations (last H 2S June - Aug 2023), substance use hx (ETOH-last used April 2024, h/o 3 DUIs in 40s; cocaine- last used 20 yrs ago), and PMH of HTN who presents to [facility] ED BIB sister from [facility] due to 2 months of worsening depression, psychosis (AH, paranoia), and poor attention to ADLs.    On interview today, pt reports feeling "nervous and scared" that he will go to skilled nursing for "a lot of things, like threatening people," so much so that it is impacting his ability to sleep at night. Reporting racing thoughts about going to skilled nursing. Pt has been taking medications as prescribed at [facility]; denies medication side effects at this time. Reports poor appetite due to anxiety about going to skilled nursing, and also states that he is missing some teeth, which makes chewing solid foods difficult. Denies any passive or active SI/HI currently. Denies any auditory or visual hallucinations, though fixates on the idea that he will be going to skilled nursing. Denies belief that anyone is coming after him. Pt is at increased fall risk due to arthritis in R hip causing antalgic gait.    From ED note at [place]:  Patient is a 68 year-old male, single, domiciled at [facility] in [place], pmh htn, mesothelioma, pph of schizoaffective d/o, depression, hx of suicide attempt, BIBEMS from assisted living facility for worsening hallucinations and paranoia. Telepsychiatry consulted for mental health evaluation.    Patient is alert and oriented to person, place, time and situation. States that he came to the ED due to feeling nervous and uptight, which he has been feeling a lot more lately. Has noticed change since September 2022 when he arrived at his assisted living facility. Prior to going to assisted living facility, pt had an argument with his super who got an order of protection against him. A case date followed but case is now closed (per collateral); however, patient is very nervous that he will go to skilled nursing due to having an upcoming court date (delusion).     Patient describes increased anxiety and feelings of paranoia, states he is nervous about the police, feels as if his phone is tapped by facility, feels there are cameras in his room including one right over his head. States that he feels safe but nervous that he is being monitored.     Patient describes feeling groggy upon awakening and doesn't sleep well, has had a decreased appetite (having lost about 50 lbs since Sept), has low energy, difficulty with concentration, feelings of hopeless/worthlessness, denies suicidal ideation, homicidal ideation. Denies AVH. Denies current substance use.    States that he has seen his psychiatrist 3 times since Sept and has not been able to express the severity of his sxs. Last IPP admission was in September at an outside hospital.    Collateral - Psychiatrist, Dr. Oral Irby, [phone] - contacted, left voicemail    Collateral - Saritha, patient's sister, [phone]   Patient has struggled with mental health illness all his life. States his condition is getting much worse since January. Patient is extremely paranoid, thinks people are coming to get him, that he did bad things, thinks smoke detectors and fire alarms are cameras monitoring him. States pt has delusions that he will go to skilled nursing and that he has a court date pending, but all cases are closed and he does not have legal issues currently. Since Jan, lost over 50 lbs, won't eat or drink despite the food being good, does not report pt discussed food being poisoned. Says patient has racing thoughts, delusions, paranoia and is constantly wringing hands.    Group home also contacted and support admission for decompensation. 70 yo M, domiciled at [facility], , no children, retired from job at [employer] in 40s, with PPH of schizoaffective disorder (bipolar type with psychotic features), one past suicide attempt in 1992 via cutting wrists (found by mother and brought in to hospital), multiple past psychiatric hospitalizations (last [facility] 2S June - Aug 2023), substance use hx (ETOH-last used April 2024, h/o 3 DUIs in 40s; cocaine- last used 20 yrs ago), and PMH of HTN who presents to [facility] ED BIB sister from [facility] due to 2 months of worsening depression, psychosis (AH, paranoia), and poor attention to ADLs.    On interview today, pt appears anxious and depressed with poor eye contact. He states his mood has been worse for the past 2 months with no clear triggers, Reports sad mood, anhedonia, hopelessness, social withdrawal, low energy, and trouble concentrating. He states that 1 month ago, he started to hear a male voice that he describes as "evil." He reports AH have increased in frequency to several times per day now. He denies dangerous CAH to harm self or others but reports +CAH to "do the opposite of what staff tell me to do." Patient is not able to provide examples. Also reports paranoia with delusion that he will be sent to [facility] due to verbal argument with fellow resident at [facility] about 3 months ago. He reports peer was picking on another female resident, so patient said "stop that or I'll break your jaw." Pt denies any actual physical violence or posturing during episode. He also states "I'm not sure if I said I'd break his jaw or if I just thought it." He states that staff and all other residents do not like his because he has poor attention to hygiene. He wonders whether staff did not wash his clothes properly last week because they don't like him. Denies VH, IOR, belief he is being watched/monitored. Pt reports decreased PO intake and states he only eats 1-2 meals. Denies lightheadedness or falls. He reports poor hygiene recently, stating it has been "years" since he last showered, that he does not brush his teeth, and that he has been changing his clothes less frequently (every 4-5 days). Reports passive SI with thoughts such as "it would be better for everyone if I was dead." Denies any active SIIP, preparatory actions, plans, or SIB. On ROS, denies persistently elevated/irritable mood, increased energy, distractibility, impulsivity, HIIP, access to guns, or recent substance use (last ETOH in April 2024). States he has been adherent with meds and that his psychiatrist at [facility] recently increased his meds 1 week ago.  Pt is amenable to voluntary inpt admission.    Collateral obtained from patient's sister Saritha (), who states pt called her this morning to report worsening mood. Sister was concerned and brought him to ED for inpt psych admission. She reports patient's mood and functioning were much better about 6 months ago while on maintenance ECT. ECT was stopped due to decreased benefit from treatments and mild memory loss.     Further collateral obtained from [facility] - Acting  Gillian (). Reports pt has been calm but very isolative to his room. No aggression, not clearly RIS. Current meds: Olanzapine 20 mg HS (increased from 15 mg 1 week ago), Venlafaxine 225 mg daily (increased 7/31/24), PRN Ativan 0.5 mg HS for anxiety/insomnia (using almost nightly for past 5-6 nights), Amlodipine 10 mg daily for HTN, Vit B12, Vit D, Senna 2 tabs HS. Psychiatrist, Dr. Sharma, [phone] - contacted, left voicemail 70 yo M, domiciled at [facility], , no children, retired from job at [employer] in 40s, with PPH of schizoaffective disorder (bipolar type with psychotic features), one past suicide attempt in 1992 via cutting wrists (found by mother and brought in to hospital), multiple past psychiatric hospitalizations (last [facility] 2S June - Aug 2023), substance use hx (ETOH-last used April 2024, h/o 3 DUIs in 40s; cocaine- last used 20 yrs ago), and PMH of HTN who presents to [facility] ED BIB sister from [facility] due to 2 months of worsening depression, psychosis (AH, paranoia), and poor attention to ADLs.    On interview today, pt appears anxious and depressed with poor eye contact. He states his mood has been worse for the past 2 months with no clear triggers, Reports sad mood, anhedonia, hopelessness, social withdrawal, low energy, and trouble concentrating. He states that 1 month ago, he started to hear a male voice that he describes as "evil." He reports AH have increased in frequency to several times per day now. He denies dangerous CAH to harm self or others but reports +CAH to "do the opposite of what staff tell me to do." Patient is not able to provide examples. Also reports paranoia with delusion that he will be sent to prison due to verbal argument with fellow resident at [facility] about 3 months ago. He reports peer was picking on another female resident, so patient said "stop that or I'll break your jaw." Pt denies any actual physical violence or posturing during episode. He also states "I'm not sure if I said I'd break his jaw or if I just thought it." He states that staff and all other residents do not like his because he has poor attention to hygiene. He wonders whether staff did not wash his clothes properly last week because they don't like him. Denies VH, IOR, belief he is being watched/monitored. Pt reports decreased PO intake and states he only eats 1-2 meals. Denies lightheadedness or falls. He reports poor hygiene recently, stating it has been "years" since he last showered, that he does not brush his teeth, and that he has been changing his clothes less frequently (every 4-5 days). Reports passive SI with thoughts such as "it would be better for everyone if I was dead." Denies any active SIIP, preparatory actions, plans, or SIB. On ROS, denies persistently elevated/irritable mood, increased energy, distractibility, impulsivity, HIIP, access to guns, or recent substance use (last ETOH in April 2024). States he has been adherent with meds and that his psychiatrist at [facility] recently increased his meds 1 week ago.  Pt is amenable to voluntary inpt admission.    Collateral obtained from patient's sister Saritha (), who states pt called her this morning to report worsening mood. Sister was concerned and brought him to ED for inpt psych admission. She reports patient's mood and functioning were much better about 6 months ago while on maintenance ECT. ECT was stopped due to decreased benefit from treatments and mild memory loss.     Further collateral obtained from [facility] - Acting  Gillian (). Reports pt has been calm but very isolative to his room. No aggression, not clearly RIS. Current meds: Olanzapine 20 mg HS (increased from 15 mg 1 week ago), Venlafaxine 225 mg daily (increased 7/31/24), PRN Ativan 0.5 mg HS for anxiety/insomnia (using almost nightly for past 5-6 nights), Amlodipine 10 mg daily for HTN, Vit B12, Vit D, Senna 2 tabs HS. Psychiatrist Dr. Sharma, [phone] - contacted, left voicemail

## 2024-09-14 NOTE — H&P ADULT - PROBLEM SELECTOR PLAN 3
Chronic stable  Not on a statin   A1c and lipid panel in AM Chronic stable  /82  Continue amlodipine 10mg daily

## 2024-09-14 NOTE — H&P ADULT - NSICDXPASTMEDICALHX_GEN_ALL_CORE_FT
PAST MEDICAL HISTORY:  Gait abnormality     HLD (hyperlipidemia)     HTN (hypertension)     MDD (major depressive disorder)     Osteoarthritis     Schizoaffective disorder

## 2024-09-14 NOTE — ED ADULT NURSE NOTE - NSFALLCONCLUSION_ED_ALL_ED
Problem: Patient Care Overview  Goal: Plan of Care Review  Outcome: Ongoing (interventions implemented as appropriate)  Baby tolerating feedings, VSS. POC reviewed with mother, verbalized understanding       Universal Safety Interventions

## 2024-09-14 NOTE — H&P ADULT - HISTORY OF PRESENT ILLNESS
69 yr old male with a pmh of HTN, HLD, MDD, schizoeffective disorder who presents with progressive Auditory hallucinations that tell him negative things and paranoia regarding staff/ppl not liking him at his assisted living facility. Detailed collateral as per  note but in summary pt with increased paranoia, auditory hallucinations, anhedonia, decreased PO intake and not performing ADLs (bathing).  Denies  headache, dizziness, chest pain, palpitations, SOB, abdominal pain, joint pain, diarrhea/constipation, urinary symptoms, visual hallucinations, SI/HI.   Vitals: T 98.5, HR 90, /82, RR 16 satting 95% RA

## 2024-09-14 NOTE — ED BEHAVIORAL HEALTH ASSESSMENT NOTE - CURRENT MEDICATION
MEDICATIONS  (STANDING):  clonazePAM  Tablet 0.5 milliGRAM(s) Oral two times a day  FLUoxetine 80 milliGRAM(s) Oral daily  OLANZapine 20 milliGRAM(s) Oral at bedtime  OXcarbazepine 150 milliGRAM(s) Oral <User Schedule>    MEDICATIONS  (PRN):   Zyprexa 20 mg HS  Effexor 225 mg daily  Amlodipine 10 mg daily  Senna 2 tabs qHS  Vitamin B12, Vit D  PRN Ativan 0.5 mg qHS

## 2024-09-14 NOTE — ED ADULT TRIAGE NOTE - CHIEF COMPLAINT QUOTE
Phx Bipolar, Manic, schizoaffective disorder, HTN. Pt was ar Brought in by sister stating pt has not been sleeping, eating, increase agitation, paranoid, psychosis , not interested in doing anything. Pt calm cooperative in triage, does not appear anxious at present. Denies SI/HI. Phx Bipolar, Manic, schizoaffective disorder, HTN. Pt was ar Brought in by sister stating pt has not been sleeping, eating, increase agitation, paranoid, psychosis , not interested in doing anything. Pt calm cooperative in triage, does not appear anxious at present. Denies SI/HI. Filomena VILLALOBOS NP made aware in .

## 2024-09-14 NOTE — ED BEHAVIORAL HEALTH ASSESSMENT NOTE - DETAILS
abnormal gait, R hip osteoarthritis see above see HPI -- recent passive SI with no intent or plan, remote SA in 1990s per protocol ANANT Butt

## 2024-09-14 NOTE — H&P ADULT - REASON FOR ADMISSION
MDD with auditory hallucinations and increased paranoia (psychosis) MDD with auditory hallucinations and increased paranoia (psychosis), dvt

## 2024-09-14 NOTE — ED BEHAVIORAL HEALTH ASSESSMENT NOTE - NSSUICPROTFACT_PSY_ALL_CORE
Fear of death or the actual act of killing self Fear of death or the actual act of killing self/Positive therapeutic relationships

## 2024-09-14 NOTE — ED BEHAVIORAL HEALTH ASSESSMENT NOTE - SUMMARY
***in progress 68 yo M, domiciled at [facility], , no children, retired from job at [employer] in 40s, with PPH of schizoaffective disorder (bipolar type with psychotic features), one past suicide attempt in 1992 via cutting wrists (found by mother and brought in to hospital), multiple past psychiatric hospitalizations (last [facility] 2S June - Aug 2023), substance use hx (ETOH-last used April 2024, h/o 3 DUIs in 40s; cocaine- last used 20 yrs ago), and PMH of HTN who presents to [facility] ED BIB sister from [facility] due to 2 months of worsening depression, psychosis (AH, paranoia), and poor attention to ADLs.    Today, patient presents with hopelessness, AH with neutral commands, paranoia, delusions of being sent to [facility], social withdrawal, poor ADLs, and passive SI in the context of stopping outpatient ECT in May 2024 and recent medication changes. Presentation is consistent with exacerbation of schizoaffective disorder, current episode depressed with psychosis. Per collateral from sister, patient was previously functioning much better while receiving ECT treatments q3wks. Patient is at elevated acute risk of harm to self due to worsening symptoms, poor ADLs, and passive SI. He requires inpatient psych admission for medication titration, safety planning, and possible re-initiation of ECT.     PLAN  - Admit 9.13 voluntary to inpt psych -- pending Geriatric bed availability  - Consider admitting to medicine while pending psych transfer due to risk of boarding in acute psych ED setting and recent poor PO intake  - Continue home meds of Effexor 225 mg daily, Zyprexa 20 mg HS  - Consider starting naltrexone for alcohol use disorder (still reporting cravings, last drink April 2024)  - Medical: HTN - continue amlodipine 10 mg daily; ambulates independently, no CPAP need  - PRNs: Ativan 0.5 mg PO HS as needed for agitation/anxiety (taking for last 6 days at [facility]), Zyprexa 2.5 mg PO/IM q6h for agitation 68 yo M, domiciled at [facility], , no children, retired from job at [employer] in 40s, with PPH of schizoaffective disorder (bipolar type with psychotic features), one past suicide attempt in 1992 via cutting wrists (found by mother and brought in to hospital), multiple past psychiatric hospitalizations (last [facility] June - Aug 2023), substance use hx (ETOH-last used April 2024, h/o 3 DUIs in 40s; cocaine- last used 20 yrs ago), and PMH of HTN who presents to [facility] ED BIB sister from [facility] due to 2 months of worsening depression, psychosis (AH, paranoia), and poor attention to ADLs.    Today, patient presents with hopelessness, AH with neutral commands, paranoia, delusions of being sent to long-term, social withdrawal, poor ADLs, and passive SI in the context of stopping outpatient ECT in May 2024 and recent medication changes. Presentation is consistent with exacerbation of schizoaffective disorder, current episode depressed with psychosis. Per collateral from sister, patient was previously functioning much better while receiving ECT treatments q3wks. Patient is at elevated acute risk of harm to self due to worsening symptoms, poor ADLs, and passive SI. He requires inpatient psych admission for medication titration, safety planning, and possible re-initiation of ECT.     PLAN  - Admit 9.13 voluntary to inpt psych -- pending Geriatric bed availability  - Consider admitting to medicine while pending psych transfer due to risk of boarding in acute psych ED setting and recent poor PO intake  - Continue home meds of Effexor 225 mg daily, Zyprexa 20 mg HS  - Consider starting naltrexone for alcohol use disorder (still reporting cravings, last drink April 2024)  - Medical: HTN - continue amlodipine 10 mg daily; ambulates independently, no CPAP need; UTox +PCP but thought to be false positive from Effexor (pt denies use)  - PRNs: Ativan 0.5 mg PO HS as needed for agitation/anxiety (taking for last 6 days at [facility]), Zyprexa 2.5 mg PO/IM q6h for agitation

## 2024-09-14 NOTE — H&P ADULT - NSHPREVIEWOFSYSTEMS_GEN_ALL_CORE
REVIEW OF SYSTEMS:    CONSTITUTIONAL: No weakness, fevers or chills + decreased PO intake   EYES/ENT: No visual changes;  No dysphagia; No sore throat; No rhinorrhea; No sinus pain/pressure  NECK: No pain or stiffness  RESPIRATORY: No cough, wheezing, hemoptysis; No shortness of breath  CARDIOVASCULAR: No chest pain or palpitations; No lower extremity edema  GASTROINTESTINAL: No abdominal or epigastric pain. No nausea, vomiting, or hematemesis; No diarrhea or constipation. No melena or hematochezia.  GENITOURINARY: No dysuria, frequency or hematuria  NEUROLOGICAL: No numbness or weakness  PSYCH: +increased paranoia, auditory hallucinations, anhedonia  MSK: ambulates without assistance   SKIN: No itching, burning, rashes, or lesions   All other review of systems is negative unless indicated above.

## 2024-09-14 NOTE — ED PROVIDER NOTE - EYES, MLM
Individual Psychotherapy (PhD/LCSW)    12/8/2022    Therapeutic Intervention: Met with patient.  Outpatient - Behavior modifying psychotherapy 60 min - CPT code 71652    The patient location is: Patient's home/ Patient reported that her location at the time of this visit was in the [facility]     Visit type: Virtual visit with synchronous audio and video     Each patient to whom he or she provides medical services by telemedicine is: (1) informed of the relationship between the physician and patient and the respective role of any other health care provider with respect to management of the patient; and (2) notified that he or she may decline to receive medical services by telemedicine and may withdraw from such care at any time.     Chief complaint/reason for encounter: depression and anxiety     Interval history and content of current session: Patient presented casually dressed, alert, and oriented. Patient reported experiencing diminished interest, insomnia, fatigue, worthlessness/guilt, poor concentration, decreased libido, social isolation, irritability and muscle tension (vaginal wall).  Patient denied current suicidal and homicidal ideations.  Explored source of patient's anxiety.  Active listening skills were used as patient described her tendency to take many vacations close in proximity to each other.  Assisted patient in exploring what she is trying to escape in her daily life.  Patient discussed her relationship with her  including how they began dating.  Assisted patient in processing her feelings around her marriage and exploring what she would like her marriage to look like.  Educated patient about using assertive communication skills to address her concerns and feelings with her .        Treatment plan:  Target symptoms: depression, anxiety , adjustment  Why chosen therapy is appropriate versus another modality: relevant to diagnosis  Outcome monitoring methods:  self-report  Therapeutic intervention type: insight oriented psychotherapy, behavior modifying psychotherapy    Risk parameters:  Patient reports no suicidal ideation  Patient reports no homicidal ideation  Patient reports no self-injurious behavior  Patient reports no violent behavior    Verbal deficits: None    Patient's response to intervention:  The patient's response to intervention is accepting.    Progress toward goals and other mental status changes:  The patient's progress toward goals is fair .    Diagnosis:     ICD-10-CM ICD-9-CM   1. Body dysmorphic disorder  F45.22 300.7   2. Adjustment disorder with mixed anxiety and depressed mood  F43.23 309.28       Plan:  individual psychotherapy    Return to clinic: 1 month    Length of Service (minutes): 60           Clear bilaterally, pupils equal, round and reactive to light.

## 2024-09-14 NOTE — ED BEHAVIORAL HEALTH ASSESSMENT NOTE - OTHER PAST PSYCHIATRIC HISTORY (INCLUDE DETAILS REGARDING ONSET, COURSE OF ILLNESS, INPATIENT/OUTPATIENT TREATMENT)
Schizoaffective d/o, bipolar type with psychotic features, transferred from [facility]   10+ psychiatric hospitalizations Schizoaffective d/o, bipolar type with psychotic features  10+ psychiatric hospitalizations, last [facility] July - Aug 2023  S/p acute ECT at [facility] to OP maintenance x 24 sessions, ended in May 2024  Follows with psychiatrist at [facility] Dr. Sharma  1 prior SA 1990 - cut wrists  no known violence  h/o [facility] due to substance use charges

## 2024-09-14 NOTE — ED BEHAVIORAL HEALTH ASSESSMENT NOTE - DIFFERENTIAL
schizoaffective disorder, depression and psychosis  r/o organic causes (UTI, anemia, etc)  r/o delirium

## 2024-09-14 NOTE — ED ADULT NURSE NOTE - OBJECTIVE STATEMENT
Hx: Schizophrenia, HTN. Pt from assisted living facility. Pt brought in by sister as per Pt request. Pt states he has had loss of appetite, poor sleep,  hygiene "not the best" and racing thoughts. Pt also admits to CAH and Pt acts on that stimuli. Pt was told to "stop" doing something and he listened to the voices. Pt also admits to Paranoia. Pt calm and cooperative. Denies: SI, SIB, HI, VH/TH, depression, ETOH/drug use.

## 2024-09-14 NOTE — H&P ADULT - PROBLEM SELECTOR PLAN 1
Chronic unstable as presenting with increased auditory hallucinations and paranoia with anhedonia    consulted: Admit 9.13 voluntary to inpt psych -- pending Geriatric bed availability. Continue home meds of Effexor 225 mg daily, Zyprexa 20 mg HS.  PRNs: Ativan 0.5 mg PO HS as needed for agitation/anxiety (taking for last 6 days at [facility]), Zyprexa 2.5 mg PO/IM q6h for agitation (not yet prescribed please contact if needed) Chronic unstable as presenting with increased auditory hallucinations and paranoia with anhedonia    consulted: Admit 9.13 voluntary to inpt psych -- pending Geriatric bed availability. Continue home meds of Effexor 225 mg daily, Zyprexa 20 mg HS.  PRNs: Ativan 0.5 mg PO HS as needed for agitation/anxiety (taking for last 6 days at [facility]), Zyprexa 2.5 mg PO/IM q6h for agitation (not yet prescribed please contact if needed)  Utox- + PCP ( per  likely false positive from Effexor, pt also denies illicit drug use)

## 2024-09-15 LAB
A1C WITH ESTIMATED AVERAGE GLUCOSE RESULT: 5.2 % — SIGNIFICANT CHANGE UP (ref 4–5.6)
ANION GAP SERPL CALC-SCNC: 16 MMOL/L — HIGH (ref 7–14)
BASOPHILS # BLD AUTO: 0.02 K/UL — SIGNIFICANT CHANGE UP (ref 0–0.2)
BASOPHILS NFR BLD AUTO: 0.3 % — SIGNIFICANT CHANGE UP (ref 0–2)
BUN SERPL-MCNC: 12 MG/DL — SIGNIFICANT CHANGE UP (ref 7–23)
CALCIUM SERPL-MCNC: 8.8 MG/DL — SIGNIFICANT CHANGE UP (ref 8.4–10.5)
CHLORIDE SERPL-SCNC: 105 MMOL/L — SIGNIFICANT CHANGE UP (ref 98–107)
CHOLEST SERPL-MCNC: 159 MG/DL — SIGNIFICANT CHANGE UP
CO2 SERPL-SCNC: 21 MMOL/L — LOW (ref 22–31)
CREAT SERPL-MCNC: 0.77 MG/DL — SIGNIFICANT CHANGE UP (ref 0.5–1.3)
EGFR: 98 ML/MIN/1.73M2 — SIGNIFICANT CHANGE UP
EOSINOPHIL # BLD AUTO: 0.12 K/UL — SIGNIFICANT CHANGE UP (ref 0–0.5)
EOSINOPHIL NFR BLD AUTO: 1.9 % — SIGNIFICANT CHANGE UP (ref 0–6)
ESTIMATED AVERAGE GLUCOSE: 103 — SIGNIFICANT CHANGE UP
GLUCOSE SERPL-MCNC: 85 MG/DL — SIGNIFICANT CHANGE UP (ref 70–99)
HCT VFR BLD CALC: 43.8 % — SIGNIFICANT CHANGE UP (ref 39–50)
HDLC SERPL-MCNC: 43 MG/DL — SIGNIFICANT CHANGE UP
HGB BLD-MCNC: 15.1 G/DL — SIGNIFICANT CHANGE UP (ref 13–17)
IANC: 3.96 K/UL — SIGNIFICANT CHANGE UP (ref 1.8–7.4)
IMM GRANULOCYTES NFR BLD AUTO: 0.3 % — SIGNIFICANT CHANGE UP (ref 0–0.9)
LIPID PNL WITH DIRECT LDL SERPL: 99 MG/DL — SIGNIFICANT CHANGE UP
LYMPHOCYTES # BLD AUTO: 1.52 K/UL — SIGNIFICANT CHANGE UP (ref 1–3.3)
LYMPHOCYTES # BLD AUTO: 24 % — SIGNIFICANT CHANGE UP (ref 13–44)
MCHC RBC-ENTMCNC: 29.5 PG — SIGNIFICANT CHANGE UP (ref 27–34)
MCHC RBC-ENTMCNC: 34.5 GM/DL — SIGNIFICANT CHANGE UP (ref 32–36)
MCV RBC AUTO: 85.5 FL — SIGNIFICANT CHANGE UP (ref 80–100)
MONOCYTES # BLD AUTO: 0.69 K/UL — SIGNIFICANT CHANGE UP (ref 0–0.9)
MONOCYTES NFR BLD AUTO: 10.9 % — SIGNIFICANT CHANGE UP (ref 2–14)
NEUTROPHILS # BLD AUTO: 3.96 K/UL — SIGNIFICANT CHANGE UP (ref 1.8–7.4)
NEUTROPHILS NFR BLD AUTO: 62.6 % — SIGNIFICANT CHANGE UP (ref 43–77)
NON HDL CHOLESTEROL: 116 MG/DL — SIGNIFICANT CHANGE UP
NRBC # BLD: 0 /100 WBCS — SIGNIFICANT CHANGE UP (ref 0–0)
NRBC # FLD: 0 K/UL — SIGNIFICANT CHANGE UP (ref 0–0)
PLATELET # BLD AUTO: 231 K/UL — SIGNIFICANT CHANGE UP (ref 150–400)
POTASSIUM SERPL-MCNC: 3.2 MMOL/L — LOW (ref 3.5–5.3)
POTASSIUM SERPL-SCNC: 3.2 MMOL/L — LOW (ref 3.5–5.3)
RBC # BLD: 5.12 M/UL — SIGNIFICANT CHANGE UP (ref 4.2–5.8)
RBC # FLD: 14.7 % — HIGH (ref 10.3–14.5)
SODIUM SERPL-SCNC: 142 MMOL/L — SIGNIFICANT CHANGE UP (ref 135–145)
TRIGL SERPL-MCNC: 87 MG/DL — SIGNIFICANT CHANGE UP
WBC # BLD: 6.33 K/UL — SIGNIFICANT CHANGE UP (ref 3.8–10.5)
WBC # FLD AUTO: 6.33 K/UL — SIGNIFICANT CHANGE UP (ref 3.8–10.5)

## 2024-09-15 PROCEDURE — 99232 SBSQ HOSP IP/OBS MODERATE 35: CPT

## 2024-09-15 RX ADMIN — Medication 20 MILLIGRAM(S): at 21:26

## 2024-09-15 RX ADMIN — Medication 0.5 MILLIGRAM(S): at 12:53

## 2024-09-15 RX ADMIN — RIVAROXABAN 15 MILLIGRAM(S): 10 TABLET, FILM COATED ORAL at 09:35

## 2024-09-15 RX ADMIN — Medication 40 MILLIEQUIVALENT(S): at 14:55

## 2024-09-15 RX ADMIN — RIVAROXABAN 15 MILLIGRAM(S): 10 TABLET, FILM COATED ORAL at 01:21

## 2024-09-15 RX ADMIN — RIVAROXABAN 15 MILLIGRAM(S): 10 TABLET, FILM COATED ORAL at 18:05

## 2024-09-15 RX ADMIN — Medication 2 TABLET(S): at 21:25

## 2024-09-15 RX ADMIN — Medication 10 MILLIGRAM(S): at 09:35

## 2024-09-15 RX ADMIN — Medication 225 MILLIGRAM(S): at 12:01

## 2024-09-15 RX ADMIN — Medication 0.5 MILLIGRAM(S): at 18:08

## 2024-09-15 RX ADMIN — Medication 40 MILLIEQUIVALENT(S): at 09:35

## 2024-09-15 NOTE — PHYSICAL THERAPY INITIAL EVALUATION ADULT - ADDITIONAL COMMENTS
Patient reports he was living in an assisted living facility. Patient was ambulatory without a device, denies any falls.    Patient left in bed, NAD, all lines and tubes intact, call bell within reach, head of bed elevated >30 degrees, RN Laura aware of PT

## 2024-09-15 NOTE — PROGRESS NOTE ADULT - SUBJECTIVE AND OBJECTIVE BOX
[facility] Division of Hospital Medicine  Titus Meek MD  Available via MS Teams  Pager: [phone]    SUBJECTIVE / OVERNIGHT EVENTS:  Patient seen and examined at the bedside.   Patient states that he is worried about becoming homeless    States that he feels that he is unliked at his AL facility and fears that they will soon kick him out.    Admits to auditory hallucinations - states they tell him mostly negative things.  Denies SI/HI  Patient denies LLE pain      ADDITIONAL REVIEW OF SYSTEMS:    MEDICATIONS  (STANDING):  amLODIPine   Tablet 10 milliGRAM(s) Oral with breakfast  lactated ringers. 1000 milliLiter(s) (100 mL/Hr) IV Continuous <Continuous>  LORazepam     Tablet 0.5 milliGRAM(s) Oral two times a day  OLANZapine 20 milliGRAM(s) Oral at bedtime  rivaroxaban 15 milliGRAM(s) Oral two times a day with meals  senna 2 Tablet(s) Oral at bedtime  venlafaxine XR. 225 milliGRAM(s) Oral daily    MEDICATIONS  (PRN):  acetaminophen     Tablet .. 650 milliGRAM(s) Oral every 6 hours PRN Temp greater or equal to 38C (100.4F), Mild Pain (1 - 3)  aluminum hydroxide/magnesium hydroxide/simethicone Suspension 30 milliLiter(s) Oral every 4 hours PRN Dyspepsia  melatonin 3 milliGRAM(s) Oral at bedtime PRN Insomnia  ondansetron Injectable 4 milliGRAM(s) IV Push every 8 hours PRN Nausea and/or Vomiting      I&O's Summary      PHYSICAL EXAM:  Vital Signs Last 24 Hrs  T(C): 36.6 (15 Sep 2024 09:30), Max: 37.1 (14 Sep 2024 20:39)  T(F): 97.8 (15 Sep 2024 09:30), Max: 98.7 (14 Sep 2024 20:39)  HR: 83 (15 Sep 2024 12:53) (78 - 90)  BP: 135/99 (15 Sep 2024 12:53) (108/82 - 145/63)  BP(mean): --  RR: 17 (15 Sep 2024 12:53) (16 - 18)  SpO2: 99% (15 Sep 2024 12:53) (95% - 100%)    Parameters below as of 15 Sep 2024 12:53  Patient On (Oxygen Delivery Method): room air      CONSTITUTIONAL: NAD  EYES: PERRLA  ENMT: Moist oral mucosa, normal dentition  NECK: Supple, no thyromegaly  RESPIRATORY: Normal respiratory effort; lungs are clear to auscultation bilaterally  CARDIOVASCULAR: S1 and S2 +ve, no murmur/rub/gallop;   ABDOMEN: Nontender to palpation, normoactive bowel sounds, no rebound/guarding;  EXTREMITIES: no significant pedal edema.    NEUROLOGY: CN 2-12 are intact and symmetric; no gross motor or sensory deficits   PSYCH: A+O to person, place, and time; depressive affect  SKIN: No rashes; no palpable lesions      LABS:                        15.1   6.33  )-----------( 231      ( 15 Sep 2024 06:50 )             43.8     09-15    142  |  105  |  12  ----------------------------<  85  3.2<L>   |  21<L>  |  0.77    Ca    8.8      15 Sep 2024 06:50    TPro  7.3  /  Alb  4.0  /  TBili  0.8  /  DBili  x   /  AST  16  /  ALT  16  /  AlkPhos  113  09-14          Urinalysis Basic - ( 15 Sep 2024 06:50 )    Color: x / Appearance: x / SG: x / pH: x  Gluc: 85 mg/dL / Ketone: x  / Bili: x / Urobili: x   Blood: x / Protein: x / Nitrite: x   Leuk Esterase: x / RBC: x / WBC x   Sq Epi: x / Non Sq Epi: x / Bacteria: x        COVID-19 PCR: NotDetec (14 Sep 2024 14:01)

## 2024-09-15 NOTE — PHYSICAL THERAPY INITIAL EVALUATION ADULT - NSPTDISCHREC_GEN_A_CORE
home with no skilled PT needs. Will be taken off inpatient PT program, patient is functionally independent/No skilled PT needs

## 2024-09-15 NOTE — PHYSICAL THERAPY INITIAL EVALUATION ADULT - GENERAL OBSERVATIONS, REHAB EVAL
Patient found semi-reclined in bed, NAD, A&O4, OK for PT per RN Laura, patient agreeable to PT evaluation, spO2 99% on room air, HR 82

## 2024-09-15 NOTE — PHYSICAL THERAPY INITIAL EVALUATION ADULT - MANUAL MUSCLE TESTING RESULTS, REHAB EVAL
"Requested Prescriptions   Pending Prescriptions Disp Refills     traZODone (DESYREL) 100 MG tablet [Pharmacy Med Name: TRAZODONE HCL 100MG TABS] 30 tablet 6     Sig: TAKE ONE TABLET BY MOUTH EVERY NIGHT AT BEDTIME       Serotonin Modulators Failed - 2/12/2021  8:08 AM        Failed - Recent (12 mo) or future (30 days) visit within the authorizing provider's specialty     Patient has had an office visit with the authorizing provider or a provider within the authorizing providers department within the previous 12 mos or has a future within next 30 days. See \"Patient Info\" tab in inbasket, or \"Choose Columns\" in Meds & Orders section of the refill encounter.              Passed - Medication is active on med list        Passed - Patient is age 18 or older           hydrochlorothiazide (HYDRODIURIL) 25 MG tablet [Pharmacy Med Name: HYDROCHLOROTHIAZIDE 25MG TABS] 30 tablet 0     Sig: TAKE ONE TABLET BY MOUTH ONCE DAILY       Diuretics (Including Combos) Protocol Failed - 2/12/2021  8:08 AM        Failed - Blood pressure under 140/90 in past 12 months     BP Readings from Last 3 Encounters:   12/17/19 135/83   12/04/19 136/84   09/16/19 130/82                 Failed - Recent (12 mo) or future (30 days) visit within the authorizing provider's specialty     Patient has had an office visit with the authorizing provider or a provider within the authorizing providers department within the previous 12 mos or has a future within next 30 days. See \"Patient Info\" tab in inBeezaget, or \"Choose Columns\" in Meds & Orders section of the refill encounter.              Failed - Normal serum creatinine on file in past 12 months     Recent Labs   Lab Test 12/04/19  1550   CR 1.07              Failed - Normal serum potassium on file in past 12 months     Recent Labs   Lab Test 12/04/19  1550   POTASSIUM 3.9                    Failed - Normal serum sodium on file in past 12 months     Recent Labs   Lab Test 12/04/19  1550                 "
Dr. Jacek Burrows
"Passed - Medication is active on med list        Passed - Patient is age 18 or older           colchicine (COLCYRS) 0.6 MG tablet [Pharmacy Med Name: COLCHICINE 0.6MG TABS] 20 tablet 0     Sig: TAKE 2 TABLETS BY MOUTH AT THE FIRST SIGN OF FLARE, TAKE 1-2 ADDITIONAL TABLETS PER DAY UNTIL SYMPTOMS IMPROVE THEN STOP THIS MEDICATION. DO NOT USE MORE THAN A FIVE DAYS       Gout Agents Protocol Failed - 2/12/2021  8:08 AM        Failed - CBC on file in past 12 months     Recent Labs   Lab Test 12/20/18  0830   WBC 6.2   RBC 5.57   HGB 15.7   HCT 47.0                    Failed - ALT on file in past 12 months     Recent Labs   Lab Test 12/04/19  1550   ALT 40             Failed - Has Uric Acid on file in past 12 months and value is less than 6     Recent Labs   Lab Test 12/04/19  1550   URIC 8.2*     If level is 6mg/dL or greater, ok to refill one time and refer to provider.           Failed - Recent (12 mo) or future (30 days) visit within the authorizing provider's specialty     Patient has had an office visit with the authorizing provider or a provider within the authorizing providers department within the previous 12 mos or has a future within next 30 days. See \"Patient Info\" tab in inbasket, or \"Choose Columns\" in Meds & Orders section of the refill encounter.              Failed - Normal serum creatinine on file in the past 12 months     Recent Labs   Lab Test 12/04/19  1550   CR 1.07       Ok to refill medication if creatinine is low          Passed - Medication is active on med list        Passed - Patient is age 18 or older           Patient informed in January needs an appointment before refills run out - scheduled physical for 1/25/2021 and no showed appointment not rescheduled. Nurse sees has no showed last 4 appointments.      Refused refills.                  Mireille Daniel RN  Triage Nurse  [facility], 24 hour nurse line, available by calling clinic at "
[phone] and following prompts.              
Patients bilateral upper and lower extremity strength grossly 4+/5 upon MMT and functional assessment

## 2024-09-15 NOTE — PATIENT PROFILE ADULT - HAVE YOU BEEN EATING POORLY BECAUSE OF A DECREASED APPETITE?

## 2024-09-15 NOTE — PATIENT PROFILE ADULT - FALL HARM RISK - RISK INTERVENTIONS

## 2024-09-15 NOTE — PHYSICAL THERAPY INITIAL EVALUATION ADULT - PERTINENT HX OF CURRENT PROBLEM, REHAB EVAL
Patient is a 69 year old male presenting with major depressive disorder with auditory hallucinations and increased paranoia (psychosis). Found to have left peroneal DVT, on Xarelto

## 2024-09-16 LAB
ANION GAP SERPL CALC-SCNC: 11 MMOL/L — SIGNIFICANT CHANGE UP (ref 7–14)
BUN SERPL-MCNC: 15 MG/DL — SIGNIFICANT CHANGE UP (ref 7–23)
CALCIUM SERPL-MCNC: 9.5 MG/DL — SIGNIFICANT CHANGE UP (ref 8.4–10.5)
CHLORIDE SERPL-SCNC: 109 MMOL/L — HIGH (ref 98–107)
CO2 SERPL-SCNC: 23 MMOL/L — SIGNIFICANT CHANGE UP (ref 22–31)
CREAT SERPL-MCNC: 0.91 MG/DL — SIGNIFICANT CHANGE UP (ref 0.5–1.3)
EGFR: 92 ML/MIN/1.73M2 — SIGNIFICANT CHANGE UP
GLUCOSE SERPL-MCNC: 93 MG/DL — SIGNIFICANT CHANGE UP (ref 70–99)
HCT VFR BLD CALC: 42.4 % — SIGNIFICANT CHANGE UP (ref 39–50)
HGB BLD-MCNC: 14.6 G/DL — SIGNIFICANT CHANGE UP (ref 13–17)
MCHC RBC-ENTMCNC: 30.3 PG — SIGNIFICANT CHANGE UP (ref 27–34)
MCHC RBC-ENTMCNC: 34.4 GM/DL — SIGNIFICANT CHANGE UP (ref 32–36)
MCV RBC AUTO: 88 FL — SIGNIFICANT CHANGE UP (ref 80–100)
MRSA PCR RESULT.: SIGNIFICANT CHANGE UP
NRBC # BLD: 0 /100 WBCS — SIGNIFICANT CHANGE UP (ref 0–0)
NRBC # FLD: 0 K/UL — SIGNIFICANT CHANGE UP (ref 0–0)
PLATELET # BLD AUTO: 255 K/UL — SIGNIFICANT CHANGE UP (ref 150–400)
POTASSIUM SERPL-MCNC: 4.3 MMOL/L — SIGNIFICANT CHANGE UP (ref 3.5–5.3)
POTASSIUM SERPL-SCNC: 4.3 MMOL/L — SIGNIFICANT CHANGE UP (ref 3.5–5.3)
RBC # BLD: 4.82 M/UL — SIGNIFICANT CHANGE UP (ref 4.2–5.8)
RBC # FLD: 14.7 % — HIGH (ref 10.3–14.5)
S AUREUS DNA NOSE QL NAA+PROBE: SIGNIFICANT CHANGE UP
SODIUM SERPL-SCNC: 143 MMOL/L — SIGNIFICANT CHANGE UP (ref 135–145)
WBC # BLD: 6.57 K/UL — SIGNIFICANT CHANGE UP (ref 3.8–10.5)
WBC # FLD AUTO: 6.57 K/UL — SIGNIFICANT CHANGE UP (ref 3.8–10.5)

## 2024-09-16 PROCEDURE — 99232 SBSQ HOSP IP/OBS MODERATE 35: CPT

## 2024-09-16 RX ORDER — OLANZAPINE 2.5 MG
2.5 TABLET ORAL EVERY 6 HOURS
Refills: 0 | Status: DISCONTINUED | OUTPATIENT
Start: 2024-09-16 | End: 2024-09-30

## 2024-09-16 RX ORDER — CHLORHEXIDINE GLUCONATE ORAL RINSE 1.2 MG/ML
1 SOLUTION DENTAL DAILY
Refills: 0 | Status: DISCONTINUED | OUTPATIENT
Start: 2024-09-16 | End: 2024-09-16

## 2024-09-16 RX ORDER — APIXABAN 5 MG/1
2 TABLET, FILM COATED ORAL
Qty: 72 | Refills: 0
Start: 2024-09-16 | End: 2024-10-03

## 2024-09-16 RX ORDER — RIVAROXABAN 10 MG/1
1 TABLET, FILM COATED ORAL
Qty: 60 | Refills: 0
Start: 2024-09-16 | End: 2024-10-15

## 2024-09-16 RX ADMIN — Medication 0.5 MILLIGRAM(S): at 17:55

## 2024-09-16 RX ADMIN — RIVAROXABAN 15 MILLIGRAM(S): 10 TABLET, FILM COATED ORAL at 09:09

## 2024-09-16 RX ADMIN — RIVAROXABAN 15 MILLIGRAM(S): 10 TABLET, FILM COATED ORAL at 17:55

## 2024-09-16 RX ADMIN — Medication 20 MILLIGRAM(S): at 21:35

## 2024-09-16 RX ADMIN — Medication 2 TABLET(S): at 21:36

## 2024-09-16 RX ADMIN — Medication 0.5 MILLIGRAM(S): at 05:53

## 2024-09-16 RX ADMIN — Medication 10 MILLIGRAM(S): at 09:09

## 2024-09-16 RX ADMIN — Medication 225 MILLIGRAM(S): at 12:16

## 2024-09-16 NOTE — DIETITIAN INITIAL EVALUATION ADULT - PERTINENT LABORATORY DATA
09-16    143  |  109<H>  |  15  ----------------------------<  93  4.3   |  23  |  0.91    Ca    9.5      16 Sep 2024 06:05    A1C with Estimated Average Glucose Result: 5.2 % (09-15-24 @ 06:50)

## 2024-09-16 NOTE — DISCHARGE NOTE PROVIDER - NSDCCPCAREPLAN_GEN_ALL_CORE_FT
PRINCIPAL DISCHARGE DIAGNOSIS  Diagnosis: Schizoaffective disorder, bipolar type  Assessment and Plan of Treatment: take your medication as prescribed and follow up with psychiatrist      SECONDARY DISCHARGE DIAGNOSES  Diagnosis: DVT, lower extremity  Assessment and Plan of Treatment: take the medication and follow up with pcp    Diagnosis: Alcohol use disorder, mild, in early remission  Assessment and Plan of Treatment:      PRINCIPAL DISCHARGE DIAGNOSIS  Diagnosis: Schizoaffective disorder, bipolar type  Assessment and Plan of Treatment: take your medication as prescribed and follow up with psychiatrist      SECONDARY DISCHARGE DIAGNOSES  Diagnosis: Alcohol use disorder, mild, in early remission  Assessment and Plan of Treatment:     Diagnosis: DVT, lower extremity  Assessment and Plan of Treatment: take the medication and follow up with pcp    Diagnosis: Urinary tract infection with hematuria  Assessment and Plan of Treatment: you were treated for a UTI and had an episode of blood in your urine.  FOllow up with your primary care doctor after [facility] to see if a urology evaluation is appropriate; You had a ct scan and sonogram of your kidneys which were normal

## 2024-09-16 NOTE — PROGRESS NOTE ADULT - SUBJECTIVE AND OBJECTIVE BOX
[facility]  Division of Hospital Medicine  Destiny Bravo MD  Pager: [phone]      Patient is a 69y old  Male who presents with a chief complaint of MDD with auditory hallucinations and increased paranoia (psychosis), dvt (15 Sep 2024 15:25)      SUBJECTIVE / OVERNIGHT EVENTS:  ADDITIONAL REVIEW OF SYSTEMS:    MEDICATIONS  (STANDING):  amLODIPine   Tablet 10 milliGRAM(s) Oral with breakfast  lactated ringers. 1000 milliLiter(s) (100 mL/Hr) IV Continuous <Continuous>  LORazepam     Tablet 0.5 milliGRAM(s) Oral two times a day  OLANZapine 20 milliGRAM(s) Oral at bedtime  rivaroxaban 15 milliGRAM(s) Oral two times a day with meals  senna 2 Tablet(s) Oral at bedtime  venlafaxine XR. 225 milliGRAM(s) Oral daily    MEDICATIONS  (PRN):  acetaminophen     Tablet .. 650 milliGRAM(s) Oral every 6 hours PRN Temp greater or equal to 38C (100.4F), Mild Pain (1 - 3)  aluminum hydroxide/magnesium hydroxide/simethicone Suspension 30 milliLiter(s) Oral every 4 hours PRN Dyspepsia  melatonin 3 milliGRAM(s) Oral at bedtime PRN Insomnia  ondansetron Injectable 4 milliGRAM(s) IV Push every 8 hours PRN Nausea and/or Vomiting      CAPILLARY BLOOD GLUCOSE        I&O's Summary      PHYSICAL EXAM:  Vital Signs Last 24 Hrs  T(C): 36.5 (16 Sep 2024 09:09), Max: 36.9 (15 Sep 2024 18:00)  T(F): 97.7 (16 Sep 2024 09:09), Max: 98.4 (15 Sep 2024 18:00)  HR: 84 (16 Sep 2024 09:09) (70 - 95)  BP: 134/90 (16 Sep 2024 09:09) (105/74 - 135/99)  BP(mean): --  RR: 18 (16 Sep 2024 09:09) (17 - 18)  SpO2: 100% (16 Sep 2024 09:09) (96% - 100%)    Parameters below as of 16 Sep 2024 09:09  Patient On (Oxygen Delivery Method): room air      CONSTITUTIONAL: NAD, well-developed, well-groomed  EYES: PERRLA; conjunctiva and sclera clear  ENMT: Moist oral mucosa, no pharyngeal injection or exudates; normal dentition  NECK: Supple, no palpable masses; no thyromegaly  RESPIRATORY: Normal respiratory effort; lungs are clear to auscultation bilaterally  CARDIOVASCULAR: Regular rate and rhythm, normal S1 and S2, no murmur/rub/gallop; No lower extremity edema; Peripheral pulses are 2+ bilaterally  ABDOMEN: Nontender to palpation, normoactive bowel sounds, no rebound/guarding; No hepatosplenomegaly  MUSCULOSKELETAL:  Normal gait; no clubbing or cyanosis of digits; no joint swelling or tenderness to palpation  PSYCH: A+O to person, place, and time; affect appropriate  NEUROLOGY: CN 2-12 are intact and symmetric; no gross sensory deficits   SKIN: No rashes; no palpable lesions    LABS:                        14.6   6.57  )-----------( 255      ( 16 Sep 2024 06:05 )             42.4     09-16    143  |  109<H>  |  15  ----------------------------<  93  4.3   |  23  |  0.91    Ca    9.5      16 Sep 2024 06:05    TPro  7.3  /  Alb  4.0  /  TBili  0.8  /  DBili  x   /  AST  16  /  ALT  16  /  AlkPhos  113  09-14          Urinalysis Basic - ( 16 Sep 2024 06:05 )    Color: x / Appearance: x / SG: x / pH: x  Gluc: 93 mg/dL / Ketone: x  / Bili: x / Urobili: x   Blood: x / Protein: x / Nitrite: x   Leuk Esterase: x / RBC: x / WBC x   Sq Epi: x / Non Sq Epi: x / Bacteria: x          RADIOLOGY & ADDITIONAL TESTS:  Results Reviewed:   Imaging Personally Reviewed:  Electrocardiogram Personally Reviewed:    COORDINATION OF CARE:  Care Discussed with Consultants/Other Providers [Y/N]:  Prior or Outpatient Records Reviewed [Y/N]:   [facility]  Division of Hospital Medicine  Destiny Bravo MD  Pager: [phone]      Patient is a 69y old  Male who presents with a chief complaint of MDD with auditory hallucinations and increased paranoia (psychosis), dvt (15 Sep 2024 15:25)      SUBJECTIVE / OVERNIGHT EVENTS: Patient examined at bedside. No pain or SOB. Pt with reported isolation to his room prior to admission. But no recent procedures or long trips noted. Awaiting [facility] bed   ADDITIONAL REVIEW OF SYSTEMS:    MEDICATIONS  (STANDING):  amLODIPine   Tablet 10 milliGRAM(s) Oral with breakfast  lactated ringers. 1000 milliLiter(s) (100 mL/Hr) IV Continuous <Continuous>  LORazepam     Tablet 0.5 milliGRAM(s) Oral two times a day  OLANZapine 20 milliGRAM(s) Oral at bedtime  rivaroxaban 15 milliGRAM(s) Oral two times a day with meals  senna 2 Tablet(s) Oral at bedtime  venlafaxine XR. 225 milliGRAM(s) Oral daily    MEDICATIONS  (PRN):  acetaminophen     Tablet .. 650 milliGRAM(s) Oral every 6 hours PRN Temp greater or equal to 38C (100.4F), Mild Pain (1 - 3)  aluminum hydroxide/magnesium hydroxide/simethicone Suspension 30 milliLiter(s) Oral every 4 hours PRN Dyspepsia  melatonin 3 milliGRAM(s) Oral at bedtime PRN Insomnia  ondansetron Injectable 4 milliGRAM(s) IV Push every 8 hours PRN Nausea and/or Vomiting      CAPILLARY BLOOD GLUCOSE        I&O's Summary      PHYSICAL EXAM:  Vital Signs Last 24 Hrs  T(C): 36.5 (16 Sep 2024 09:09), Max: 36.9 (15 Sep 2024 18:00)  T(F): 97.7 (16 Sep 2024 09:09), Max: 98.4 (15 Sep 2024 18:00)  HR: 84 (16 Sep 2024 09:09) (70 - 95)  BP: 134/90 (16 Sep 2024 09:09) (105/74 - 135/99)  BP(mean): --  RR: 18 (16 Sep 2024 09:09) (17 - 18)  SpO2: 100% (16 Sep 2024 09:09) (96% - 100%)    Parameters below as of 16 Sep 2024 09:09  Patient On (Oxygen Delivery Method): room air        CONSTITUTIONAL: Middle age man in  NAD  EYES: PERRLA  ENMT: Moist oral mucosa, normal dentition  NECK: Supple, no thyromegaly  RESPIRATORY: Normal respiratory effort; lungs are clear to auscultation bilaterally  CARDIOVASCULAR: S1 and S2 +ve, no murmur/rub/gallop;   ABDOMEN: Nontender to palpation, normoactive bowel sounds, no rebound/guarding;  EXTREMITIES: no significant pedal edema.    NEUROLOGY: CN 2-12 are intact and symmetric; no gross motor or sensory deficits   PSYCH: A+O to person, place, and time; depressive affect  SKIN: No rashes; no palpable lesions    LABS:                        14.6   6.57  )-----------( 255      ( 16 Sep 2024 06:05 )             42.4     09-16    143  |  109<H>  |  15  ----------------------------<  93  4.3   |  23  |  0.91    Ca    9.5      16 Sep 2024 06:05    TPro  7.3  /  Alb  4.0  /  TBili  0.8  /  DBili  x   /  AST  16  /  ALT  16  /  AlkPhos  113  09-14          Urinalysis Basic - ( 16 Sep 2024 06:05 )    Color: x / Appearance: x / SG: x / pH: x  Gluc: 93 mg/dL / Ketone: x  / Bili: x / Urobili: x   Blood: x / Protein: x / Nitrite: x   Leuk Esterase: x / RBC: x / WBC x   Sq Epi: x / Non Sq Epi: x / Bacteria: x          RADIOLOGY & ADDITIONAL TESTS:  Results Reviewed:   Imaging Personally Reviewed:  Electrocardiogram Personally Reviewed:    COORDINATION OF CARE:  Care Discussed with Consultants/Other Providers [Y/N]:  Prior or Outpatient Records Reviewed [Y/N]:

## 2024-09-16 NOTE — BH CONSULTATION LIAISON PROGRESS NOTE - NSBHASSESSMENTFT_PSY_ALL_CORE
70 yo M, domiciled at [facility], , no children, retired from job at [employer] in 40s, with PPH of schizoaffective disorder (bipolar type with psychotic features), one past suicide attempt in 1992 via cutting wrists (found by mother and brought in to hospital), multiple past psychiatric hospitalizations (last [facility] 2S June - Aug 2023), substance use hx (ETOH-last used April 2024, h/o 3 DUIs in 40s; cocaine- last used 20 yrs ago), and PMH of HTN who presents to [facility] ED BIB sister from [facility] due to 2 months of worsening depression, psychosis (AH, paranoia), and poor attention to ADLs.  Today, patient presents with hopelessness, AH with neutral commands, paranoia, delusions of being sent to [facility], social withdrawal, poor ADLs, and passive SI in the context of stopping outpatient ECT in May 2024 and recent medication changes. Presentation is consistent with exacerbation of schizoaffective disorder, current episode depressed with psychosis. Per collateral from sister, patient was previously functioning much better while receiving ECT treatments q3wks. Patient is at elevated acute risk of harm to self due to worsening symptoms, poor ADLs, and passive SI. He requires inpatient psych admission for medication titration, safety planning, and possible re-initiation of ECT.  9/16: Pt AnOx3 to person, place, and time. Reports increasing anhedonia and hopelessness. Pt describes AH which promote feelings of depression and paranoia that he is be watched. Reports feeling safe in the hospital and denies command hallucinations. Admits to passive SI but denies current intent or plan. Denies HI or visual hallucinations. Admits to continued craving for alcohol but states it is controlled because of lack of access.     PLAN  - Admit voluntary vs involuntary to [facility], discuss with patient further  - Continue home meds of Effexor 225 mg daily, Zyprexa 20 mg HS  - Consider starting naltrexone for alcohol use disorder (still reporting cravings, last drink April 2024)  - Medical: HTN - continue amlodipine 10 mg daily; ambulates independently, no CPAP need; UTox +PCP but thought to be false positive from Effexor (pt denies use)  - PRNs: Ativan 0.5 mg PO HS as needed for agitation/anxiety (taking for last 6 days at [facility]), Zyprexa 2.5 mg PO/IM q6h for agitation schizoaffective disorder, depression and psyhcosis 70 yo M, domiciled at [facility], , no children, retired from job at [employer] in 40s, with PPH of schizoaffective disorder (bipolar type with psychotic features), one past suicide attempt in 1992 via cutting wrists (found by mother and brought in to hospital), multiple past psychiatric hospitalizations (last [facility] 2S June - Aug 2023), substance use hx (ETOH-last used April 2024, h/o 3 DUIs in 40s; cocaine- last used 20 yrs ago), and PMH of HTN who presents to [facility] ED BIB sister from [facility] due to 2 months of worsening depression, psychosis (AH, paranoia), and poor attention to ADLs.    Today, patient presents with hopelessness, AH with neutral commands, paranoia, delusions of being sent to intermediate, social withdrawal, poor ADLs, and passive SI in the context of stopping outpatient ECT in May 2024 and recent medication changes. Presentation is consistent with exacerbation of schizoaffective disorder, current episode depressed with psychosis. Per collateral from sister, patient was previously functioning much better while receiving ECT treatments q3wks. Patient is at elevated acute risk of harm to self due to worsening symptoms, poor ADLs, and passive SI. He requires inpatient psych admission for medication titration, safety planning, and possible re-initiation of ECT.    9/16: Pt AnOx3 to person, place, and time. Reports increasing anhedonia and hopelessness. Pt describes AH which promote feelings of depression and paranoia that he is be watched. Reports feeling safe in the hospital and denies command hallucinations. Admits to passive SI but denies current intent or plan. Denies HI or visual hallucinations. Admits to continued craving for alcohol but states it is controlled because of lack of access.     PLAN  - Admit voluntary vs involuntary to [facility], discuss with patient further  - Continue home meds of Effexor 225 mg daily, Zyprexa 20 mg HS  - Consider starting naltrexone for alcohol use disorder (still reporting cravings, last drink April 2024)  - Medical: HTN - continue amlodipine 10 mg daily; ambulates independently, no CPAP need; UTox +PCP but thought to be false positive from Effexor (pt denies use)  - PRNs: Zyprexa 2.5 mg PO/IM q6h for agitation schizoaffective disorder, depression and psyhcosis

## 2024-09-16 NOTE — DISCHARGE NOTE PROVIDER - ATTENDING DISCHARGE PHYSICAL EXAMINATION:
pt seen and examined by me  pt seen resting in bed, reports feeling anxious  ate 1 pancake from breakfast tray; with encouragement, pt got up and ate some oatmeal  VSS  CHEST b/l AE  ABD +bs soft  a/w decomp schizoaffective d/o  needs further inpt psych stabilization  medically stable for dc to [facility]  handoff given to [facility]  await covid swab

## 2024-09-16 NOTE — DISCHARGE NOTE PROVIDER - HOSPITAL COURSE
69 yr old male presenting with MDD with auditory hallucinations and increased paranoia (psychosis)      Schizoaffective disorder, bipolar type.    Chronic unstable as presenting with increased auditory hallucinations and paranoia with anhedonia   Psyc on board    voluntary to inpt psych -- pending [facility] bed availability.     Continue home meds of Effexor 225 mg daily, Zyprexa was increased to 25 mg HS.  ativan bid, added remeron for appetite on 9/20    PRNs: zyprexa  Utox- + PCP ( per BH likely false positive from Effexor, pt also denies illicit drug use)  Discussed with psych attending on 9/19 about pt' poor intake since zyprexa was increased last night would not want to add new meds, nsg to encourage pt to eat. rsoelia added remeron for appetite on 9/20, cont to monitor po intake  unable to go to [facility] due to poor po intake at this time.     DVT, lower extremity.    New LLE peroneal DVT  Started on Xarelto - will continue.   - Outpatient follow up for new DVT. While with reported isolation to room prior to admission, No clear provoking factors for DVT  [ ] Price check for DOAC - Eliquis has a copay of 190$ with deduductible after deductible will be 47$ a month. Patient needs prior auth for Xarelto. Prior team Discussed coumadin with patient but concerned with compliance and ability to monitor INR  - switched to eliquis.     Benign essential HTN.   Chronic stable  Continue amlodipine 10mg daily  bp acceptable.          69 yr old male presenting with MDD with auditory hallucinations and increased paranoia (psychosis)    Hospital course as detailed below:     1. Schizoaffective disorder, bipolar type.   - Chronic unstable as presenting with increased auditory hallucinations and paranoia with anhedonia.   - Psych following.   > voluntary to inpt psych -- now when UTI treatment noted below is complete.   > continue meds Effexor 225 mg daily, Zyprexa 25 mg HS. Ativan bid, added Remeron for appetite on 9/20. PRN Haldol 5mg PO/IM q6h for agitation.   > Remeron increased from 7.5mg QHS to 15mg QHS 9/27/24 per psych recs.   - Utox- + PCP ( per  likely false positive from Effexor, pt also denies illicit drug use)  - Transfer to [facility] pending improved PO intake.   - Also pt was started on Ativan BID on 9/19, uncertain reason? Was prescribed outpatient as Ativan 0.5mg QHS PRN (ISTOP [id number]). Changed to QHS (x3, until 9/28), then taper to QHS PRN 9/29 (home dose) to avoid benzo dependence.    2. DVT, lower extremity.   - New LLE peroneal DVT  - Pt was started on Xarelto but switched to Eliquis with transient load; discussed with pharmacy.   - Eliquis 5mg BID.   - Outpatient follow up w/ hematology for new DVT.     3. Hematuria.   - 9/24 pt noted to have hematuria overnight, UA concerning for UTI. No WBC, no fever, but positive L CVA tenderness.   - Urine was sent for culture and pt was empirically started on Rocephin.   - Culture resulted negative and tenderness resolves.   - CT A/P w/ no infection. L side renal cyst noted; will order renal US to evaluate simple vs complex and to r/o cause of any hematuria  - Renal US normal; no stone noted; no other cause of hematuria identified.   - Will advise outpt urology follow up for possible cysto.    4. Hypertension. Continued Amlodipine 10mg QD.   5. HLD: not on  meds.     Patient stable for transfer to [facility]. 69 yr old male presenting with MDD with auditory hallucinations and increased paranoia (psychosis)    Hospital course as detailed below:     1. Schizoaffective disorder, bipolar type.   - Chronic unstable as presenting with increased auditory hallucinations and paranoia with anhedonia.   - Psych following.   > voluntary to inpt psych -- now when UTI treatment noted below is complete.   > continue meds Effexor 225 mg daily, Zyprexa 25 mg HS. Ativan bid, added Remeron for appetite on 9/20. PRN Haldol 5mg PO/IM q6h for agitation.   > Remeron increased from 7.5mg QHS to 15mg QHS 9/27/24 per psych recs.   - Utox- + PCP ( per  likely false positive from Effexor, pt also denies illicit drug use)  - Transfer to [facility] pending improved PO intake.   - Also pt was started on Ativan BID on 9/19, uncertain reason? Was prescribed outpatient as Ativan 0.5mg QHS PRN (ISTOP [id number]). Changed to QHS (x3, until 9/28), then taper to QHS PRN 9/29 (home dose) to avoid benzo dependence.    2. DVT, lower extremity.   - New LLE peroneal DVT  - Pt was started on Xarelto but switched to Eliquis with transient load; discussed with pharmacy.   - Eliquis 5mg BID.   - Outpatient follow up w/ hematology for new DVT.     3. Hematuria.   - 9/24 pt noted to have hematuria overnight, UA concerning for UTI. No WBC, no fever, but positive L CVA tenderness.   - Urine was sent for culture and pt was empirically started on Rocephin.   - Culture resulted negative and tenderness resolves.   - CT A/P w/ no infection. L side renal cyst noted; will order renal US to evaluate simple vs complex and to r/o cause of any hematuria  - Renal US normal; no stone noted; no other cause of hematuria identified.   - Will advise outpt urology follow up for possible cysto.    4. Hypertension. Continued Amlodipine 10mg QD.   5. HLD: not on  meds.     Patient stable for transfer to [facility].     ___________________________  Addendum:   EKG obtained 9/30pm, qtc 558  Will hold zyprexa  Psych called to make aware  Will discharge pt to [facility]. [facility] hospitalist made aware of prolonged qtc.   Dr. Correa aware and in agreement with RYAN.  - Rianna Rivas PA-C 69 yr old male presenting with MDD with auditory hallucinations and increased paranoia (psychosis)    Hospital course as detailed below:     1. Schizoaffective disorder, bipolar type.   - Chronic unstable as presenting with increased auditory hallucinations and paranoia with anhedonia.   - Psych following.   > voluntary to inpt psych -- now when UTI treatment noted below is complete.   > continue meds Effexor 225 mg daily, Zyprexa 25 mg HS. Ativan bid, added Remeron for appetite on 9/20. PRN Haldol 5mg PO/IM q6h for agitation.   > Remeron increased from 7.5mg QHS to 15mg QHS 9/27/24 per psych recs.   - Utox- + PCP ( per  likely false positive from Effexor, pt also denies illicit drug use)  - Transfer to [facility] pending improved PO intake.   - Also pt was started on Ativan BID on 9/19, uncertain reason? Was prescribed outpatient as Ativan 0.5mg QHS PRN (ISTOP [id number]). Changed to QHS (x3, until 9/28), then taper to QHS PRN 9/29 (home dose) to avoid benzo dependence.    2. DVT, lower extremity.   - New LLE peroneal DVT  - Pt was started on Xarelto but switched to Eliquis with transient load; discussed with pharmacy.   - Eliquis 5mg BID.   - Outpatient follow up w/ hematology for new DVT.     3. Hematuria.   - 9/24 pt noted to have hematuria overnight, UA concerning for UTI. No WBC, no fever, but positive L CVA tenderness.   - Urine was sent for culture and pt was empirically started on Rocephin.   - Culture resulted negative and tenderness resolves.   - CT A/P w/ no infection. L side renal cyst noted; will order renal US to evaluate simple vs complex and to r/o cause of any hematuria  - Renal US normal; no stone noted; no other cause of hematuria identified.   - Will advise outpt urology follow up for possible cysto.    4. Hypertension. Continued Amlodipine 10mg QD.   5. HLD: not on  meds.     Patient stable for transfer to [facility].     ___________________________  Addendum:   EKG obtained 9/30pm, qtc 558  EP called to manually calculate, manual qtc  566  Will hold zyprexa  Psych called to make aware  Will discharge pt to [facility]. [facility] hospitalist made aware of prolonged qtc.   Dr. Correa aware and in agreement with RYAN.  - Rianna Rivas PA-C

## 2024-09-16 NOTE — BH CONSULTATION LIAISON PROGRESS NOTE - NSBHCHARTREVIEWLAB_PSY_A_CORE FT
CBC Full  -  ( 16 Sep 2024 06:05 )  WBC Count : 6.57 K/uL  RBC Count : 4.82 M/uL  Hemoglobin : 14.6 g/dL  Hematocrit : 42.4 %  Platelet Count - Automated : 255 K/uL  Mean Cell Volume : 88.0 fL  Mean Cell Hemoglobin : 30.3 pg  Mean Cell Hemoglobin Concentration : 34.4 gm/dL  Auto Neutrophil # : x  Auto Lymphocyte # : x  Auto Monocyte # : x  Auto Eosinophil # : x  Auto Basophil # : x  Auto Neutrophil % : x  Auto Lymphocyte % : x  Auto Monocyte % : x  Auto Eosinophil % : x  Auto Basophil % : x  09-16    143  |  109<H>  |  15  ----------------------------<  93  4.3   |  23  |  0.91    Ca    9.5      16 Sep 2024 06:05

## 2024-09-16 NOTE — DIETITIAN INITIAL EVALUATION ADULT - REASON FOR ADMISSION
Per chart, Pt is 69 yr old male with a pmh of HTN, HLD, MDD, schizoeffective disorder who presents with progressive Auditory hallucinations that tell him negative things and paranoia regarding staff/ppl not liking him at his assisted living facility. Detailed collateral as per  note but in summary pt with increased paranoia, auditory hallucinations, anhedonia, decreased PO intake and not performing ADLs (bathing).

## 2024-09-16 NOTE — DIETITIAN INITIAL EVALUATION ADULT - PERTINENT MEDS FT
MEDICATIONS  (STANDING):  amLODIPine   Tablet 10 milliGRAM(s) Oral with breakfast  lactated ringers. 1000 milliLiter(s) (100 mL/Hr) IV Continuous <Continuous>  LORazepam     Tablet 0.5 milliGRAM(s) Oral two times a day  OLANZapine 20 milliGRAM(s) Oral at bedtime  rivaroxaban 15 milliGRAM(s) Oral two times a day with meals  senna 2 Tablet(s) Oral at bedtime  venlafaxine XR. 225 milliGRAM(s) Oral daily    MEDICATIONS  (PRN):  acetaminophen     Tablet .. 650 milliGRAM(s) Oral every 6 hours PRN Temp greater or equal to 38C (100.4F), Mild Pain (1 - 3)  aluminum hydroxide/magnesium hydroxide/simethicone Suspension 30 milliLiter(s) Oral every 4 hours PRN Dyspepsia  melatonin 3 milliGRAM(s) Oral at bedtime PRN Insomnia  ondansetron Injectable 4 milliGRAM(s) IV Push every 8 hours PRN Nausea and/or Vomiting

## 2024-09-16 NOTE — BH CONSULTATION LIAISON PROGRESS NOTE - NSBHFUPINTERVALHXFT_PSY_A_CORE
Pt seen at bedside, AnOx3 to person, place, and time. States that he is not doing well, describes poor sleep (1-2 hours per night) due to racing thoughts, anhedonia, lack of appetite, hopelessness, and guilt about threatening another resident at his assisted living facility when the resident appeared to be picking on a female resident. Pt denies that he would ever cause harm to others but describes paranoia that "people are talking about me" because of this interaction.      Pt states he feels safe in the hospital but he may still be under surveillance via people with masks. He admits to daily auditory hallucinations. Specifically, voices which put him down/worsen mood and increase his paranoia of being watched. Denies command AH. Admits to passive SI but denies current intention or plan. States that nothing can help him and that he is not interested in pursuing ECT because it did not improve his mood. Denies HI or visual hallucinations. Denies smoking or illicit drug use but admits to drinking a whole bottle of scotch in April. States that he no longer has access to alcohol but admits to cravings.

## 2024-09-16 NOTE — DIETITIAN INITIAL EVALUATION ADULT - OTHER CALCULATIONS
Defer fluid needs to MD/Team.  Ideal Body Weight: 166lbs / 75.2kg +/-10%. IBW used to calculate nutritional estimated needs.   As per Jered HILIZZIE wt hx: 89.8kg (May, 2023), ?73.9kg (Jul, 2023), ABW 99.8kg (9/14/24). Questionable weight accuracy. Obtain daily weight to verify accuracy.

## 2024-09-16 NOTE — DIETITIAN INITIAL EVALUATION ADULT - ADD RECOMMEND
- Continue current diet order, which remains appropriate at this time.   - Please consistently document % PO intake in nursing flowsheets to assess adequacy of nutritional intake/monitor need for further nutritional intervention(s).   - Monitor weights, diet tolerance, skin integrity, BMs, pertinent labs.   - RDN services remain available as needed.   - Nutrition department to honor food preferences as feasible.

## 2024-09-16 NOTE — DISCHARGE NOTE PROVIDER - CARE PROVIDER_API CALL
Bhupendra Cohen  Hematology  [address] [place] [id number]  Phone: [phone]  Fax: [phone]  Follow Up Time:     Urologist,   [facility]  Urology at the [address] [address], [place] [address]  [phone]  Phone: (   )    -  Fax: (   )    -  Follow Up Time:

## 2024-09-16 NOTE — DISCHARGE NOTE PROVIDER - NSDCMRMEDTOKEN_GEN_ALL_CORE_FT
amLODIPine 10 mg oral tablet: 1 tab(s) orally once a day  Ativan 0.5 mg oral tablet: 1 tab(s) orally once a day (at bedtime) as needed for  agitation  rivaroxaban 15 mg oral tablet: 1 tab(s) orally 2 times a day (with meals)  senna (sennosides) 8.6 mg oral tablet: 2 tab(s) orally once a day (at bedtime)  venlafaxine 225 mg oral tablet, extended release: 1 tab(s) orally once a day  ZyPREXA 20 mg oral tablet: 1 tab(s) orally once a day (at bedtime)   amLODIPine 10 mg oral tablet: 1 tab(s) orally once a day  Ativan 0.5 mg oral tablet: 1 tab(s) orally once a day (at bedtime) as needed for  agitation  Eliquis Starter Pack for Treatment of DVT and PE 5 mg oral tablet: 2 tab(s) orally 2 times a day for 5 days, then decrease to 1 tab by mouth twice daily  rivaroxaban 15 mg oral tablet: 1 tab(s) orally 2 times a day (with meals)  senna (sennosides) 8.6 mg oral tablet: 2 tab(s) orally once a day (at bedtime)  venlafaxine 225 mg oral tablet, extended release: 1 tab(s) orally once a day  ZyPREXA 20 mg oral tablet: 1 tab(s) orally once a day (at bedtime)   amLODIPine 10 mg oral tablet: 1 tab(s) orally once a day  Ativan 0.5 mg oral tablet: 1 tab(s) orally once a day (at bedtime) as needed for  agitation  Eliquis Starter Pack for Treatment of DVT and PE 5 mg oral tablet: 2 tab(s) orally 2 times a day for 5 days, then decrease to 1 tab by mouth twice daily  rivaroxaban 15 mg oral tablet: 1 tab(s) orally 2 times a day (with meals)  senna (sennosides) 8.6 mg oral tablet: 2 tab(s) orally once a day (at bedtime)  venlafaxine 225 mg oral tablet, extended release: 1 tab(s) orally once a day  Xarelto Starter Pack 15 mg-20 mg oral tablet: 1 tab(s) orally 2 times a day  ZyPREXA 20 mg oral tablet: 1 tab(s) orally once a day (at bedtime)   amLODIPine 10 mg oral tablet: 1 tab(s) orally once a day  apixaban 5 mg oral tablet: 1 tab(s) orally 2 times a day take 2 tabs BID  thru 10/1, on 10/2 change to 1 tab BID  mirtazapine 15 mg oral tablet: 1 tab(s) orally once a day (at bedtime)  OLANZapine 5 mg oral tablet: 5 tab(s) orally once a day (at bedtime)  senna (sennosides) 8.6 mg oral tablet: 2 tab(s) orally once a day (at bedtime)  venlafaxine 225 mg oral tablet, extended release: 1 tab(s) orally once a day   amLODIPine 10 mg oral tablet: 1 tab(s) orally once a day  apixaban 5 mg oral tablet: 1 tab(s) orally 2 times a day take 2 tabs BID  thru 10/1, on 10/2 change to 1 tab BID  mirtazapine 15 mg oral tablet: 1 tab(s) orally once a day (at bedtime)  senna (sennosides) 8.6 mg oral tablet: 2 tab(s) orally once a day (at bedtime)  venlafaxine 225 mg oral tablet, extended release: 1 tab(s) orally once a day

## 2024-09-16 NOTE — DIETITIAN INITIAL EVALUATION ADULT - ORAL INTAKE PTA/DIET HISTORY
Pt lives at assisted living. Pt receives three meals a day. Pt reported okay appetite/PO intake PTA. Pt reported UBW 220lbs PTA.

## 2024-09-16 NOTE — DISCHARGE NOTE PROVIDER - PROVIDER TOKENS
PROVIDER:[TOKEN:[[id number]]],FREE:[LAST:[Urologist],PHONE:[(   )    -],FAX:[(   )    -],ADDRESS:[[facility]  [place] [address]  [phone]]]

## 2024-09-16 NOTE — DIETITIAN INITIAL EVALUATION ADULT - OTHER INFO
Patient seen at bedside. No recent episodes of nausea, vomiting, diarrhea or constipation. Per Pt, last BM 9/16. Ordered for bowel regimen: Senna 2 tables qHS. Denies any chewing/swallowing difficulties. Of note, Pt with poor dentition. Pt ordered for Easy to Chew, Of note, this facility does not provide easy to chew and Soft and Bite sized is being providedd. No food allergies reported. No food preferences verbalized at this time.

## 2024-09-16 NOTE — BH CONSULTATION LIAISON PROGRESS NOTE - NSBHCHARTREVIEWVS_PSY_A_CORE FT
Vital Signs Last 24 Hrs  T(C): 36.5 (16 Sep 2024 09:09), Max: 36.9 (15 Sep 2024 18:00)  T(F): 97.7 (16 Sep 2024 09:09), Max: 98.4 (15 Sep 2024 18:00)  HR: 84 (16 Sep 2024 09:09) (70 - 95)  BP: 134/90 (16 Sep 2024 09:09) (105/74 - 134/90)  BP(mean): --  ABP: --  ABP(mean): --  RR: 18 (16 Sep 2024 09:09) (17 - 18)  SpO2: 100% (16 Sep 2024 09:09) (96% - 100%)    O2 Parameters below as of 16 Sep 2024 09:09  Patient On (Oxygen Delivery Method): room air

## 2024-09-16 NOTE — PROGRESS NOTE ADULT - PROBLEM SELECTOR PLAN 2
New LLE peroneal DVT  Started on Xarelto - will continue.   - Outpatient follow up for new DVT New LLE peroneal DVT  Started on Xarelto - will continue.   - Outpatient follow up for new DVT. While with reported isolation to room prior to admission, No clear provoking factors for DVT  [ ] Price check for DOAC

## 2024-09-16 NOTE — DISCHARGE NOTE PROVIDER - NSDCFUADDAPPT_GEN_ALL_CORE_FT
Please follow up with hematology after discharge regarding the blood clot in your leg and continuing Eliquis. You should not stop Eliquis on your own. It can be life threatening.     Please also follow up with urology after discharge due to blood seen in your urine. This should be worked up outpatient and a possible cystoscopy may be done.

## 2024-09-16 NOTE — DIETITIAN INITIAL EVALUATION ADULT - NS FNS DIET ORDER
Diet, Regular:   DASH/TLC {Sodium & Cholesterol Restricted} (DASH)  Easy to Chew (EASYTOCHEW) (09-14-24 @ 20:22) [Active]

## 2024-09-17 LAB
ANION GAP SERPL CALC-SCNC: 15 MMOL/L — HIGH (ref 7–14)
BUN SERPL-MCNC: 12 MG/DL — SIGNIFICANT CHANGE UP (ref 7–23)
CALCIUM SERPL-MCNC: 9.1 MG/DL — SIGNIFICANT CHANGE UP (ref 8.4–10.5)
CHLORIDE SERPL-SCNC: 107 MMOL/L — SIGNIFICANT CHANGE UP (ref 98–107)
CO2 SERPL-SCNC: 22 MMOL/L — SIGNIFICANT CHANGE UP (ref 22–31)
CREAT SERPL-MCNC: 0.87 MG/DL — SIGNIFICANT CHANGE UP (ref 0.5–1.3)
EGFR: 95 ML/MIN/1.73M2 — SIGNIFICANT CHANGE UP
GLUCOSE SERPL-MCNC: 85 MG/DL — SIGNIFICANT CHANGE UP (ref 70–99)
HCT VFR BLD CALC: 46.3 % — SIGNIFICANT CHANGE UP (ref 39–50)
HGB BLD-MCNC: 15.9 G/DL — SIGNIFICANT CHANGE UP (ref 13–17)
MAGNESIUM SERPL-MCNC: 2 MG/DL — SIGNIFICANT CHANGE UP (ref 1.6–2.6)
MCHC RBC-ENTMCNC: 30.1 PG — SIGNIFICANT CHANGE UP (ref 27–34)
MCHC RBC-ENTMCNC: 34.3 GM/DL — SIGNIFICANT CHANGE UP (ref 32–36)
MCV RBC AUTO: 87.5 FL — SIGNIFICANT CHANGE UP (ref 80–100)
NRBC # BLD: 0 /100 WBCS — SIGNIFICANT CHANGE UP (ref 0–0)
NRBC # FLD: 0 K/UL — SIGNIFICANT CHANGE UP (ref 0–0)
PHOSPHATE SERPL-MCNC: 2.7 MG/DL — SIGNIFICANT CHANGE UP (ref 2.5–4.5)
PLATELET # BLD AUTO: 263 K/UL — SIGNIFICANT CHANGE UP (ref 150–400)
POTASSIUM SERPL-MCNC: 3.9 MMOL/L — SIGNIFICANT CHANGE UP (ref 3.5–5.3)
POTASSIUM SERPL-SCNC: 3.9 MMOL/L — SIGNIFICANT CHANGE UP (ref 3.5–5.3)
RBC # BLD: 5.29 M/UL — SIGNIFICANT CHANGE UP (ref 4.2–5.8)
RBC # FLD: 15 % — HIGH (ref 10.3–14.5)
SARS-COV-2 RNA SPEC QL NAA+PROBE: SIGNIFICANT CHANGE UP
SODIUM SERPL-SCNC: 144 MMOL/L — SIGNIFICANT CHANGE UP (ref 135–145)
WBC # BLD: 5.89 K/UL — SIGNIFICANT CHANGE UP (ref 3.8–10.5)
WBC # FLD AUTO: 5.89 K/UL — SIGNIFICANT CHANGE UP (ref 3.8–10.5)

## 2024-09-17 PROCEDURE — 99232 SBSQ HOSP IP/OBS MODERATE 35: CPT

## 2024-09-17 RX ORDER — RIVAROXABAN 10 MG/1
1 TABLET, FILM COATED ORAL
Qty: 42 | Refills: 0
Start: 2024-09-17 | End: 2024-10-07

## 2024-09-17 RX ADMIN — Medication 0.5 MILLIGRAM(S): at 17:28

## 2024-09-17 RX ADMIN — Medication 0.5 MILLIGRAM(S): at 05:37

## 2024-09-17 RX ADMIN — Medication 10 MILLIGRAM(S): at 09:36

## 2024-09-17 RX ADMIN — RIVAROXABAN 15 MILLIGRAM(S): 10 TABLET, FILM COATED ORAL at 17:28

## 2024-09-17 RX ADMIN — Medication 225 MILLIGRAM(S): at 12:24

## 2024-09-17 RX ADMIN — Medication 20 MILLIGRAM(S): at 21:20

## 2024-09-17 RX ADMIN — RIVAROXABAN 15 MILLIGRAM(S): 10 TABLET, FILM COATED ORAL at 09:36

## 2024-09-17 RX ADMIN — Medication 2 TABLET(S): at 21:20

## 2024-09-17 NOTE — PROGRESS NOTE ADULT - PROBLEM SELECTOR PLAN 2
New LLE peroneal DVT  Started on Xarelto - will continue.   - Outpatient follow up for new DVT. While with reported isolation to room prior to admission, No clear provoking factors for DVT  [ ] Price check for DOAC New LLE peroneal DVT  Started on Xarelto - will continue.   - Outpatient follow up for new DVT. While with reported isolation to room prior to admission, No clear provoking factors for DVT  [ ] Price check for DOAC - Eliquis has a copay of 190$ with deduductible after deductible will be 47$ a month. Patient needs prior auth for Xarelto. Will follow up co-pay if PA is apporved with Xarelto. Discussed coumadin with patient but concerned with compliance and ability to monitor INR

## 2024-09-17 NOTE — BH CONSULTATION LIAISON PROGRESS NOTE - NSBHCHARTREVIEWVS_PSY_A_CORE FT
Vital Signs Last 24 Hrs  T(C): 36.6 (17 Sep 2024 05:05), Max: 36.7 (16 Sep 2024 21:02)  T(F): 97.9 (17 Sep 2024 05:05), Max: 98.1 (16 Sep 2024 21:02)  HR: 78 (17 Sep 2024 05:05) (66 - 78)  BP: 133/79 (17 Sep 2024 05:05) (118/85 - 133/79)  BP(mean): --  RR: 18 (17 Sep 2024 05:05) (18 - 18)  SpO2: 98% (17 Sep 2024 05:05) (97% - 98%)    Parameters below as of 17 Sep 2024 05:05  Patient On (Oxygen Delivery Method): room air

## 2024-09-17 NOTE — BH CONSULTATION LIAISON PROGRESS NOTE - NSBHFUPINTERVALHXFT_PSY_A_CORE
Pt seen at bedside, calm and cooperative. Reports unchanged mood, continues to describe anhedonia and lack of appetite due to inability to taste. Sleep is still poor, difficulty both with going to sleep and staying asleep due to racing thoughts.     Pt last heard AH "a couple of days ago", and describes it as one or multiple voices inside pt's head which "tell me to be careful". Denies AH or command AH today. Continues to endorse feelings of paranoia that people are watching him. Reports VH, shadows in vision throughout the day.     Pt is amenable to go to [facility] for evaluation, wonders when he can go.     Denies SI/HI, states he feels safe in the hospital.

## 2024-09-17 NOTE — PROGRESS NOTE ADULT - SUBJECTIVE AND OBJECTIVE BOX
[facility]  Division of Hospital Medicine  Destiny Bravo MD  Pager: [phone]      Patient is a 69y old  Male who presents with a chief complaint of Per chart, Pt is 69 yr old male with a pmh of HTN, HLD, MDD, schizoeffective disorder who presents with progressive Auditory hallucinations that tell him negative things and paranoia regarding staff/ppl not liking him at his assisted living facility. Detailed collateral as per  note but in summary pt with increased paranoia, auditory hallucinations, anhedonia, decreased PO intake and not performing ADLs (bathing). (16 Sep 2024 16:20)      SUBJECTIVE / OVERNIGHT EVENTS:  ADDITIONAL REVIEW OF SYSTEMS:    MEDICATIONS  (STANDING):  amLODIPine   Tablet 10 milliGRAM(s) Oral with breakfast  lactated ringers. 1000 milliLiter(s) (100 mL/Hr) IV Continuous <Continuous>  LORazepam     Tablet 0.5 milliGRAM(s) Oral two times a day  OLANZapine 20 milliGRAM(s) Oral at bedtime  rivaroxaban 15 milliGRAM(s) Oral two times a day with meals  senna 2 Tablet(s) Oral at bedtime  venlafaxine XR. 225 milliGRAM(s) Oral daily    MEDICATIONS  (PRN):  acetaminophen     Tablet .. 650 milliGRAM(s) Oral every 6 hours PRN Temp greater or equal to 38C (100.4F), Mild Pain (1 - 3)  aluminum hydroxide/magnesium hydroxide/simethicone Suspension 30 milliLiter(s) Oral every 4 hours PRN Dyspepsia  melatonin 3 milliGRAM(s) Oral at bedtime PRN Insomnia  OLANZapine 2.5 milliGRAM(s) Oral every 6 hours PRN agitation  OLANZapine Injectable 2.5 milliGRAM(s) IntraMuscular every 6 hours PRN agitation if pt refuses PO  ondansetron Injectable 4 milliGRAM(s) IV Push every 8 hours PRN Nausea and/or Vomiting      CAPILLARY BLOOD GLUCOSE        I&O's Summary      PHYSICAL EXAM:  Vital Signs Last 24 Hrs  T(C): 36.8 (17 Sep 2024 09:30), Max: 36.8 (17 Sep 2024 09:30)  T(F): 98.2 (17 Sep 2024 09:30), Max: 98.2 (17 Sep 2024 09:30)  HR: 79 (17 Sep 2024 09:30) (66 - 79)  BP: 122/87 (17 Sep 2024 09:30) (118/85 - 133/79)  BP(mean): --  RR: 18 (17 Sep 2024 09:30) (18 - 18)  SpO2: 100% (17 Sep 2024 09:30) (97% - 100%)    Parameters below as of 17 Sep 2024 09:30  Patient On (Oxygen Delivery Method): room air      CONSTITUTIONAL: NAD, well-developed, well-groomed  EYES: PERRLA; conjunctiva and sclera clear  ENMT: Moist oral mucosa, no pharyngeal injection or exudates; normal dentition  NECK: Supple, no palpable masses; no thyromegaly  RESPIRATORY: Normal respiratory effort; lungs are clear to auscultation bilaterally  CARDIOVASCULAR: Regular rate and rhythm, normal S1 and S2, no murmur/rub/gallop; No lower extremity edema; Peripheral pulses are 2+ bilaterally  ABDOMEN: Nontender to palpation, normoactive bowel sounds, no rebound/guarding; No hepatosplenomegaly  MUSCULOSKELETAL:  Normal gait; no clubbing or cyanosis of digits; no joint swelling or tenderness to palpation  PSYCH: A+O to person, place, and time; affect appropriate  NEUROLOGY: CN 2-12 are intact and symmetric; no gross sensory deficits   SKIN: No rashes; no palpable lesions    LABS:                        15.9   5.89  )-----------( 263      ( 17 Sep 2024 07:50 )             46.3     09-17    144  |  107  |  12  ----------------------------<  85  3.9   |  22  |  0.87    Ca    9.1      17 Sep 2024 07:50  Phos  2.7     09-17  Mg     2.00     09-17            Urinalysis Basic - ( 17 Sep 2024 07:50 )    Color: x / Appearance: x / SG: x / pH: x  Gluc: 85 mg/dL / Ketone: x  / Bili: x / Urobili: x   Blood: x / Protein: x / Nitrite: x   Leuk Esterase: x / RBC: x / WBC x   Sq Epi: x / Non Sq Epi: x / Bacteria: x          RADIOLOGY & ADDITIONAL TESTS:  Results Reviewed:   Imaging Personally Reviewed:  Electrocardiogram Personally Reviewed:    COORDINATION OF CARE:  Care Discussed with Consultants/Other Providers [Y/N]:  Prior or Outpatient Records Reviewed [Y/N]:   [facility]  Division of Hospital Medicine  Destiny Bravo MD  Pager: [phone]      Patient is a 69y old  Male who presents with a chief complaint of Per chart, Pt is 69 yr old male with a pmh of HTN, HLD, MDD, schizoeffective disorder who presents with progressive Auditory hallucinations that tell him negative things and paranoia regarding staff/ppl not liking him at his assisted living facility. Detailed collateral as per  note but in summary pt with increased paranoia, auditory hallucinations, anhedonia, decreased PO intake and not performing ADLs (bathing). (16 Sep 2024 16:20)      SUBJECTIVE / OVERNIGHT EVENTS: Patient examined at bedside. Reports that he cannot afford 140 co pay for eliquis. Will explore other options for DOAC. patient denies recent surgery. Reports hip replacement in december but no long term travel,   ADDITIONAL REVIEW OF SYSTEMS:    MEDICATIONS  (STANDING):  amLODIPine   Tablet 10 milliGRAM(s) Oral with breakfast  lactated ringers. 1000 milliLiter(s) (100 mL/Hr) IV Continuous <Continuous>  LORazepam     Tablet 0.5 milliGRAM(s) Oral two times a day  OLANZapine 20 milliGRAM(s) Oral at bedtime  rivaroxaban 15 milliGRAM(s) Oral two times a day with meals  senna 2 Tablet(s) Oral at bedtime  venlafaxine XR. 225 milliGRAM(s) Oral daily    MEDICATIONS  (PRN):  acetaminophen     Tablet .. 650 milliGRAM(s) Oral every 6 hours PRN Temp greater or equal to 38C (100.4F), Mild Pain (1 - 3)  aluminum hydroxide/magnesium hydroxide/simethicone Suspension 30 milliLiter(s) Oral every 4 hours PRN Dyspepsia  melatonin 3 milliGRAM(s) Oral at bedtime PRN Insomnia  OLANZapine 2.5 milliGRAM(s) Oral every 6 hours PRN agitation  OLANZapine Injectable 2.5 milliGRAM(s) IntraMuscular every 6 hours PRN agitation if pt refuses PO  ondansetron Injectable 4 milliGRAM(s) IV Push every 8 hours PRN Nausea and/or Vomiting      CAPILLARY BLOOD GLUCOSE        I&O's Summary      PHYSICAL EXAM:  Vital Signs Last 24 Hrs  T(C): 36.8 (17 Sep 2024 09:30), Max: 36.8 (17 Sep 2024 09:30)  T(F): 98.2 (17 Sep 2024 09:30), Max: 98.2 (17 Sep 2024 09:30)  HR: 79 (17 Sep 2024 09:30) (66 - 79)  BP: 122/87 (17 Sep 2024 09:30) (118/85 - 133/79)  BP(mean): --  RR: 18 (17 Sep 2024 09:30) (18 - 18)  SpO2: 100% (17 Sep 2024 09:30) (97% - 100%)    Parameters below as of 17 Sep 2024 09:30  Patient On (Oxygen Delivery Method): room air      CONSTITUTIONAL: Middle age man in  NAD  EYES: PERRLA  ENMT: Moist oral mucosa, normal dentition  NECK: Supple, no thyromegaly  RESPIRATORY: Normal respiratory effort; lungs are clear to auscultation bilaterally  CARDIOVASCULAR: S1 and S2 +ve, no murmur/rub/gallop;   ABDOMEN: Nontender to palpation, normoactive bowel sounds, no rebound/guarding;  EXTREMITIES: no significant pedal edema.    NEUROLOGY: CN 2-12 are intact and symmetric; no gross motor or sensory deficits   PSYCH: A+O to person, place, and time; depressive affect  SKIN: No rashes; no palpable lesions    LABS:                        15.9   5.89  )-----------( 263      ( 17 Sep 2024 07:50 )             46.3     09-17    144  |  107  |  12  ----------------------------<  85  3.9   |  22  |  0.87    Ca    9.1      17 Sep 2024 07:50  Phos  2.7     09-17  Mg     2.00     09-17            Urinalysis Basic - ( 17 Sep 2024 07:50 )    Color: x / Appearance: x / SG: x / pH: x  Gluc: 85 mg/dL / Ketone: x  / Bili: x / Urobili: x   Blood: x / Protein: x / Nitrite: x   Leuk Esterase: x / RBC: x / WBC x   Sq Epi: x / Non Sq Epi: x / Bacteria: x          RADIOLOGY & ADDITIONAL TESTS:  Results Reviewed:   Imaging Personally Reviewed:  Electrocardiogram Personally Reviewed:    COORDINATION OF CARE:  Care Discussed with Consultants/Other Providers [Y/N]:  Prior or Outpatient Records Reviewed [Y/N]:

## 2024-09-17 NOTE — BH CONSULTATION LIAISON PROGRESS NOTE - NSBHASSESSMENTFT_PSY_ALL_CORE
70 yo M, domiciled at [facility], , no children, retired from job at [employer] in 40s, with PPH of schizoaffective disorder (bipolar type with psychotic features), one past suicide attempt in 1992 via cutting wrists (found by mother and brought in to hospital), multiple past psychiatric hospitalizations (last [facility] 2S June - Aug 2023), substance use hx (ETOH-last used April 2024, h/o 3 DUIs in 40s; cocaine- last used 20 yrs ago), and PMH of HTN who presents to [facility] ED BIB sister from [facility] due to 2 months of worsening depression, psychosis (AH, paranoia), and poor attention to ADLs.    Today, patient presents with hopelessness, AH with neutral commands, paranoia, delusions of being sent to [facility], social withdrawal, poor ADLs, and passive SI in the context of stopping outpatient ECT in May 2024 and recent medication changes. Presentation is consistent with exacerbation of schizoaffective disorder, current episode depressed with psychosis. Per collateral from sister, patient was previously functioning much better while receiving ECT treatments q3wks. Patient is at elevated acute risk of harm to self due to worsening symptoms, poor ADLs, and passive SI. He requires inpatient psych admission for medication titration, safety planning, and possible re-initiation of ECT.    9/16: Pt AnOx3 to person, place, and time. Reports increasing anhedonia and hopelessness. Pt describes AH which promote feelings of depression and paranoia that he is be watched. Reports feeling safe in the hospital and denies command hallucinations. Admits to passive SI but denies current intent or plan. Denies HI or visual hallucinations. Admits to continued craving for alcohol but states it is controlled because of lack of access.   9/17: Continues to endorse poor mood, sleep, and feelings of paranoia/being watched. Describes VH of shadows throughout the day. Pt amenable to [facility] admission. Denies current AH, command hallucinations, SI or HI.     PLAN  - Admit voluntary to [facility]  - Continue home meds of Effexor 225 mg daily, Zyprexa 20 mg HS  - Consider starting naltrexone for alcohol use disorder (still reporting cravings, last drink April 2024)  - Medical: HTN - continue amlodipine 10 mg daily; ambulates independently, no CPAP need; UTox +PCP but thought to be false positive from Effexor (pt denies use)  - PRNs: Zyprexa 2.5 mg PO/IM q6h for agitation schizoaffective disorder, depression and psychosis

## 2024-09-18 PROCEDURE — 99232 SBSQ HOSP IP/OBS MODERATE 35: CPT

## 2024-09-18 RX ORDER — OLANZAPINE 2.5 MG
25 TABLET ORAL AT BEDTIME
Refills: 0 | Status: DISCONTINUED | OUTPATIENT
Start: 2024-09-18 | End: 2024-09-30

## 2024-09-18 RX ADMIN — Medication 225 MILLIGRAM(S): at 12:18

## 2024-09-18 RX ADMIN — Medication 0.5 MILLIGRAM(S): at 18:27

## 2024-09-18 RX ADMIN — RIVAROXABAN 15 MILLIGRAM(S): 10 TABLET, FILM COATED ORAL at 18:27

## 2024-09-18 RX ADMIN — RIVAROXABAN 15 MILLIGRAM(S): 10 TABLET, FILM COATED ORAL at 08:35

## 2024-09-18 RX ADMIN — Medication 2 TABLET(S): at 22:13

## 2024-09-18 RX ADMIN — Medication 25 MILLIGRAM(S): at 22:12

## 2024-09-18 RX ADMIN — Medication 0.5 MILLIGRAM(S): at 05:01

## 2024-09-18 RX ADMIN — Medication 10 MILLIGRAM(S): at 08:35

## 2024-09-18 NOTE — PROGRESS NOTE ADULT - SUBJECTIVE AND OBJECTIVE BOX
Patient is a 69y old  Male who presents with a chief complaint of MDD with auditory hallucinations and increased paranoia (psychosis), dvt (17 Sep 2024 09:49)      SUBJECTIVE / OVERNIGHT EVENTS: Pt seen and examined at 11:45am, no overnight events, denies any physical complaints, no other new issues reported.    MEDICATIONS  (STANDING):  amLODIPine   Tablet 10 milliGRAM(s) Oral with breakfast  lactated ringers. 1000 milliLiter(s) (100 mL/Hr) IV Continuous <Continuous>  LORazepam     Tablet 0.5 milliGRAM(s) Oral two times a day  OLANZapine 20 milliGRAM(s) Oral at bedtime  rivaroxaban 15 milliGRAM(s) Oral two times a day with meals  senna 2 Tablet(s) Oral at bedtime  venlafaxine XR. 225 milliGRAM(s) Oral daily    MEDICATIONS  (PRN):  acetaminophen     Tablet .. 650 milliGRAM(s) Oral every 6 hours PRN Temp greater or equal to 38C (100.4F), Mild Pain (1 - 3)  aluminum hydroxide/magnesium hydroxide/simethicone Suspension 30 milliLiter(s) Oral every 4 hours PRN Dyspepsia  melatonin 3 milliGRAM(s) Oral at bedtime PRN Insomnia  OLANZapine 2.5 milliGRAM(s) Oral every 6 hours PRN agitation  OLANZapine Injectable 2.5 milliGRAM(s) IntraMuscular every 6 hours PRN agitation if pt refuses PO  ondansetron Injectable 4 milliGRAM(s) IV Push every 8 hours PRN Nausea and/or Vomiting      Vital Signs Last 24 Hrs  T(C): 36.7 (18 Sep 2024 10:40), Max: 36.8 (17 Sep 2024 18:17)  T(F): 98 (18 Sep 2024 10:40), Max: 98.2 (17 Sep 2024 18:17)  HR: 97 (18 Sep 2024 10:40) (81 - 97)  BP: 131/96 (18 Sep 2024 10:40) (120/78 - 142/98)  BP(mean): --  RR: 18 (18 Sep 2024 10:40) (18 - 18)  SpO2: 98% (18 Sep 2024 10:40) (96% - 98%)    Parameters below as of 18 Sep 2024 10:40  Patient On (Oxygen Delivery Method): room air      CAPILLARY BLOOD GLUCOSE        I&O's Summary      PHYSICAL EXAM:  GENERAL: NAD  CHEST/LUNG: Clear to auscultation bilaterally; No wheeze  HEART: Regular rate and rhythm  ABDOMEN: Soft, Nontender, Nondistended  EXTREMITIES: no LE edema  PSYCH: Calm  NEUROLOGY: AAOx3  SKIN: No rashes or lesions    LABS:                        15.9   5.89  )-----------( 263      ( 17 Sep 2024 07:50 )             46.3     09-17    144  |  107  |  12  ----------------------------<  85  3.9   |  22  |  0.87    Ca    9.1      17 Sep 2024 07:50  Phos  2.7     09-17  Mg     2.00     09-17            Urinalysis Basic - ( 17 Sep 2024 07:50 )    Color: x / Appearance: x / SG: x / pH: x  Gluc: 85 mg/dL / Ketone: x  / Bili: x / Urobili: x   Blood: x / Protein: x / Nitrite: x   Leuk Esterase: x / RBC: x / WBC x   Sq Epi: x / Non Sq Epi: x / Bacteria: x        RADIOLOGY & ADDITIONAL TESTS:    Imaging Personally Reviewed:    Consultant(s) Notes Reviewed:      Care Discussed with Consultants/Other Providers:

## 2024-09-18 NOTE — BH CONSULTATION LIAISON PROGRESS NOTE - NSBHASSESSMENTFT_PSY_ALL_CORE
70 yo M, domiciled at [facility], , no children, retired from job at [employer] in 40s, with PPH of schizoaffective disorder (bipolar type with psychotic features), one past suicide attempt in 1992 via cutting wrists (found by mother and brought in to hospital), multiple past psychiatric hospitalizations (last [facility] 2S June - Aug 2023), substance use hx (ETOH-last used April 2024, h/o 3 DUIs in 40s; cocaine- last used 20 yrs ago), and PMH of HTN who presents to [facility] ED BIB sister from [facility] due to 2 months of worsening depression, psychosis (AH, paranoia), and poor attention to ADLs.    Today, patient presents with hopelessness, AH with neutral commands, paranoia, delusions of being sent to detention, social withdrawal, poor ADLs, and passive SI in the context of stopping outpatient ECT in May 2024 and recent medication changes. Presentation is consistent with exacerbation of schizoaffective disorder, current episode depressed with psychosis. Per collateral from sister, patient was previously functioning much better while receiving ECT treatments q3wks. Patient is at elevated acute risk of harm to self due to worsening symptoms, poor ADLs, and passive SI. He requires inpatient psych admission for medication titration, safety planning, and possible re-initiation of ECT.    9/16: Pt AnOx3 to person, place, and time. Reports increasing anhedonia and hopelessness. Pt describes AH which promote feelings of depression and paranoia that he is be watched. Reports feeling safe in the hospital and denies command hallucinations. Admits to passive SI but denies current intent or plan. Denies HI or visual hallucinations. Admits to continued craving for alcohol but states it is controlled because of lack of access.   9/17: Continues to endorse poor mood, sleep, and feelings of paranoia/being watched. Describes VH of shadows throughout the day. Pt amenable to [facility] admission. Denies current AH, command hallucinations, SI or HI.   9/18: Continues to report poor mood, appetite, and sleep. Describes worsening paranoia and racing thoughts, states that he is being watched by everyone because he will be sent to detention for life. Denies that there might be another explanation for these events or that these beliefs are abnormal. Verbalizes understanding of plan to go to [facility]. Denies AVH, SI/I/P, HI    PLAN  - OK to shower, please assist as needed  - Admit voluntary to [facility]  - Continue home meds of Effexor 225 mg daily  - INCREASE Zyprexa from 20 mg to 25 mg HS  - Consider starting naltrexone for alcohol use disorder (still reporting cravings, last drink April 2024)  - Medical: HTN - continue amlodipine 10 mg daily; ambulates independently, no CPAP need; UTox +PCP but thought to be false positive from Effexor (pt denies use)  - PRNs: Haldol 5 mg PO/IM q6h for agitation schizoaffective disorder, depression and psychosis

## 2024-09-18 NOTE — BH CONSULTATION LIAISON PROGRESS NOTE - NSBHFUPINTERVALHXFT_PSY_A_CORE
Pt seen at bedside. Continues to report poor mood, appetite, and sleep. Describes worsening paranoia and racing thoughts, states that he is being watched by everyone, including earlier when at the assisted living facility by couples who "did not belong there" because they seemed too healthy and that his room mate was using his cell phone to monitor his movements. Pt states that he is being watched because he will be sent to senior living for previous "felonies like my DUI". Reports that he received two DUIs, the most recent about 15 years ago, and that after having a third strike, he will be sent to senior living for life. States he does not feel like his room is a hospital room because people are constantly coming and going and giving him "dirty looks". Denies that there might be another explanation for these events or that these beliefs are abnormal.     Verbalizes understanding of plan to go to [facility].     Denies AVH, SI/I/P, HI.

## 2024-09-18 NOTE — PROGRESS NOTE ADULT - PROBLEM SELECTOR PLAN 5
Pt endorses intermittent alcohol cravings prior - consider naloxone  No reported cravings today.    Plan discussed with ACP

## 2024-09-18 NOTE — PROGRESS NOTE ADULT - PROBLEM SELECTOR PLAN 2
New LLE peroneal DVT  Started on Xarelto - will continue.   - Outpatient follow up for new DVT. While with reported isolation to room prior to admission, No clear provoking factors for DVT  [ ] Price check for DOAC - Eliquis has a copay of 190$ with deduductible after deductible will be 47$ a month. Patient needs prior auth for Xarelto. Will follow up co-pay if PA is apporved with Xarelto. Prior team Discussed coumadin with patient but concerned with compliance and ability to monitor INR

## 2024-09-18 NOTE — BH CONSULTATION LIAISON PROGRESS NOTE - NSBHCHARTREVIEWVS_PSY_A_CORE FT
Vital Signs Last 24 Hrs  T(C): 36.4 (18 Sep 2024 04:55), Max: 36.8 (17 Sep 2024 18:17)  T(F): 97.6 (18 Sep 2024 04:55), Max: 98.2 (17 Sep 2024 18:17)  HR: 95 (18 Sep 2024 04:55) (81 - 95)  BP: 142/98 (18 Sep 2024 04:55) (120/78 - 142/98)  BP(mean): --  RR: 18 (18 Sep 2024 04:55) (18 - 18)  SpO2: 98% (18 Sep 2024 04:55) (96% - 98%)    Parameters below as of 18 Sep 2024 04:55  Patient On (Oxygen Delivery Method): room air

## 2024-09-19 LAB
ANION GAP SERPL CALC-SCNC: 14 MMOL/L — SIGNIFICANT CHANGE UP (ref 7–14)
BUN SERPL-MCNC: 13 MG/DL — SIGNIFICANT CHANGE UP (ref 7–23)
CALCIUM SERPL-MCNC: 9.1 MG/DL — SIGNIFICANT CHANGE UP (ref 8.4–10.5)
CHLORIDE SERPL-SCNC: 104 MMOL/L — SIGNIFICANT CHANGE UP (ref 98–107)
CO2 SERPL-SCNC: 22 MMOL/L — SIGNIFICANT CHANGE UP (ref 22–31)
CREAT SERPL-MCNC: 0.91 MG/DL — SIGNIFICANT CHANGE UP (ref 0.5–1.3)
EGFR: 92 ML/MIN/1.73M2 — SIGNIFICANT CHANGE UP
GLUCOSE SERPL-MCNC: 80 MG/DL — SIGNIFICANT CHANGE UP (ref 70–99)
HCT VFR BLD CALC: 48.3 % — SIGNIFICANT CHANGE UP (ref 39–50)
HGB BLD-MCNC: 16.7 G/DL — SIGNIFICANT CHANGE UP (ref 13–17)
MAGNESIUM SERPL-MCNC: 2.1 MG/DL — SIGNIFICANT CHANGE UP (ref 1.6–2.6)
MCHC RBC-ENTMCNC: 30 PG — SIGNIFICANT CHANGE UP (ref 27–34)
MCHC RBC-ENTMCNC: 34.6 GM/DL — SIGNIFICANT CHANGE UP (ref 32–36)
MCV RBC AUTO: 86.9 FL — SIGNIFICANT CHANGE UP (ref 80–100)
NRBC # BLD: 0 /100 WBCS — SIGNIFICANT CHANGE UP (ref 0–0)
NRBC # FLD: 0 K/UL — SIGNIFICANT CHANGE UP (ref 0–0)
PHOSPHATE SERPL-MCNC: 3.4 MG/DL — SIGNIFICANT CHANGE UP (ref 2.5–4.5)
PLATELET # BLD AUTO: 305 K/UL — SIGNIFICANT CHANGE UP (ref 150–400)
POTASSIUM SERPL-MCNC: 3.7 MMOL/L — SIGNIFICANT CHANGE UP (ref 3.5–5.3)
POTASSIUM SERPL-SCNC: 3.7 MMOL/L — SIGNIFICANT CHANGE UP (ref 3.5–5.3)
RBC # BLD: 5.56 M/UL — SIGNIFICANT CHANGE UP (ref 4.2–5.8)
RBC # FLD: 14.7 % — HIGH (ref 10.3–14.5)
SODIUM SERPL-SCNC: 140 MMOL/L — SIGNIFICANT CHANGE UP (ref 135–145)
WBC # BLD: 6.7 K/UL — SIGNIFICANT CHANGE UP (ref 3.8–10.5)
WBC # FLD AUTO: 6.7 K/UL — SIGNIFICANT CHANGE UP (ref 3.8–10.5)

## 2024-09-19 PROCEDURE — 99232 SBSQ HOSP IP/OBS MODERATE 35: CPT

## 2024-09-19 RX ADMIN — RIVAROXABAN 15 MILLIGRAM(S): 10 TABLET, FILM COATED ORAL at 08:17

## 2024-09-19 RX ADMIN — Medication 25 MILLIGRAM(S): at 21:50

## 2024-09-19 RX ADMIN — RIVAROXABAN 15 MILLIGRAM(S): 10 TABLET, FILM COATED ORAL at 17:46

## 2024-09-19 RX ADMIN — Medication 10 MILLIGRAM(S): at 08:17

## 2024-09-19 RX ADMIN — Medication 2 TABLET(S): at 21:50

## 2024-09-19 RX ADMIN — Medication 0.5 MILLIGRAM(S): at 17:46

## 2024-09-19 RX ADMIN — Medication 0.5 MILLIGRAM(S): at 05:40

## 2024-09-19 RX ADMIN — Medication 225 MILLIGRAM(S): at 12:05

## 2024-09-19 NOTE — PROGRESS NOTE ADULT - PROBLEM SELECTOR PLAN 3
Chronic stable  Continue amlodipine 10mg daily  if bp persists to be elevated will add metoprolol low dose

## 2024-09-19 NOTE — BH CONSULTATION LIAISON PROGRESS NOTE - NSBHFUPINTERVALHXFT_PSY_A_CORE
Pt seen at bedside, sitting up. Reports worsening mood and continued poor sleep and appetite. Continues to endorse feelings of paranoia that he will be taken to nursing home because "fewer people are coming in and out of the room". Denies AVH, command hallucinations, SI/I/P, HI.      Verbalizes understanding of plan to go to [facility].  Pt seen at bedside, sitting up. Reports worsening mood and continued poor sleep and appetite due to racing thoughts. Continues to endorse feelings of paranoia that he will be taken to care home because "fewer people are coming in and out of the room". Pt amenable to washing via shower or sponge bath, may need invitation from staff. Denies AVH, command hallucinations, SI/I/P, HI.      Verbalizes understanding of plan to go to [facility].  Pt seen at bedside, sitting up. Reports worsening mood and continued poor sleep and appetite due to racing thoughts. Continues to endorse feelings of paranoia that he will be taken to longterm because "fewer people are coming in and out of the room". Pt may need help with washing/ADLs, please have staff assist. Denies AVH, command hallucinations, SI/I/P, HI.      Verbalizes understanding of plan to go to [facility].  Pt seen at bedside, sitting up. Reports worsening mood and continued poor sleep and appetite due to racing thoughts. Continues to endorse feelings of paranoia that he will be taken to long term because "fewer people are coming in and out of the room". Pt expresses desire for staff to assist with showering/ADLs. Denies AVH, command hallucinations, SI/I/P, HI.      Verbalizes understanding of plan to go to [facility].

## 2024-09-19 NOTE — BH CONSULTATION LIAISON PROGRESS NOTE - NSBHCHARTREVIEWVS_PSY_A_CORE FT
Vital Signs Last 24 Hrs  T(C): 36.3 (19 Sep 2024 08:15), Max: 36.9 (18 Sep 2024 14:10)  T(F): 97.3 (19 Sep 2024 08:15), Max: 98.5 (18 Sep 2024 21:06)  HR: 99 (19 Sep 2024 08:15) (66 - 99)  BP: 131/103 (19 Sep 2024 08:15) (121/97 - 157/97)  BP(mean): --  RR: 18 (19 Sep 2024 08:15) (16 - 18)  SpO2: 98% (19 Sep 2024 08:15) (95% - 100%)    Parameters below as of 19 Sep 2024 08:15  Patient On (Oxygen Delivery Method): room air     No

## 2024-09-19 NOTE — PROGRESS NOTE ADULT - PROBLEM SELECTOR PLAN 2
New LLE peroneal DVT  Started on Xarelto - will continue.   - Outpatient follow up for new DVT. While with reported isolation to room prior to admission, No clear provoking factors for DVT  [ ] Price check for DOAC - Eliquis has a copay of 190$ with deduductible after deductible will be 47$ a month. Patient needs prior auth for Xarelto. Will follow up co-pay if PA is approved with Xarelto. Prior team Discussed coumadin with patient but concerned with compliance and ability to monitor INR  -Discussed with ACP about getting prior auth for xarelto for now cont xarelto

## 2024-09-19 NOTE — BH CONSULTATION LIAISON PROGRESS NOTE - NSBHASSESSMENTFT_PSY_ALL_CORE
70 yo M, domiciled at [facility], , no children, retired from job at [employer] in 40s, with PPH of schizoaffective disorder (bipolar type with psychotic features), one past suicide attempt in 1992 via cutting wrists (found by mother and brought in to hospital), multiple past psychiatric hospitalizations (last [facility] 2S June - Aug 2023), substance use hx (ETOH-last used April 2024, h/o 3 DUIs in 40s; cocaine- last used 20 yrs ago), and PMH of HTN who presents to [facility] ED BIB sister from [facility] due to 2 months of worsening depression, psychosis (AH, paranoia), and poor attention to ADLs.    Today, patient presents with hopelessness, AH with neutral commands, paranoia, delusions of being sent to skilled nursing, social withdrawal, poor ADLs, and passive SI in the context of stopping outpatient ECT in May 2024 and recent medication changes. Presentation is consistent with exacerbation of schizoaffective disorder, current episode depressed with psychosis. Per collateral from sister, patient was previously functioning much better while receiving ECT treatments q3wks. Patient is at elevated acute risk of harm to self due to worsening symptoms, poor ADLs, and passive SI. He requires inpatient psych admission for medication titration, safety planning, and possible re-initiation of ECT.    9/16: Pt AnOx3 to person, place, and time. Reports increasing anhedonia and hopelessness. Pt describes AH which promote feelings of depression and paranoia that he is be watched. Reports feeling safe in the hospital and denies command hallucinations. Admits to passive SI but denies current intent or plan. Denies HI or visual hallucinations. Admits to continued craving for alcohol but states it is controlled because of lack of access.   9/17: Continues to endorse poor mood, sleep, and feelings of paranoia/being watched. Describes VH of shadows throughout the day. Pt amenable to [facility] admission. Denies current AH, command hallucinations, SI or HI.   9/18: Continues to report poor mood, appetite, and sleep. Describes worsening paranoia and racing thoughts, states that he is being watched by everyone because he will be sent to skilled nursing for life. Denies that there might be another explanation for these events or that these beliefs are abnormal. Verbalizes understanding of plan to go to [facility]. Denies AVH, SI/I/P, HI  9/19: Reports worsening mood and continued poor sleep and appetite. Continues to endorse feelings of paranoia. Denies AVH, command hallucinations, SI/I/P, HI. No adverse effects noted from Zyprexa increase. Awaiting bed availability at [facility].      PLAN  - OK to shower, please assist as needed  - Admit voluntary to [facility]  - Continue home meds of Effexor 225 mg daily  - INCREASE Zyprexa from 20 mg to 25 mg HS  - Consider starting naltrexone for alcohol use disorder (still reporting cravings, last drink April 2024)  - Medical: HTN - continue amlodipine 10 mg daily; ambulates independently, no CPAP need; UTox +PCP but thought to be false positive from Effexor (pt denies use)  - PLEASE NOTE CHANGE IN PRN: Haldol 5 mg PO/IM q6h for agitation schizoaffective disorder, depression and psychosis    Note incomplete without attending signature  70 yo M, domiciled at [facility], , no children, retired from job at [employer] in 40s, with PPH of schizoaffective disorder (bipolar type with psychotic features), one past suicide attempt in 1992 via cutting wrists (found by mother and brought in to hospital), multiple past psychiatric hospitalizations (last [facility] 2S June - Aug 2023), substance use hx (ETOH-last used April 2024, h/o 3 DUIs in 40s; cocaine- last used 20 yrs ago), and PMH of HTN who presents to [facility] ED BIB sister from [facility] due to 2 months of worsening depression, psychosis (AH, paranoia), and poor attention to ADLs.    Today, patient presents with hopelessness, AH with neutral commands, paranoia, delusions of being sent to assisted, social withdrawal, poor ADLs, and passive SI in the context of stopping outpatient ECT in May 2024 and recent medication changes. Presentation is consistent with exacerbation of schizoaffective disorder, current episode depressed with psychosis. Per collateral from sister, patient was previously functioning much better while receiving ECT treatments q3wks. Patient is at elevated acute risk of harm to self due to worsening symptoms, poor ADLs, and passive SI. He requires inpatient psych admission for medication titration, safety planning, and possible re-initiation of ECT.    9/16: Pt AnOx3 to person, place, and time. Reports increasing anhedonia and hopelessness. Pt describes AH which promote feelings of depression and paranoia that he is be watched. Reports feeling safe in the hospital and denies command hallucinations. Admits to passive SI but denies current intent or plan. Denies HI or visual hallucinations. Admits to continued craving for alcohol but states it is controlled because of lack of access.   9/17: Continues to endorse poor mood, sleep, and feelings of paranoia/being watched. Describes VH of shadows throughout the day. Pt amenable to [facility] admission. Denies current AH, command hallucinations, SI or HI.   9/18: Continues to report poor mood, appetite, and sleep. Describes worsening paranoia and racing thoughts, states that he is being watched by everyone because he will be sent to assisted for life. Denies that there might be another explanation for these events or that these beliefs are abnormal. Verbalizes understanding of plan to go to [facility]. Denies AVH, SI/I/P, HI  9/19: Reports worsening mood and continued poor sleep and appetite. Continues to endorse feelings of paranoia. Denies AVH, command hallucinations, SI/I/P, HI. No adverse effects noted from Zyprexa increase. Awaiting bed availability at [facility].      PLAN  - OK to shower, please assist as needed  - Admit voluntary to [facility]  - Continue home meds of Effexor 225 mg daily  - c/w Zyprexa 25 mg HS  - Consider starting naltrexone for alcohol use disorder (still reporting cravings, last drink April 2024)  - Medical: HTN - continue amlodipine 10 mg daily; ambulates independently, no CPAP need; UTox +PCP but thought to be false positive from Effexor (pt denies use)  - PLEASE NOTE CHANGE IN PRN: Haldol 5 mg PO/IM q6h for agitation schizoaffective disorder, depression and psychosis    Note incomplete without attending signature  70 yo M, domiciled at [facility], , no children, retired from job at [employer] in 40s, with PPH of schizoaffective disorder (bipolar type with psychotic features), one past suicide attempt in 1992 via cutting wrists (found by mother and brought in to hospital), multiple past psychiatric hospitalizations (last [facility] 2S June - Aug 2023), substance use hx (ETOH-last used April 2024, h/o 3 DUIs in 40s; cocaine- last used 20 yrs ago), and PMH of HTN who presents to [facility] ED BIB sister from [facility] due to 2 months of worsening depression, psychosis (AH, paranoia), and poor attention to ADLs.    Today, patient presents with hopelessness, AH with neutral commands, paranoia, delusions of being sent to long term, social withdrawal, poor ADLs, and passive SI in the context of stopping outpatient ECT in May 2024 and recent medication changes. Presentation is consistent with exacerbation of schizoaffective disorder, current episode depressed with psychosis. Per collateral from sister, patient was previously functioning much better while receiving ECT treatments q3wks. Patient is at elevated acute risk of harm to self due to worsening symptoms, poor ADLs, and passive SI. He requires inpatient psych admission for medication titration, safety planning, and possible re-initiation of ECT.    9/16: Pt AnOx3 to person, place, and time. Reports increasing anhedonia and hopelessness. Pt describes AH which promote feelings of depression and paranoia that he is be watched. Reports feeling safe in the hospital and denies command hallucinations. Admits to passive SI but denies current intent or plan. Denies HI or visual hallucinations. Admits to continued craving for alcohol but states it is controlled because of lack of access.   9/17: Continues to endorse poor mood, sleep, and feelings of paranoia/being watched. Describes VH of shadows throughout the day. Pt amenable to [facility] admission. Denies current AH, command hallucinations, SI or HI.   9/18: Continues to report poor mood, appetite, and sleep. Describes worsening paranoia and racing thoughts, states that he is being watched by everyone because he will be sent to long term for life. Denies that there might be another explanation for these events or that these beliefs are abnormal. Verbalizes understanding of plan to go to [facility]. Denies AVH, SI/I/P, HI  9/19: Reports worsening mood and continued poor sleep and appetite. Continues to endorse feelings of paranoia. Denies AVH, command hallucinations, SI/I/P, HI. No adverse effects noted from Zyprexa increase. Awaiting bed availability at [facility]. Pt malodorous and requesting assistance with showering.      PLAN  - Please have nursing assist pt with showering and ADLs  - Admit voluntary to [facility]  - Continue home meds of Effexor 225 mg daily  - c/w Zyprexa 25 mg HS  - Consider starting naltrexone for alcohol use disorder (still reporting cravings, last drink April 2024)  - Medical: HTN - continue amlodipine 10 mg daily; ambulates independently, no CPAP need; UTox +PCP but thought to be false positive from Effexor (pt denies use)  - PLEASE NOTE CHANGE IN PRN: Haldol 5 mg PO/IM q6h for agitation schizoaffective disorder, depression and psychosis    Note incomplete without attending signature

## 2024-09-19 NOTE — PROGRESS NOTE ADULT - SUBJECTIVE AND OBJECTIVE BOX
Patient is a 69y old  Male who presents with a chief complaint of MDD with auditory hallucinations and increased paranoia (psychosis), dvt (18 Sep 2024 14:48)      SUBJECTIVE / OVERNIGHT EVENTS: Pt seen and examined at 11:15am, no overnight events, denies any physical complaints, per nsg pt is not eating much, only eating 25% of his meals, unable to go to [facility] as he is not taking po well,  no other new issues reported.      MEDICATIONS  (STANDING):  amLODIPine   Tablet 10 milliGRAM(s) Oral with breakfast  lactated ringers. 1000 milliLiter(s) (100 mL/Hr) IV Continuous <Continuous>  LORazepam     Tablet 0.5 milliGRAM(s) Oral two times a day  OLANZapine 25 milliGRAM(s) Oral at bedtime  rivaroxaban 15 milliGRAM(s) Oral two times a day with meals  senna 2 Tablet(s) Oral at bedtime  venlafaxine XR. 225 milliGRAM(s) Oral daily    MEDICATIONS  (PRN):  acetaminophen     Tablet .. 650 milliGRAM(s) Oral every 6 hours PRN Temp greater or equal to 38C (100.4F), Mild Pain (1 - 3)  aluminum hydroxide/magnesium hydroxide/simethicone Suspension 30 milliLiter(s) Oral every 4 hours PRN Dyspepsia  melatonin 3 milliGRAM(s) Oral at bedtime PRN Insomnia  OLANZapine 2.5 milliGRAM(s) Oral every 6 hours PRN agitation  OLANZapine Injectable 2.5 milliGRAM(s) IntraMuscular every 6 hours PRN agitation if pt refuses PO  ondansetron Injectable 4 milliGRAM(s) IV Push every 8 hours PRN Nausea and/or Vomiting      Vital Signs Last 24 Hrs  T(C): 36.3 (19 Sep 2024 08:15), Max: 36.9 (18 Sep 2024 14:10)  T(F): 97.3 (19 Sep 2024 08:15), Max: 98.5 (18 Sep 2024 21:06)  HR: 99 (19 Sep 2024 08:15) (66 - 99)  BP: 131/103 (19 Sep 2024 08:15) (121/97 - 157/97)  BP(mean): --  RR: 18 (19 Sep 2024 08:15) (16 - 18)  SpO2: 98% (19 Sep 2024 08:15) (95% - 100%)    Parameters below as of 19 Sep 2024 08:15  Patient On (Oxygen Delivery Method): room air      CAPILLARY BLOOD GLUCOSE        I&O's Summary      PHYSICAL EXAM:  GENERAL: NAD  CHEST/LUNG: Clear to auscultation bilaterally; No wheeze  HEART: Regular rate and rhythm  ABDOMEN: Soft, Nontender, Nondistended  EXTREMITIES: no LE edema  PSYCH: Calm  NEUROLOGY: Alert, awake, answers questions  SKIN: No rashes or lesions    LABS:                        16.7   6.70  )-----------( 305      ( 19 Sep 2024 07:08 )             48.3     09-19    140  |  104  |  13  ----------------------------<  80  3.7   |  22  |  0.91    Ca    9.1      19 Sep 2024 07:08  Phos  3.4     09-19  Mg     2.10     09-19            Urinalysis Basic - ( 19 Sep 2024 07:08 )    Color: x / Appearance: x / SG: x / pH: x  Gluc: 80 mg/dL / Ketone: x  / Bili: x / Urobili: x   Blood: x / Protein: x / Nitrite: x   Leuk Esterase: x / RBC: x / WBC x   Sq Epi: x / Non Sq Epi: x / Bacteria: x        RADIOLOGY & ADDITIONAL TESTS:    Imaging Personally Reviewed:    Consultant(s) Notes Reviewed:      Care Discussed with Consultants/Other Providers:

## 2024-09-20 LAB
ANION GAP SERPL CALC-SCNC: 18 MMOL/L — HIGH (ref 7–14)
BUN SERPL-MCNC: 14 MG/DL — SIGNIFICANT CHANGE UP (ref 7–23)
CALCIUM SERPL-MCNC: 9.4 MG/DL — SIGNIFICANT CHANGE UP (ref 8.4–10.5)
CHLORIDE SERPL-SCNC: 102 MMOL/L — SIGNIFICANT CHANGE UP (ref 98–107)
CO2 SERPL-SCNC: 20 MMOL/L — LOW (ref 22–31)
CREAT SERPL-MCNC: 0.98 MG/DL — SIGNIFICANT CHANGE UP (ref 0.5–1.3)
EGFR: 85 ML/MIN/1.73M2 — SIGNIFICANT CHANGE UP
GLUCOSE SERPL-MCNC: 73 MG/DL — SIGNIFICANT CHANGE UP (ref 70–99)
HCT VFR BLD CALC: 50.1 % — HIGH (ref 39–50)
HGB BLD-MCNC: 17.2 G/DL — HIGH (ref 13–17)
MAGNESIUM SERPL-MCNC: 2.1 MG/DL — SIGNIFICANT CHANGE UP (ref 1.6–2.6)
MCHC RBC-ENTMCNC: 30.1 PG — SIGNIFICANT CHANGE UP (ref 27–34)
MCHC RBC-ENTMCNC: 34.3 GM/DL — SIGNIFICANT CHANGE UP (ref 32–36)
MCV RBC AUTO: 87.7 FL — SIGNIFICANT CHANGE UP (ref 80–100)
NRBC # BLD: 0 /100 WBCS — SIGNIFICANT CHANGE UP (ref 0–0)
NRBC # FLD: 0 K/UL — SIGNIFICANT CHANGE UP (ref 0–0)
PHOSPHATE SERPL-MCNC: 3.2 MG/DL — SIGNIFICANT CHANGE UP (ref 2.5–4.5)
PLATELET # BLD AUTO: 288 K/UL — SIGNIFICANT CHANGE UP (ref 150–400)
POTASSIUM SERPL-MCNC: 4.1 MMOL/L — SIGNIFICANT CHANGE UP (ref 3.5–5.3)
POTASSIUM SERPL-SCNC: 4.1 MMOL/L — SIGNIFICANT CHANGE UP (ref 3.5–5.3)
RBC # BLD: 5.71 M/UL — SIGNIFICANT CHANGE UP (ref 4.2–5.8)
RBC # FLD: 14.8 % — HIGH (ref 10.3–14.5)
SODIUM SERPL-SCNC: 140 MMOL/L — SIGNIFICANT CHANGE UP (ref 135–145)
WBC # BLD: 6.08 K/UL — SIGNIFICANT CHANGE UP (ref 3.8–10.5)
WBC # FLD AUTO: 6.08 K/UL — SIGNIFICANT CHANGE UP (ref 3.8–10.5)

## 2024-09-20 PROCEDURE — 99232 SBSQ HOSP IP/OBS MODERATE 35: CPT

## 2024-09-20 RX ADMIN — Medication 25 MILLIGRAM(S): at 21:59

## 2024-09-20 RX ADMIN — Medication 2 TABLET(S): at 21:57

## 2024-09-20 RX ADMIN — Medication 225 MILLIGRAM(S): at 08:57

## 2024-09-20 RX ADMIN — Medication 0.5 MILLIGRAM(S): at 05:02

## 2024-09-20 RX ADMIN — RIVAROXABAN 15 MILLIGRAM(S): 10 TABLET, FILM COATED ORAL at 08:58

## 2024-09-20 RX ADMIN — Medication 10 MILLIGRAM(S): at 08:56

## 2024-09-20 RX ADMIN — RIVAROXABAN 15 MILLIGRAM(S): 10 TABLET, FILM COATED ORAL at 17:00

## 2024-09-20 RX ADMIN — Medication 0.5 MILLIGRAM(S): at 17:00

## 2024-09-20 NOTE — BH CONSULTATION LIAISON PROGRESS NOTE - NSBHFUPINTERVALHXFT_PSY_A_CORE
Pt seen at bedside, lying lateral recumbent. Continues to report poor mood and appetite; denies pain or discomfort with eating, simply that he does not have an appetite and cannot taste. Reports feeling safe, denies AVH/SI/HI.

## 2024-09-20 NOTE — PROGRESS NOTE ADULT - PROBLEM SELECTOR PLAN 2
New LLE peroneal DVT  Started on Xarelto - will continue.   - Outpatient follow up for new DVT. While with reported isolation to room prior to admission, No clear provoking factors for DVT  [ ] Price check for DOAC - Eliquis has a copay of 190$ with deduductible after deductible will be 47$ a month. Patient needs prior auth for Xarelto. Prior team Discussed coumadin with patient but concerned with compliance and ability to monitor INR  -Can switch to eliquis if issues obtaining xarelto due to insurance/prior auth

## 2024-09-20 NOTE — BH CONSULTATION LIAISON PROGRESS NOTE - NSBHCHARTREVIEWVS_PSY_A_CORE FT
Vital Signs Last 24 Hrs  T(C): 36.6 (20 Sep 2024 10:29), Max: 36.7 (19 Sep 2024 21:09)  T(F): 97.9 (20 Sep 2024 10:29), Max: 98 (19 Sep 2024 21:09)  HR: 96 (20 Sep 2024 10:29) (64 - 96)  BP: 110/90 (20 Sep 2024 10:29) (106/75 - 132/90)  BP(mean): --  RR: 18 (20 Sep 2024 10:29) (18 - 18)  SpO2: 92% (20 Sep 2024 10:29) (92% - 97%)    Parameters below as of 20 Sep 2024 10:29  Patient On (Oxygen Delivery Method): room air

## 2024-09-20 NOTE — BH CONSULTATION LIAISON PROGRESS NOTE - NSBHASSESSMENTFT_PSY_ALL_CORE
68 yo M, domiciled at [facility], , no children, retired from job at [employer] in 40s, with PPH of schizoaffective disorder (bipolar type with psychotic features), one past suicide attempt in 1992 via cutting wrists (found by mother and brought in to hospital), multiple past psychiatric hospitalizations (last [facility] 2S June - Aug 2023), substance use hx (ETOH-last used April 2024, h/o 3 DUIs in 40s; cocaine- last used 20 yrs ago), and PMH of HTN who presents to [facility] ED BIB sister from [facility] due to 2 months of worsening depression, psychosis (AH, paranoia), and poor attention to ADLs.    Today, patient presents with hopelessness, AH with neutral commands, paranoia, delusions of being sent to senior care, social withdrawal, poor ADLs, and passive SI in the context of stopping outpatient ECT in May 2024 and recent medication changes. Presentation is consistent with exacerbation of schizoaffective disorder, current episode depressed with psychosis. Per collateral from sister, patient was previously functioning much better while receiving ECT treatments q3wks. Patient is at elevated acute risk of harm to self due to worsening symptoms, poor ADLs, and passive SI. He requires inpatient psych admission for medication titration, safety planning, and possible re-initiation of ECT.    9/16: Pt AnOx3 to person, place, and time. Reports increasing anhedonia and hopelessness. Pt describes AH which promote feelings of depression and paranoia that he is be watched. Reports feeling safe in the hospital and denies command hallucinations. Admits to passive SI but denies current intent or plan. Denies HI or visual hallucinations. Admits to continued craving for alcohol but states it is controlled because of lack of access.   9/17: Continues to endorse poor mood, sleep, and feelings of paranoia/being watched. Describes VH of shadows throughout the day. Pt amenable to [facility] admission. Denies current AH, command hallucinations, SI or HI.   9/18: Continues to report poor mood, appetite, and sleep. Describes worsening paranoia and racing thoughts, states that he is being watched by everyone because he will be sent to senior care for life. Denies that there might be another explanation for these events or that these beliefs are abnormal. Verbalizes understanding of plan to go to [facility]. Denies AVH, SI/I/P, HI  9/19: Reports worsening mood and continued poor sleep and appetite. Continues to endorse feelings of paranoia. Denies AVH, command hallucinations, SI/I/P, HI. No adverse effects noted from Zyprexa increase. Awaiting bed availability at [facility]. Pt malodorous and requesting assistance with showering.    9/20: Continues to report poor mood and appetite, denies any discomfort with eating. Pt encouraged to slowly increase his portions. Denies SI/I/P, AVH, HI.      PLAN  - Please have nursing assist pt with showering and ADLs  - Admit voluntary to [facility]  -START mirtazapine 7.5 mg PO qd for appetite stimulation   - c/w Effexor 225 mg daily  - c/w Zyprexa 25 mg HS  - Consider starting naltrexone for alcohol use disorder (still reporting cravings, last drink April 2024)  - Medical: HTN - continue amlodipine 10 mg daily; ambulates independently, no CPAP need; UTox +PCP but thought to be false positive from Effexor (pt denies use)  - PLEASE NOTE CHANGE IN PRN: Haldol 5 mg PO/IM q6h for agitation schizoaffective disorder, depression and psychosis    Note incomplete without attending signature  68 yo M, domiciled at [facility], , no children, retired from job at [employer] in 40s, with PPH of schizoaffective disorder (bipolar type with psychotic features), one past suicide attempt in 1992 via cutting wrists (found by mother and brought in to hospital), multiple past psychiatric hospitalizations (last [facility] 2S June - Aug 2023), substance use hx (ETOH-last used April 2024, h/o 3 DUIs in 40s; cocaine- last used 20 yrs ago), and PMH of HTN who presents to [facility] ED BIB sister from [facility] due to 2 months of worsening depression, psychosis (AH, paranoia), and poor attention to ADLs.    Today, patient presents with hopelessness, AH with neutral commands, paranoia, delusions of being sent to half-way, social withdrawal, poor ADLs, and passive SI in the context of stopping outpatient ECT in May 2024 and recent medication changes. Presentation is consistent with exacerbation of schizoaffective disorder, current episode depressed with psychosis. Per collateral from sister, patient was previously functioning much better while receiving ECT treatments q3wks. Patient is at elevated acute risk of harm to self due to worsening symptoms, poor ADLs, and passive SI. He requires inpatient psych admission for medication titration, safety planning, and possible re-initiation of ECT.    9/16: Pt AnOx3 to person, place, and time. Reports increasing anhedonia and hopelessness. Pt describes AH which promote feelings of depression and paranoia that he is be watched. Reports feeling safe in the hospital and denies command hallucinations. Admits to passive SI but denies current intent or plan. Denies HI or visual hallucinations. Admits to continued craving for alcohol but states it is controlled because of lack of access.   9/17: Continues to endorse poor mood, sleep, and feelings of paranoia/being watched. Describes VH of shadows throughout the day. Pt amenable to [facility] admission. Denies current AH, command hallucinations, SI or HI.   9/18: Continues to report poor mood, appetite, and sleep. Describes worsening paranoia and racing thoughts, states that he is being watched by everyone because he will be sent to half-way for life. Denies that there might be another explanation for these events or that these beliefs are abnormal. Verbalizes understanding of plan to go to [facility]. Denies AVH, SI/I/P, HI  9/19: Reports worsening mood and continued poor sleep and appetite. Continues to endorse feelings of paranoia. Denies AVH, command hallucinations, SI/I/P, HI. No adverse effects noted from Zyprexa increase. Awaiting bed availability at [facility]. Pt malodorous and requesting assistance with showering.    9/20: Continues to report poor mood and appetite, denies any discomfort with eating. Pt encouraged to slowly increase his portions. Denies SI/I/P, AVH, HI.      PLAN  - Please have nursing assist pt with showering and ADLs  - Admit voluntary to [facility]  -START mirtazapine 7.5 mg PO qHS for appetite stimulation   - c/w Effexor 225 mg daily  - c/w Zyprexa 25 mg HS  - Consider starting naltrexone for alcohol use disorder (still reporting cravings, last drink April 2024)  - Medical: HTN - continue amlodipine 10 mg daily; ambulates independently, no CPAP need; UTox +PCP but thought to be false positive from Effexor (pt denies use)  - PLEASE NOTE CHANGE IN PRN: Haldol 5 mg PO/IM q6h for agitation schizoaffective disorder, depression and psychosis    Note incomplete without attending signature

## 2024-09-20 NOTE — BH CONSULTATION LIAISON PROGRESS NOTE - NSBHFUPINTERVALCCFT_PSY_A_CORE
Called pt back regarding below. Stated that writer called over to IR and that it would be best to just get the port removed ASAP. 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 Pt stated that she cant get it removed tomorrow as she has other family obligations and pt is going to [place]. 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 Pt aware however stated that she can not do this due to obligations. Stated that maybe we can have port removed and have her go to [place] tomorrow depending on timing of apt, and that can be discussed with IR and timing of apt. Pt declined stated that she can't do both. Reinforced again that we are recommending that pt get this removed ASAP. However this is pt decision, we can only pass on recommendations. Advised pt is she develops any fever, chills or drainage that she needs to go to the ED. And that she needs to schedule this apt STAT. Pt aware of above and denied any additional questions.    "I'm not much better"

## 2024-09-20 NOTE — PROGRESS NOTE ADULT - SUBJECTIVE AND OBJECTIVE BOX
Patient is a 69y old  Male who presents with a chief complaint of MDD with auditory hallucinations and increased paranoia (psychosis), dvt (19 Sep 2024 13:27)      SUBJECTIVE / OVERNIGHT EVENTS: Pt seen and examined at 11:30am, no overnight events, denies any physical complaints, per nsg pt is not eating much, still only eating 25% of his meals, [place] [facility] [place], pt says "I am worried what is going to happen to me", no other new issues reported.        MEDICATIONS  (STANDING):  amLODIPine   Tablet 10 milliGRAM(s) Oral with breakfast  lactated ringers. 1000 milliLiter(s) (100 mL/Hr) IV Continuous <Continuous>  LORazepam     Tablet 0.5 milliGRAM(s) Oral two times a day  OLANZapine 25 milliGRAM(s) Oral at bedtime  rivaroxaban 15 milliGRAM(s) Oral two times a day with meals  senna 2 Tablet(s) Oral at bedtime  venlafaxine XR. 225 milliGRAM(s) Oral daily    MEDICATIONS  (PRN):  acetaminophen     Tablet .. 650 milliGRAM(s) Oral every 6 hours PRN Temp greater or equal to 38C (100.4F), Mild Pain (1 - 3)  aluminum hydroxide/magnesium hydroxide/simethicone Suspension 30 milliLiter(s) Oral every 4 hours PRN Dyspepsia  melatonin 3 milliGRAM(s) Oral at bedtime PRN Insomnia  OLANZapine 2.5 milliGRAM(s) Oral every 6 hours PRN agitation  OLANZapine Injectable 2.5 milliGRAM(s) IntraMuscular every 6 hours PRN agitation if pt refuses PO  ondansetron Injectable 4 milliGRAM(s) IV Push every 8 hours PRN Nausea and/or Vomiting      Vital Signs Last 24 Hrs  T(C): 36.6 (20 Sep 2024 10:29), Max: 36.7 (19 Sep 2024 21:09)  T(F): 97.9 (20 Sep 2024 10:29), Max: 98 (19 Sep 2024 21:09)  HR: 96 (20 Sep 2024 10:29) (64 - 96)  BP: 110/90 (20 Sep 2024 10:29) (106/75 - 132/90)  BP(mean): --  RR: 18 (20 Sep 2024 10:29) (18 - 18)  SpO2: 92% (20 Sep 2024 10:29) (92% - 97%)    Parameters below as of 20 Sep 2024 10:29  Patient On (Oxygen Delivery Method): room air      CAPILLARY BLOOD GLUCOSE        I&O's Summary      PHYSICAL EXAM:  GENERAL: NAD  CHEST/LUNG: Clear to auscultation bilaterally; No wheeze  HEART: Regular rate and rhythm  ABDOMEN: Soft, Nontender, Nondistended  EXTREMITIES: no LE edema  PSYCH: Calm  NEUROLOGY: Alert, awake, answers questions  SKIN: No rashes or lesions    LABS:                        17.2   6.08  )-----------( 288      ( 20 Sep 2024 07:35 )             50.1     09-20    140  |  102  |  14  ----------------------------<  73  4.1   |  20[L]  |  0.98    Ca    9.4      20 Sep 2024 07:35  Phos  3.2     09-20  Mg     2.10     09-20            Urinalysis Basic - ( 20 Sep 2024 07:35 )    Color: x / Appearance: x / SG: x / pH: x  Gluc: 73 mg/dL / Ketone: x  / Bili: x / Urobili: x   Blood: x / Protein: x / Nitrite: x   Leuk Esterase: x / RBC: x / WBC x   Sq Epi: x / Non Sq Epi: x / Bacteria: x        RADIOLOGY & ADDITIONAL TESTS:    Imaging Personally Reviewed:    Consultant(s) Notes Reviewed:      Care Discussed with Consultants/Other Providers:

## 2024-09-21 LAB
ANION GAP SERPL CALC-SCNC: 16 MMOL/L — HIGH (ref 7–14)
BUN SERPL-MCNC: 16 MG/DL — SIGNIFICANT CHANGE UP (ref 7–23)
CALCIUM SERPL-MCNC: 9.6 MG/DL — SIGNIFICANT CHANGE UP (ref 8.4–10.5)
CHLORIDE SERPL-SCNC: 102 MMOL/L — SIGNIFICANT CHANGE UP (ref 98–107)
CO2 SERPL-SCNC: 21 MMOL/L — LOW (ref 22–31)
CREAT SERPL-MCNC: 0.94 MG/DL — SIGNIFICANT CHANGE UP (ref 0.5–1.3)
EGFR: 89 ML/MIN/1.73M2 — SIGNIFICANT CHANGE UP
GLUCOSE SERPL-MCNC: 79 MG/DL — SIGNIFICANT CHANGE UP (ref 70–99)
HCT VFR BLD CALC: 48.5 % — SIGNIFICANT CHANGE UP (ref 39–50)
HGB BLD-MCNC: 16.8 G/DL — SIGNIFICANT CHANGE UP (ref 13–17)
MAGNESIUM SERPL-MCNC: 2.1 MG/DL — SIGNIFICANT CHANGE UP (ref 1.6–2.6)
MCHC RBC-ENTMCNC: 30.2 PG — SIGNIFICANT CHANGE UP (ref 27–34)
MCHC RBC-ENTMCNC: 34.6 GM/DL — SIGNIFICANT CHANGE UP (ref 32–36)
MCV RBC AUTO: 87.2 FL — SIGNIFICANT CHANGE UP (ref 80–100)
NRBC # BLD: 0 /100 WBCS — SIGNIFICANT CHANGE UP (ref 0–0)
NRBC # FLD: 0 K/UL — SIGNIFICANT CHANGE UP (ref 0–0)
PHOSPHATE SERPL-MCNC: 3.2 MG/DL — SIGNIFICANT CHANGE UP (ref 2.5–4.5)
PLATELET # BLD AUTO: 311 K/UL — SIGNIFICANT CHANGE UP (ref 150–400)
POTASSIUM SERPL-MCNC: 3.7 MMOL/L — SIGNIFICANT CHANGE UP (ref 3.5–5.3)
POTASSIUM SERPL-SCNC: 3.7 MMOL/L — SIGNIFICANT CHANGE UP (ref 3.5–5.3)
RBC # BLD: 5.56 M/UL — SIGNIFICANT CHANGE UP (ref 4.2–5.8)
RBC # FLD: 14.6 % — HIGH (ref 10.3–14.5)
SODIUM SERPL-SCNC: 139 MMOL/L — SIGNIFICANT CHANGE UP (ref 135–145)
WBC # BLD: 5.94 K/UL — SIGNIFICANT CHANGE UP (ref 3.8–10.5)
WBC # FLD AUTO: 5.94 K/UL — SIGNIFICANT CHANGE UP (ref 3.8–10.5)

## 2024-09-21 PROCEDURE — 99232 SBSQ HOSP IP/OBS MODERATE 35: CPT

## 2024-09-21 RX ORDER — HALOPERIDOL LACTATE 2 MG/ML
5 CONCENTRATE, ORAL ORAL EVERY 6 HOURS
Refills: 0 | Status: DISCONTINUED | OUTPATIENT
Start: 2024-09-21 | End: 2024-09-30

## 2024-09-21 RX ORDER — APIXABAN 5 MG/1
10 TABLET, FILM COATED ORAL EVERY 12 HOURS
Refills: 0 | Status: DISCONTINUED | OUTPATIENT
Start: 2024-09-21 | End: 2024-09-23

## 2024-09-21 RX ORDER — MIRTAZAPINE 30 MG/1
7.5 TABLET, FILM COATED ORAL AT BEDTIME
Refills: 0 | Status: DISCONTINUED | OUTPATIENT
Start: 2024-09-21 | End: 2024-09-27

## 2024-09-21 RX ADMIN — Medication 10 MILLIGRAM(S): at 08:35

## 2024-09-21 RX ADMIN — Medication 25 MILLIGRAM(S): at 22:01

## 2024-09-21 RX ADMIN — Medication 225 MILLIGRAM(S): at 08:35

## 2024-09-21 RX ADMIN — APIXABAN 10 MILLIGRAM(S): 5 TABLET, FILM COATED ORAL at 17:05

## 2024-09-21 RX ADMIN — Medication 0.5 MILLIGRAM(S): at 05:36

## 2024-09-21 RX ADMIN — RIVAROXABAN 15 MILLIGRAM(S): 10 TABLET, FILM COATED ORAL at 08:35

## 2024-09-21 RX ADMIN — MIRTAZAPINE 7.5 MILLIGRAM(S): 30 TABLET, FILM COATED ORAL at 22:01

## 2024-09-21 RX ADMIN — Medication 0.5 MILLIGRAM(S): at 17:04

## 2024-09-21 RX ADMIN — Medication 2 TABLET(S): at 22:01

## 2024-09-21 NOTE — PROGRESS NOTE ADULT - PROBLEM SELECTOR PLAN 2
New LLE peroneal DVT  Started on Xarelto - will continue.   - Outpatient follow up for new DVT. While with reported isolation to room prior to admission, No clear provoking factors for DVT  [ ] Price check for DOAC - Eliquis has a copay of 190$ with deduductible after deductible will be 47$ a month. Patient needs prior auth for Xarelto. Prior team Discussed coumadin with patient but concerned with compliance and ability to monitor INR  - switched to eliquis

## 2024-09-21 NOTE — PROGRESS NOTE ADULT - SUBJECTIVE AND OBJECTIVE BOX
Patient is a 69y old  Male who presents with a chief complaint of MDD with auditory hallucinations and increased paranoia (psychosis), dvt (20 Sep 2024 13:29)      SUBJECTIVE / OVERNIGHT EVENTS: Pt seen and examined at 12:50pm, no overnight events, denies any physical complaints, still with poor po intake, per nsg pt did not eat breakfast, ate probably a spoonful of his lunch,  no other new issues reported.        MEDICATIONS  (STANDING):  amLODIPine   Tablet 10 milliGRAM(s) Oral with breakfast  apixaban 10 milliGRAM(s) Oral every 12 hours  lactated ringers. 1000 milliLiter(s) (100 mL/Hr) IV Continuous <Continuous>  LORazepam     Tablet 0.5 milliGRAM(s) Oral two times a day  mirtazapine 7.5 milliGRAM(s) Oral at bedtime  OLANZapine 25 milliGRAM(s) Oral at bedtime  senna 2 Tablet(s) Oral at bedtime  venlafaxine XR. 225 milliGRAM(s) Oral daily    MEDICATIONS  (PRN):  acetaminophen     Tablet .. 650 milliGRAM(s) Oral every 6 hours PRN Temp greater or equal to 38C (100.4F), Mild Pain (1 - 3)  aluminum hydroxide/magnesium hydroxide/simethicone Suspension 30 milliLiter(s) Oral every 4 hours PRN Dyspepsia  haloperidol     Tablet 5 milliGRAM(s) Oral every 6 hours PRN Agitation  haloperidol    Injectable 5 milliGRAM(s) IntraMuscular every 6 hours PRN Agitation  melatonin 3 milliGRAM(s) Oral at bedtime PRN Insomnia  OLANZapine 2.5 milliGRAM(s) Oral every 6 hours PRN agitation  OLANZapine Injectable 2.5 milliGRAM(s) IntraMuscular every 6 hours PRN agitation if pt refuses PO  ondansetron Injectable 4 milliGRAM(s) IV Push every 8 hours PRN Nausea and/or Vomiting      Vital Signs Last 24 Hrs  T(C): 36.7 (21 Sep 2024 10:25), Max: 36.9 (20 Sep 2024 17:55)  T(F): 98.1 (21 Sep 2024 10:25), Max: 98.5 (20 Sep 2024 17:55)  HR: 72 (21 Sep 2024 10:25) (72 - 89)  BP: 129/74 (21 Sep 2024 10:25) (110/79 - 129/85)  BP(mean): --  RR: 18 (21 Sep 2024 10:25) (18 - 18)  SpO2: 97% (21 Sep 2024 10:25) (92% - 98%)    Parameters below as of 21 Sep 2024 05:00  Patient On (Oxygen Delivery Method): room air      CAPILLARY BLOOD GLUCOSE        I&O's Summary      PHYSICAL EXAM:  GENERAL: NAD  CHEST/LUNG: Clear to auscultation bilaterally; No wheeze  HEART: Regular rate and rhythm  ABDOMEN: Soft, Nontender, Nondistended  EXTREMITIES: no LE edema  PSYCH: Calm  NEUROLOGY: Alert, awake, answers questions  SKIN: No rashes or lesions    LABS:                        16.8   5.94  )-----------( 311      ( 21 Sep 2024 07:00 )             48.5     09-21    139  |  102  |  16  ----------------------------<  79  3.7   |  21[L]  |  0.94    Ca    9.6      21 Sep 2024 07:00  Phos  3.2     09-21  Mg     2.10     09-21            Urinalysis Basic - ( 21 Sep 2024 07:00 )    Color: x / Appearance: x / SG: x / pH: x  Gluc: 79 mg/dL / Ketone: x  / Bili: x / Urobili: x   Blood: x / Protein: x / Nitrite: x   Leuk Esterase: x / RBC: x / WBC x   Sq Epi: x / Non Sq Epi: x / Bacteria: x        RADIOLOGY & ADDITIONAL TESTS:    Imaging Personally Reviewed:    Consultant(s) Notes Reviewed:      Care Discussed with Consultants/Other Providers:

## 2024-09-22 LAB
ANION GAP SERPL CALC-SCNC: 12 MMOL/L — SIGNIFICANT CHANGE UP (ref 7–14)
BUN SERPL-MCNC: 15 MG/DL — SIGNIFICANT CHANGE UP (ref 7–23)
CALCIUM SERPL-MCNC: 9.3 MG/DL — SIGNIFICANT CHANGE UP (ref 8.4–10.5)
CHLORIDE SERPL-SCNC: 107 MMOL/L — SIGNIFICANT CHANGE UP (ref 98–107)
CO2 SERPL-SCNC: 21 MMOL/L — LOW (ref 22–31)
CREAT SERPL-MCNC: 0.89 MG/DL — SIGNIFICANT CHANGE UP (ref 0.5–1.3)
EGFR: 94 ML/MIN/1.73M2 — SIGNIFICANT CHANGE UP
GLUCOSE SERPL-MCNC: 85 MG/DL — SIGNIFICANT CHANGE UP (ref 70–99)
HCT VFR BLD CALC: 46.3 % — SIGNIFICANT CHANGE UP (ref 39–50)
HGB BLD-MCNC: 16 G/DL — SIGNIFICANT CHANGE UP (ref 13–17)
MAGNESIUM SERPL-MCNC: 2 MG/DL — SIGNIFICANT CHANGE UP (ref 1.6–2.6)
MCHC RBC-ENTMCNC: 30.3 PG — SIGNIFICANT CHANGE UP (ref 27–34)
MCHC RBC-ENTMCNC: 34.6 GM/DL — SIGNIFICANT CHANGE UP (ref 32–36)
MCV RBC AUTO: 87.7 FL — SIGNIFICANT CHANGE UP (ref 80–100)
NRBC # BLD: 0 /100 WBCS — SIGNIFICANT CHANGE UP (ref 0–0)
NRBC # FLD: 0 K/UL — SIGNIFICANT CHANGE UP (ref 0–0)
PHOSPHATE SERPL-MCNC: 3 MG/DL — SIGNIFICANT CHANGE UP (ref 2.5–4.5)
PLATELET # BLD AUTO: 306 K/UL — SIGNIFICANT CHANGE UP (ref 150–400)
POTASSIUM SERPL-MCNC: 3.8 MMOL/L — SIGNIFICANT CHANGE UP (ref 3.5–5.3)
POTASSIUM SERPL-SCNC: 3.8 MMOL/L — SIGNIFICANT CHANGE UP (ref 3.5–5.3)
RBC # BLD: 5.28 M/UL — SIGNIFICANT CHANGE UP (ref 4.2–5.8)
RBC # FLD: 14.6 % — HIGH (ref 10.3–14.5)
SODIUM SERPL-SCNC: 140 MMOL/L — SIGNIFICANT CHANGE UP (ref 135–145)
WBC # BLD: 6.25 K/UL — SIGNIFICANT CHANGE UP (ref 3.8–10.5)
WBC # FLD AUTO: 6.25 K/UL — SIGNIFICANT CHANGE UP (ref 3.8–10.5)

## 2024-09-22 PROCEDURE — 99232 SBSQ HOSP IP/OBS MODERATE 35: CPT

## 2024-09-22 RX ADMIN — APIXABAN 10 MILLIGRAM(S): 5 TABLET, FILM COATED ORAL at 18:56

## 2024-09-22 RX ADMIN — Medication 25 MILLIGRAM(S): at 21:27

## 2024-09-22 RX ADMIN — Medication 2 TABLET(S): at 21:28

## 2024-09-22 RX ADMIN — MIRTAZAPINE 7.5 MILLIGRAM(S): 30 TABLET, FILM COATED ORAL at 21:27

## 2024-09-22 RX ADMIN — Medication 10 MILLIGRAM(S): at 09:15

## 2024-09-22 RX ADMIN — Medication 0.5 MILLIGRAM(S): at 06:05

## 2024-09-22 RX ADMIN — Medication 225 MILLIGRAM(S): at 18:56

## 2024-09-22 RX ADMIN — APIXABAN 10 MILLIGRAM(S): 5 TABLET, FILM COATED ORAL at 06:05

## 2024-09-22 RX ADMIN — Medication 0.5 MILLIGRAM(S): at 19:00

## 2024-09-22 NOTE — PROGRESS NOTE ADULT - SUBJECTIVE AND OBJECTIVE BOX
Patient is a 69y old  Male who presents with a chief complaint of MDD with auditory hallucinations and increased paranoia (psychosis), dvt (21 Sep 2024 13:56)      SUBJECTIVE / OVERNIGHT EVENTS: Pt seen and examined at 11:55am, no overnight events, denies any physical complaints, per nsg ate full dinner, this am ate pancakes only, did not eat the scrambled eggs,  no other new issues reported.        MEDICATIONS  (STANDING):  amLODIPine   Tablet 10 milliGRAM(s) Oral with breakfast  apixaban 10 milliGRAM(s) Oral every 12 hours  lactated ringers. 1000 milliLiter(s) (100 mL/Hr) IV Continuous <Continuous>  LORazepam     Tablet 0.5 milliGRAM(s) Oral two times a day  mirtazapine 7.5 milliGRAM(s) Oral at bedtime  OLANZapine 25 milliGRAM(s) Oral at bedtime  senna 2 Tablet(s) Oral at bedtime  venlafaxine XR. 225 milliGRAM(s) Oral daily    MEDICATIONS  (PRN):  acetaminophen     Tablet .. 650 milliGRAM(s) Oral every 6 hours PRN Temp greater or equal to 38C (100.4F), Mild Pain (1 - 3)  aluminum hydroxide/magnesium hydroxide/simethicone Suspension 30 milliLiter(s) Oral every 4 hours PRN Dyspepsia  haloperidol     Tablet 5 milliGRAM(s) Oral every 6 hours PRN Agitation  haloperidol    Injectable 5 milliGRAM(s) IntraMuscular every 6 hours PRN Agitation  melatonin 3 milliGRAM(s) Oral at bedtime PRN Insomnia  OLANZapine 2.5 milliGRAM(s) Oral every 6 hours PRN agitation  OLANZapine Injectable 2.5 milliGRAM(s) IntraMuscular every 6 hours PRN agitation if pt refuses PO  ondansetron Injectable 4 milliGRAM(s) IV Push every 8 hours PRN Nausea and/or Vomiting      Vital Signs Last 24 Hrs  T(C): 36.7 (22 Sep 2024 09:13), Max: 36.8 (21 Sep 2024 21:25)  T(F): 98.1 (22 Sep 2024 09:13), Max: 98.2 (21 Sep 2024 21:25)  HR: 68 (22 Sep 2024 09:13) (68 - 98)  BP: 131/90 (22 Sep 2024 09:13) (105/77 - 131/90)  BP(mean): --  RR: 18 (22 Sep 2024 09:13) (18 - 18)  SpO2: 97% (22 Sep 2024 09:13) (97% - 98%)    Parameters below as of 22 Sep 2024 09:13  Patient On (Oxygen Delivery Method): room air      CAPILLARY BLOOD GLUCOSE        I&O's Summary      PHYSICAL EXAM:  GENERAL: NAD  CHEST/LUNG: Clear to auscultation bilaterally; No wheeze  HEART: Regular rate and rhythm  ABDOMEN: Soft, Nontender, Nondistended  EXTREMITIES: no LE edema  PSYCH: Calm  NEUROLOGY: Alert, awake, answers questions  SKIN: No rashes or lesions    LABS:                        16.0   6.25  )-----------( 306      ( 22 Sep 2024 06:33 )             46.3     09-22    140  |  107  |  15  ----------------------------<  85  3.8   |  21[L]  |  0.89    Ca    9.3      22 Sep 2024 06:33  Phos  3.0     09-22  Mg     2.00     09-22            Urinalysis Basic - ( 22 Sep 2024 06:33 )    Color: x / Appearance: x / SG: x / pH: x  Gluc: 85 mg/dL / Ketone: x  / Bili: x / Urobili: x   Blood: x / Protein: x / Nitrite: x   Leuk Esterase: x / RBC: x / WBC x   Sq Epi: x / Non Sq Epi: x / Bacteria: x        RADIOLOGY & ADDITIONAL TESTS:    Imaging Personally Reviewed:    Consultant(s) Notes Reviewed:      Care Discussed with Consultants/Other Providers:

## 2024-09-23 LAB
ANION GAP SERPL CALC-SCNC: 16 MMOL/L — HIGH (ref 7–14)
APPEARANCE UR: ABNORMAL
BACTERIA # UR AUTO: NEGATIVE /HPF — SIGNIFICANT CHANGE UP
BILIRUB UR-MCNC: ABNORMAL
BUN SERPL-MCNC: 12 MG/DL — SIGNIFICANT CHANGE UP (ref 7–23)
CALCIUM SERPL-MCNC: 9.3 MG/DL — SIGNIFICANT CHANGE UP (ref 8.4–10.5)
CAST: 3 /LPF — SIGNIFICANT CHANGE UP (ref 0–4)
CHLORIDE SERPL-SCNC: 104 MMOL/L — SIGNIFICANT CHANGE UP (ref 98–107)
CO2 SERPL-SCNC: 21 MMOL/L — LOW (ref 22–31)
COLOR SPEC: ABNORMAL
CREAT SERPL-MCNC: 0.84 MG/DL — SIGNIFICANT CHANGE UP (ref 0.5–1.3)
DIFF PNL FLD: ABNORMAL
EGFR: 96 ML/MIN/1.73M2 — SIGNIFICANT CHANGE UP
GLUCOSE SERPL-MCNC: 74 MG/DL — SIGNIFICANT CHANGE UP (ref 70–99)
GLUCOSE UR QL: NEGATIVE MG/DL — SIGNIFICANT CHANGE UP
HCT VFR BLD CALC: 45.4 % — SIGNIFICANT CHANGE UP (ref 39–50)
HGB BLD-MCNC: 15.5 G/DL — SIGNIFICANT CHANGE UP (ref 13–17)
KETONES UR-MCNC: NEGATIVE MG/DL — SIGNIFICANT CHANGE UP
LEUKOCYTE ESTERASE UR-ACNC: ABNORMAL
MAGNESIUM SERPL-MCNC: 2 MG/DL — SIGNIFICANT CHANGE UP (ref 1.6–2.6)
MCHC RBC-ENTMCNC: 29.6 PG — SIGNIFICANT CHANGE UP (ref 27–34)
MCHC RBC-ENTMCNC: 34.1 GM/DL — SIGNIFICANT CHANGE UP (ref 32–36)
MCV RBC AUTO: 86.8 FL — SIGNIFICANT CHANGE UP (ref 80–100)
NITRITE UR-MCNC: POSITIVE
NRBC # BLD: 0 /100 WBCS — SIGNIFICANT CHANGE UP (ref 0–0)
NRBC # FLD: 0 K/UL — SIGNIFICANT CHANGE UP (ref 0–0)
PH UR: 5 — SIGNIFICANT CHANGE UP (ref 5–8)
PHOSPHATE SERPL-MCNC: 3.2 MG/DL — SIGNIFICANT CHANGE UP (ref 2.5–4.5)
PLATELET # BLD AUTO: 305 K/UL — SIGNIFICANT CHANGE UP (ref 150–400)
POTASSIUM SERPL-MCNC: 3.5 MMOL/L — SIGNIFICANT CHANGE UP (ref 3.5–5.3)
POTASSIUM SERPL-SCNC: 3.5 MMOL/L — SIGNIFICANT CHANGE UP (ref 3.5–5.3)
PROT UR-MCNC: 100 MG/DL
RBC # BLD: 5.23 M/UL — SIGNIFICANT CHANGE UP (ref 4.2–5.8)
RBC # FLD: 14.9 % — HIGH (ref 10.3–14.5)
RBC CASTS # UR COMP ASSIST: 5301 /HPF — HIGH (ref 0–4)
REVIEW: SIGNIFICANT CHANGE UP
SODIUM SERPL-SCNC: 141 MMOL/L — SIGNIFICANT CHANGE UP (ref 135–145)
SP GR SPEC: 1.02 — SIGNIFICANT CHANGE UP (ref 1–1.03)
SQUAMOUS # UR AUTO: 5 /HPF — SIGNIFICANT CHANGE UP (ref 0–5)
UROBILINOGEN FLD QL: 1 MG/DL — SIGNIFICANT CHANGE UP (ref 0.2–1)
WBC # BLD: 6.23 K/UL — SIGNIFICANT CHANGE UP (ref 3.8–10.5)
WBC # FLD AUTO: 6.23 K/UL — SIGNIFICANT CHANGE UP (ref 3.8–10.5)
WBC UR QL: 19 /HPF — HIGH (ref 0–5)

## 2024-09-23 PROCEDURE — 99232 SBSQ HOSP IP/OBS MODERATE 35: CPT

## 2024-09-23 RX ADMIN — APIXABAN 10 MILLIGRAM(S): 5 TABLET, FILM COATED ORAL at 18:02

## 2024-09-23 RX ADMIN — Medication 10 MILLIGRAM(S): at 08:14

## 2024-09-23 RX ADMIN — Medication 0.5 MILLIGRAM(S): at 18:02

## 2024-09-23 RX ADMIN — MIRTAZAPINE 7.5 MILLIGRAM(S): 30 TABLET, FILM COATED ORAL at 21:20

## 2024-09-23 RX ADMIN — Medication 225 MILLIGRAM(S): at 13:21

## 2024-09-23 RX ADMIN — Medication 2 TABLET(S): at 21:20

## 2024-09-23 RX ADMIN — APIXABAN 10 MILLIGRAM(S): 5 TABLET, FILM COATED ORAL at 06:08

## 2024-09-23 RX ADMIN — Medication 0.5 MILLIGRAM(S): at 06:07

## 2024-09-23 RX ADMIN — Medication 25 MILLIGRAM(S): at 21:20

## 2024-09-23 NOTE — BH CONSULTATION LIAISON PROGRESS NOTE - NSICDXBHSECONDARYDX_PSY_ALL_CORE
Alcohol use disorder, mild, in early remission   F10.11  Benign essential HTN   I10  HLD (hyperlipidemia)   E78.5  DVT, lower extremity   I82.409  Schizoaffective disorder, bipolar type   F25.0  

## 2024-09-23 NOTE — PROVIDER CONTACT NOTE (OTHER) - ASSESSMENT
pt is awake, alert, no acute distress noted or reported. pt has very poor PO intake. Pt has no visible trauma to penis upon assessment. Vitals are 98.0 temp, 117/80 BP, 71 HR, 98% O2, 18 RR

## 2024-09-23 NOTE — PROGRESS NOTE ADULT - SUBJECTIVE AND OBJECTIVE BOX
[facility] Division of Hospital Medicine  Meir Francis MD   Available via MS Teams  In house pager [phone]    Patient is a 69y old  Male who presents with a chief complaint of MDD with auditory hallucinations and increased paranoia (psychosis), dvt (21 Sep 2024 13:56)    SUBJECTIVE / OVERNIGHT EVENTS:  Pt seen and examined at bedside; no acute events reported.   Still endorsing intermittent auditory hallucination but states it has decreased; last one was a few days ago.     ADDITIONAL REVIEW OF SYSTEMS: no acute ROS at this time     MEDICATIONS  (STANDING):  amLODIPine   Tablet 10 milliGRAM(s) Oral with breakfast  apixaban 10 milliGRAM(s) Oral every 12 hours  lactated ringers. 1000 milliLiter(s) (100 mL/Hr) IV Continuous <Continuous>  LORazepam     Tablet 0.5 milliGRAM(s) Oral two times a day  mirtazapine 7.5 milliGRAM(s) Oral at bedtime  OLANZapine 25 milliGRAM(s) Oral at bedtime  senna 2 Tablet(s) Oral at bedtime  venlafaxine XR. 225 milliGRAM(s) Oral daily    MEDICATIONS  (PRN):  acetaminophen     Tablet .. 650 milliGRAM(s) Oral every 6 hours PRN Temp greater or equal to 38C (100.4F), Mild Pain (1 - 3)  aluminum hydroxide/magnesium hydroxide/simethicone Suspension 30 milliLiter(s) Oral every 4 hours PRN Dyspepsia  haloperidol     Tablet 5 milliGRAM(s) Oral every 6 hours PRN Agitation  haloperidol    Injectable 5 milliGRAM(s) IntraMuscular every 6 hours PRN Agitation  melatonin 3 milliGRAM(s) Oral at bedtime PRN Insomnia  OLANZapine 2.5 milliGRAM(s) Oral every 6 hours PRN agitation  OLANZapine Injectable 2.5 milliGRAM(s) IntraMuscular every 6 hours PRN agitation if pt refuses PO  ondansetron Injectable 4 milliGRAM(s) IV Push every 8 hours PRN Nausea and/or Vomiting      I&O's Summary      PHYSICAL EXAM:  Vital Signs Last 24 Hrs  T(C): 36.7 (23 Sep 2024 19:06), Max: 36.8 (23 Sep 2024 14:12)  T(F): 98 (23 Sep 2024 19:06), Max: 98.2 (23 Sep 2024 14:12)  HR: 71 (23 Sep 2024 19:06) (60 - 96)  BP: 117/80 (23 Sep 2024 19:06) (114/80 - 137/90)  BP(mean): --  RR: 18 (23 Sep 2024 19:06) (17 - 18)  SpO2: 98% (23 Sep 2024 19:06) (97% - 100%)    Parameters below as of 23 Sep 2024 19:06  Patient On (Oxygen Delivery Method): room air      CONSTITUTIONAL: NAD, well-developed  EYES: PERRLA; conjunctiva and sclera clear  ENMT: Moist oral mucosa, no pharyngeal injection or exudates  NECK: Supple, no palpable masses;  RESPIRATORY: Normal respiratory effort; lungs are clear to auscultation bilaterally  CARDIOVASCULAR: Regular rate and rhythm, normal S1 and S2, no murmur/rub/gallop  ABDOMEN: Nontender to palpation, normoactive bowel sounds, no rebound/guarding  MUSCULOSKELETAL:  no clubbing or cyanosis of digits; no joint swelling or tenderness to palpation  PSYCH: Awake and alert, answering questions appropriately, still w/ intermittent auditory hallucinations   NEUROLOGY: No focal deficits noted   SKIN: No rashes; no palpable lesions    LABS:                        15.5   6.23  )-----------( 305      ( 23 Sep 2024 05:22 )             45.4     09-23    141  |  104  |  12  ----------------------------<  74  3.5   |  21[L]  |  0.84    Ca    9.3      23 Sep 2024 05:22  Phos  3.2     09-23  Mg     2.00     09-23            Urinalysis Basic - ( 23 Sep 2024 05:22 )    Color: x / Appearance: x / SG: x / pH: x  Gluc: 74 mg/dL / Ketone: x  / Bili: x / Urobili: x   Blood: x / Protein: x / Nitrite: x   Leuk Esterase: x / RBC: x / WBC x   Sq Epi: x / Non Sq Epi: x / Bacteria: x        COVID-19 PCR: NotDetec (17 Sep 2024 06:50)  COVID-19 PCR: NotDetec (14 Sep 2024 14:01)      RADIOLOGY & ADDITIONAL TESTS:  New Results Reviewed Today: [x]  New Imaging Personally Reviewed Today:  New Electrocardiogram Personally Reviewed Today:  Prior or Outpatient Records Reviewed Today:    COMMUNICATION:  Care Discussed with Consultants/Other Providers and Details of Discussion:  Discussions with Patient/Family:  PCP Communication:

## 2024-09-23 NOTE — BH CONSULTATION LIAISON PROGRESS NOTE - NSBHFUPINTERVALHXFT_PSY_A_CORE
Pt seen at bedside, lying right lateral recumbent. Reports worsening mood and that he is unable to eat due to poor appetite and a "knot in my stomach". Pt encouraged to increase PO intake. Continues to describe racing thoughts and belief that he will be sent to alf despite redirection. Reports feeling safe in the hospital.    Admits to  over the weekend, unable to recall what was said, but described the voices as whispers which put him down; denies command hallucinations. Pt denies suicidal intention or plan, homicidal intention or plan.   pt. has been adherent with medications and did not get any PRNs. Pt seen at bedside, lying right lateral recumbent. Reports worsening mood and that he is unable to eat due to poor appetite and a "knot in my stomach". Pt encouraged to increase PO intake. Reports he ate breakfast. Continues to describe racing thoughts and belief that he will be sent to halfway despite redirection. Reports feeling safe in the hospital.    Admits to vague AH over the weekend, unable to recall what was said, but described the voices as whispers which put him down; denies command hallucinations. Pt denies suicidal intention or plan, homicidal intention or plan.

## 2024-09-23 NOTE — PROGRESS NOTE ADULT - PROBLEM SELECTOR PLAN 2
New LLE peroneal DVT  Started on Xarelto - will continue.   - Outpatient follow up w/ hematology for new DVT. While with reported isolation to room prior to admission, No clear provoking factors for DVT.   [ ] Price check for DOAC - Eliquis has a copay of 190$ with deduductible after deductible will be 47$ a month. Patient needs prior auth for Xarelto. Prior team Discussed coumadin with patient but concerned with compliance and ability to monitor INR  - switched to Eliquis

## 2024-09-23 NOTE — BH CONSULTATION LIAISON PROGRESS NOTE - NSBHCHARTREVIEWLAB_PSY_A_CORE FT
CBC Full  -  ( 23 Sep 2024 05:22 )  WBC Count : 6.23 K/uL  RBC Count : 5.23 M/uL  Hemoglobin : 15.5 g/dL  Hematocrit : 45.4 %  Platelet Count - Automated : 305 K/uL  Mean Cell Volume : 86.8 fL  Mean Cell Hemoglobin : 29.6 pg  Mean Cell Hemoglobin Concentration : 34.1 gm/dL  Auto Neutrophil # : x  Auto Lymphocyte # : x  Auto Monocyte # : x  Auto Eosinophil # : x  Auto Basophil # : x  Auto Neutrophil % : x  Auto Lymphocyte % : x  Auto Monocyte % : x  Auto Eosinophil % : x  Auto Basophil % : x    09-23    141  |  104  |  12  ----------------------------<  74  3.5   |  21[L]  |  0.84    Ca    9.3      23 Sep 2024 05:22  Phos  3.2     09-23  Mg     2.00     09-23

## 2024-09-23 NOTE — BH CONSULTATION LIAISON PROGRESS NOTE - NSBHASSESSMENTFT_PSY_ALL_CORE
68 yo M, domiciled at [facility], , no children, retired from job at [employer] in 40s, with PPH of schizoaffective disorder (bipolar type with psychotic features), one past suicide attempt in 1992 via cutting wrists (found by mother and brought in to hospital), multiple past psychiatric hospitalizations (last [facility] 2S June - Aug 2023), substance use hx (ETOH-last used April 2024, h/o 3 DUIs in 40s; cocaine- last used 20 yrs ago), and PMH of HTN who presents to [facility] ED BIB sister from [facility] due to 2 months of worsening depression, psychosis (AH, paranoia), and poor attention to ADLs.    Today, patient presents with hopelessness, AH with neutral commands, paranoia, delusions of being sent to long term, social withdrawal, poor ADLs, and passive SI in the context of stopping outpatient ECT in May 2024 and recent medication changes. Presentation is consistent with exacerbation of schizoaffective disorder, current episode depressed with psychosis. Per collateral from sister, patient was previously functioning much better while receiving ECT treatments q3wks. Patient is at elevated acute risk of harm to self due to worsening symptoms, poor ADLs, and passive SI. He requires inpatient psych admission for medication titration, safety planning, and possible re-initiation of ECT.    9/16: Pt AnOx3 to person, place, and time. Reports increasing anhedonia and hopelessness. Pt describes AH which promote feelings of depression and paranoia that he is be watched. Reports feeling safe in the hospital and denies command hallucinations. Admits to passive SI but denies current intent or plan. Denies HI or visual hallucinations. Admits to continued craving for alcohol but states it is controlled because of lack of access.   9/17: Continues to endorse poor mood, sleep, and feelings of paranoia/being watched. Describes VH of shadows throughout the day. Pt amenable to [facility] admission. Denies current AH, command hallucinations, SI or HI.   9/18: Continues to report poor mood, appetite, and sleep. Describes worsening paranoia and racing thoughts, states that he is being watched by everyone because he will be sent to long term for life. Denies that there might be another explanation for these events or that these beliefs are abnormal. Verbalizes understanding of plan to go to [facility]. Denies AVH, SI/I/P, HI  9/19: Reports worsening mood and continued poor sleep and appetite. Continues to endorse feelings of paranoia. Denies AVH, command hallucinations, SI/I/P, HI. No adverse effects noted from Zyprexa increase. Awaiting bed availability at [facility]. Pt malodorous and requesting assistance with showering.    9/20: Continues to report poor mood and appetite, denies any discomfort with eating. Pt encouraged to slowly increase his portions. Denies SI/I/P, AVH, HI.    9/23: Reports poor mood and appetite. Admits to angella  over the weekend, denies command hallucinations. Pt encouraged to increase PO intake. Denies SI/I/P.      PLAN  - Please have nursing assist pt with showering and ADLs, pt has difficulty initiating action   - Admit voluntary to [facility]   - c/w mirtazapine 7.5 mg PO qHS for appetite stimulation   - c/w Effexor 225 mg daily  - c/w Zyprexa 25 mg HS  - Consider starting naltrexone for alcohol use disorder (still reporting cravings, last drink April 2024)  - Medical: HTN - continue amlodipine 10 mg daily; ambulates independently, no CPAP need; UTox +PCP but thought to be false positive from Effexor (pt denies use)  - PRN: Haldol 5 mg PO/IM q6h for agitation schizoaffective disorder, depression and psychosis    Note incomplete without attending signature  70 yo M, domiciled at [facility], , no children, retired from job at [employer] in 40s, with PPH of schizoaffective disorder (bipolar type with psychotic features), one past suicide attempt in 1992 via cutting wrists (found by mother and brought in to hospital), multiple past psychiatric hospitalizations (last [facility] 2S June - Aug 2023), substance use hx (ETOH-last used April 2024, h/o 3 DUIs in 40s; cocaine- last used 20 yrs ago), and PMH of HTN who presents to [facility] ED BIB sister from [facility] due to 2 months of worsening depression, psychosis (AH, paranoia), and poor attention to ADLs.    Today, patient presents with hopelessness, AH with neutral commands, paranoia, delusions of being sent to retirement, social withdrawal, poor ADLs, and passive SI in the context of stopping outpatient ECT in May 2024 and recent medication changes. Presentation is consistent with exacerbation of schizoaffective disorder, current episode depressed with psychosis. Per collateral from sister, patient was previously functioning much better while receiving ECT treatments q3wks. Patient is at elevated acute risk of harm to self due to worsening symptoms, poor ADLs, and passive SI. He requires inpatient psych admission for medication titration, safety planning, and possible re-initiation of ECT.    9/16: Pt AnOx3 to person, place, and time. Reports increasing anhedonia and hopelessness. Pt describes AH which promote feelings of depression and paranoia that he is be watched. Reports feeling safe in the hospital and denies command hallucinations. Admits to passive SI but denies current intent or plan. Denies HI or visual hallucinations. Admits to continued craving for alcohol but states it is controlled because of lack of access.   9/17: Continues to endorse poor mood, sleep, and feelings of paranoia/being watched. Describes VH of shadows throughout the day. Pt amenable to [facility] admission. Denies current AH, command hallucinations, SI or HI.   9/18: Continues to report poor mood, appetite, and sleep. Describes worsening paranoia and racing thoughts, states that he is being watched by everyone because he will be sent to retirement for life. Denies that there might be another explanation for these events or that these beliefs are abnormal. Verbalizes understanding of plan to go to [facility]. Denies AVH, SI/I/P, HI  9/19: Reports worsening mood and continued poor sleep and appetite. Continues to endorse feelings of paranoia. Denies AVH, command hallucinations, SI/I/P, HI. No adverse effects noted from Zyprexa increase. Awaiting bed availability at [facility]. Pt malodorous and requesting assistance with showering.    9/20: Continues to report poor mood and appetite, denies any discomfort with eating. Pt encouraged to slowly increase his portions. Denies SI/I/P, AVH, HI.    9/23: Reports poor mood and appetite. Admits to vague whispered AH over the weekend, denies command hallucinations. Pt encouraged to increase PO intake. Denies SI/I/P.      PLAN  - Please have nursing assist pt with showering and ADLs, pt has difficulty initiating action   - Encourage PO intake, document calorie count.  -  c/w mirtazapine 7.5 mg PO qHS for appetite stimulation   - c/w Effexor 225 mg daily to target mood sxs  - c/w Zyprexa 25 mg HS  - PRN: Haldol 5 mg PO/IM q6h for agitation   - Hold antipsychotics if qtc >500  - OOB to chair/Pt/OT as needed as tolerated  -Dispo; Given ongoing depression/psychosis, pt. will need an inpatient psychiatric admission for further stabilization/medication management, admit voluntary to [facility]

## 2024-09-23 NOTE — BH CONSULTATION LIAISON PROGRESS NOTE - NSBHCHARTREVIEWVS_PSY_A_CORE FT
Vital Signs Last 24 Hrs  T(C): 36.6 (23 Sep 2024 10:32), Max: 36.8 (22 Sep 2024 14:37)  T(F): 97.8 (23 Sep 2024 10:32), Max: 98.2 (22 Sep 2024 14:37)  HR: 96 (23 Sep 2024 10:32) (60 - 96)  BP: 119/87 (23 Sep 2024 10:32) (114/80 - 137/90)  BP(mean): --  RR: 18 (23 Sep 2024 10:32) (18 - 18)  SpO2: 97% (23 Sep 2024 10:32) (97% - 100%)    Parameters below as of 23 Sep 2024 10:32  Patient On (Oxygen Delivery Method): room air

## 2024-09-23 NOTE — PROGRESS NOTE ADULT - PROBLEM SELECTOR PLAN 5
- Pt endorses intermittent alcohol cravings prior - consider naloxone  - No reported cravings today. - Pt endorses intermittent alcohol cravings prior - consider naloxone  - No reported cravings today.      Prophylaxis:   - DVT ppx: Eliquis  - Code: Full  - Diet: Regular; easy to chew

## 2024-09-24 DIAGNOSIS — I82.409 ACUTE EMBOLISM AND THROMBOSIS OF UNSPECIFIED DEEP VEINS OF UNSPECIFIED LOWER EXTREMITY: ICD-10-CM

## 2024-09-24 DIAGNOSIS — F10.10 ALCOHOL ABUSE, UNCOMPLICATED: ICD-10-CM

## 2024-09-24 DIAGNOSIS — Z29.9 ENCOUNTER FOR PROPHYLACTIC MEASURES, UNSPECIFIED: ICD-10-CM

## 2024-09-24 DIAGNOSIS — E78.5 HYPERLIPIDEMIA, UNSPECIFIED: ICD-10-CM

## 2024-09-24 DIAGNOSIS — F25.0 SCHIZOAFFECTIVE DISORDER, BIPOLAR TYPE: ICD-10-CM

## 2024-09-24 DIAGNOSIS — N39.0 URINARY TRACT INFECTION, SITE NOT SPECIFIED: ICD-10-CM

## 2024-09-24 DIAGNOSIS — I10 ESSENTIAL (PRIMARY) HYPERTENSION: ICD-10-CM

## 2024-09-24 LAB
HCT VFR BLD CALC: 49.1 % — SIGNIFICANT CHANGE UP (ref 39–50)
HGB BLD-MCNC: 16.7 G/DL — SIGNIFICANT CHANGE UP (ref 13–17)
MCHC RBC-ENTMCNC: 30 PG — SIGNIFICANT CHANGE UP (ref 27–34)
MCHC RBC-ENTMCNC: 34 GM/DL — SIGNIFICANT CHANGE UP (ref 32–36)
MCV RBC AUTO: 88.2 FL — SIGNIFICANT CHANGE UP (ref 80–100)
NRBC # BLD: 0 /100 WBCS — SIGNIFICANT CHANGE UP (ref 0–0)
NRBC # FLD: 0 K/UL — SIGNIFICANT CHANGE UP (ref 0–0)
PLATELET # BLD AUTO: 324 K/UL — SIGNIFICANT CHANGE UP (ref 150–400)
RBC # BLD: 5.57 M/UL — SIGNIFICANT CHANGE UP (ref 4.2–5.8)
RBC # FLD: 15.2 % — HIGH (ref 10.3–14.5)
SARS-COV-2 RNA SPEC QL NAA+PROBE: SIGNIFICANT CHANGE UP
WBC # BLD: 6.03 K/UL — SIGNIFICANT CHANGE UP (ref 3.8–10.5)
WBC # FLD AUTO: 6.03 K/UL — SIGNIFICANT CHANGE UP (ref 3.8–10.5)

## 2024-09-24 PROCEDURE — 99232 SBSQ HOSP IP/OBS MODERATE 35: CPT

## 2024-09-24 RX ORDER — CEFTRIAXONE SODIUM 1 G
1000 VIAL (EA) INJECTION EVERY 24 HOURS
Refills: 0 | Status: DISCONTINUED | OUTPATIENT
Start: 2024-09-25 | End: 2024-09-25

## 2024-09-24 RX ORDER — CEFTRIAXONE SODIUM 1 G
1000 VIAL (EA) INJECTION ONCE
Refills: 0 | Status: COMPLETED | OUTPATIENT
Start: 2024-09-24 | End: 2024-09-24

## 2024-09-24 RX ORDER — CEFTRIAXONE SODIUM 1 G
VIAL (EA) INJECTION
Refills: 0 | Status: DISCONTINUED | OUTPATIENT
Start: 2024-09-24 | End: 2024-09-25

## 2024-09-24 RX ADMIN — Medication 0.5 MILLIGRAM(S): at 17:58

## 2024-09-24 RX ADMIN — MIRTAZAPINE 7.5 MILLIGRAM(S): 30 TABLET, FILM COATED ORAL at 20:57

## 2024-09-24 RX ADMIN — Medication 100 MILLIGRAM(S): at 13:28

## 2024-09-24 RX ADMIN — Medication 225 MILLIGRAM(S): at 13:28

## 2024-09-24 RX ADMIN — Medication 0.5 MILLIGRAM(S): at 05:10

## 2024-09-24 RX ADMIN — Medication 25 MILLIGRAM(S): at 20:57

## 2024-09-24 RX ADMIN — Medication 2 TABLET(S): at 20:56

## 2024-09-24 RX ADMIN — Medication 10 MILLIGRAM(S): at 09:28

## 2024-09-24 NOTE — BH CONSULTATION LIAISON PROGRESS NOTE - NSBHFUPINTERVALHXFT_PSY_A_CORE
Patient has been adherent with medications and did not get any PRNs. Per chart review, patient noted to have hematuria, moderate leukocyte esterase concentration, and positive nitrites last night. Patient denies chills, pain, or burning, stating there was not any previous episodes of hematuria, but admits to increased frequency and periods of confusion. At the time of interview, patient is alert and oriented to person, place, time, and situation, with good attention.      Continues to describe poor mood and sleep. Per chart review, staff note improvement in PO intake.     Denies current AVH, SI/I/P, HI. Continues to report delusions of reference, stating that because there are fewer staff coming into the room, he will be taken to [facility] soon. Patient unable to be redirected.   Patient has been adherent with medications and did not get any PRNs. Per chart review, UA was done and patient noted to have moderate leukocyte esterase concentration, and positive nitrites last night. Patient denies chills, pain, or burning, but admits to increased frequency and periods of confusion. At the time of interview, patient is alert and oriented to person, place, time, and situation, with good attention.      Continues to describe poor mood and sleep. Per chart review, staff note improvement in PO intake.     Denies current AVH, SI/I/P, HI. Some ideas of reference, stating that because there are fewer staff coming into the room, he will be taken to prison soon.     Says he sees psychiatry NP for his mood, last follow up few weeks ago. Reports siblings are supportive.  Patient has been adherent with medications and did not get any PRNs. Per chart review, UA was done and patient noted to have moderate leukocyte esterase concentration, and positive nitrites last night. Patient denies chills, pain, or burning, but admits to increased frequency and periods of confusion. At the time of interview, patient is alert and oriented to person, place, time, and situation, with good attention.      Continues to describe poor mood and sleep. Per chart review, staff note improvement in PO intake.     Denies current AVH, SI/I/P, HI. Some ideas of reference, stating that because there are fewer staff coming into the room, he will be taken to [facility] soon.     Says he sees psychiatry NP Zachary for his mood, last follow up few weeks ago. Unable to provide contact information of the provider. Reports siblings are supportive.

## 2024-09-24 NOTE — BH CONSULTATION LIAISON PROGRESS NOTE - NSBHMSETHTCONTENT_PSY_A_CORE
delusion of being sent to [facility], paranoia/Delusions/Ruminations/Hopelessness delusion of being sent to alf, paranoia/Ruminations/Hopelessness/Other

## 2024-09-24 NOTE — BH CONSULTATION LIAISON PROGRESS NOTE - NSBHCHARTREVIEWVS_PSY_A_CORE FT
Vital Signs Last 24 Hrs  T(C): 36.4 (24 Sep 2024 04:40), Max: 36.8 (23 Sep 2024 14:12)  T(F): 97.5 (24 Sep 2024 04:40), Max: 98.2 (23 Sep 2024 14:12)  HR: 99 (24 Sep 2024 09:25) (65 - 99)  BP: 118/93 (24 Sep 2024 09:25) (117/80 - 134/97)  BP(mean): --  RR: 18 (24 Sep 2024 04:40) (17 - 18)  SpO2: 96% (24 Sep 2024 04:40) (96% - 100%)    Parameters below as of 24 Sep 2024 04:40  Patient On (Oxygen Delivery Method): room air

## 2024-09-24 NOTE — PROGRESS NOTE ADULT - PROBLEM SELECTOR PLAN 3
- Noted to have hematuria overnight, UA concerning for UTI.   - No WBC, no fever, but pos L CVA tenderness.   - Will send for culture.   - Will start on Rocephin 1g IVPB QD (d/t CVA tenderness will get CT A/P w/o contrast to r/o pyelo).

## 2024-09-24 NOTE — BH CONSULTATION LIAISON PROGRESS NOTE - NSBHASSESSMENTFT_PSY_ALL_CORE
70 yo M, domiciled at [facility], , no children, retired from job at [employer] in 40s, with PPH of schizoaffective disorder (bipolar type with psychotic features), one past suicide attempt in 1992 via cutting wrists (found by mother and brought in to hospital), multiple past psychiatric hospitalizations (last [facility] 2S June - Aug 2023), substance use hx (ETOH-last used April 2024, h/o 3 DUIs in 40s; cocaine- last used 20 yrs ago), and PMH of HTN who presents to [facility] ED BIB sister from [facility] due to 2 months of worsening depression, psychosis (AH, paranoia), and poor attention to ADLs.    Today, patient presents with hopelessness, AH with neutral commands, paranoia, delusions of being sent to [facility], social withdrawal, poor ADLs, and passive SI in the context of stopping outpatient ECT in May 2024 and recent medication changes. Presentation is consistent with exacerbation of schizoaffective disorder, current episode depressed with psychosis. Per collateral from sister, patient was previously functioning much better while receiving ECT treatments q3wks. Patient is at elevated acute risk of harm to self due to worsening symptoms, poor ADLs, and passive SI. He requires inpatient psych admission for medication titration, safety planning, and possible re-initiation of ECT.    9/16: Pt AnOx3 to person, place, and time. Reports increasing anhedonia and hopelessness. Pt describes AH which promote feelings of depression and paranoia that he is be watched. Reports feeling safe in the hospital and denies command hallucinations. Admits to passive SI but denies current intent or plan. Denies HI or visual hallucinations. Admits to continued craving for alcohol but states it is controlled because of lack of access.   9/17: Continues to endorse poor mood, sleep, and feelings of paranoia/being watched. Describes VH of shadows throughout the day. Pt amenable to [facility] admission. Denies current AH, command hallucinations, SI or HI.   9/18: Continues to report poor mood, appetite, and sleep. Describes worsening paranoia and racing thoughts, states that he is being watched by everyone because he will be sent to [facility] for life. Denies that there might be another explanation for these events or that these beliefs are abnormal. Verbalizes understanding of plan to go to [facility]. Denies AVH, SI/I/P, HI  9/19: Reports worsening mood and continued poor sleep and appetite. Continues to endorse feelings of paranoia. Denies AVH, command hallucinations, SI/I/P, HI. No adverse effects noted from Zyprexa increase. Awaiting bed availability at [facility]. Pt malodorous and requesting assistance with showering.    9/20: Continues to report poor mood and appetite, denies any discomfort with eating. Pt encouraged to slowly increase his portions. Denies SI/I/P, AVH, HI.    9/23: Reports poor mood and appetite. Admits to vague whispered AH over the weekend, denies command hallucinations. Pt encouraged to increase PO intake. Denies SI/I/P.    9/24: Per chart review, patient noted to have improved PO intake and new onset hematuria; however, at the time of interview, patient does not show systemic signs of UTI. Continues to report delusions of reference, stating that because there are fewer staff coming into the room, he will be taken to [facility] soon. Patient unable to be redirected. Denies current AVH, SI/I/P, HI.    PLAN  - Please have nursing assist pt with showering and ADLs, pt has difficulty initiating action   - Encourage PO intake, document calorie count.  -  c/w mirtazapine 7.5 mg PO qHS for appetite stimulation   - c/w Effexor 225 mg daily to target mood sxs  - c/w Zyprexa 25 mg HS  - PRN: Haldol 5 mg PO/IM q6h for agitation   - Hold antipsychotics if qtc >500  - OOB to chair/Pt/OT as needed as tolerated  -Dispo; Given ongoing depression/psychosis, pt. will need an inpatient psychiatric admission for further stabilization/medication management, admit voluntary to [facility] once cleared medically.      68 yo M, domiciled at [facility], , no children, retired from job at [employer] in 40s, with PPH of schizoaffective disorder (bipolar type with psychotic features), one past suicide attempt in 1992 via cutting wrists (found by mother and brought in to hospital), multiple past psychiatric hospitalizations (last [facility] 2S June - Aug 2023), substance use hx (ETOH-last used April 2024, h/o 3 DUIs in 40s; cocaine- last used 20 yrs ago), and PMH of HTN who presents to [facility] ED BIB sister from [facility] due to 2 months of worsening depression, psychosis (AH, paranoia), and poor attention to ADLs.    Today, patient presents with hopelessness, AH with neutral commands, paranoia, delusions of being sent to alf, social withdrawal, poor ADLs, and passive SI in the context of stopping outpatient ECT in May 2024 and recent medication changes. Presentation is consistent with exacerbation of schizoaffective disorder, current episode depressed with psychosis. Per collateral from sister, patient was previously functioning much better while receiving ECT treatments q3wks. Patient is at elevated acute risk of harm to self due to worsening symptoms, poor ADLs, and passive SI. He requires inpatient psych admission for medication titration, safety planning, and possible re-initiation of ECT.    9/16: Pt AnOx3 to person, place, and time. Reports increasing anhedonia and hopelessness. Pt describes AH which promote feelings of depression and paranoia that he is be watched. Reports feeling safe in the hospital and denies command hallucinations. Admits to passive SI but denies current intent or plan. Denies HI or visual hallucinations. Admits to continued craving for alcohol but states it is controlled because of lack of access.   9/17: Continues to endorse poor mood, sleep, and feelings of paranoia/being watched. Describes VH of shadows throughout the day. Pt amenable to [facility] admission. Denies current AH, command hallucinations, SI or HI.   9/18: Continues to report poor mood, appetite, and sleep. Describes worsening paranoia and racing thoughts, states that he is being watched by everyone because he will be sent to alf for life. Denies that there might be another explanation for these events or that these beliefs are abnormal. Verbalizes understanding of plan to go to [facility]. Denies AVH, SI/I/P, HI  9/19: Reports worsening mood and continued poor sleep and appetite. Continues to endorse feelings of paranoia. Denies AVH, command hallucinations, SI/I/P, HI. No adverse effects noted from Zyprexa increase. Awaiting bed availability at [facility]. Pt malodorous and requesting assistance with showering.    9/20: Continues to report poor mood and appetite, denies any discomfort with eating. Pt encouraged to slowly increase his portions. Denies SI/I/P, AVH, HI.    9/23: Reports poor mood and appetite. Admits to vague whispered AH over the weekend, denies command hallucinations. Pt encouraged to increase PO intake. Denies SI/I/P.    9/24: As per discussion with the primary team, patient noted to have improved PO intake and new onset hematuria, continues to report ideas of reference and depressed mood.  Denies current AVH, SI/I/P, HI.    PLAN  - Please have nursing assist pt with showering and ADLs, pt has difficulty initiating action   - Encourage PO intake, document calorie count.  -  c/w mirtazapine 7.5 mg PO qHS for appetite stimulation   - c/w Effexor 225 mg daily to target mood sxs  - c/w Zyprexa 25 mg HS  - PRN: Haldol 5 mg PO/IM q6h for agitation   - Hold antipsychotics if qtc >500  - OOB to chair/Pt/OT as needed as tolerated  -Dispo; Given ongoing depression/psychosis, pt. will need an inpatient psychiatric admission for further stabilization/medication management, admit voluntary to [facility] once cleared medically.

## 2024-09-25 LAB
ANION GAP SERPL CALC-SCNC: 17 MMOL/L — HIGH (ref 7–14)
BUN SERPL-MCNC: 14 MG/DL — SIGNIFICANT CHANGE UP (ref 7–23)
CALCIUM SERPL-MCNC: 9 MG/DL — SIGNIFICANT CHANGE UP (ref 8.4–10.5)
CHLORIDE SERPL-SCNC: 105 MMOL/L — SIGNIFICANT CHANGE UP (ref 98–107)
CO2 SERPL-SCNC: 18 MMOL/L — LOW (ref 22–31)
CREAT SERPL-MCNC: 0.86 MG/DL — SIGNIFICANT CHANGE UP (ref 0.5–1.3)
CULTURE RESULTS: SIGNIFICANT CHANGE UP
EGFR: 95 ML/MIN/1.73M2 — SIGNIFICANT CHANGE UP
GLUCOSE SERPL-MCNC: 73 MG/DL — SIGNIFICANT CHANGE UP (ref 70–99)
HCT VFR BLD CALC: 46.9 % — SIGNIFICANT CHANGE UP (ref 39–50)
HGB BLD-MCNC: 16.2 G/DL — SIGNIFICANT CHANGE UP (ref 13–17)
MAGNESIUM SERPL-MCNC: 1.9 MG/DL — SIGNIFICANT CHANGE UP (ref 1.6–2.6)
MCHC RBC-ENTMCNC: 30.4 PG — SIGNIFICANT CHANGE UP (ref 27–34)
MCHC RBC-ENTMCNC: 34.5 GM/DL — SIGNIFICANT CHANGE UP (ref 32–36)
MCV RBC AUTO: 88 FL — SIGNIFICANT CHANGE UP (ref 80–100)
NRBC # BLD: 0 /100 WBCS — SIGNIFICANT CHANGE UP (ref 0–0)
NRBC # FLD: 0 K/UL — SIGNIFICANT CHANGE UP (ref 0–0)
PHOSPHATE SERPL-MCNC: 3.2 MG/DL — SIGNIFICANT CHANGE UP (ref 2.5–4.5)
PLATELET # BLD AUTO: 301 K/UL — SIGNIFICANT CHANGE UP (ref 150–400)
POTASSIUM SERPL-MCNC: 3.7 MMOL/L — SIGNIFICANT CHANGE UP (ref 3.5–5.3)
POTASSIUM SERPL-SCNC: 3.7 MMOL/L — SIGNIFICANT CHANGE UP (ref 3.5–5.3)
RBC # BLD: 5.33 M/UL — SIGNIFICANT CHANGE UP (ref 4.2–5.8)
RBC # FLD: 14.6 % — HIGH (ref 10.3–14.5)
SODIUM SERPL-SCNC: 140 MMOL/L — SIGNIFICANT CHANGE UP (ref 135–145)
SPECIMEN SOURCE: SIGNIFICANT CHANGE UP
WBC # BLD: 5.67 K/UL — SIGNIFICANT CHANGE UP (ref 3.8–10.5)
WBC # FLD AUTO: 5.67 K/UL — SIGNIFICANT CHANGE UP (ref 3.8–10.5)

## 2024-09-25 PROCEDURE — 99232 SBSQ HOSP IP/OBS MODERATE 35: CPT

## 2024-09-25 PROCEDURE — 74176 CT ABD & PELVIS W/O CONTRAST: CPT | Mod: 26

## 2024-09-25 PROCEDURE — 76770 US EXAM ABDO BACK WALL COMP: CPT | Mod: 26

## 2024-09-25 RX ADMIN — Medication 225 MILLIGRAM(S): at 17:20

## 2024-09-25 RX ADMIN — Medication 10 MILLIGRAM(S): at 09:58

## 2024-09-25 RX ADMIN — MIRTAZAPINE 7.5 MILLIGRAM(S): 30 TABLET, FILM COATED ORAL at 21:18

## 2024-09-25 RX ADMIN — Medication 25 MILLIGRAM(S): at 21:18

## 2024-09-25 RX ADMIN — Medication 0.5 MILLIGRAM(S): at 05:24

## 2024-09-25 RX ADMIN — Medication 100 MILLIGRAM(S): at 14:06

## 2024-09-25 RX ADMIN — Medication 2 TABLET(S): at 21:18

## 2024-09-25 RX ADMIN — Medication 0.5 MILLIGRAM(S): at 17:19

## 2024-09-25 NOTE — BH CONSULTATION LIAISON PROGRESS NOTE - NSBHCHARTREVIEWLAB_PSY_A_CORE FT
CBC Full  -  ( 25 Sep 2024 05:21 )  WBC Count : 5.67 K/uL  RBC Count : 5.33 M/uL  Hemoglobin : 16.2 g/dL  Hematocrit : 46.9 %  Platelet Count - Automated : 301 K/uL  Mean Cell Volume : 88.0 fL  Mean Cell Hemoglobin : 30.4 pg  Mean Cell Hemoglobin Concentration : 34.5 gm/dL  Auto Neutrophil # : x  Auto Lymphocyte # : x  Auto Monocyte # : x  Auto Eosinophil # : x  Auto Basophil # : x  Auto Neutrophil % : x  Auto Lymphocyte % : x  Auto Monocyte % : x  Auto Eosinophil % : x  Auto Basophil % : x  09-25    140  |  105  |  14  ----------------------------<  73  3.7   |  18[L]  |  0.86    Ca    9.0      25 Sep 2024 05:21  Phos  3.2     09-25  Mg     1.90     09-25

## 2024-09-25 NOTE — BH CONSULTATION LIAISON PROGRESS NOTE - NSBHMSETHTCONTENT_PSY_A_CORE
delusion of being sent to long-term, paranoia/Ideas of reference/Ruminations/Hopelessness delusion of being sent to senior living, paranoia/Ideas of reference/Ruminations/Hopelessness/Other

## 2024-09-25 NOTE — BH CONSULTATION LIAISON PROGRESS NOTE - NSBHCHARTREVIEWVS_PSY_A_CORE FT
Vital Signs Last 24 Hrs  T(C): 36.5 (25 Sep 2024 05:15), Max: 36.5 (25 Sep 2024 05:15)  T(F): 97.7 (25 Sep 2024 05:15), Max: 97.7 (25 Sep 2024 05:15)  HR: 88 (25 Sep 2024 05:15) (66 - 100)  BP: 117/98 (25 Sep 2024 05:15) (117/98 - 130/88)  BP(mean): 105 (25 Sep 2024 05:15) (105 - 105)  RR: 17 (24 Sep 2024 21:55) (17 - 18)  SpO2: 97% (25 Sep 2024 05:15) (97% - 97%)    Parameters below as of 25 Sep 2024 05:15  Patient On (Oxygen Delivery Method): room air

## 2024-09-25 NOTE — PROGRESS NOTE ADULT - PROBLEM SELECTOR PLAN 3
- Noted to have hematuria overnight, UA concerning for UTI 9/25.   - No WBC, no fever, but pos L CVA tenderness.   - Will send for culture.   - Will start on Rocephin 1g IVPB QD (d/t CVA tenderness will get CT A/P w/o contrast to r/o pyelo).  - Update: culture negative, no CVA tenderness. Still no fever, no WBC. Will d/c rocephin.   - CT A/P w/ no infection. L side renal cyst noted; will order renal US to evaluate simple vs complex and to r/o cause of any hematuria.

## 2024-09-25 NOTE — BH CONSULTATION LIAISON PROGRESS NOTE - NSBHFUPINTERVALHXFT_PSY_A_CORE
Patient has been adherent with medications and did not get any PRNs. Notes improvement in lower abdominal discomfort, rating it as 2/10. Continues to report low mood and feelings of anhedonia. 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 Patient cannot be redirected.       Denies current AVH, SI/I/P, HI.  Patient has been adherent with medications and did not get any PRNs. Notes improvement in lower abdominal discomfort, rating it as 2/10. Continues to report low mood and feelings of anhedonia. 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 Easily redirectable.     Denies current AVH, SI/I/P, HI.

## 2024-09-25 NOTE — BH CONSULTATION LIAISON PROGRESS NOTE - NSBHMSEHYG_PSY_A_CORE
10/8/2019      RE: Jennifer Bob  [address]  [facility] [address]       Dear Colleague,    Thank you for the opportunity to participate in the care of your patient, Jennifer Bob, at the [facility] HEART [facility] at [facility]. Please see a copy of my visit note below.             Cardiology Clinic  Jennifer Bob MRN: [id number]  Age: 89 year old, : 1929  Primary care provider: No Ref-Primary, Physician            Assessment and Plan:     Assessment :  1. Atrial fibrillation, permanent. S/p AVN ablation 2018. Device low rate now changed to 60 bpm on 10/8/2019. On Apixaban.  2. Cardiomyopathy with intermittent heart failure. Stable and euvolemic today. EF 30-35%, I suspect a significant part is due to AFib and tachycardia. Plan for repeat TTE 6 months after AVN ablation.   3. Hypertension. BP tends to be high in office.  4. CAD s/p CABG. No ischemic symptoms.  5. COPD. Advanced though not oxygen dependent.     Plan:  -TTE in 3 months    Patient discussed with staff attending, Dr. Lowe.    Sha Rivera MD  Cardiology Fellow  Pager: [phone]      Attending addendum:  Patient seen and examined with Dr. Rivera. The history and physical findings are accurate as recorded. She is at baseline. Feels better without palpitations. Home -120 mmhg per patient and her daughter.    All labs, imaging studies, ECG and telemetry data have been reviewed personally. Specifically, CRT-P interrogation showed underlying rhythm to be AF with CHB; she is BIV paced 100%. All parameters normal.     The assessment and plans outlined reflect our joint decision making.  1. AF with RVR s/p AVN RF and CRT-P. Reduce LRL to 60 bpm. Continue apixaban 2.5 bid  2. Cardiomyopathy, no evidence of volume overload. Continue current meds.  3. HTN. Controlled at home.    Plans:  Remote monitor follow up of CRT-P every 3 months  I will see patient back in office 1  Fair year  Continue all current meds  Consider repeat echo next year to assess response to CRT-P    Claudia Lowe MD  Cardiology              Subjective:     Ms. Bob is am 89 year old  female with a h/o CAD s/p CABG, stroke 2013, advanced COPD. New onset afib with  bpm 1/2019, EF 27%, no new ischemia by nuclear scan. Given metoprolol with some pauses on telemetry. Underwent CRT-P without atrial lead. Did not tolerate apixaban, anticoagulated with warfarin. Now s/p AVN ablation 7/2019 for difficult to control rate of afib.     Today, patient reports that since her AVN ablation, she reports feeling about the same. Perhaps she is having more MCNAIR and doing less activities with her friends at the nursing home. No orthopnea, PND, LE edema. No episodes of lightheadedness, dizziness, palpitations.           The following portions of the patient's history were reviewed and updated as appropriate: allergies, current medications, past family history, past medical history, past social history, past surgical history, and the problem list.     Past Medical History:  1. CAD. CABG 1992;  MI with VF arrest, s/p PCI SVG to RCA graft 8/29/09  ([place]  2. Hypertension ~ home  - 140/150 , occasionally 160-180mmHg  3. Hyperlipidemia  4. COPD. PFT 2014 showed reduced FEV1.  5. Hernia repair  6. Cholecystectomy  7. Ischemic stroke 2013; recent hospital admission 7/7/18 with another stroke, received tPA, residual right weakness. Ziopatch post discharge showed no atrial fibrillation.  8. Peripheral arterial disease s/p superior femoral artery stent 2018  9. Atrial fibrillation, 1/2019, with RVR.  Had increasing shortness of breath with both metoprolol and digoxin.  Amiodarone was started for rate control in February 2019.  10. Bradycardia with rate control - s/p [employer] CRT-P with RV and LV leads 1/18/19, complicated with RV lead microperforation requiring repositioning, no pericardial effusion          Past Medical  History:     Past Medical History:   Diagnosis Date     Abdominal wall hernia     three hernias at previous incision sites, allergic to mesh so repair not repeated, wears girdle     Anxiety 1/9/2012     ASCVD (arteriosclerotic cardiovascular disease) 1/9/2012     CKD (chronic kidney disease) stage 3, GFR 30-59 ml/min (H) 1/10/2012     Colon polyp     resected     DDD (degenerative disc disease), lumbar 1/10/2012     Hyperlipidemia LDL goal <100 1/9/2012     Hyperlipidemia LDL goal <70 12/16/2013     Hypertension goal BP (blood pressure) < 140/90 1/9/2012     Incontinence of urine 1/9/2012     Left hip pain 1/9/2012     Low back pain 1/9/2012     Seasonal allergies 1/9/2012     STEMI (ST elevation myocardial infarction) (H) 8-    with VF arrest.     Stented coronary artery 8-     TIA (transient ischaemic attack) 1/10/2012              Past Surgical History:      Past Surgical History:   Procedure Laterality Date     abdominal abscess      at incisional site after kristine     adominal hernia repair      had mesh placed, then allergic so it was removed and she wears a girdle     CHOLECYSTECTOMY       CORONARY ARTERY BYPASS  1992     EP ABLATION AV NODE N/A 7/8/2019    Procedure: EP ABLATION AV NODE;  Surgeon: Claudia Lowe MD;  Location:  HEART CARDIAC CATH LAB     EP PACEMAKER N/A 1/18/2019    Procedure: EP Pacemaker;  Surgeon: Serafin Llamas MD;  Location:  HEART CARDIAC CATH LAB     EP PACEMAKER Left 1/23/2019    Procedure: EP Pacemaker;  Surgeon: Ran Hodges MD;  Location:  HEART CARDIAC CATH LAB              Social History:     Social History     Socioeconomic History     Marital status:      Spouse name: Not on file     Number of children: Not on file     Years of education: Not on file     Highest education level: Not on file   Occupational History     Not on file   Social Needs     Financial resource strain: Not on file     Food insecurity:     Worry: Not on file      Inability: Not on file     Transportation needs:     Medical: Not on file     Non-medical: Not on file   Tobacco Use     Smoking status: Former Smoker     Smokeless tobacco: Never Used     Tobacco comment: quit smoking in 1983   Substance and Sexual Activity     Alcohol use: No     Drug use: No     Sexual activity: Never   Lifestyle     Physical activity:     Days per week: Not on file     Minutes per session: Not on file     Stress: Not on file   Relationships     Social connections:     Talks on phone: Not on file     Gets together: Not on file     Attends Adventism service: Not on file     Active member of club or organization: Not on file     Attends meetings of clubs or organizations: Not on file     Relationship status: Not on file     Intimate partner violence:     Fear of current or ex partner: Not on file     Emotionally abused: Not on file     Physically abused: Not on file     Forced sexual activity: Not on file   Other Topics Concern     Parent/sibling w/ CABG, MI or angioplasty before 65F 55M? No   Social History Narrative     Not on file              Family History:     No family history on file.  Family history reviewed and updated in EPIC          Allergies:     Allergies   Allergen Reactions     Apixaban Shortness Of Breath     Patient became SOB the first few times she tried to take this medication, and consequently discontinued use (occurred around the beginning of February 2019)     Adhesive Tape      blisters     Atenolol      SOB     Lopressor Hct      SOB              Medications:     (Not in a hospital admission)     Current Outpatient Medications   Medication     albuterol (PROVENTIL) (2.5 MG/3ML) 0.083% neb solution     amLODIPine (NORVASC) 10 MG tablet     apixaban ANTICOAGULANT (ELIQUIS) 2.5 MG tablet     atorvastatin (LIPITOR) 40 MG tablet     atorvastatin (LIPITOR) 40 MG tablet     Cholecalciferol (VITAMIN D3 PO)     docusate sodium (COLACE) 100 MG capsule     escitalopram (LEXAPRO) 10  "MG tablet     fluticasone (FLONASE) 50 MCG/ACT nasal spray     furosemide (LASIX) 20 MG tablet     hydrALAZINE (APRESOLINE) 50 MG tablet     losartan (COZAAR) 100 MG tablet     Multiple Vitamins-Minerals (MULTIVITAMIN OR)     order for DME     predniSONE (DELTASONE) 1 MG tablet     Saline (OCEAN NASAL SPRAY NA)     sodium chloride-sodium bicarb (CLASSIC NETI POT SINUS WASH) 2300-700 MG KIT     TRAMADOL HCL PO     VENTOLIN  (90 Base) MCG/ACT inhaler     VENTOLIN  (90 Base) MCG/ACT inhaler     BETA BLOCKER NOT PRESCRIBED, INTENTIONAL,     order for DME     tiotropium (SPIRIVA) 18 MCG inhaled capsule     warfarin (COUMADIN) 2.5 MG tablet     No current facility-administered medications for this visit.                      Physical Exam:     BP (!) 170/82 (BP Location: Right arm, Patient Position: Chair, Cuff Size: Adult Small)   Pulse 60   Ht 1.575 m (5' 2\")   Wt 46.4 kg (102 lb 4.8 oz)   SpO2 95%   BMI 18.71 kg/m       Wt Readings from Last 4 Encounters:   10/08/19 46.4 kg (102 lb 4.8 oz)   19 49.9 kg (110 lb)   19 50.3 kg (111 lb)   19 50.6 kg (111 lb 8 oz)     Gen: No acute distress  HEENT: NC/AT, PERRL, EOM intact, MMM, OP without exudates  PULM/THORAX: Clear to auscultation bilaterally, no rales/rhonchi/wheezes  CV: normal rate, regular rhythm, normal S1 and S2, no murmurs or rubs. No JVD  ABD: Soft, NTND, bowel sounds present, no masses  EXT: WWP. No LE edema.  NEURO: CN II-XII grossly intact. A&Ox3            Data:     Labs Reviewed on Admission  Pertinent for:  Lab Results   Component Value Date    TROPI 0.046 (H) 2019    TROPI 0.050 (H) 2019    TROPI 0.111 (H) 2019    TROPI 0.121 (HH) 2019    TROPI 0.093 (H) 2019    TROPONIN 0.02 2018    TROPONIN 0.04 2017    TROPONIN 0.02 2014    TROPONIN 0.03 10/17/2013               Most Recent Cardiology Studies:     EK19: afib, V-paced, frequent PVCs      Echo:     Interpretation " Summary (3/27/19)  Left ventricular size is normal.  The Ejection Fraction is estimated at 30-35%.  Inferior wall akinesis is present.  Posterior wall akinesis is present.  Right ventricular function, chamber size, wall motion, and thickness are  normal.  Dilation of the inferior vena cava is present with abnormal respiratory  variation in diameter.  No pericardial effusion is present.  A left pleural effusion is present.      Device interrogation (10/8/19):  [place] [facility] [place] and iterative programming of [employer] dual lead bi-v pacemaker per MD orders. Patient is scheduled to see Dr. Lowe today. Normal pacemaker function. 5 non sustained episodes recorded since last interrogation.  Non since AV node ablation on 7/10/19. No arrhythmias recorded since 7/10/19. Intrinsic rhythm = biv p @ 30 bpm.  = 100%. BVP = 100%.  Estimated battery longevity to DEBORA = 8 years.  No short v-v intervals recorded. RV lead impedance trends appear stable. Patient reports that she is feeling well and denies specific complaints. Plan for patient to send a remote transmission in 3 months and return to clinic in 6 months.  JACOB Johnson RNdual lead bi-v pacemakerI have reviewed and interpreted the device interrogation, settings, programming and nurse's summary.  The device is functioning within normal device parameters.   I agree with the current findings, assessment and plan.              Please do not hesitate to contact me if you have any questions/concerns.     Sincerely,     Claudia Lowe MD

## 2024-09-25 NOTE — BH CONSULTATION LIAISON PROGRESS NOTE - NSBHASSESSMENTFT_PSY_ALL_CORE
70 yo M, domiciled at [facility], , no children, retired from job at [employer] in 40s, with PPH of schizoaffective disorder (bipolar type with psychotic features), one past suicide attempt in 1992 via cutting wrists (found by mother and brought in to hospital), multiple past psychiatric hospitalizations (last [facility] 2S June - Aug 2023), substance use hx (ETOH-last used April 2024, h/o 3 DUIs in 40s; cocaine- last used 20 yrs ago), and PMH of HTN who presents to [facility] ED BIB sister from [facility] due to 2 months of worsening depression, psychosis (AH, paranoia), and poor attention to ADLs.    Today, patient presents with hopelessness, AH with neutral commands, paranoia, delusions of being sent to senior care, social withdrawal, poor ADLs, and passive SI in the context of stopping outpatient ECT in May 2024 and recent medication changes. Presentation is consistent with exacerbation of schizoaffective disorder, current episode depressed with psychosis. Per collateral from sister, patient was previously functioning much better while receiving ECT treatments q3wks. Patient is at elevated acute risk of harm to self due to worsening symptoms, poor ADLs, and passive SI. He requires inpatient psych admission for medication titration, safety planning, and possible re-initiation of ECT.    9/16: Pt AnOx3 to person, place, and time. Reports increasing anhedonia and hopelessness. Pt describes AH which promote feelings of depression and paranoia that he is be watched. Reports feeling safe in the hospital and denies command hallucinations. Admits to passive SI but denies current intent or plan. Denies HI or visual hallucinations. Admits to continued craving for alcohol but states it is controlled because of lack of access.   9/17: Continues to endorse poor mood, sleep, and feelings of paranoia/being watched. Describes VH of shadows throughout the day. Pt amenable to [facility] admission. Denies current AH, command hallucinations, SI or HI.   9/18: Continues to report poor mood, appetite, and sleep. Describes worsening paranoia and racing thoughts, states that he is being watched by everyone because he will be sent to senior care for life. Denies that there might be another explanation for these events or that these beliefs are abnormal. Verbalizes understanding of plan to go to [facility]. Denies AVH, SI/I/P, HI  9/19: Reports worsening mood and continued poor sleep and appetite. Continues to endorse feelings of paranoia. Denies AVH, command hallucinations, SI/I/P, HI. No adverse effects noted from Zyprexa increase. Awaiting bed availability at [facility]. Pt malodorous and requesting assistance with showering.    9/20: Continues to report poor mood and appetite, denies any discomfort with eating. Pt encouraged to slowly increase his portions. Denies SI/I/P, AVH, HI.    9/23: Reports poor mood and appetite. Admits to vague whispered AH over the weekend, denies command hallucinations. Pt encouraged to increase PO intake. Denies SI/I/P.    9/24: As per discussion with the primary team, patient noted to have improved PO intake and new onset hematuria, continues to report ideas of reference and depressed mood.  Denies current AVH, SI/I/P, HI.  9/25: Reports low mood and anhedonia; continues to describe ruminations and feelings of paranoia. Pt noted to be on ativan 0.5mg PO BID starting on 9/19, recommend to continue to avoid withdrawal symptoms.     PLAN  - Please have nursing assist pt with showering and ADLs, pt has difficulty initiating action   - Encourage PO intake, document calorie count.  -  c/w mirtazapine 7.5 mg PO qHS for appetite stimulation   - c/w Effexor 225 mg daily to target mood sxs  - c/w Zyprexa 25 mg HS  - PRN: Haldol 5 mg PO/IM q6h for agitation   - Hold antipsychotics if qtc >500  - OOB to chair/Pt/OT as needed as tolerated  -Dispo; Given ongoing depression/psychosis, pt. will need an inpatient psychiatric admission for further stabilization/medication management, admit voluntary to [facility] once cleared medically.      70 yo M, domiciled at [facility], , no children, retired from job at [employer] in 40s, with PPH of schizoaffective disorder (bipolar type with psychotic features), one past suicide attempt in 1992 via cutting wrists (found by mother and brought in to hospital), multiple past psychiatric hospitalizations (last [facility] 2S June - Aug 2023), substance use hx (ETOH-last used April 2024, h/o 3 DUIs in 40s; cocaine- last used 20 yrs ago), and PMH of HTN who presents to [facility] ED BIB sister from [facility] due to 2 months of worsening depression, psychosis (AH, paranoia), and poor attention to ADLs.    Today, patient presents with hopelessness, AH with neutral commands, paranoia, delusions of being sent to intermediate, social withdrawal, poor ADLs, and passive SI in the context of stopping outpatient ECT in May 2024 and recent medication changes. Presentation is consistent with exacerbation of schizoaffective disorder, current episode depressed with psychosis. Per collateral from sister, patient was previously functioning much better while receiving ECT treatments q3wks. Patient is at elevated acute risk of harm to self due to worsening symptoms, poor ADLs, and passive SI. He requires inpatient psych admission for medication titration, safety planning, and possible re-initiation of ECT.    9/16: Pt AnOx3 to person, place, and time. Reports increasing anhedonia and hopelessness. Pt describes AH which promote feelings of depression and paranoia that he is be watched. Reports feeling safe in the hospital and denies command hallucinations. Admits to passive SI but denies current intent or plan. Denies HI or visual hallucinations. Admits to continued craving for alcohol but states it is controlled because of lack of access.   9/17: Continues to endorse poor mood, sleep, and feelings of paranoia/being watched. Describes VH of shadows throughout the day. Pt amenable to [facility] admission. Denies current AH, command hallucinations, SI or HI.   9/18: Continues to report poor mood, appetite, and sleep. Describes worsening paranoia and racing thoughts, states that he is being watched by everyone because he will be sent to intermediate for life. Denies that there might be another explanation for these events or that these beliefs are abnormal. Verbalizes understanding of plan to go to [facility]. Denies AVH, SI/I/P, HI  9/19: Reports worsening mood and continued poor sleep and appetite. Continues to endorse feelings of paranoia. Denies AVH, command hallucinations, SI/I/P, HI. No adverse effects noted from Zyprexa increase. Awaiting bed availability at [facility]. Pt malodorous and requesting assistance with showering.    9/20: Continues to report poor mood and appetite, denies any discomfort with eating. Pt encouraged to slowly increase his portions. Denies SI/I/P, AVH, HI.    9/23: Reports poor mood and appetite. Admits to vague whispered AH over the weekend, denies command hallucinations. Pt encouraged to increase PO intake. Denies SI/I/P.    9/24: As per discussion with the primary team, patient noted to have improved PO intake and new onset hematuria, continues to report ideas of reference and depressed mood.  Denies current AVH, SI/I/P, HI.  9/25: Reports low mood and anhedonia; continues to describe ruminations and feelings of paranoia. Firmly denies si and hi.    PLAN  - Please have nursing assist pt with showering and ADLs, pt has difficulty initiating action   - Encourage PO intake, document calorie count.  -  c/w mirtazapine 7.5 mg PO qHS for appetite stimulation   - c/w Effexor 225 mg daily to target mood sxs  - c/w Zyprexa 25 mg HS  - Pt noted to be on ativan 0.5mg PO BID starting on 9/19, recommend to continue for now, avoid stopping abruptly due to risk of withdrawal symptoms.   - PRN: Haldol 5 mg PO/IM q6h for agitation   - Hold antipsychotics if qtc >500  - Treatment of UTI as per team, on IV antibiotics.   - OOB to chair/Pt/OT as needed as tolerated  -Dispo; Given ongoing depression/psychosis, pt. will need an inpatient psychiatric admission for further stabilization/medication management, admit voluntary to [facility] once cleared medically.

## 2024-09-25 NOTE — PROGRESS NOTE ADULT - SUBJECTIVE AND OBJECTIVE BOX
[facility] Division of Hospital Medicine  Meir Francis MD   Available via MS Teams  In house pager [phone]  Patient is a 69y old  Male who presents with a chief complaint of MDD with auditory hallucinations and increased paranoia (psychosis), dvt (21 Sep 2024 13:56)    SUBJECTIVE / OVERNIGHT EVENTS:  - Pt seen and examined at bedside. Improvement in urinary symptoms, and urine less dark.     ADDITIONAL REVIEW OF SYSTEMS: endorses feeling depressed otherwise denies acute ROS     MEDICATIONS  (STANDING):  amLODIPine   Tablet 10 milliGRAM(s) Oral with breakfast  cefTRIAXone   IVPB 1000 milliGRAM(s) IV Intermittent every 24 hours  cefTRIAXone   IVPB      lactated ringers. 1000 milliLiter(s) (100 mL/Hr) IV Continuous <Continuous>  LORazepam     Tablet 0.5 milliGRAM(s) Oral two times a day  mirtazapine 7.5 milliGRAM(s) Oral at bedtime  OLANZapine 25 milliGRAM(s) Oral at bedtime  senna 2 Tablet(s) Oral at bedtime  venlafaxine XR. 225 milliGRAM(s) Oral daily    MEDICATIONS  (PRN):  acetaminophen     Tablet .. 650 milliGRAM(s) Oral every 6 hours PRN Temp greater or equal to 38C (100.4F), Mild Pain (1 - 3)  aluminum hydroxide/magnesium hydroxide/simethicone Suspension 30 milliLiter(s) Oral every 4 hours PRN Dyspepsia  haloperidol     Tablet 5 milliGRAM(s) Oral every 6 hours PRN Agitation  haloperidol    Injectable 5 milliGRAM(s) IntraMuscular every 6 hours PRN Agitation  melatonin 3 milliGRAM(s) Oral at bedtime PRN Insomnia  OLANZapine 2.5 milliGRAM(s) Oral every 6 hours PRN agitation  OLANZapine Injectable 2.5 milliGRAM(s) IntraMuscular every 6 hours PRN agitation if pt refuses PO  ondansetron Injectable 4 milliGRAM(s) IV Push every 8 hours PRN Nausea and/or Vomiting      I&O's Summary      PHYSICAL EXAM:  Vital Signs Last 24 Hrs  T(C): 36.7 (25 Sep 2024 13:39), Max: 36.7 (25 Sep 2024 13:39)  T(F): 98.1 (25 Sep 2024 13:39), Max: 98.1 (25 Sep 2024 13:39)  HR: 78 (25 Sep 2024 13:39) (66 - 94)  BP: 118/81 (25 Sep 2024 13:39) (116/90 - 130/88)  BP(mean): 105 (25 Sep 2024 05:15) (105 - 105)  RR: 18 (25 Sep 2024 13:39) (16 - 18)  SpO2: 98% (25 Sep 2024 13:39) (97% - 99%)    Parameters below as of 25 Sep 2024 13:39  Patient On (Oxygen Delivery Method): room air      CONSTITUTIONAL: NAD, well-developed  EYES: PERRLA; conjunctiva and sclera clear  ENMT: Moist oral mucosa, no pharyngeal injection or exudates  NECK: Supple, no palpable masses;  RESPIRATORY: Normal respiratory effort; lungs are clear to auscultation bilaterally  CARDIOVASCULAR: Regular rate and rhythm, normal S1 and S2, no murmur/rub/gallop  ABDOMEN: Nontender to palpation, normoactive bowel sounds, no rebound/guarding, no CVA tenderness today   MUSCULOSKELETAL:  no clubbing or cyanosis of digits; no joint swelling or tenderness to palpation  PSYCH: Awake and alert, answering questions appropriately, still w/ intermittent auditory hallucinations   NEUROLOGY: No focal deficits noted   SKIN: No rashes; no palpable lesions    LABS:                        16.2   5.67  )-----------( 301      ( 25 Sep 2024 05:21 )             46.9     09-25    140  |  105  |  14  ----------------------------<  73  3.7   |  18[L]  |  0.86    Ca    9.0      25 Sep 2024 05:21  Phos  3.2     09-25  Mg     1.90     09-25            Urinalysis Basic - ( 25 Sep 2024 05:21 )    Color: x / Appearance: x / SG: x / pH: x  Gluc: 73 mg/dL / Ketone: x  / Bili: x / Urobili: x   Blood: x / Protein: x / Nitrite: x   Leuk Esterase: x / RBC: x / WBC x   Sq Epi: x / Non Sq Epi: x / Bacteria: x        Culture - Urine (collected 24 Sep 2024 09:50)  Source: Clean Catch Clean Catch (Midstream)  Final Report (25 Sep 2024 13:14):    <10,000 CFU/mL Normal Urogenital Ashlie      COVID-19 PCR: NotDetec (24 Sep 2024 12:26)  COVID-19 PCR: NotDetec (17 Sep 2024 06:50)  COVID-19 PCR: NotDetec (14 Sep 2024 14:01)      RADIOLOGY & ADDITIONAL TESTS:  New Results Reviewed Today: [x]  New Imaging Personally Reviewed Today:  New Electrocardiogram Personally Reviewed Today:  Prior or Outpatient Records Reviewed Today:    COMMUNICATION:  Care Discussed with Consultants/Other Providers and Details of Discussion:  Discussions with Patient/Family:  PCP Communication:

## 2024-09-26 DIAGNOSIS — R31.9 HEMATURIA, UNSPECIFIED: ICD-10-CM

## 2024-09-26 LAB
ANION GAP SERPL CALC-SCNC: 15 MMOL/L — HIGH (ref 7–14)
BUN SERPL-MCNC: 16 MG/DL — SIGNIFICANT CHANGE UP (ref 7–23)
CALCIUM SERPL-MCNC: 9.3 MG/DL — SIGNIFICANT CHANGE UP (ref 8.4–10.5)
CHLORIDE SERPL-SCNC: 105 MMOL/L — SIGNIFICANT CHANGE UP (ref 98–107)
CO2 SERPL-SCNC: 18 MMOL/L — LOW (ref 22–31)
CREAT SERPL-MCNC: 1.03 MG/DL — SIGNIFICANT CHANGE UP (ref 0.5–1.3)
EGFR: 80 ML/MIN/1.73M2 — SIGNIFICANT CHANGE UP
GLUCOSE SERPL-MCNC: 72 MG/DL — SIGNIFICANT CHANGE UP (ref 70–99)
MAGNESIUM SERPL-MCNC: 2 MG/DL — SIGNIFICANT CHANGE UP (ref 1.6–2.6)
PHOSPHATE SERPL-MCNC: 3.5 MG/DL — SIGNIFICANT CHANGE UP (ref 2.5–4.5)
POTASSIUM SERPL-MCNC: 4.5 MMOL/L — SIGNIFICANT CHANGE UP (ref 3.5–5.3)
POTASSIUM SERPL-SCNC: 4.5 MMOL/L — SIGNIFICANT CHANGE UP (ref 3.5–5.3)
SODIUM SERPL-SCNC: 138 MMOL/L — SIGNIFICANT CHANGE UP (ref 135–145)

## 2024-09-26 PROCEDURE — 99232 SBSQ HOSP IP/OBS MODERATE 35: CPT

## 2024-09-26 RX ORDER — APIXABAN 5 MG/1
10 TABLET, FILM COATED ORAL ONCE
Refills: 0 | Status: COMPLETED | OUTPATIENT
Start: 2024-09-26 | End: 2024-09-26

## 2024-09-26 RX ADMIN — Medication 2 TABLET(S): at 21:28

## 2024-09-26 RX ADMIN — Medication 10 MILLIGRAM(S): at 09:50

## 2024-09-26 RX ADMIN — Medication 0.5 MILLIGRAM(S): at 21:28

## 2024-09-26 RX ADMIN — Medication 25 MILLIGRAM(S): at 23:00

## 2024-09-26 RX ADMIN — Medication 225 MILLIGRAM(S): at 17:50

## 2024-09-26 RX ADMIN — MIRTAZAPINE 7.5 MILLIGRAM(S): 30 TABLET, FILM COATED ORAL at 21:28

## 2024-09-26 RX ADMIN — APIXABAN 10 MILLIGRAM(S): 5 TABLET, FILM COATED ORAL at 17:48

## 2024-09-26 NOTE — CHART NOTE - NSCHARTNOTEFT_GEN_A_CORE
Nutrition Follow-Up Chart Note         Source: Patient A&Ox4    Chart [x ]     Pt is seen for nutrition follow up as per protocol.    __________________ Medical Course__________________  69 yr old male presenting with MDD with auditory hallucinations and increased paranoia (psychosis).    Diet, Regular:   Easy to Chew (EASYTOCHEW) (09-21-24 @ 16:08)         __________________ Nutrition Course__________________   Pt reports his appetite is "not the best" in hospital. PO intake varies from 0-50% in RN flow sheet. Pt noted on Remeron for appetite stimulant. Attempted to explored pt's food preferences, nothing voiced at this time. Pt continues tolerating Easy to Chew diet. Offered pt Ensure supplement, pt is not interested at this time. Denies any GI distress (nausea/vomiting/diarrhea/constipation.) LBM 9/24.     __________________ Pertinent Medications__________________   MEDICATIONS  (STANDING):  amLODIPine   Tablet 10 milliGRAM(s) Oral with breakfast  lactated ringers. 1000 milliLiter(s) (100 mL/Hr) IV Continuous <Continuous>  LORazepam     Tablet 0.5 milliGRAM(s) Oral at bedtime  mirtazapine 7.5 milliGRAM(s) Oral at bedtime  OLANZapine 25 milliGRAM(s) Oral at bedtime  senna 2 Tablet(s) Oral at bedtime  venlafaxine XR. 225 milliGRAM(s) Oral daily    MEDICATIONS  (PRN):  acetaminophen     Tablet .. 650 milliGRAM(s) Oral every 6 hours PRN Temp greater or equal to 38C (100.4F), Mild Pain (1 - 3)  aluminum hydroxide/magnesium hydroxide/simethicone Suspension 30 milliLiter(s) Oral every 4 hours PRN Dyspepsia  haloperidol     Tablet 5 milliGRAM(s) Oral every 6 hours PRN Agitation  haloperidol    Injectable 5 milliGRAM(s) IntraMuscular every 6 hours PRN Agitation  melatonin 3 milliGRAM(s) Oral at bedtime PRN Insomnia  OLANZapine 2.5 milliGRAM(s) Oral every 6 hours PRN agitation  OLANZapine Injectable 2.5 milliGRAM(s) IntraMuscular every 6 hours PRN agitation if pt refuses PO  ondansetron Injectable 4 milliGRAM(s) IV Push every 8 hours PRN Nausea and/or Vomiting      __________________ Pertinent Labs__________________   09-26    138  |  105  |  16  ----------------------------<  72  4.5   |  18[L]  |  1.03    Ca    9.3      26 Sep 2024 06:05  Phos  3.5     09-26  Mg     2.00     09-26                            16.2   5.67  )-----------( 301      ( 25 Sep 2024 05:21 )             46.9                __________________ Anthropometrics__________________     Anthropometrics: Height (cm): 177.8 (09-14)  Weight (kg): 99.8 (09-14)  BMI (kg/m2): 31.6 (09-14)  IBW:   +/- 10%    Weight Assessment: per RN flowsheet in KG  Daily     Daily   % weight change :     Edema:    Skin:     Estimated Needs Assessment:   [  ] No change in need assessment    [  ] recalculated,   Weight Used:   Estimated Energy:  Kcal/kg/day (  kcal/kg)  Estimated Protein:  gm/kg/day (  gm/kg)  Estimated Fluid: per Medical and team discretion.    Nutrition Focused Physical Exam: [  ] conducted  [  ] deferred  [  ] not applicable;    Muscle wasting [  ] temporal [  ] clavicle [  ] shoulder [  ] scapula [  ] thigh [  ] calf [  ] dorsal hand    Fat Loss [  ] buccal [  ] orbital [  ] triceps [  ] ribs     Previous Nutrition Diagnosis:   [  ] Severe/Moderate malnutrition  [  ] Increased Nutrient Needs  [  ] Altered Nutrition Related Labs  [  ] Unintentional Weight Loss  [  ] Inadequate Oral Intake  [  ] Inadequate Protein Energy Intake  [  ] Inadequate Energy Intake  [  ] Food and Nutrition Knowledge Deficit    Nutrition Diagnosis is : [  ] ongoing  [  ] resolved [  ] not applicable     New Nutrition Diagnosis : [  ] ongoing  [  ] resolved [  ] not applicable     Education:  [  ] Given today        Type of education provided:   [  ] Given on previous assessment by RD   [  ] Not applicable 2/2 cognitive deficit  [  ] Pt refusal of education offered   [  ] Not applicable 2/2 current prognosis  [  ] Not warranted at present    Recommendations:  [ x ] Continue present PO diet/EN order as it remains appropriate at this time  [ x ] Honor food and fluid preferences as able.  [ x ] Please consider adding    Monitoring and Evaluation:   [ x ] Monitor PO intake, skin integrity, bowel regimen, and nutrition pertinent labs.  [ x ] Tolerance to diet prescription [ x ] weights [ x ] follow up per protocol Nutrition Follow-Up Chart Note         Source: Patient A&Ox4    Chart [x ]     Pt is seen for nutrition follow up as per protocol.    __________________ Medical Course__________________  69 yr old male presenting with MDD with auditory hallucinations and increased paranoia (psychosis).    Diet, Regular:   Easy to Chew (EASYTOCHEW) (09-21-24 @ 16:08)         __________________ Nutrition Course__________________   Pt reports his appetite is "not the best" in hospital. PO intake varies from 0-50% in RN flow sheet. Pt noted on Remeron for appetite stimulant. Attempted to explored pt's food preferences, nothing voiced at this time. Pt continues tolerating Easy to Chew diet. Offered pt Ensure supplement, pt is not interested at this time. Pt is amenable to receieve Mighty Shake 1x daily (220kcal, 6gm pro per each serving), and Magic Cup 1x daily (290kcal, 9gm pro) from kitchen. Denies any GI distress (nausea/vomiting/diarrhea/constipation.) LBM 9/24. Pt noted on bowel regimen. Pt continues to encourage to consume small frequent meals to meeting his nutritional needs. RD to remain available for further nutritional interventions as indicated.     __________________ Pertinent Medications__________________   MEDICATIONS  (STANDING):  amLODIPine   Tablet 10 milliGRAM(s) Oral with breakfast  lactated ringers. 1000 milliLiter(s) (100 mL/Hr) IV Continuous <Continuous>  LORazepam     Tablet 0.5 milliGRAM(s) Oral at bedtime  mirtazapine 7.5 milliGRAM(s) Oral at bedtime  OLANZapine 25 milliGRAM(s) Oral at bedtime  senna 2 Tablet(s) Oral at bedtime  venlafaxine XR. 225 milliGRAM(s) Oral daily    MEDICATIONS  (PRN):  acetaminophen     Tablet .. 650 milliGRAM(s) Oral every 6 hours PRN Temp greater or equal to 38C (100.4F), Mild Pain (1 - 3)  aluminum hydroxide/magnesium hydroxide/simethicone Suspension 30 milliLiter(s) Oral every 4 hours PRN Dyspepsia  haloperidol     Tablet 5 milliGRAM(s) Oral every 6 hours PRN Agitation  haloperidol    Injectable 5 milliGRAM(s) IntraMuscular every 6 hours PRN Agitation  melatonin 3 milliGRAM(s) Oral at bedtime PRN Insomnia  OLANZapine 2.5 milliGRAM(s) Oral every 6 hours PRN agitation  OLANZapine Injectable 2.5 milliGRAM(s) IntraMuscular every 6 hours PRN agitation if pt refuses PO  ondansetron Injectable 4 milliGRAM(s) IV Push every 8 hours PRN Nausea and/or Vomiting      __________________ Pertinent Labs__________________   09-26    138  |  105  |  16  ----------------------------<  72  4.5   |  18[L]  |  1.03    Ca    9.3      26 Sep 2024 06:05  Phos  3.5     09-26  Mg     2.00     09-26                            16.2   5.67  )-----------( 301      ( 25 Sep 2024 05:21 )             46.9                __________________ Anthropometrics__________________     Anthropometrics: Height (cm): 177.8 (09-14)  Weight (kg): 99.8 (09-14)  BMI (kg/m2): 31.6 (09-14)  IBW:   +/- 10%    Weight Assessment: No updated weight for assessment     Edema: None noted at present as per RN flow sheet.     Skin: None noted at present as per RN flow sheet.     Estimated Needs Assessment:   [ x ] No change in need assessment    Weight Used: .1   Estimated Energy: 0661-8022 Kcal/kg/day (25-30  kcal/kg)  Estimated Protein: 67.6-82.7 gm/kg/day (0.9-1.1  gm/kg)  Estimated Fluid: per Medical and team discretion.      Previous Nutrition Diagnosis:   [ x ] Obesity/ Overweight    Nutrition Diagnosis is : [ x ] ongoing    New Nutrition Diagnosis : Inadequate oral intake related to mental illness as evidence by PO intake <50% in hospital,  is following.         Recommendations:  [ x ] Recommend continue with current diet, which remains appropriate at this time.   [ x ] Honor food and fluid preferences as able.  [ x ] Pt is amenable to receieve Mighty Shake 1x daily (220kcal, 6gm pro per each serving), and Magic Cup 1x daily (290kcal, 9gm pro) from kitchen to provide optimal nutrition.   [ x ] RD to remain available for further nutritional interventions as indicated.     Monitoring and Evaluation:   [ x ] Monitor PO intake, skin integrity, bowel regimen, and nutrition pertinent labs.  [ x ] Tolerance to diet prescription [ x ] weights [ x ] follow up per protocol

## 2024-09-26 NOTE — PROGRESS NOTE ADULT - PROBLEM SELECTOR PLAN 3
- 9/24 pt noted to have hematuria overnight, UA concerning for UTI 9/25. No WBC, no fever, but positive L CVA tenderness.   - Urine was sent for culture and pt was empirically started on Rocephin.   - Culture resulted negative and tenderness resolves.   - CT A/P w/ no infection. L side renal cyst noted; will order renal US to evaluate simple vs complex and to r/o cause of any hematuria  - Renal US normal; no stone noted; no other cause of hematuria identified.   - Will advise outpt urology follow up for possible cysto. - 9/24 pt noted to have hematuria overnight, UA concerning for UTI. No WBC, no fever, but positive L CVA tenderness.   - Urine was sent for culture and pt was empirically started on Rocephin.   - Culture resulted negative and tenderness resolves.   - CT A/P w/ no infection. L side renal cyst noted; will order renal US to evaluate simple vs complex and to r/o cause of any hematuria  - Renal US normal; no stone noted; no other cause of hematuria identified.   - Will advise outpt urology follow up for possible cysto.

## 2024-09-26 NOTE — PROGRESS NOTE ADULT - SUBJECTIVE AND OBJECTIVE BOX
[facility] Division of Hospital Medicine  Meir Francis MD   Available via MS Teams  In house pager [phone]    Patient is a 69y old  Male who presents with a chief complaint of MDD with auditory hallucinations and increased paranoia (psychosis), dvt (21 Sep 2024 13:56)    SUBJECTIVE / OVERNIGHT EVENTS:  - Pt seen and examined at bedside, still feeling depressed. No acute events overnight.     ADDITIONAL REVIEW OF SYSTEMS: no change in ROS     MEDICATIONS  (STANDING):  amLODIPine   Tablet 10 milliGRAM(s) Oral with breakfast  lactated ringers. 1000 milliLiter(s) (100 mL/Hr) IV Continuous <Continuous>  LORazepam     Tablet 0.5 milliGRAM(s) Oral at bedtime  mirtazapine 7.5 milliGRAM(s) Oral at bedtime  OLANZapine 25 milliGRAM(s) Oral at bedtime  senna 2 Tablet(s) Oral at bedtime  venlafaxine XR. 225 milliGRAM(s) Oral daily    MEDICATIONS  (PRN):  acetaminophen     Tablet .. 650 milliGRAM(s) Oral every 6 hours PRN Temp greater or equal to 38C (100.4F), Mild Pain (1 - 3)  aluminum hydroxide/magnesium hydroxide/simethicone Suspension 30 milliLiter(s) Oral every 4 hours PRN Dyspepsia  haloperidol     Tablet 5 milliGRAM(s) Oral every 6 hours PRN Agitation  haloperidol    Injectable 5 milliGRAM(s) IntraMuscular every 6 hours PRN Agitation  melatonin 3 milliGRAM(s) Oral at bedtime PRN Insomnia  OLANZapine 2.5 milliGRAM(s) Oral every 6 hours PRN agitation  OLANZapine Injectable 2.5 milliGRAM(s) IntraMuscular every 6 hours PRN agitation if pt refuses PO  ondansetron Injectable 4 milliGRAM(s) IV Push every 8 hours PRN Nausea and/or Vomiting      I&O's Summary      PHYSICAL EXAM:  Vital Signs Last 24 Hrs  T(C): 37 (26 Sep 2024 14:30), Max: 37 (26 Sep 2024 09:50)  T(F): 98.6 (26 Sep 2024 14:30), Max: 98.6 (26 Sep 2024 09:50)  HR: 83 (26 Sep 2024 14:30) (66 - 85)  BP: 121/87 (26 Sep 2024 14:30) (108/83 - 140/79)  BP(mean): --  RR: 17 (26 Sep 2024 14:30) (17 - 18)  SpO2: 97% (26 Sep 2024 14:30) (97% - 98%)    Parameters below as of 26 Sep 2024 14:30  Patient On (Oxygen Delivery Method): room air      CONSTITUTIONAL: NAD, well-developed  EYES: PERRLA; conjunctiva and sclera clear  ENMT: Moist oral mucosa, no pharyngeal injection or exudates  NECK: Supple, no palpable masses;  RESPIRATORY: Normal respiratory effort; lungs are clear to auscultation bilaterally  CARDIOVASCULAR: Regular rate and rhythm, normal S1 and S2, no murmur/rub/gallop  ABDOMEN: Nontender to palpation, normoactive bowel sounds, no rebound/guarding, no CVA tenderness today   MUSCULOSKELETAL:  no clubbing or cyanosis of digits; no joint swelling or tenderness to palpation  PSYCH: Awake and alert, answering questions appropriately, still w/ intermittent auditory hallucinations   NEUROLOGY: No focal deficits noted   SKIN: No rashes; no palpable lesions      LABS:                        16.2   5.67  )-----------( 301      ( 25 Sep 2024 05:21 )             46.9     09-26    138  |  105  |  16  ----------------------------<  72  4.5   |  18[L]  |  1.03    Ca    9.3      26 Sep 2024 06:05  Phos  3.5     09-26  Mg     2.00     09-26            Urinalysis Basic - ( 26 Sep 2024 06:05 )    Color: x / Appearance: x / SG: x / pH: x  Gluc: 72 mg/dL / Ketone: x  / Bili: x / Urobili: x   Blood: x / Protein: x / Nitrite: x   Leuk Esterase: x / RBC: x / WBC x   Sq Epi: x / Non Sq Epi: x / Bacteria: x        Culture - Urine (collected 24 Sep 2024 09:50)  Source: Clean Catch Clean Catch (Midstream)  Final Report (25 Sep 2024 13:14):    <10,000 CFU/mL Normal Urogenital Ashlie      COVID-19 PCR: NotDetec (24 Sep 2024 12:26)  COVID-19 PCR: NotDetec (17 Sep 2024 06:50)  COVID-19 PCR: NotDetec (14 Sep 2024 14:01)

## 2024-09-26 NOTE — PROGRESS NOTE ADULT - PROBLEM SELECTOR PLAN 2
New LLE peroneal DVT  - Pt was started on Xarelto but switched to Eliquis with incomplete load and order dced overnight? Discussed with pharmacy over phone and they will determine dose of continued regimen. Eliquis 10mg x1 ordered STAT for now 9/26 evening.   - Outpatient follow up w/ hematology for new DVT. While with reported isolation to room prior to admission, No clear provoking factors for DVT.   [ ] Price check for DOAC - Eliquis has a copay of 190$ with deduductible after deductible will be 47$ a month. Patient needs prior auth for Xarelto. Prior team Discussed coumadin with patient but concerned with compliance and ability to monitor INR.

## 2024-09-27 LAB
ANION GAP SERPL CALC-SCNC: 13 MMOL/L — SIGNIFICANT CHANGE UP (ref 7–14)
BUN SERPL-MCNC: 14 MG/DL — SIGNIFICANT CHANGE UP (ref 7–23)
CALCIUM SERPL-MCNC: 9.3 MG/DL — SIGNIFICANT CHANGE UP (ref 8.4–10.5)
CHLORIDE SERPL-SCNC: 106 MMOL/L — SIGNIFICANT CHANGE UP (ref 98–107)
CO2 SERPL-SCNC: 23 MMOL/L — SIGNIFICANT CHANGE UP (ref 22–31)
CREAT SERPL-MCNC: 0.96 MG/DL — SIGNIFICANT CHANGE UP (ref 0.5–1.3)
EGFR: 87 ML/MIN/1.73M2 — SIGNIFICANT CHANGE UP
GLUCOSE SERPL-MCNC: 81 MG/DL — SIGNIFICANT CHANGE UP (ref 70–99)
MAGNESIUM SERPL-MCNC: 2 MG/DL — SIGNIFICANT CHANGE UP (ref 1.6–2.6)
PHOSPHATE SERPL-MCNC: 3.4 MG/DL — SIGNIFICANT CHANGE UP (ref 2.5–4.5)
POTASSIUM SERPL-MCNC: 3.6 MMOL/L — SIGNIFICANT CHANGE UP (ref 3.5–5.3)
POTASSIUM SERPL-SCNC: 3.6 MMOL/L — SIGNIFICANT CHANGE UP (ref 3.5–5.3)
SODIUM SERPL-SCNC: 142 MMOL/L — SIGNIFICANT CHANGE UP (ref 135–145)

## 2024-09-27 PROCEDURE — 99232 SBSQ HOSP IP/OBS MODERATE 35: CPT

## 2024-09-27 RX ORDER — APIXABAN 5 MG/1
5 TABLET, FILM COATED ORAL EVERY 12 HOURS
Refills: 0 | Status: DISCONTINUED | OUTPATIENT
Start: 2024-09-27 | End: 2024-09-30

## 2024-09-27 RX ORDER — MIRTAZAPINE 30 MG/1
15 TABLET, FILM COATED ORAL AT BEDTIME
Refills: 0 | Status: DISCONTINUED | OUTPATIENT
Start: 2024-09-27 | End: 2024-09-30

## 2024-09-27 RX ADMIN — Medication 10 MILLIGRAM(S): at 08:14

## 2024-09-27 RX ADMIN — Medication 2 TABLET(S): at 22:05

## 2024-09-27 RX ADMIN — MIRTAZAPINE 15 MILLIGRAM(S): 30 TABLET, FILM COATED ORAL at 22:05

## 2024-09-27 RX ADMIN — Medication 0.5 MILLIGRAM(S): at 22:05

## 2024-09-27 RX ADMIN — Medication 225 MILLIGRAM(S): at 17:41

## 2024-09-27 RX ADMIN — APIXABAN 5 MILLIGRAM(S): 5 TABLET, FILM COATED ORAL at 22:05

## 2024-09-27 RX ADMIN — Medication 25 MILLIGRAM(S): at 22:06

## 2024-09-27 RX ADMIN — APIXABAN 5 MILLIGRAM(S): 5 TABLET, FILM COATED ORAL at 13:00

## 2024-09-27 NOTE — BH CONSULTATION LIAISON PROGRESS NOTE - NSBHCHARTREVIEWVS_PSY_A_CORE FT
Vital Signs Last 24 Hrs  T(C): 36.7 (27 Sep 2024 05:00), Max: 37 (26 Sep 2024 14:30)  T(F): 98.1 (27 Sep 2024 05:00), Max: 98.6 (26 Sep 2024 14:30)  HR: 71 (27 Sep 2024 08:12) (70 - 83)  BP: 122/62 (27 Sep 2024 08:12) (121/87 - 137/96)  BP(mean): --  RR: 18 (27 Sep 2024 05:00) (17 - 18)  SpO2: 100% (27 Sep 2024 05:00) (97% - 100%)    Parameters below as of 27 Sep 2024 05:00  Patient On (Oxygen Delivery Method): room air     Vital Signs Last 24 Hrs  T(C): 36.7 (27 Sep 2024 05:00), Max: 37 (26 Sep 2024 14:30)  T(F): 98.1 (27 Sep 2024 05:00), Max: 98.6 (26 Sep 2024 14:30)  HR: 71 (27 Sep 2024 08:12) (70 - 83)  BP: 122/62 (27 Sep 2024 08:12) (121/87 - 137/96)  BP(mean): --  RR: 18 (27 Sep 2024 05:00) (17 - 18)  SpO2: 100% (27 Sep 2024 05:00) (97% - 100%)  Parameters below as of 27 Sep 2024 05:00  Patient On (Oxygen Delivery Method): room air  09-27

## 2024-09-27 NOTE — BH CONSULTATION LIAISON PROGRESS NOTE - NSBHMSETHTCONTENT_PSY_A_CORE
delusion of being sent to [facility], paranoia/Ideas of reference/Ruminations/Hopelessness/Other delusion of being sent to [facility], paranoia/Ruminations/Other

## 2024-09-27 NOTE — BH CONSULTATION LIAISON PROGRESS NOTE - NSBHFUPINTERVALHXFT_PSY_A_CORE
Patient has been adherent with medications and did not get any PRNs, noted to be medically active due to poor PO intake. Patient reports eating eggs and apple juice this morning but continues to endorse poor appetite. Patient amenable to increased dose of mirtazepine to stimulate appetite.     Does not endorse ideas of reference or paranoia, no mention of going to [facility], states he feels safe.     Denies current SI or HI.  Patient has been adherent with medications and did not get any PRNs.  Patient reports eating eggs and apple juice this morning but continues to endorse poor appetite. Patient amenable to increased dose of mirtazepine to help with his appetite/mood.      Does not endorse ideas of reference or paranoia, no mention of going to custodial, states he feels safe.     Denies current SI or HI.  Patient has been adherent with medications and did not get any PRNs.  Patient reports eating eggs and apple juice this morning but continues to endorse poor appetite. Patient amenable to increased dose of mirtazapine to help with his appetite/mood.      Does not endorse ideas of reference or paranoia, no mention of going to care home, states he feels safe.     Denies current SI or HI.

## 2024-09-27 NOTE — BH CONSULTATION LIAISON PROGRESS NOTE - NSBHCHARTREVIEWLAB_PSY_A_CORE FT
CBC Full  -  ( 25 Sep 2024 05:21 )  WBC Count : 5.67 K/uL  RBC Count : 5.33 M/uL  Hemoglobin : 16.2 g/dL  Hematocrit : 46.9 %  Platelet Count - Automated : 301 K/uL  Mean Cell Volume : 88.0 fL  Mean Cell Hemoglobin : 30.4 pg  Mean Cell Hemoglobin Concentration : 34.5 gm/dL  Auto Neutrophil # : x  Auto Lymphocyte # : x  Auto Monocyte # : x  Auto Eosinophil # : x  Auto Basophil # : x  Auto Neutrophil % : x  Auto Lymphocyte % : x  Auto Monocyte % : x  Auto Eosinophil % : x  Auto Basophil % : x  09-25    140  |  105  |  14  ----------------------------<  73  3.7   |  18[L]  |  0.86    Ca    9.0      25 Sep 2024 05:21  Phos  3.2     09-25  Mg     1.90     09-25         142  |  106  |  14  ----------------------------<  81  3.6   |  23  |  0.96    Ca    9.3      27 Sep 2024 05:53  Phos  3.4     09-27  Mg     2.00     09-27

## 2024-09-27 NOTE — BH CONSULTATION LIAISON PROGRESS NOTE - NSBHASSESSMENTFT_PSY_ALL_CORE
70 yo M, domiciled at [facility], , no children, retired from job at [employer] in 40s, with PPH of schizoaffective disorder (bipolar type with psychotic features), one past suicide attempt in 1992 via cutting wrists (found by mother and brought in to hospital), multiple past psychiatric hospitalizations (last 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    Today, patient presents with hopelessness, AH with neutral commands, paranoia, delusions of being sent to half-way, social withdrawal, poor ADLs, and passive SI in the context of stopping outpatient ECT in May 2024 and recent medication changes. Presentation is consistent with exacerbation of schizoaffective disorder, current episode depressed with psychosis. Per collateral from sister, patient was previously functioning much better while receiving ECT treatments q3wks. Patient is at elevated acute risk of harm to self due to worsening symptoms, poor ADLs, and passive SI. He requires inpatient psych admission for medication titration, safety planning, and possible re-initiation of ECT.    9/16: Pt AnOx3 to person, place, and time. Reports increasing anhedonia and hopelessness. Pt describes AH which promote feelings of depression and paranoia that he is be watched. Reports feeling safe in the hospital and denies command hallucinations. Admits to passive SI but denies current intent or plan. Denies HI or visual hallucinations. Admits to continued craving for alcohol but states it is controlled because of lack of access.   9/17: Continues to endorse poor mood, sleep, and feelings of paranoia/being watched. Describes VH of shadows throughout the day. Pt amenable to [facility] admission. Denies current AH, command hallucinations, SI or HI.   9/18: Continues to report poor mood, appetite, and sleep. Describes worsening paranoia and racing thoughts, states that he is being watched by everyone because he will be sent to half-way for life. Denies that there might be another explanation for these events or that these beliefs are abnormal. Verbalizes understanding of plan to go to [facility]. Denies AVH, SI/I/P, HI  9/19: Reports worsening mood and continued poor sleep and appetite. Continues to endorse feelings of paranoia. Denies AVH, command hallucinations, SI/I/P, HI. No adverse effects noted from Zyprexa increase. Awaiting bed availability at [facility]. Pt malodorous and requesting assistance with showering.    9/20: Continues to report poor mood and appetite, denies any discomfort with eating. Pt encouraged to slowly increase his portions. Denies SI/I/P, AVH, HI.    9/23: Reports poor mood and appetite. Admits to vague whispered AH over the weekend, denies command hallucinations. Pt encouraged to increase PO intake. Denies SI/I/P.    9/24: As per discussion with the primary team, patient noted to have improved PO intake and new onset hematuria, continues to report ideas of reference and depressed mood.  Denies current AVH, SI/I/P, HI.  9/25: Reports low mood and anhedonia; continues to describe ruminations and feelings of paranoia. Firmly denies si and hi.  9/27: Continues to report poor mood and appetite. Amenable to increasing Mirtazepine dose as stated below. Does not endorse ideas of reference, states he feels safe. Denies SI/HI.     PLAN  - Please have nursing assist pt with showering and ADLs, pt has difficulty initiating action   - Encourage PO intake, document calorie count.  -  INCREASE mirtazapine 15 mg PO qHS for appetite stimulation   - c/w Effexor 225 mg daily to target mood sxs  - c/w Zyprexa 25 mg HS  - Agree to continue ativan 0.5mg PO qHS then taper to PRN. Avoid stopping abruptly due to risk of withdrawal symptoms.   - PRN: Haldol 5 mg PO/IM q6h for agitation   - Hold antipsychotics if qtc >500  - Treatment of UTI as per team, on IV antibiotics.   - OOB to chair/Pt/OT as needed as tolerated  -Dispo; Given ongoing depression/psychosis, pt. will need an inpatient psychiatric admission for further stabilization/medication management, admit voluntary to [facility] once cleared medically.      68 yo M, domiciled at [facility], , no children, retired from job at [employer] in 40s, with PPH of schizoaffective disorder (bipolar type with psychotic features), one past suicide attempt in 1992 via cutting wrists (found by mother and brought in to hospital), multiple past psychiatric hospitalizations (last [facility] 2S June - Aug 2023), substance use hx (ETOH-last used April 2024, h/o 3 DUIs in 40s; cocaine- last used 20 yrs ago), and PMH of HTN who presents to [facility] ED BIB sister from [facility] due to 2 months of worsening depression, psychosis (AH, paranoia), and poor attention to ADLs.    Today, patient presents with hopelessness, AH with neutral commands, paranoia, delusions of being sent to [facility], social withdrawal, poor ADLs, and passive SI in the context of stopping outpatient ECT in May 2024 and recent medication changes. Presentation is consistent with exacerbation of schizoaffective disorder, current episode depressed with psychosis. Per collateral from sister, patient was previously functioning much better while receiving ECT treatments q3wks. Patient is at elevated acute risk of harm to self due to worsening symptoms, poor ADLs, and passive SI. He requires inpatient psych admission for medication titration, safety planning, and possible re-initiation of ECT.    9/16: Pt AnOx3 to person, place, and time. Reports increasing anhedonia and hopelessness. Pt describes AH which promote feelings of depression and paranoia that he is be watched. Reports feeling safe in the hospital and denies command hallucinations. Admits to passive SI but denies current intent or plan. Denies HI or visual hallucinations. Admits to continued craving for alcohol but states it is controlled because of lack of access.   9/17: Continues to endorse poor mood, sleep, and feelings of paranoia/being watched. Describes VH of shadows throughout the day. Pt amenable to [facility] admission. Denies current AH, command hallucinations, SI or HI.   9/18: Continues to report poor mood, appetite, and sleep. Describes worsening paranoia and racing thoughts, states that he is being watched by everyone because he will be sent to [facility] for life. Denies that there might be another explanation for these events or that these beliefs are abnormal. Verbalizes understanding of plan to go to [facility]. Denies AVH, SI/I/P, HI  9/19: Reports worsening mood and continued poor sleep and appetite. Continues to endorse feelings of paranoia. Denies AVH, command hallucinations, SI/I/P, HI. No adverse effects noted from Zyprexa increase. Awaiting bed availability at [facility]. Pt malodorous and requesting assistance with showering.    9/20: Continues to report poor mood and appetite, denies any discomfort with eating. Pt encouraged to slowly increase his portions. Denies SI/I/P, AVH, HI.    9/23: Reports poor mood and appetite. Admits to vague whispered AH over the weekend, denies command hallucinations. Pt encouraged to increase PO intake. Denies SI/I/P.    9/24: As per discussion with the primary team, patient noted to have improved PO intake and new onset hematuria, continues to report ideas of reference and depressed mood.  Denies current AVH, SI/I/P, HI.  9/25: Reports low mood and anhedonia; continues to describe ruminations and feelings of paranoia. Firmly denies si and hi.  9/27: Continues to report poor mood and appetite. Amenable to increasing Mirtazepine dose as stated below. Does not endorse ideas of reference, states he feels safe. Denies SI/HI.     PLAN  - Please have nursing assist pt with showering and ADLs, pt has difficulty initiating action   - Encourage PO intake, document calorie count.  -  INCREASE mirtazapine 15 mg PO qHS for appetite stimulation   - c/w Effexor 225 mg daily to target mood sxs  - c/w Zyprexa 25 mg HS  - Agree to continue ativan 0.5mg PO qHS then taper to PRN. Avoid stopping abruptly due to risk of withdrawal symptoms.   - PRN: Haldol 5 mg PO/IM q6h for agitation   - Hold antipsychotics if qtc >500    *note incomplete without attending signature   - Treatment of UTI as per team, on IV antibiotics.   - OOB to chair/Pt/OT as needed as tolerated  -Dispo; Given ongoing depression/psychosis, pt. will need an inpatient psychiatric admission for further stabilization/medication management, admit voluntary to [facility] once cleared medically.      70 yo M, domiciled at [facility], , no children, retired from job at [employer] in 40s, with PPH of schizoaffective disorder (bipolar type with psychotic features), one past suicide attempt in 1992 via cutting wrists (found by mother and brought in to hospital), multiple past psychiatric hospitalizations (last [facility] 2S June - Aug 2023), substance use hx (ETOH-last used April 2024, h/o 3 DUIs in 40s; cocaine- last used 20 yrs ago), and PMH of HTN who presents to [facility] ED BIB sister from [facility] due to 2 months of worsening depression, psychosis (AH, paranoia), and poor attention to ADLs.    Today, patient presents with hopelessness, AH with neutral commands, paranoia, delusions of being sent to skilled nursing, social withdrawal, poor ADLs, and passive SI in the context of stopping outpatient ECT in May 2024 and recent medication changes. Presentation is consistent with exacerbation of schizoaffective disorder, current episode depressed with psychosis. Per collateral from sister, patient was previously functioning much better while receiving ECT treatments q3wks. Patient is at elevated acute risk of harm to self due to worsening symptoms, poor ADLs, and passive SI. He requires inpatient psych admission for medication titration, safety planning, and possible re-initiation of ECT.    9/16: Pt AnOx3 to person, place, and time. Reports increasing anhedonia and hopelessness. Pt describes AH which promote feelings of depression and paranoia that he is be watched. Reports feeling safe in the hospital and denies command hallucinations. Admits to passive SI but denies current intent or plan. Denies HI or visual hallucinations. Admits to continued craving for alcohol but states it is controlled because of lack of access.   9/17: Continues to endorse poor mood, sleep, and feelings of paranoia/being watched. Describes VH of shadows throughout the day. Pt amenable to [facility] admission. Denies current AH, command hallucinations, SI or HI.   9/18: Continues to report poor mood, appetite, and sleep. Describes worsening paranoia and racing thoughts, states that he is being watched by everyone because he will be sent to skilled nursing for life. Denies that there might be another explanation for these events or that these beliefs are abnormal. Verbalizes understanding of plan to go to [facility]. Denies AVH, SI/I/P, HI  9/19: Reports worsening mood and continued poor sleep and appetite. Continues to endorse feelings of paranoia. Denies AVH, command hallucinations, SI/I/P, HI. No adverse effects noted from Zyprexa increase. Awaiting bed availability at [facility]. Pt malodorous and requesting assistance with showering.    9/20: Continues to report poor mood and appetite, denies any discomfort with eating. Pt encouraged to slowly increase his portions. Denies SI/I/P, AVH, HI.    9/23: Reports poor mood and appetite. Admits to vague whispered AH over the weekend, denies command hallucinations. Pt encouraged to increase PO intake. Denies SI/I/P.    9/24: As per discussion with the primary team, patient noted to have improved PO intake and new onset hematuria, continues to report ideas of reference and depressed mood.  Denies current AVH, SI/I/P, HI.  9/25: Reports low mood and anhedonia; continues to describe ruminations and feelings of paranoia. Firmly denies si and hi.  9/27: Reports somewhat improved PO intake but still not eating well, reports poor mood. Amenable to increasing Mirtazapine dose as stated below. Does not endorse ideas of reference, states he feels safe. Denies SI/HI.     PLAN  - Please have nursing assist pt with showering and ADLs, pt has difficulty initiating action   - Encourage PO intake, document calorie count.  -  INCREASE mirtazapine 15 mg PO qHS for appetite stimulation/mood   - c/w Effexor 225 mg daily to target mood sxs  - c/w Zyprexa 25 mg HS for psychosis  - Agree to continue ativan 0.5mg PO qHS, plan to eventually dc   - PRN: Haldol 5 mg PO/IM q6h for agitation   - Hold antipsychotics if qtc >500  - OOB to chair/Pt/OT as needed as tolerated  -Dispo; Given ongoing depression/psychosis, pt. will need an inpatient psychiatric admission for further stabilization/medication management, admit voluntary to [facility] once cleared medically.

## 2024-09-27 NOTE — PROGRESS NOTE ADULT - SUBJECTIVE AND OBJECTIVE BOX
[facility] Division of Hospital Medicine  Meir Francis MD   Available via MS Teams  In house pager [phone]    Patient is a 69y old  Male who presents with a chief complaint of MDD with auditory hallucinations and increased paranoia (psychosis), dvt (21 Sep 2024 13:56)    SUBJECTIVE / OVERNIGHT EVENTS:  - Pt seen and examined at bedside. No acute events overnight. Urinary symptoms resolved.   - Still feeling depressed.     ADDITIONAL REVIEW OF SYSTEMS: no change in ROS.     MEDICATIONS  (STANDING):  amLODIPine   Tablet 10 milliGRAM(s) Oral with breakfast  apixaban 5 milliGRAM(s) Oral every 12 hours  lactated ringers. 1000 milliLiter(s) (100 mL/Hr) IV Continuous <Continuous>  LORazepam     Tablet 0.5 milliGRAM(s) Oral at bedtime  mirtazapine 15 milliGRAM(s) Oral at bedtime  OLANZapine 25 milliGRAM(s) Oral at bedtime  senna 2 Tablet(s) Oral at bedtime  venlafaxine XR. 225 milliGRAM(s) Oral daily    MEDICATIONS  (PRN):  acetaminophen     Tablet .. 650 milliGRAM(s) Oral every 6 hours PRN Temp greater or equal to 38C (100.4F), Mild Pain (1 - 3)  aluminum hydroxide/magnesium hydroxide/simethicone Suspension 30 milliLiter(s) Oral every 4 hours PRN Dyspepsia  haloperidol     Tablet 5 milliGRAM(s) Oral every 6 hours PRN Agitation  haloperidol    Injectable 5 milliGRAM(s) IntraMuscular every 6 hours PRN Agitation  melatonin 3 milliGRAM(s) Oral at bedtime PRN Insomnia  OLANZapine 2.5 milliGRAM(s) Oral every 6 hours PRN agitation  OLANZapine Injectable 2.5 milliGRAM(s) IntraMuscular every 6 hours PRN agitation if pt refuses PO  ondansetron Injectable 4 milliGRAM(s) IV Push every 8 hours PRN Nausea and/or Vomiting      I&O's Summary      PHYSICAL EXAM:  Vital Signs Last 24 Hrs  T(C): 36.7 (27 Sep 2024 10:00), Max: 36.7 (26 Sep 2024 21:25)  T(F): 98.1 (27 Sep 2024 10:00), Max: 98.1 (27 Sep 2024 05:00)  HR: 92 (27 Sep 2024 10:00) (70 - 92)  BP: 135/88 (27 Sep 2024 10:00) (122/62 - 137/96)  BP(mean): --  RR: 18 (27 Sep 2024 10:00) (18 - 18)  SpO2: 97% (27 Sep 2024 10:00) (97% - 100%)    Parameters below as of 27 Sep 2024 10:00  Patient On (Oxygen Delivery Method): room air      CONSTITUTIONAL: NAD, well-developed  EYES: PERRLA; conjunctiva and sclera clear  ENMT: Moist oral mucosa, no pharyngeal injection or exudates  NECK: Supple, no palpable masses;  RESPIRATORY: Normal respiratory effort; lungs are clear to auscultation bilaterally  CARDIOVASCULAR: Regular rate and rhythm, normal S1 and S2, no murmur/rub/gallop  ABDOMEN: Nontender to palpation, normoactive bowel sounds, no rebound/guarding, no CVA tenderness today   MUSCULOSKELETAL:  no clubbing or cyanosis of digits; no joint swelling or tenderness to palpation  PSYCH: Awake and alert, answering questions appropriately, still w/ intermittent auditory hallucinations   NEUROLOGY: No focal deficits noted   SKIN: No rashes; no palpable lesions    LABS:    09-27    142  |  106  |  14  ----------------------------<  81  3.6   |  23  |  0.96    Ca    9.3      27 Sep 2024 05:53  Phos  3.4     09-27  Mg     2.00     09-27            Urinalysis Basic - ( 27 Sep 2024 05:53 )    Color: x / Appearance: x / SG: x / pH: x  Gluc: 81 mg/dL / Ketone: x  / Bili: x / Urobili: x   Blood: x / Protein: x / Nitrite: x   Leuk Esterase: x / RBC: x / WBC x   Sq Epi: x / Non Sq Epi: x / Bacteria: x        COVID-19 PCR: NotDetec (24 Sep 2024 12:26)  COVID-19 PCR: NotDetec (17 Sep 2024 06:50)  COVID-19 PCR: NotDetec (14 Sep 2024 14:01)      RADIOLOGY & ADDITIONAL TESTS:  New Results Reviewed Today:   New Imaging Personally Reviewed Today:  New Electrocardiogram Personally Reviewed Today:  Prior or Outpatient Records Reviewed Today:    COMMUNICATION:  Care Discussed with Consultants/Other Providers and Details of Discussion:  Discussions with Patient/Family:  PCP Communication:

## 2024-09-27 NOTE — PROGRESS NOTE ADULT - PROBLEM SELECTOR PLAN 2
New LLE peroneal DVT  - Pt was started on Xarelto but switched to Eliquis with transient load; discussed with pharmacy over phone. Per article review cited by pharmacy ([email or web address]), will now continue with Eliquis 5mg BID.   - Outpatient follow up w/ hematology for new DVT. While with reported isolation to room prior to admission, No clear provoking factors for DVT.   [ ] Price check for DOAC - Eliquis has a copay of 190$ with deduductible after deductible will be 47$ a month. Patient needs prior auth for Xarelto. Prior team Discussed coumadin with patient but concerned with compliance and ability to monitor INR.

## 2024-09-27 NOTE — PROGRESS NOTE ADULT - PROBLEM SELECTOR PLAN 3
- 9/24 pt noted to have hematuria overnight, UA concerning for UTI. No WBC, no fever, but positive L CVA tenderness.   - Urine was sent for culture and pt was empirically started on Rocephin.   - Culture resulted negative and tenderness resolves.   - CT A/P w/ no infection. L side renal cyst noted; will order renal US to evaluate simple vs complex and to r/o cause of any hematuria  - Renal US normal; no stone noted; no other cause of hematuria identified.   - Will advise outpt urology follow up for possible cysto

## 2024-09-28 PROCEDURE — 99231 SBSQ HOSP IP/OBS SF/LOW 25: CPT

## 2024-09-28 RX ADMIN — Medication 10 MILLIGRAM(S): at 09:09

## 2024-09-28 RX ADMIN — Medication 225 MILLIGRAM(S): at 17:23

## 2024-09-28 RX ADMIN — Medication 0.5 MILLIGRAM(S): at 22:08

## 2024-09-28 RX ADMIN — APIXABAN 5 MILLIGRAM(S): 5 TABLET, FILM COATED ORAL at 10:29

## 2024-09-28 RX ADMIN — MIRTAZAPINE 15 MILLIGRAM(S): 30 TABLET, FILM COATED ORAL at 22:08

## 2024-09-28 RX ADMIN — Medication 2 TABLET(S): at 22:08

## 2024-09-28 RX ADMIN — Medication 25 MILLIGRAM(S): at 22:08

## 2024-09-28 RX ADMIN — APIXABAN 5 MILLIGRAM(S): 5 TABLET, FILM COATED ORAL at 22:08

## 2024-09-28 NOTE — PROGRESS NOTE ADULT - PROBLEM SELECTOR PLAN 3
- 9/24 pt noted to have hematuria overnight, UA concerning for UTI. No WBC, no fever, but positive L CVA tenderness.   - Urine was sent for culture and pt was empirically started on Rocephin.   - Culture resulted negative and tenderness resolves.   - CT A/P w/ no infection. L side renal cyst noted; will order renal US to evaluate simple vs complex and to r/o cause of any hematuria  - Renal US normal; no stone noted; no other cause of hematuria identified.   - Will advise outpt urology follow up for possible cysto.

## 2024-09-28 NOTE — PROGRESS NOTE ADULT - SUBJECTIVE AND OBJECTIVE BOX
[facility] Division of Hospital Medicine  Meir Francis MD   Available via MS Teams  In house pager [phone]    Patient is a 69y old  Male who presents with a chief complaint of MDD with auditory hallucinations and increased paranoia (psychosis), dvt (21 Sep 2024 13:56)    SUBJECTIVE / OVERNIGHT EVENTS:  - Pt seen and examined at bedside, no acute events reported overnight.   - Still endorsing depression.     ADDITIONAL REVIEW OF SYSTEMS: no change in ROS     MEDICATIONS  (STANDING):  amLODIPine   Tablet 10 milliGRAM(s) Oral with breakfast  apixaban 5 milliGRAM(s) Oral every 12 hours  lactated ringers. 1000 milliLiter(s) (100 mL/Hr) IV Continuous <Continuous>  LORazepam     Tablet 0.5 milliGRAM(s) Oral at bedtime  mirtazapine 15 milliGRAM(s) Oral at bedtime  OLANZapine 25 milliGRAM(s) Oral at bedtime  senna 2 Tablet(s) Oral at bedtime  venlafaxine XR. 225 milliGRAM(s) Oral daily    MEDICATIONS  (PRN):  acetaminophen     Tablet .. 650 milliGRAM(s) Oral every 6 hours PRN Temp greater or equal to 38C (100.4F), Mild Pain (1 - 3)  aluminum hydroxide/magnesium hydroxide/simethicone Suspension 30 milliLiter(s) Oral every 4 hours PRN Dyspepsia  haloperidol     Tablet 5 milliGRAM(s) Oral every 6 hours PRN Agitation  haloperidol    Injectable 5 milliGRAM(s) IntraMuscular every 6 hours PRN Agitation  melatonin 3 milliGRAM(s) Oral at bedtime PRN Insomnia  OLANZapine 2.5 milliGRAM(s) Oral every 6 hours PRN agitation  OLANZapine Injectable 2.5 milliGRAM(s) IntraMuscular every 6 hours PRN agitation if pt refuses PO  ondansetron Injectable 4 milliGRAM(s) IV Push every 8 hours PRN Nausea and/or Vomiting      I&O's Summary      PHYSICAL EXAM:  Vital Signs Last 24 Hrs  T(C): 36.7 (28 Sep 2024 09:06), Max: 36.7 (27 Sep 2024 21:02)  T(F): 98.1 (28 Sep 2024 09:06), Max: 98.1 (28 Sep 2024 09:06)  HR: 92 (28 Sep 2024 09:06) (72 - 92)  BP: 130/86 (28 Sep 2024 09:06) (109/79 - 130/86)  BP(mean): --  RR: 16 (28 Sep 2024 09:06) (16 - 18)  SpO2: 100% (28 Sep 2024 09:06) (97% - 100%)    Parameters below as of 28 Sep 2024 09:06  Patient On (Oxygen Delivery Method): room air      CONSTITUTIONAL: NAD, well-developed  EYES: PERRLA; conjunctiva and sclera clear  ENMT: Moist oral mucosa, no pharyngeal injection or exudates  NECK: Supple, no palpable masses;  RESPIRATORY: Normal respiratory effort; lungs are clear to auscultation bilaterally  CARDIOVASCULAR: Regular rate and rhythm, normal S1 and S2, no murmur/rub/gallop  ABDOMEN: Nontender to palpation, normoactive bowel sounds, no rebound/guarding, no CVA tenderness   MUSCULOSKELETAL:  no clubbing or cyanosis of digits; no joint swelling or tenderness to palpation  PSYCH: Awake and alert, answering questions appropriately, still w/ intermittent auditory hallucinations   NEUROLOGY: No focal deficits noted   SKIN: No rashes; no palpable lesions    LABS:    09-27    142  |  106  |  14  ----------------------------<  81  3.6   |  23  |  0.96    Ca    9.3      27 Sep 2024 05:53  Phos  3.4     09-27  Mg     2.00     09-27            Urinalysis Basic - ( 27 Sep 2024 05:53 )    Color: x / Appearance: x / SG: x / pH: x  Gluc: 81 mg/dL / Ketone: x  / Bili: x / Urobili: x   Blood: x / Protein: x / Nitrite: x   Leuk Esterase: x / RBC: x / WBC x   Sq Epi: x / Non Sq Epi: x / Bacteria: x        COVID-19 PCR: NotDetec (24 Sep 2024 12:26)  COVID-19 PCR: NotDetec (17 Sep 2024 06:50)  COVID-19 PCR: NotDetec (14 Sep 2024 14:01)      RADIOLOGY & ADDITIONAL TESTS:  New Results Reviewed Today:   New Imaging Personally Reviewed Today:  New Electrocardiogram Personally Reviewed Today:  Prior or Outpatient Records Reviewed Today:    COMMUNICATION:  Care Discussed with Consultants/Other Providers and Details of Discussion:  Discussions with Patient/Family:  PCP Communication:

## 2024-09-28 NOTE — PROGRESS NOTE ADULT - PROBLEM SELECTOR PLAN 2
New LLE peroneal DVT  - Pt was started on Xarelto but switched to Eliquis with transient load; discussed with pharmacy over phone. Per article review cited by pharmacy ([email or web address]), will now continue with Eliquis 5mg BID.   - Outpatient follow up w/ hematology for new DVT. While with reported isolation to room prior to admission, No clear provoking factors for DVT.   [ ] Price check for DOAC - Eliquis has a copay of 190$ with deduductible after deductible will be 47$ a month. Patient needs prior auth for Xarelto. Prior team Discussed coumadin with patient but concerned with compliance and ability to monitor INR

## 2024-09-29 PROCEDURE — 99231 SBSQ HOSP IP/OBS SF/LOW 25: CPT

## 2024-09-29 RX ADMIN — Medication 0.5 MILLIGRAM(S): at 09:21

## 2024-09-29 RX ADMIN — APIXABAN 5 MILLIGRAM(S): 5 TABLET, FILM COATED ORAL at 22:19

## 2024-09-29 RX ADMIN — Medication 225 MILLIGRAM(S): at 17:09

## 2024-09-29 RX ADMIN — Medication 25 MILLIGRAM(S): at 22:19

## 2024-09-29 RX ADMIN — MIRTAZAPINE 15 MILLIGRAM(S): 30 TABLET, FILM COATED ORAL at 22:20

## 2024-09-29 RX ADMIN — APIXABAN 5 MILLIGRAM(S): 5 TABLET, FILM COATED ORAL at 12:59

## 2024-09-29 RX ADMIN — Medication 10 MILLIGRAM(S): at 09:21

## 2024-09-29 NOTE — PROGRESS NOTE ADULT - PROVIDER SPECIALTY LIST ADULT
Hospitalist

## 2024-09-29 NOTE — PROGRESS NOTE ADULT - TIME BILLING
- Ordering, reviewing, and interpreting labs, testing, and imaging.  - Independently obtaining a review of systems and performing a physical exam  - Reviewing consultant documentation/recommendations in addition to discussing plan of care with consultants.  - Counselling and educating patient and family regarding interpretation of aforementioned items and plan of care.
Review of laboratory data, radiology results, consultants' recommendations, documentation in [place], discussion with patient/house staff/ACP and interdisciplinary staff (such as , social workers, etc). Interventions were performed as documented above.
- Ordering, reviewing, and interpreting labs, testing, and imaging.  - Independently obtaining a review of systems and performing a physical exam  - Reviewing consultant documentation/recommendations in addition to discussing plan of care with consultants.  - Counselling and educating patient and family regarding interpretation of aforementioned items and plan of care.
Review of laboratory data, radiology results, consultants' recommendations, documentation in [place], discussion with patient/house staff/ACP and interdisciplinary staff (such as , social workers, etc). Interventions were performed as documented above.
- Ordering, reviewing, and interpreting labs, testing, and imaging.  - Independently obtaining a review of systems and performing a physical exam  - Reviewing consultant documentation/recommendations in addition to discussing plan of care with consultants.  - Counselling and educating patient and family regarding interpretation of aforementioned items and plan of care.
- Ordering, reviewing, and interpreting labs, testing, and imaging.  - Independently obtaining a review of systems and performing a physical exam  - Reviewing consultant documentation/recommendations in addition to discussing plan of care with consultants.  - Counselling and educating patient and family regarding interpretation of aforementioned items and plan of care.

## 2024-09-29 NOTE — PROGRESS NOTE ADULT - PROBLEM SELECTOR PLAN 6
- Pt endorses intermittent alcohol cravings prior - consider naloxone but no reported cravings today.
- Pt endorses intermittent alcohol cravings prior - consider naloxone but no reported cravings today
- Pt endorses intermittent alcohol cravings prior - consider naloxone but no reported cravings today
- Pt endorses intermittent alcohol cravings prior - consider naloxone but no reported cravings today.
- Pt endorses intermittent alcohol cravings prior - consider naloxone but no reported cravings today
- Pt endorses intermittent alcohol cravings prior - consider naloxone but no reported cravings today.

## 2024-09-29 NOTE — PROGRESS NOTE ADULT - PROBLEM SELECTOR PLAN 1
- Chronic unstable as presenting with increased auditory hallucinations and paranoia with anhedonia.   - Psych following.   > voluntary to inpt psych -- pending Geriatric bed availability.   > continue home meds Effexor 225 mg daily, Zyprexa was increased to 25 mg HS. Ativan bid, added Remeron 7.5mg QHS for appetite on 9/20. PRN Haldol 5mg PO/IM q6h for agitation.   - Utox- + PCP ( per  likely false positive from Effexor, pt also denies illicit drug use)  - Transfer to [facility] pending improved PO intake; will monitor on current Remeron dose.
- Chronic unstable as presenting with increased auditory hallucinations and paranoia with anhedonia.   - Psych following.   > voluntary to inpt psych -- now when UTI treatment noted below is complete.   > continue meds Effexor 225 mg daily, Zyprexa 25 mg HS. Ativan bid, added Remeron 7.5mg QHS for appetite on 9/20. PRN Haldol 5mg PO/IM q6h for agitation.   - Utox- + PCP ( per  likely false positive from Effexor, pt also denies illicit drug use)  - Transfer to [facility] pending improved PO intake; will monitor on current Remeron dose  - Also pt was started on Ativan BID on 9/19, uncertain reason? Was prescribed outpatient as Ativan 0.5mg QHS PRN (ISTOP [id number]). Will change back to QHS (x3, until 9/28), then taper to QHS PRN to avoid benzo dependence.
- Chronic unstable as presenting with increased auditory hallucinations and paranoia with anhedonia.   - Psych following.   > voluntary to inpt psych -- now when UTI treatment noted below is complete.   > continue meds Effexor 225 mg daily, Zyprexa 25 mg HS. Ativan bid, added Remeron 7.5mg QHS for appetite on 9/20. PRN Haldol 5mg PO/IM q6h for agitation.   - Utox- + PCP ( per  likely false positive from Effexor, pt also denies illicit drug use)  - Transfer to [facility] pending improved PO intake; will monitor on current Remeron dose  - Also pt was started on Ativan BID on 9/19, uncertain reason? Was prescribed outpatient as Ativan 0.5mg QHS PRN (ISTOP [id number]). Will change back to QHS (x3, until 9/27), then taper to QHS PRN to avoid benzo dependence.
Chronic unstable as presenting with increased auditory hallucinations and paranoia with anhedonia   Psyc on board    voluntary to inpt psych -- pending Geriatric bed availability.     Continue home meds of Effexor 225 mg daily, Zyprexa was increased to 25 mg HS.  ativan bid    PRNs: zyprexa  Utox- + PCP ( per  likely false positive from Effexor, pt also denies illicit drug use)  Discussed with psych attending about pt' poor intake since zyprexa was increased last night would not want to add new meds, nsg to encourage pt to eat. cont to monitor po intake  unable to go to [facility] due to poor po intake at this time
Chronic unstable as presenting with increased auditory hallucinations and paranoia with anhedonia   Psyc on board    voluntary to inpt psych -- pending Geriatric bed availability.     Continue home meds of Effexor 225 mg daily, Zyprexa was increased to 25 mg HS.  ativan bid, added remeron for appetite on 9/20    PRNs: zyprexa  Utox- + PCP ( per  likely false positive from Effexor, pt also denies illicit drug use)  Discussed with psych attending on 9/19 about pt' poor intake since zyprexa was increased last night would not want to add new meds, nsg to encourage pt to eat. roselia added remeron for appetite on 9/20, cont to monitor po intake  unable to go to [facility] due to poor po intake at this time
- Chronic unstable as presenting with increased auditory hallucinations and paranoia with anhedonia.   - Psych following.   > voluntary to inpt psych -- now when UTI treatment noted below is complete.   > continue meds Effexor 225 mg daily, Zyprexa 25 mg HS. Ativan bid, added Remeron for appetite on 9/20. PRN Haldol 5mg PO/IM q6h for agitation.   > Remeron increased from 7.5mg QHS to 15mg QHS 9/27/24 per psych recs.   - Utox- + PCP ( per  likely false positive from Effexor, pt also denies illicit drug use)  - Transfer to [facility] pending improved PO intake; will monitor on current Remeron dose  - Also pt was started on Ativan BID on 9/19, uncertain reason? Was prescribed outpatient as Ativan 0.5mg QHS PRN (ISTOP [id number]). Will change back to QHS (x3, until 9/28), then taper to QHS PRN to avoid benzo dependence
Chronic unstable as presenting with increased auditory hallucinations and paranoia with anhedonia   Psyc on board    voluntary to inpt psych -- pending Geriatric bed availability.     Continue home meds of Effexor 225 mg daily, Zyprexa was increased to 25 mg HS.  ativan bid, added remeron for appetite on 9/20    PRNs: zyprexa  Utox- + PCP ( per  likely false positive from Effexor, pt also denies illicit drug use)  Discussed with psych attending on 9/19 about pt' poor intake since zyprexa was increased last night would not want to add new meds, nsg to encourage pt to eat. roselia added remeron for appetite on 9/20, cont to monitor po intake  unable to go to [facility] due to poor po intake at this time
Chronic unstable as presenting with increased auditory hallucinations and paranoia with anhedonia   Psyc on board    voluntary to inpt psych -- pending Geriatric bed availability.     Continue home meds of Effexor 225 mg daily, Zyprexa 20 mg HS.      PRNs: Ativan 0.5 mg PO HS as needed for agitation/anxiety (switched to BID today)  Utox- + PCP ( per  likely false positive from Effexor, pt also denies illicit drug use)
Chronic unstable as presenting with increased auditory hallucinations and paranoia with anhedonia   Psyc on board    voluntary to inpt psych -- pending Geriatric bed availability.     Continue home meds of Effexor 225 mg daily, Zyprexa was increased to 25 mg HS.  ativan bid    PRNs: zyprexa  Utox- + PCP ( per  likely false positive from Effexor, pt also denies illicit drug use)  Discussed with psych attending on 9/19 about pt' poor intake since zyprexa was increased last night would not want to add new meds, nsg to encourage pt to eat. cont to monitor po intake  unable to go to [facility] due to poor po intake at this time
Chronic unstable as presenting with increased auditory hallucinations and paranoia with anhedonia   Psyc on board    voluntary to inpt psych -- pending Geriatric bed availability.     Continue home meds of Effexor 225 mg daily, Zyprexa 20 mg HS.  ativan bid    PRNs: zyprexa  Utox- + PCP ( per  likely false positive from Effexor, pt also denies illicit drug use)
- Chronic unstable as presenting with increased auditory hallucinations and paranoia with anhedonia.   - Psych following.   > voluntary to inpt psych -- now when UTI treatment noted below is complete.   > continue meds Effexor 225 mg daily, Zyprexa 25 mg HS. Ativan bid, added Remeron 7.5mg QHS for appetite on 9/20. PRN Haldol 5mg PO/IM q6h for agitation.   - Utox- + PCP ( per  likely false positive from Effexor, pt also denies illicit drug use)  - Transfer to [facility] pending improved PO intake; will monitor on current Remeron dose.
- Chronic unstable as presenting with increased auditory hallucinations and paranoia with anhedonia.   - Psych following.   > voluntary to inpt psych -- now when UTI treatment noted below is complete.   > continue meds Effexor 225 mg daily, Zyprexa 25 mg HS. Ativan bid, added Remeron for appetite on 9/20. PRN Haldol 5mg PO/IM q6h for agitation.   > Remeron increased from 7.5mg QHS to 15mg QHS 9/27/24 per psych recs.   - Utox- + PCP ( per  likely false positive from Effexor, pt also denies illicit drug use)  - Transfer to [facility] pending improved PO intake; will monitor on current Remeron dose  - Also pt was started on Ativan BID on 9/19, uncertain reason? Was prescribed outpatient as Ativan 0.5mg QHS PRN (ISTOP [id number]). Will change back to QHS (x3, until 9/28), then taper to QHS PRN to avoid benzo dependence.
- Chronic unstable as presenting with increased auditory hallucinations and paranoia with anhedonia.   - Psych following.   > voluntary to inpt psych -- now when UTI treatment noted below is complete.   > continue meds Effexor 225 mg daily, Zyprexa 25 mg HS. Ativan bid, added Remeron for appetite on 9/20. PRN Haldol 5mg PO/IM q6h for agitation.   > Remeron increased from 7.5mg QHS to 15mg QHS 9/27/24 per psych recs.   - Utox- + PCP ( per  likely false positive from Effexor, pt also denies illicit drug use)  - Transfer to [facility] pending improved PO intake; will monitor on current Remeron dose  - Also pt was started on Ativan BID on 9/19, uncertain reason? Was prescribed outpatient as Ativan 0.5mg QHS PRN (ISTOP [id number]). Will change back to QHS (x3, until 9/28), then taper to QHS PRN (home dose) to avoid benzo dependence

## 2024-09-29 NOTE — PROGRESS NOTE ADULT - PROBLEM SELECTOR PROBLEM 4
HLD (hyperlipidemia)
Hypertension
HLD (hyperlipidemia)
HLD (hyperlipidemia)
Hypertension
HLD (hyperlipidemia)
Hypertension
HLD (hyperlipidemia)
HLD (hyperlipidemia)
Hypertension
Statement Selected

## 2024-09-29 NOTE — PROGRESS NOTE ADULT - PROBLEM SELECTOR PLAN 2
New LLE peroneal DVT  - Pt was started on Xarelto but switched to Eliquis with transient load; discussed with pharmacy over phone. Per article review cited by pharmacy ([email or web address]), will now continue with Eliquis 5mg BID.   - Outpatient follow up w/ hematology for new DVT. While with reported isolation to room prior to admission, No clear provoking factors for DVT.   [ ] Price check for DOAC - Eliquis has a copay of 190$ with deduductible after deductible will be 47$ a month. Patient needs prior auth for Xarelto. Prior team Discussed coumadin with patient but concerned with compliance and ability to monitor INR.

## 2024-09-29 NOTE — PROGRESS NOTE ADULT - SUBJECTIVE AND OBJECTIVE BOX
[facility] Division of Hospital Medicine  Meir Francis MD   Available via MS Teams  In house pager [phone]    Patient is a 69y old  Male who presents with a chief complaint of MDD with auditory hallucinations and increased paranoia (psychosis), dvt (21 Sep 2024 13:56)    SUBJECTIVE / OVERNIGHT EVENTS:  - Pt seen and examined at bedside, no acute events reported overnight.   - Continue to endorse depressive symptoms, no suicidal/homicidal ideations.     ADDITIONAL REVIEW OF SYSTEMS: no change in ROS     MEDICATIONS  (STANDING):  amLODIPine   Tablet 10 milliGRAM(s) Oral with breakfast  apixaban 5 milliGRAM(s) Oral every 12 hours  lactated ringers. 1000 milliLiter(s) (100 mL/Hr) IV Continuous <Continuous>  LORazepam     Tablet 0.5 milliGRAM(s) Oral at bedtime  mirtazapine 15 milliGRAM(s) Oral at bedtime  OLANZapine 25 milliGRAM(s) Oral at bedtime  senna 2 Tablet(s) Oral at bedtime  venlafaxine XR. 225 milliGRAM(s) Oral daily    MEDICATIONS  (PRN):  acetaminophen     Tablet .. 650 milliGRAM(s) Oral every 6 hours PRN Temp greater or equal to 38C (100.4F), Mild Pain (1 - 3)  aluminum hydroxide/magnesium hydroxide/simethicone Suspension 30 milliLiter(s) Oral every 4 hours PRN Dyspepsia  haloperidol     Tablet 5 milliGRAM(s) Oral every 6 hours PRN Agitation  haloperidol    Injectable 5 milliGRAM(s) IntraMuscular every 6 hours PRN Agitation  melatonin 3 milliGRAM(s) Oral at bedtime PRN Insomnia  OLANZapine 2.5 milliGRAM(s) Oral every 6 hours PRN agitation  OLANZapine Injectable 2.5 milliGRAM(s) IntraMuscular every 6 hours PRN agitation if pt refuses PO  ondansetron Injectable 4 milliGRAM(s) IV Push every 8 hours PRN Nausea and/or Vomiting      I&O's Summary      PHYSICAL EXAM:  Vital Signs Last 24 Hrs  T(C): 36.8 (29 Sep 2024 10:02), Max: 36.8 (29 Sep 2024 10:02)  T(F): 98.3 (29 Sep 2024 10:02), Max: 98.3 (29 Sep 2024 10:02)  HR: 67 (29 Sep 2024 10:02) (61 - 86)  BP: 125/71 (29 Sep 2024 10:02) (122/91 - 138/95)  BP(mean): --  RR: 17 (29 Sep 2024 10:02) (17 - 18)  SpO2: 99% (29 Sep 2024 10:02) (97% - 100%)    Parameters below as of 29 Sep 2024 10:02  Patient On (Oxygen Delivery Method): room air    CONSTITUTIONAL: NAD, well-developed, seborrhoic dermatitis   EYES: PERRLA; conjunctiva and sclera clear  ENMT: Moist oral mucosa, no pharyngeal injection or exudates  NECK: Supple, no palpable masses;  RESPIRATORY: Normal respiratory effort; lungs are clear to auscultation bilaterally  CARDIOVASCULAR: Regular rate and rhythm, normal S1 and S2, no murmur/rub/gallop  ABDOMEN: Nontender to palpation, normoactive bowel sounds, no rebound/guarding, no CVA tenderness   MUSCULOSKELETAL:  no clubbing or cyanosis of digits; no joint swelling or tenderness to palpation  PSYCH: Awake and alert, answering questions appropriately, still w/ intermittent auditory hallucinations however improved since admission   NEUROLOGY: No focal deficits noted   SKIN: No rashes; no palpable lesions    LABS:                    COVID-19 PCR: NotDetec (24 Sep 2024 12:26)  COVID-19 PCR: NotDetec (17 Sep 2024 06:50)  COVID-19 PCR: NotDetec (14 Sep 2024 14:01)      RADIOLOGY & ADDITIONAL TESTS:  New Results Reviewed Today:   New Imaging Personally Reviewed Today:  New Electrocardiogram Personally Reviewed Today:  Prior or Outpatient Records Reviewed Today:    COMMUNICATION:  Care Discussed with Consultants/Other Providers and Details of Discussion:  Discussions with Patient/Family:  PCP Communication:

## 2024-09-29 NOTE — PROGRESS NOTE ADULT - PROBLEM SELECTOR PROBLEM 2
DVT, lower extremity

## 2024-09-29 NOTE — PROGRESS NOTE ADULT - PROBLEM SELECTOR PROBLEM 3
Benign essential HTN
Hematuria
Benign essential HTN
Hematuria
Benign essential HTN
Acute UTI
Hematuria
Benign essential HTN
Hematuria
Acute UTI

## 2024-09-29 NOTE — PROGRESS NOTE ADULT - PROBLEM SELECTOR PROBLEM 5
Alcohol use disorder, mild, in early remission
Hyperlipemia
Alcohol use disorder, mild, in early remission
Alcohol use disorder, mild, in early remission
Hyperlipemia
Alcohol use disorder, mild, in early remission
Hyperlipemia

## 2024-09-29 NOTE — PROGRESS NOTE ADULT - PROBLEM SELECTOR PROBLEM 6
Mild alcohol use disorder

## 2024-09-29 NOTE — PROGRESS NOTE ADULT - PROBLEM SELECTOR PLAN 7
Code: Full  Diet: Regular; easy to chew  DVT ppx: Eliquis
Code: Full  Diet: Regular; easy to chew  DVT ppx: Eliquis.
Code: Full  Diet: Regular; easy to chew  DVT ppx: Eliquis
Code: Full  Diet: Regular; easy to chew  DVT ppx: Eliquis.
Code: Full  Diet: Regular; easy to chew  DVT ppx: Eliquis
Code: Full  Diet: Regular; easy to chew  DVT ppx: Eliquis    Dispo: Awaiting transfer to [facility] pending bed availability. Needs outpt f/u with heme (DVT on Eliquis) and urology (hematuria; inpatient w/u neg, outpt cysto) after discharge from [facility].

## 2024-09-29 NOTE — PROGRESS NOTE ADULT - REASON FOR ADMISSION
MDD with auditory hallucinations and increased paranoia (psychosis), dvt

## 2024-09-29 NOTE — PROGRESS NOTE ADULT - PROBLEM SELECTOR PROBLEM 1
Schizoaffective disorder, bipolar type

## 2024-09-29 NOTE — PROGRESS NOTE ADULT - PROBLEM SELECTOR PLAN 4
A1c 5.2  lipid panel WNL  monitor off meds
- Continuing Amlodipine 10mg QD.   - bps stable.
- Continuing Amlodipine 10mg QD.   - bps stable
A1c 5.2  lipid panel WNL  monitor off meds
A1c 5.2  lipid panel WNL  monitor off meds
- Continuing Amlodipine 10mg QD.   - bps stable.
A1c 5.2  lipid panel WNL  monitor off meds
- A1c 5.2  - lipid panel WNL  - monitor off meds
A1c 5.2  lipid panel WNL  monitor off meds
A1c 5.2  lipid panel WNL  monitor off meds
- Continuing Amlodipine 10mg QD.   - bps stable
- Continuing Amlodipine 10mg QD.   - bps stable.
- Continuing Amlodipine 10mg QD.   - bps stable

## 2024-09-29 NOTE — PROGRESS NOTE ADULT - ASSESSMENT
69 yr old male presenting with MDD with auditory hallucinations and increased paranoia (psychosis)
69 yr old male presenting with MDD with auditory hallucinations and increased paranoia (psychosis.)
69 yr old male presenting with MDD with auditory hallucinations and increased paranoia (psychosis)
69 yr old male presenting with MDD with auditory hallucinations and increased paranoia (psychosis)
69 yr old male presenting with MDD with auditory hallucinations and increased paranoia (psychosis). 
69 yr old male presenting with MDD with auditory hallucinations and increased paranoia (psychosis)
69 yr old male presenting with MDD with auditory hallucinations and increased paranoia (psychosis).
69 yr old male presenting with MDD with auditory hallucinations and increased paranoia (psychosis)
69 yr old male presenting with MDD with auditory hallucinations and increased paranoia (psychosis).

## 2024-09-30 ENCOUNTER — INPATIENT (INPATIENT)
Facility: HOSPITAL | Age: 69
LOS: 37 days | Discharge: HOME | End: 2024-11-07
Attending: PSYCHIATRY & NEUROLOGY | Admitting: PSYCHIATRY & NEUROLOGY
Payer: MEDICARE

## 2024-09-30 VITALS
TEMPERATURE: 97 F | HEIGHT: 70 IN | WEIGHT: 220.02 LBS | OXYGEN SATURATION: 97 % | RESPIRATION RATE: 18 BRPM | DIASTOLIC BLOOD PRESSURE: 67 MMHG | SYSTOLIC BLOOD PRESSURE: 102 MMHG | HEART RATE: 95 BPM

## 2024-09-30 VITALS
DIASTOLIC BLOOD PRESSURE: 93 MMHG | HEART RATE: 85 BPM | TEMPERATURE: 98 F | RESPIRATION RATE: 18 BRPM | SYSTOLIC BLOOD PRESSURE: 112 MMHG | OXYGEN SATURATION: 95 %

## 2024-09-30 PROBLEM — F25.9 SCHIZOAFFECTIVE DISORDER, UNSPECIFIED: Chronic | Status: ACTIVE | Noted: 2024-09-14

## 2024-09-30 PROBLEM — F32.9 MAJOR DEPRESSIVE DISORDER, SINGLE EPISODE, UNSPECIFIED: Chronic | Status: ACTIVE | Noted: 2024-09-14

## 2024-09-30 LAB — SARS-COV-2 RNA SPEC QL NAA+PROBE: SIGNIFICANT CHANGE UP

## 2024-09-30 PROCEDURE — 99233 SBSQ HOSP IP/OBS HIGH 50: CPT

## 2024-09-30 PROCEDURE — 93010 ELECTROCARDIOGRAM REPORT: CPT

## 2024-09-30 PROCEDURE — 99239 HOSP IP/OBS DSCHRG MGMT >30: CPT

## 2024-09-30 RX ORDER — MELATONIN 5 MG
3 TABLET ORAL ONCE
Refills: 0 | Status: COMPLETED | OUTPATIENT
Start: 2024-09-30 | End: 2024-09-30

## 2024-09-30 RX ORDER — MELATONIN 5 MG
3 TABLET ORAL AT BEDTIME
Refills: 0 | Status: DISCONTINUED | OUTPATIENT
Start: 2024-09-30 | End: 2024-11-07

## 2024-09-30 RX ORDER — HALOPERIDOL DECANOATE 50 MG/ML
5 INJECTION INTRAMUSCULAR EVERY 6 HOURS
Refills: 0 | Status: DISCONTINUED | OUTPATIENT
Start: 2024-09-30 | End: 2024-09-30

## 2024-09-30 RX ORDER — OLANZAPINE 20 MG/1
2.5 TABLET ORAL ONCE
Refills: 0 | Status: DISCONTINUED | OUTPATIENT
Start: 2024-09-30 | End: 2024-09-30

## 2024-09-30 RX ORDER — APIXABAN 5 MG/1
1 TABLET, FILM COATED ORAL
Qty: 0 | Refills: 0 | DISCHARGE

## 2024-09-30 RX ORDER — SENNA 187 MG
2 TABLET ORAL ONCE
Refills: 0 | Status: COMPLETED | OUTPATIENT
Start: 2024-09-30 | End: 2024-09-30

## 2024-09-30 RX ORDER — ACETAMINOPHEN 500 MG
650 TABLET ORAL EVERY 6 HOURS
Refills: 0 | Status: DISCONTINUED | OUTPATIENT
Start: 2024-09-30 | End: 2024-11-07

## 2024-09-30 RX ORDER — MIRTAZAPINE 30 MG/1
15 TABLET ORAL ONCE
Refills: 0 | Status: COMPLETED | OUTPATIENT
Start: 2024-09-30 | End: 2024-09-30

## 2024-09-30 RX ORDER — OLANZAPINE 2.5 MG
1 TABLET ORAL
Refills: 0 | DISCHARGE

## 2024-09-30 RX ORDER — OLANZAPINE 20 MG/1
25 TABLET ORAL AT BEDTIME
Refills: 0 | Status: DISCONTINUED | OUTPATIENT
Start: 2024-09-30 | End: 2024-09-30

## 2024-09-30 RX ORDER — APIXABAN 5 MG/1
2 TABLET, FILM COATED ORAL
Qty: 72 | Refills: 0
Start: 2024-09-30 | End: 2024-10-17

## 2024-09-30 RX ORDER — OLANZAPINE 20 MG/1
2.5 TABLET ORAL EVERY 6 HOURS
Refills: 0 | Status: DISCONTINUED | OUTPATIENT
Start: 2024-09-30 | End: 2024-09-30

## 2024-09-30 RX ORDER — HALOPERIDOL DECANOATE 50 MG/ML
5 INJECTION INTRAMUSCULAR ONCE
Refills: 0 | Status: DISCONTINUED | OUTPATIENT
Start: 2024-09-30 | End: 2024-09-30

## 2024-09-30 RX ORDER — LORAZEPAM 2 MG
0.5 TABLET ORAL AT BEDTIME
Refills: 0 | Status: DISCONTINUED | OUTPATIENT
Start: 2024-09-30 | End: 2024-09-30

## 2024-09-30 RX ORDER — HALOPERIDOL DECANOATE 50 MG/ML
5 INJECTION INTRAMUSCULAR
Refills: 0 | Status: DISCONTINUED | OUTPATIENT
Start: 2024-09-30 | End: 2024-09-30

## 2024-09-30 RX ORDER — MAGNESIUM, ALUMINUM HYDROXIDE 200-200 MG
30 TABLET,CHEWABLE ORAL EVERY 4 HOURS
Refills: 0 | Status: DISCONTINUED | OUTPATIENT
Start: 2024-09-30 | End: 2024-11-07

## 2024-09-30 RX ORDER — MIRTAZAPINE 30 MG/1
15 TABLET ORAL AT BEDTIME
Refills: 0 | Status: DISCONTINUED | OUTPATIENT
Start: 2024-09-30 | End: 2024-10-02

## 2024-09-30 RX ORDER — VENLAFAXINE HCL 150 MG
225 CAPSULE, EXT RELEASE 24 HR ORAL DAILY
Refills: 0 | Status: DISCONTINUED | OUTPATIENT
Start: 2024-09-30 | End: 2024-11-07

## 2024-09-30 RX ORDER — SENNA 187 MG
2 TABLET ORAL AT BEDTIME
Refills: 0 | Status: DISCONTINUED | OUTPATIENT
Start: 2024-09-30 | End: 2024-11-07

## 2024-09-30 RX ORDER — OLANZAPINE 2.5 MG
5 TABLET ORAL
Qty: 0 | Refills: 0 | DISCHARGE
Start: 2024-09-30

## 2024-09-30 RX ORDER — AMLODIPINE BESYLATE 10 MG
10 TABLET ORAL
Refills: 0 | Status: DISCONTINUED | OUTPATIENT
Start: 2024-09-30 | End: 2024-11-07

## 2024-09-30 RX ORDER — APIXABAN 5 MG/1
5 TABLET, FILM COATED ORAL ONCE
Refills: 0 | Status: COMPLETED | OUTPATIENT
Start: 2024-09-30 | End: 2024-09-30

## 2024-09-30 RX ORDER — ONDANSETRON HYDROCHLORIDE 2 MG/ML
4 INJECTION, SOLUTION INTRAMUSCULAR; INTRAVENOUS EVERY 6 HOURS
Refills: 0 | Status: DISCONTINUED | OUTPATIENT
Start: 2024-09-30 | End: 2024-09-30

## 2024-09-30 RX ORDER — APIXABAN 5 MG/1
5 TABLET, FILM COATED ORAL EVERY 12 HOURS
Refills: 0 | Status: DISCONTINUED | OUTPATIENT
Start: 2024-09-30 | End: 2024-11-07

## 2024-09-30 RX ORDER — INFLUENZ VIR VAC TV P-SURF2003 15MCG/.5ML
0.5 SYRINGE (ML) INTRAMUSCULAR ONCE
Refills: 0 | Status: COMPLETED | OUTPATIENT
Start: 2024-09-30 | End: 2024-09-30

## 2024-09-30 RX ORDER — MIRTAZAPINE 30 MG/1
1 TABLET, FILM COATED ORAL
Qty: 0 | Refills: 0 | DISCHARGE
Start: 2024-09-30

## 2024-09-30 RX ADMIN — Medication 3 MILLIGRAM(S): at 22:38

## 2024-09-30 RX ADMIN — APIXABAN 5 MILLIGRAM(S): 5 TABLET, FILM COATED ORAL at 23:28

## 2024-09-30 RX ADMIN — Medication 2 TABLET(S): at 22:38

## 2024-09-30 RX ADMIN — Medication 10 MILLIGRAM(S): at 09:21

## 2024-09-30 RX ADMIN — APIXABAN 5 MILLIGRAM(S): 5 TABLET, FILM COATED ORAL at 13:43

## 2024-09-30 RX ADMIN — MIRTAZAPINE 15 MILLIGRAM(S): 30 TABLET ORAL at 22:38

## 2024-09-30 RX ADMIN — Medication 225 MILLIGRAM(S): at 18:39

## 2024-09-30 NOTE — BH CONSULTATION LIAISON PROGRESS NOTE - NSBHATTESTTYPESTAFF_PSY_A_CORE
Resident/Student
Student
Student
Resident/Student
Student

## 2024-09-30 NOTE — BH CONSULTATION LIAISON PROGRESS NOTE - NSBHINDICATION_PSY_ALL_CORE
ES if attempting to elope, currently calm, cannot leave AMA

## 2024-09-30 NOTE — DISCHARGE NOTE NURSING/CASE MANAGEMENT/SOCIAL WORK - PATIENT PORTAL LINK FT
You can access the FollowMyHealth Patient Portal offered by [facility] by registering at the following website: [email or web address]. By joining [facility]’s FollowMyHealth portal, you will also be able to view your health information using other applications (apps) compatible with our system.
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
## 2024-09-30 NOTE — BH INPATIENT PSYCHIATRY ASSESSMENT NOTE - HPI (INCLUDE ILLNESS QUALITY, SEVERITY, DURATION, TIMING, CONTEXT, MODIFYING FACTORS, ASSOCIATED SIGNS AND SYMPTOMS)
From admission interview:      From ED interview:  70 yo M, domiciled at [facility], , no children, retired from job at [employer] in 40s, with PPH of schizoaffective disorder (bipolar type with psychotic features), one past suicide attempt in 1992 via cutting wrists (found by mother and brought in to hospital), multiple past psychiatric hospitalizations (last [facility] 2S June - Aug 2023), substance use hx (ETOH-last used April 2024, h/o 3 DUIs in 40s; cocaine- last used 20 yrs ago), and PMH of HTN who presents to [facility] ED BIB sister from [facility] due to 2 months of worsening depression, psychosis (AH, paranoia), and poor attention to ADLs.    On interview today, pt appears anxious and depressed with poor eye contact. He states his mood has been worse for the past 2 months with no clear triggers, Reports sad mood, anhedonia, hopelessness, social withdrawal, low energy, and trouble concentrating. He states that 1 month ago, he started to hear a male voice that he describes as "evil." He reports AH have increased in frequency to several times per day now. He denies dangerous CAH to harm self or others but reports +CAH to "do the opposite of what staff tell me to do." Patient is not able to provide examples. Also reports paranoia with delusion that he will be sent to penitentiary due to verbal argument with fellow resident at [facility] about 3 months ago. He reports peer was picking on another female resident, so patient said "stop that or I'll break your jaw." Pt denies any actual physical violence or posturing during episode. He also states "I'm not sure if I said I'd break his jaw or if I just thought it." He states that staff and all other residents do not like his because he has poor attention to hygiene. He wonders whether staff did not wash his clothes properly last week because they don't like him. Denies VH, IOR, belief he is being watched/monitored. Pt reports decreased PO intake and states he only eats 1-2 meals. Denies lightheadedness or falls. He reports poor hygiene recently, stating it has been "years" since he last showered, that he does not brush his teeth, and that he has been changing his clothes less frequently (every 4-5 days). Reports passive SI with thoughts such as "it would be better for everyone if I was dead." Denies any active SIIP, preparatory actions, plans, or SIB. On ROS, denies persistently elevated/irritable mood, increased energy, distractibility, impulsivity, HIIP, access to guns, or recent substance use (last ETOH in April 2024). States he has been adherent with meds and that his psychiatrist at [facility] recently increased his meds 1 week ago.  Pt is amenable to voluntary inpt admission.    Collateral obtained from patient's sister Saritha (), who states pt called her this morning to report worsening mood. Sister was concerned and brought him to ED for inpt psych admission. She reports patient's mood and functioning were much better about 6 months ago while on maintenance ECT. ECT was stopped due to decreased benefit from treatments and mild memory loss.     Further collateral obtained from [facility] - Acting  Gillian (). Reports pt has been calm but very isolative to his room. No aggression, not clearly RIS. Current meds: Olanzapine 20 mg HS (increased from 15 mg 1 week ago), Venlafaxine 225 mg daily (increased 7/31/24), PRN Ativan 0.5 mg HS for anxiety/insomnia (using almost nightly for past 5-6 nights), Amlodipine 10 mg daily for HTN, Vit B12, Vit D, Senna 2 tabs HS. Psychiatrist Dr. Sharma, [phone] - contacted, left voicemail From admission interview:  Patient seen in her room, in no acute distress, amenable to interview. States that he has been depressed and is concerned that he will go to assisted. Also states he will not be allowed back to his [facility]. Denies any concerns or physical complaints currently. Denies allergies or dietary restrictions. Denies SI/HI/AVH.     From ED interview:  68 yo M, domiciled at [facility], , no children, retired from job at [employer] in 40s, with PPH of schizoaffective disorder (bipolar type with psychotic features), one past suicide attempt in 1992 via cutting wrists (found by mother and brought in to hospital), multiple past psychiatric hospitalizations (last [facility] June - Aug 2023), substance use hx (ETOH-last used April 2024, h/o 3 DUIs in 40s; cocaine- last used 20 yrs ago), and PMH of HTN who presents to [facility] ED BIB sister from [facility] due to 2 months of worsening depression, psychosis (AH, paranoia), and poor attention to ADLs.    On interview today, pt appears anxious and depressed with poor eye contact. He states his mood has been worse for the past 2 months with no clear triggers, Reports sad mood, anhedonia, hopelessness, social withdrawal, low energy, and trouble concentrating. He states that 1 month ago, he started to hear a male voice that he describes as "evil." He reports AH have increased in frequency to several times per day now. He denies dangerous CAH to harm self or others but reports +CAH to "do the opposite of what staff tell me to do." Patient is not able to provide examples. Also reports paranoia with delusion that he will be sent to residential due to verbal argument with fellow resident at [facility] about 3 months ago. He reports peer was picking on another female resident, so patient said "stop that or I'll break your jaw." Pt denies any actual physical violence or posturing during episode. He also states "I'm not sure if I said I'd break his jaw or if I just thought it." He states that staff and all other residents do not like his because he has poor attention to hygiene. He wonders whether staff did not wash his clothes properly last week because they don't like him. Denies VH, IOR, belief he is being watched/monitored. Pt reports decreased PO intake and states he only eats 1-2 meals. Denies lightheadedness or falls. He reports poor hygiene recently, stating it has been "years" since he last showered, that he does not brush his teeth, and that he has been changing his clothes less frequently (every 4-5 days). Reports passive SI with thoughts such as "it would be better for everyone if I was dead." Denies any active SIIP, preparatory actions, plans, or SIB. On ROS, denies persistently elevated/irritable mood, increased energy, distractibility, impulsivity, HIIP, access to guns, or recent substance use (last ETOH in April 2024). States he has been adherent with meds and that his psychiatrist at [facility] recently increased his meds 1 week ago.  Pt is amenable to voluntary inpt admission.    Collateral obtained from patient's sister Saritha (), who states pt called her this morning to report worsening mood. Sister was concerned and brought him to ED for inpt psych admission. She reports patient's mood and functioning were much better about 6 months ago while on maintenance ECT. ECT was stopped due to decreased benefit from treatments and mild memory loss.     Further collateral obtained from [facility] - Acting  Gillian (). Reports pt has been calm but very isolative to his room. No aggression, not clearly RIS. Current meds: Olanzapine 20 mg HS (increased from 15 mg 1 week ago), Venlafaxine 225 mg daily (increased 7/31/24), PRN Ativan 0.5 mg HS for anxiety/insomnia (using almost nightly for past 5-6 nights), Amlodipine 10 mg daily for HTN, Vit B12, Vit D, Senna 2 tabs HS. Psychiatrist Dr. Sharma, [phone] - contacted, left voicemail

## 2024-09-30 NOTE — BH CONSULTATION LIAISON PROGRESS NOTE - NSBHMSEMUSCLE_PSY_A_CORE
Unable to assess
Moderate
Unable to assess
Unable to assess

## 2024-09-30 NOTE — BH CONSULTATION LIAISON PROGRESS NOTE - NSBHATTESTCOMMENTATTENDFT_PSY_A_CORE
patient seen as f/u - continued depression and CAH but no SI/HI, future oriented and help seeking wishes to get better but somewhat ambivalent about [facility] at this time though signed 9.13 earlier. Psych will follow, c/w meds as above, patient does not want ECT at this time. Cannot leave AMA, if attempting to elope must place on 1:1 or send to ES room. PRNs as above. 
Chart reviewed, case d/w primary team in person, pt. seen/evaluated with the student, I agree with above assessment/plan. Patient remains depressed and  paranoid, denies avh, denies si and hi. Reports he ate breakfast in the morning. Plan as above. Will follow
agree with above, patient safe in hospital but has worsening paranoia, hopelessness. Denies suicidality/intent or plan and denies homicidality/intent or plan. Asking to go to [facility]. Amenable to trying to increase meds. Patient tolerating Zyprexa without issue no EPS noted, walking fine. Would increase Zyprexa to 25mg as some patients benefit from supratherapeutic doses and patient without notable oversedation, sfx, autonomic issues, constipation or urinary retention on this med. Psych will follow. Change PRNs to Haldol as above, cannot leave AMA. Transfer to [facility] once medically cleared and bed available. 
agree with above, patient paranoid but not about food, states that he mostly does not have an appetite but is suggestible in general. Begin mirtazapine for depression/appetite stimulation as above.     Would have nursing encourage food intake and allow for showering and better monitor/take care of patient's ADL's. Would move patient closer to nursing station and encourage staying OOB and into chair where possible. PT/OT and if patient able encourage walks. 
Handoff received from Dr. Cooley, chart reviewed, pt. seen/evaluated with the student, I agree with above assessment/plan. Patient visibly depressed, withdrawn, possibly paranoid about food though stated that he was able to eat some breakfast, no si or hi. Plan as above, encourage PO intake and document.  Will follow
agree with above, A/P modified - patient c/w meds. Denies suicidality/intent or plan and denies homicidality/intent or plan. + psychosis but not effecting actions greatly. Awaiting transfer to [facility] once able. PRNs as above. Cannot leave AMA, needs further psych assessment. 
Chart reviewed, pt. seen/evaluated with the student, I agree with above assessment/plan. Patient calm/cooperative, concrete, reports mood is still depressed, reports improvement in PO intake but still not eating well, +paranoia/delusions , stating " I think I will be going to longterm as I said something which I did not mean", firmly denies SIIP, denies HIIP.  Plan as above. Case d/w Dr. Francis. Will follow 
Chart reviewed, pt. seen/evaluated with the student, I agree with above assessment/plan. Patient continues to report low mood, able to eat some eggs and drank apple juice but appetite remains poor. Less paranoid today, denies avh, denies si and hi. Plan as above, will follow
Met with the patient. Case discussed with PARamonaS impression and plan discussed and agreed upon  Anxious, dysphoric, perseverative. Asks ' you promise I will get better at [facility] right?'. Admits to negative, derogatory AH, denies any command. Denies any si or hi    Agree with plan above   Medicine team aware of plan  Discussed with previous  psych team-- above plan discussed  Coordinated [facility] bed search with sw  will give hand off to 2 [facility] team

## 2024-09-30 NOTE — BH CONSULTATION LIAISON PROGRESS NOTE - NSBHATTESTBILLING_PSY_A_CORE
73338-Oiwngkuets OBS or IP - moderate complexity OR 35-49 mins
85223-Spabqlofej OBS or IP - moderate complexity OR 35-49 mins
95709-Oeowrjaigv OBS or IP - high complexity OR 50-79 mins
88236-Suvohbikjp OBS or IP - moderate complexity OR 35-49 mins
91180-Sxmkzrxlxx OBS or IP - moderate complexity OR 35-49 mins
74244-Tlxrhngkpv OBS or IP - moderate complexity OR 35-49 mins
81772-Djratqwwal OBS or IP - moderate complexity OR 35-49 mins
41504-Kwlbmllcir OBS or IP - moderate complexity OR 35-49 mins
75014-Azmybkitok OBS or IP - moderate complexity OR 35-49 mins

## 2024-09-30 NOTE — BH PATIENT PROFILE - NSVRISKTHREATS_PSY_ALL_CORE
----- Message from Nasima Thomas [facility]--[phone] [id number] [id number]    Communication Preference:--Mom--Zara--[phone]--    Additional Information: Refill request for medication listed above.    Do not know

## 2024-09-30 NOTE — BH PATIENT PROFILE - HOME MEDICATIONS
apixaban 5 mg oral tablet , 1 tab(s) orally 2 times a day take 2 tabs BID  thru 10/1, on 10/2 change to 1 tab BID  mirtazapine 15 mg oral tablet , 1 tab(s) orally once a day (at bedtime)  senna (sennosides) 8.6 mg oral tablet , 2 tab(s) orally once a day (at bedtime)  amLODIPine 10 mg oral tablet , 1 tab(s) orally once a day  venlafaxine 225 mg oral tablet, extended release , 1 tab(s) orally once a day

## 2024-09-30 NOTE — BH INPATIENT PSYCHIATRY ASSESSMENT NOTE - DESCRIPTION
Lives in assisted living facility, pt reports no family or children; has living sister, brother, and father; long-time friend of 30 years, Ezequiel Cuevas ([phone]).

## 2024-09-30 NOTE — BH CHART NOTE - NSEVENTNOTEFT_PSY_ALL_CORE
Leena from  psychiatry paged YESSENIA at 20:47 regarding pt's prolonged QTC. Per addendum from Discharge Document (unstructured note, written at 20:23):     "EKG obtained 9/30pm, qtc 558  EP called to manually calculate, manual qtc 566  Will hold zyprexa  Psych called to make aware  Will discharge pt to [facility]. [facility] hospitalist made aware of prolonged qtc.   Dr. Correa aware and in agreement with DC.  - Rianna Rivas PA-C"    [facility] was made aware of the situation. Zyprexa HS held. 2S nurses instructed to obtain faxed copies of recent EKGs from [facility]S.

## 2024-09-30 NOTE — BH CONSULTATION LIAISON PROGRESS NOTE - NSBHMSESPABN_PSY_A_CORE
Soft volume/Slowed rate/Decreased productivity

## 2024-09-30 NOTE — BH CONSULTATION LIAISON PROGRESS NOTE - NSICDXBHPRIMARYDX_PSY_ALL_CORE
Schizoaffective disorder, bipolar type   F25.0  

## 2024-09-30 NOTE — BH INPATIENT PSYCHIATRY ASSESSMENT NOTE - NSBHSAALC_PSY_A_CORE FT
Last used in April 2022 on his birthday, states he drank 1 bottle of liquor due to anxiety, endorses feeling drunk but denies blackout or withdrawal, no recent use since then

## 2024-09-30 NOTE — BH INPATIENT PSYCHIATRY ASSESSMENT NOTE - RISK ASSESSMENT
Pt is at elevated acute risk of harm to self due to worsening depression, psychosis, and passive SI. Acute risk factors: hopelessness, recent med changes, social isolation. Chronic risk factors include elderly age, male gender, h/o SA (1992), h/o >10 psych admissions, past substance abuse (alcohol and remote cocaine use), He has protective factors of no active SIIP, no HIIP, stable housing, supportive family and good health overall. Overall, pt is unable to care for self 2/2 psychiatric illness, paranoid delusions, poor hygiene, Pt is at elevated acute risk of harm to self and meets criteria for psychiatric admission.

## 2024-09-30 NOTE — BH CONSULTATION LIAISON PROGRESS NOTE - CURRENT MEDICATION
MEDICATIONS  (STANDING):  amLODIPine   Tablet 10 milliGRAM(s) Oral with breakfast  cefTRIAXone   IVPB 1000 milliGRAM(s) IV Intermittent every 24 hours  cefTRIAXone   IVPB      lactated ringers. 1000 milliLiter(s) (100 mL/Hr) IV Continuous <Continuous>  LORazepam     Tablet 0.5 milliGRAM(s) Oral two times a day  mirtazapine 7.5 milliGRAM(s) Oral at bedtime  OLANZapine 25 milliGRAM(s) Oral at bedtime  senna 2 Tablet(s) Oral at bedtime  venlafaxine XR. 225 milliGRAM(s) Oral daily    MEDICATIONS  (PRN):  acetaminophen     Tablet .. 650 milliGRAM(s) Oral every 6 hours PRN Temp greater or equal to 38C (100.4F), Mild Pain (1 - 3)  aluminum hydroxide/magnesium hydroxide/simethicone Suspension 30 milliLiter(s) Oral every 4 hours PRN Dyspepsia  haloperidol     Tablet 5 milliGRAM(s) Oral every 6 hours PRN Agitation  haloperidol    Injectable 5 milliGRAM(s) IntraMuscular every 6 hours PRN Agitation  melatonin 3 milliGRAM(s) Oral at bedtime PRN Insomnia  OLANZapine 2.5 milliGRAM(s) Oral every 6 hours PRN agitation  OLANZapine Injectable 2.5 milliGRAM(s) IntraMuscular every 6 hours PRN agitation if pt refuses PO  ondansetron Injectable 4 milliGRAM(s) IV Push every 8 hours PRN Nausea and/or Vomiting  
MEDICATIONS  (STANDING):  amLODIPine   Tablet 10 milliGRAM(s) Oral with breakfast  lactated ringers. 1000 milliLiter(s) (100 mL/Hr) IV Continuous <Continuous>  LORazepam     Tablet 0.5 milliGRAM(s) Oral two times a day  OLANZapine 20 milliGRAM(s) Oral at bedtime  rivaroxaban 15 milliGRAM(s) Oral two times a day with meals  senna 2 Tablet(s) Oral at bedtime  venlafaxine XR. 225 milliGRAM(s) Oral daily    MEDICATIONS  (PRN):  acetaminophen     Tablet .. 650 milliGRAM(s) Oral every 6 hours PRN Temp greater or equal to 38C (100.4F), Mild Pain (1 - 3)  aluminum hydroxide/magnesium hydroxide/simethicone Suspension 30 milliLiter(s) Oral every 4 hours PRN Dyspepsia  melatonin 3 milliGRAM(s) Oral at bedtime PRN Insomnia  ondansetron Injectable 4 milliGRAM(s) IV Push every 8 hours PRN Nausea and/or Vomiting  
MEDICATIONS  (STANDING):  amLODIPine   Tablet 10 milliGRAM(s) Oral with breakfast  apixaban 5 milliGRAM(s) Oral every 12 hours  lactated ringers. 1000 milliLiter(s) (100 mL/Hr) IV Continuous <Continuous>  LORazepam     Tablet 0.5 milliGRAM(s) Oral at bedtime  mirtazapine 7.5 milliGRAM(s) Oral at bedtime  OLANZapine 25 milliGRAM(s) Oral at bedtime  senna 2 Tablet(s) Oral at bedtime  venlafaxine XR. 225 milliGRAM(s) Oral daily    MEDICATIONS  (PRN):  acetaminophen     Tablet .. 650 milliGRAM(s) Oral every 6 hours PRN Temp greater or equal to 38C (100.4F), Mild Pain (1 - 3)  aluminum hydroxide/magnesium hydroxide/simethicone Suspension 30 milliLiter(s) Oral every 4 hours PRN Dyspepsia  haloperidol     Tablet 5 milliGRAM(s) Oral every 6 hours PRN Agitation  haloperidol    Injectable 5 milliGRAM(s) IntraMuscular every 6 hours PRN Agitation  melatonin 3 milliGRAM(s) Oral at bedtime PRN Insomnia  OLANZapine 2.5 milliGRAM(s) Oral every 6 hours PRN agitation  OLANZapine Injectable 2.5 milliGRAM(s) IntraMuscular every 6 hours PRN agitation if pt refuses PO  ondansetron Injectable 4 milliGRAM(s) IV Push every 8 hours PRN Nausea and/or Vomiting  
MEDICATIONS  (STANDING):  amLODIPine   Tablet 10 milliGRAM(s) Oral with breakfast  apixaban 10 milliGRAM(s) Oral every 12 hours  lactated ringers. 1000 milliLiter(s) (100 mL/Hr) IV Continuous <Continuous>  LORazepam     Tablet 0.5 milliGRAM(s) Oral two times a day  mirtazapine 7.5 milliGRAM(s) Oral at bedtime  OLANZapine 25 milliGRAM(s) Oral at bedtime  senna 2 Tablet(s) Oral at bedtime  venlafaxine XR. 225 milliGRAM(s) Oral daily    MEDICATIONS  (PRN):  acetaminophen     Tablet .. 650 milliGRAM(s) Oral every 6 hours PRN Temp greater or equal to 38C (100.4F), Mild Pain (1 - 3)  aluminum hydroxide/magnesium hydroxide/simethicone Suspension 30 milliLiter(s) Oral every 4 hours PRN Dyspepsia  haloperidol     Tablet 5 milliGRAM(s) Oral every 6 hours PRN Agitation  haloperidol    Injectable 5 milliGRAM(s) IntraMuscular every 6 hours PRN Agitation  melatonin 3 milliGRAM(s) Oral at bedtime PRN Insomnia  OLANZapine 2.5 milliGRAM(s) Oral every 6 hours PRN agitation  OLANZapine Injectable 2.5 milliGRAM(s) IntraMuscular every 6 hours PRN agitation if pt refuses PO  ondansetron Injectable 4 milliGRAM(s) IV Push every 8 hours PRN Nausea and/or Vomiting  
MEDICATIONS  (STANDING):  amLODIPine   Tablet 10 milliGRAM(s) Oral with breakfast  cefTRIAXone   IVPB 1000 milliGRAM(s) IV Intermittent once  cefTRIAXone   IVPB      lactated ringers. 1000 milliLiter(s) (100 mL/Hr) IV Continuous <Continuous>  LORazepam     Tablet 0.5 milliGRAM(s) Oral two times a day  mirtazapine 7.5 milliGRAM(s) Oral at bedtime  OLANZapine 25 milliGRAM(s) Oral at bedtime  senna 2 Tablet(s) Oral at bedtime  venlafaxine XR. 225 milliGRAM(s) Oral daily    MEDICATIONS  (PRN):  acetaminophen     Tablet .. 650 milliGRAM(s) Oral every 6 hours PRN Temp greater or equal to 38C (100.4F), Mild Pain (1 - 3)  aluminum hydroxide/magnesium hydroxide/simethicone Suspension 30 milliLiter(s) Oral every 4 hours PRN Dyspepsia  haloperidol     Tablet 5 milliGRAM(s) Oral every 6 hours PRN Agitation  haloperidol    Injectable 5 milliGRAM(s) IntraMuscular every 6 hours PRN Agitation  melatonin 3 milliGRAM(s) Oral at bedtime PRN Insomnia  OLANZapine 2.5 milliGRAM(s) Oral every 6 hours PRN agitation  OLANZapine Injectable 2.5 milliGRAM(s) IntraMuscular every 6 hours PRN agitation if pt refuses PO  ondansetron Injectable 4 milliGRAM(s) IV Push every 8 hours PRN Nausea and/or Vomiting  
MEDICATIONS  (STANDING):  amLODIPine   Tablet 10 milliGRAM(s) Oral with breakfast  lactated ringers. 1000 milliLiter(s) (100 mL/Hr) IV Continuous <Continuous>  LORazepam     Tablet 0.5 milliGRAM(s) Oral two times a day  OLANZapine 25 milliGRAM(s) Oral at bedtime  rivaroxaban 15 milliGRAM(s) Oral two times a day with meals  senna 2 Tablet(s) Oral at bedtime  venlafaxine XR. 225 milliGRAM(s) Oral daily    MEDICATIONS  (PRN):  acetaminophen     Tablet .. 650 milliGRAM(s) Oral every 6 hours PRN Temp greater or equal to 38C (100.4F), Mild Pain (1 - 3)  aluminum hydroxide/magnesium hydroxide/simethicone Suspension 30 milliLiter(s) Oral every 4 hours PRN Dyspepsia  melatonin 3 milliGRAM(s) Oral at bedtime PRN Insomnia  OLANZapine 2.5 milliGRAM(s) Oral every 6 hours PRN agitation  OLANZapine Injectable 2.5 milliGRAM(s) IntraMuscular every 6 hours PRN agitation if pt refuses PO  ondansetron Injectable 4 milliGRAM(s) IV Push every 8 hours PRN Nausea and/or Vomiting  
MEDICATIONS  (STANDING):  amLODIPine   Tablet 10 milliGRAM(s) Oral with breakfast  lactated ringers. 1000 milliLiter(s) (100 mL/Hr) IV Continuous <Continuous>  LORazepam     Tablet 0.5 milliGRAM(s) Oral two times a day  OLANZapine 25 milliGRAM(s) Oral at bedtime  rivaroxaban 15 milliGRAM(s) Oral two times a day with meals  senna 2 Tablet(s) Oral at bedtime  venlafaxine XR. 225 milliGRAM(s) Oral daily    MEDICATIONS  (PRN):  acetaminophen     Tablet .. 650 milliGRAM(s) Oral every 6 hours PRN Temp greater or equal to 38C (100.4F), Mild Pain (1 - 3)  aluminum hydroxide/magnesium hydroxide/simethicone Suspension 30 milliLiter(s) Oral every 4 hours PRN Dyspepsia  melatonin 3 milliGRAM(s) Oral at bedtime PRN Insomnia  OLANZapine 2.5 milliGRAM(s) Oral every 6 hours PRN agitation  OLANZapine Injectable 2.5 milliGRAM(s) IntraMuscular every 6 hours PRN agitation if pt refuses PO  ondansetron Injectable 4 milliGRAM(s) IV Push every 8 hours PRN Nausea and/or Vomiting  
MEDICATIONS  (STANDING):  amLODIPine   Tablet 10 milliGRAM(s) Oral with breakfast  apixaban 5 milliGRAM(s) Oral every 12 hours  lactated ringers. 1000 milliLiter(s) (100 mL/Hr) IV Continuous <Continuous>  mirtazapine 15 milliGRAM(s) Oral at bedtime  OLANZapine 25 milliGRAM(s) Oral at bedtime  senna 2 Tablet(s) Oral at bedtime  venlafaxine XR. 225 milliGRAM(s) Oral daily    MEDICATIONS  (PRN):  acetaminophen     Tablet .. 650 milliGRAM(s) Oral every 6 hours PRN Temp greater or equal to 38C (100.4F), Mild Pain (1 - 3)  aluminum hydroxide/magnesium hydroxide/simethicone Suspension 30 milliLiter(s) Oral every 4 hours PRN Dyspepsia  haloperidol     Tablet 5 milliGRAM(s) Oral every 6 hours PRN Agitation  haloperidol    Injectable 5 milliGRAM(s) IntraMuscular every 6 hours PRN Agitation  LORazepam     Tablet 0.5 milliGRAM(s) Oral at bedtime PRN Anxiety  melatonin 3 milliGRAM(s) Oral at bedtime PRN Insomnia  OLANZapine 2.5 milliGRAM(s) Oral every 6 hours PRN agitation  OLANZapine Injectable 2.5 milliGRAM(s) IntraMuscular every 6 hours PRN agitation if pt refuses PO  ondansetron Injectable 4 milliGRAM(s) IV Push every 8 hours PRN Nausea and/or Vomiting  
MEDICATIONS  (STANDING):  amLODIPine   Tablet 10 milliGRAM(s) Oral with breakfast  lactated ringers. 1000 milliLiter(s) (100 mL/Hr) IV Continuous <Continuous>  LORazepam     Tablet 0.5 milliGRAM(s) Oral two times a day  OLANZapine 20 milliGRAM(s) Oral at bedtime  rivaroxaban 15 milliGRAM(s) Oral two times a day with meals  senna 2 Tablet(s) Oral at bedtime  venlafaxine XR. 225 milliGRAM(s) Oral daily    MEDICATIONS  (PRN):  acetaminophen     Tablet .. 650 milliGRAM(s) Oral every 6 hours PRN Temp greater or equal to 38C (100.4F), Mild Pain (1 - 3)  aluminum hydroxide/magnesium hydroxide/simethicone Suspension 30 milliLiter(s) Oral every 4 hours PRN Dyspepsia  melatonin 3 milliGRAM(s) Oral at bedtime PRN Insomnia  OLANZapine 2.5 milliGRAM(s) Oral every 6 hours PRN agitation  OLANZapine Injectable 2.5 milliGRAM(s) IntraMuscular every 6 hours PRN agitation if pt refuses PO  ondansetron Injectable 4 milliGRAM(s) IV Push every 8 hours PRN Nausea and/or Vomiting  
MEDICATIONS  (STANDING):  amLODIPine   Tablet 10 milliGRAM(s) Oral with breakfast  lactated ringers. 1000 milliLiter(s) (100 mL/Hr) IV Continuous <Continuous>  LORazepam     Tablet 0.5 milliGRAM(s) Oral two times a day  OLANZapine 20 milliGRAM(s) Oral at bedtime  rivaroxaban 15 milliGRAM(s) Oral two times a day with meals  senna 2 Tablet(s) Oral at bedtime  venlafaxine XR. 225 milliGRAM(s) Oral daily    MEDICATIONS  (PRN):  acetaminophen     Tablet .. 650 milliGRAM(s) Oral every 6 hours PRN Temp greater or equal to 38C (100.4F), Mild Pain (1 - 3)  aluminum hydroxide/magnesium hydroxide/simethicone Suspension 30 milliLiter(s) Oral every 4 hours PRN Dyspepsia  melatonin 3 milliGRAM(s) Oral at bedtime PRN Insomnia  OLANZapine 2.5 milliGRAM(s) Oral every 6 hours PRN agitation  OLANZapine Injectable 2.5 milliGRAM(s) IntraMuscular every 6 hours PRN agitation if pt refuses PO  ondansetron Injectable 4 milliGRAM(s) IV Push every 8 hours PRN Nausea and/or Vomiting

## 2024-09-30 NOTE — CHART NOTE - NSCHARTNOTEFT_GEN_A_CORE
Received telephone call from RL Blanca reporting patient with prolonged qtc interval, recommend repeating EKG and if prolonged to contact cardiology, also instructed primary team to contact [facility] provider. [facility] YESSENIA Olvera aware.     Addendum:   EKG obtained 9/30pm, qtc 558  EP called to manually calculate, manual qtc 566  Will hold zyprexa  Psych called to make aware  Will discharge pt to [facility]. [facility] hospitalist made aware of prolonged qtc.   Dr. Correa aware and in agreement with DC.  - Rianna Rivas PA-C

## 2024-09-30 NOTE — BH INPATIENT PSYCHIATRY ASSESSMENT NOTE - NSBHASSESSSUMMFT_PSY_ALL_CORE
Patient is a 70 yo M with history of HTN and DVT and hx of schizoaffective disorder, bipolar type, BIB sister from [facility] due to 2 months of worsening depression and psychosis. Patient with depressed mood, poor ADLs, and persecutory delusions given arguments he had with another patient where he made threats. Patient with numerous previous suicide attempts and previous ECT; he appears to have worsened after completing his course of ECT as an outpatient. He was admitted medically due to poor PO intake. Given his recent MDE and failure to thrive, he is unable to care for himself and therefore meets criteria for inpatient psychiatric hospitalization.   SH: Lives in [facility]. Retired from [employer], , no children.   PsyHx: 1x SA by cutting in 1990. Multiple hospitalizations MR Aug 2023. Was on ECT until 6 months ago    1. Admission status  9.27 (Involuntary admission)    2. Significant lab findings  - U/A positive for blood, nitrites, and LE    3. Psychotropic medication  - Olanzapine 25 mg QHS (psychosis)  	- Home medication  - Venlafaxine  mg daily (depression)  	- Home medication  - Mirtazapine 15 mg QHS (depression)  	- Started during [facility] hospitalization      Agitation PRNs: Olanzapine PO/IM, Lorazepam PO    4. Medical conditions, other medication, consults  A) DVT  - Eliquis 5 mg BID  B) Constipation   - Senna 2 tabs QHS  C) HTN  - Amlodipine 10 mg daily    5. Psychosocial interventions  - Patient provided verbal consent to speak with TBD  - CO 1:1: Not required  - Restrictions/allowances: None   Patient is a 68 yo M with history of HTN and DVT and hx of schizoaffective disorder, bipolar type, BIB sister from [facility] due to 2 months of worsening depression and psychosis. Patient with depressed mood, poor ADLs, and persecutory delusions given arguments he had with another patient where he made threats. Patient with numerous previous suicide attempts and previous ECT; he appears to have worsened after completing his course of ECT as an outpatient. He was admitted medically due to poor PO intake. Given his recent MDE and failure to thrive, he is unable to care for himself and therefore meets criteria for inpatient psychiatric hospitalization.   SH: Lives in [facility]. Retired from [employer], , no children.   PsyHx: 1x SA by cutting in 1990. Multiple hospitalizations MR Aug 2023. Was on ECT until 6 months ago    1. Admission status  9.27 (Involuntary admission)    2. Significant lab findings  - U/A positive for blood, nitrites, and LE    3. Psychotropic medication  - Venlafaxine  mg daily (depression)  	- Home medication  - Mirtazapine 15 mg QHS (depression)  	- Started during [facility] hospitalization    HOLD Olanzapine 25 mg QHS (psychosis)  	- Home medication, held due to prolonged QTc per recommendation of C/L team. Will repeat EKG.  	- Primary team to address    Agitation PRNs: Olanzapine PO/IM, Lorazepam PO    4. Medical conditions, other medication, consults  A) DVT  - Eliquis 5 mg BID  B) Constipation   - Senna 2 tabs QHS  C) HTN  - Amlodipine 10 mg daily    5. Psychosocial interventions  - Patient provided verbal consent to speak with TBD  - CO 1:1: Not required  - Restrictions/allowances: None

## 2024-09-30 NOTE — BH INPATIENT PSYCHIATRY ASSESSMENT NOTE - NSBHCHARTREVIEWVS_PSY_A_CORE FT
Vital Signs Last 24 Hrs  T(C): 36.8 (09-30-24 @ 16:00), Max: 36.8 (09-30-24 @ 16:00)  T(F): 98.2 (09-30-24 @ 16:00), Max: 98.2 (09-30-24 @ 16:00)  HR: 85 (09-30-24 @ 16:00) (70 - 90)  BP: 112/93 (09-30-24 @ 16:00) (112/93 - 139/95)  BP(mean): --  RR: 18 (09-30-24 @ 16:00) (18 - 18)  SpO2: 95% (09-30-24 @ 16:00) (95% - 99%)

## 2024-09-30 NOTE — BH PATIENT PROFILE - FALL HARM RISK - HARM RISK INTERVENTIONS

## 2024-09-30 NOTE — BH CONSULTATION LIAISON PROGRESS NOTE - NSBHCHARTREVIEWVS_PSY_A_CORE FT
Vital Signs Last 24 Hrs  T(C): 36.4 (30 Sep 2024 10:48), Max: 36.7 (29 Sep 2024 17:25)  T(F): 97.5 (30 Sep 2024 10:48), Max: 98.1 (29 Sep 2024 17:25)  HR: 70 (30 Sep 2024 10:48) (70 - 90)  BP: 127/87 (30 Sep 2024 10:48) (117/77 - 139/95)  BP(mean): --  RR: 18 (30 Sep 2024 10:48) (18 - 18)  SpO2: 97% (30 Sep 2024 10:48) (96% - 100%)    Parameters below as of 30 Sep 2024 10:48  Patient On (Oxygen Delivery Method): room air

## 2024-09-30 NOTE — BH CONSULTATION LIAISON PROGRESS NOTE - NSBHPTASSESSDT_PSY_A_CORE
16-Sep-2024 13:42
20-Sep-2024 12:44
18-Sep-2024 11:00
23-Sep-2024 11:58
25-Sep-2024 10:01
19-Sep-2024 08:48
27-Sep-2024 10:51
24-Sep-2024 11:58
30-Sep-2024 10:57
17-Sep-2024 09:31

## 2024-09-30 NOTE — BH CONSULTATION LIAISON PROGRESS NOTE - NSBHATTESTTYPEVISIT_PSY_A_CORE
Attending with Resident/Fellow/Student

## 2024-09-30 NOTE — BH CONSULTATION LIAISON PROGRESS NOTE - NSBHTIMEACTIVITIESPERFORMED_PSY_A_CORE
Discussed with patient, team, SW, and chart reviewed. Time exclusive of supervision/education. 
chart review, coordinated with medicine, sw, previous cl team,

## 2024-09-30 NOTE — BH CONSULTATION LIAISON PROGRESS NOTE - NSBHFUPINTERVALHXFT_PSY_A_CORE
Patient has been adherent with medications and did not get any PRNs.  Patient continues to endorse poor mood and appetite, states that he was not able to eat much today because he is, "nervous about what's going to happen to me". Reports that PO intake was better over the weekend compared to today. Does not describe adverse effects from increased Mirtazepine dose.      Denies current AVH, SI, or HI.

## 2024-09-30 NOTE — BH INPATIENT PSYCHIATRY ASSESSMENT NOTE - NSBHMETABOLIC_PSY_ALL_CORE_FT
BMI: BMI (kg/m2): 31.6 (09-14-24 @ 12:49)  HbA1c: A1C with Estimated Average Glucose Result: 5.2 % (09-15-24 @ 06:50)    Glucose:   BP: --Vital Signs Last 24 Hrs  T(C): 36.8 (09-30-24 @ 16:00), Max: 36.8 (09-30-24 @ 16:00)  T(F): 98.2 (09-30-24 @ 16:00), Max: 98.2 (09-30-24 @ 16:00)  HR: 85 (09-30-24 @ 16:00) (70 - 90)  BP: 112/93 (09-30-24 @ 16:00) (112/93 - 139/95)  BP(mean): --  RR: 18 (09-30-24 @ 16:00) (18 - 18)  SpO2: 95% (09-30-24 @ 16:00) (95% - 99%)      Lipid Panel: Date/Time: 09-15-24 @ 06:50  Cholesterol, Serum: 159  LDL Cholesterol Calculated: 99  HDL Cholesterol, Serum: 43  Total Cholesterol/HDL Ration Measurement: --  Triglycerides, Serum: 87

## 2024-09-30 NOTE — BH INPATIENT PSYCHIATRY ASSESSMENT NOTE - CURRENT MEDICATION
MEDICATIONS  (STANDING):    MEDICATIONS  (PRN):   MEDICATIONS  (STANDING):  amLODIPine   Tablet 10 milliGRAM(s) Oral with breakfast  apixaban 5 milliGRAM(s) Oral every 12 hours  melatonin. 3 milliGRAM(s) Oral at bedtime  mirtazapine 15 milliGRAM(s) Oral at bedtime  ondansetron    Tablet 4 milliGRAM(s) Oral every 6 hours  senna 2 Tablet(s) Oral at bedtime  venlafaxine  milliGRAM(s) Oral daily    MEDICATIONS  (PRN):  acetaminophen     Tablet .. 650 milliGRAM(s) Oral every 6 hours PRN Temp greater or equal to 38C (100.4F), Mild Pain (1 - 3)  aluminum hydroxide/magnesium hydroxide/simethicone Suspension 30 milliLiter(s) Oral every 4 hours PRN Dyspepsia  LORazepam     Tablet 0.5 milliGRAM(s) Oral at bedtime PRN Anxiety  OLANZapine 2.5 milliGRAM(s) Oral every 6 hours PRN Agitation  OLANZapine Injectable 2.5 milliGRAM(s) IntraMuscular once PRN Agitation if pt refuses PO

## 2024-09-30 NOTE — BH CONSULTATION LIAISON PROGRESS NOTE - NSBHMSEPERCEPT_PSY_A_CORE
No abnormalities
Auditory hallucinations
Auditory hallucinations
No abnormalities
No abnormalities
Auditory hallucinations
No abnormalities

## 2024-09-30 NOTE — BH CONSULTATION LIAISON PROGRESS NOTE - NSBHMSEMOOD_PSY_A_CORE
Depressed
Depressed
Depressed/Anxious
Depressed
Depressed/Anxious
Depressed/Anxious
Depressed
Depressed

## 2024-10-01 LAB
APPEARANCE UR: ABNORMAL
BACTERIA # UR AUTO: NEGATIVE /HPF — SIGNIFICANT CHANGE UP
BILIRUB UR-MCNC: NEGATIVE — SIGNIFICANT CHANGE UP
CAST: 9 /LPF — HIGH (ref 0–4)
COLOR SPEC: SIGNIFICANT CHANGE UP
DIFF PNL FLD: NEGATIVE — SIGNIFICANT CHANGE UP
GLUCOSE UR QL: NEGATIVE MG/DL — SIGNIFICANT CHANGE UP
KETONES UR-MCNC: ABNORMAL MG/DL
LEUKOCYTE ESTERASE UR-ACNC: ABNORMAL
NITRITE UR-MCNC: NEGATIVE — SIGNIFICANT CHANGE UP
PH UR: 5.5 — SIGNIFICANT CHANGE UP (ref 5–8)
PROT UR-MCNC: SIGNIFICANT CHANGE UP MG/DL
RBC CASTS # UR COMP ASSIST: 5 /HPF — HIGH (ref 0–4)
REVIEW: SIGNIFICANT CHANGE UP
SP GR SPEC: 1.02 — SIGNIFICANT CHANGE UP (ref 1–1.03)
SQUAMOUS # UR AUTO: 13 /HPF — HIGH (ref 0–5)
UROBILINOGEN FLD QL: 1 MG/DL — SIGNIFICANT CHANGE UP (ref 0.2–1)
WBC UR QL: 3 /HPF — SIGNIFICANT CHANGE UP (ref 0–5)

## 2024-10-01 PROCEDURE — 93010 ELECTROCARDIOGRAM REPORT: CPT

## 2024-10-01 PROCEDURE — 99223 1ST HOSP IP/OBS HIGH 75: CPT

## 2024-10-01 RX ORDER — POLYETHYLENE GLYCOL 3350 17 G/17G
17 POWDER, FOR SOLUTION ORAL EVERY 12 HOURS
Refills: 0 | Status: DISCONTINUED | OUTPATIENT
Start: 2024-10-01 | End: 2024-11-07

## 2024-10-01 RX ADMIN — Medication 225 MILLIGRAM(S): at 09:58

## 2024-10-01 RX ADMIN — Medication 3 MILLIGRAM(S): at 21:14

## 2024-10-01 RX ADMIN — APIXABAN 5 MILLIGRAM(S): 5 TABLET, FILM COATED ORAL at 09:58

## 2024-10-01 RX ADMIN — APIXABAN 5 MILLIGRAM(S): 5 TABLET, FILM COATED ORAL at 21:14

## 2024-10-01 RX ADMIN — MIRTAZAPINE 15 MILLIGRAM(S): 30 TABLET ORAL at 21:14

## 2024-10-01 RX ADMIN — Medication 10 MILLIGRAM(S): at 09:58

## 2024-10-01 RX ADMIN — Medication 2 TABLET(S): at 21:14

## 2024-10-01 RX ADMIN — POLYETHYLENE GLYCOL 3350 17 GRAM(S): 17 POWDER, FOR SOLUTION ORAL at 14:13

## 2024-10-01 NOTE — BH INPATIENT PSYCHIATRY PROGRESS NOTE - NSBHASSESSSUMMFT_PSY_ALL_CORE
Patient is a 70 yo M with history of HTN and DVT and hx of schizoaffective disorder, bipolar type, BIB sister from [facility] due to 2 months of worsening depression and psychosis. Patient with depressed mood, poor ADLs, and persecutory delusions given arguments he had with another patient where he made threats. Patient with numerous previous suicide attempts and previous ECT; he appears to have worsened after completing his course of ECT as an outpatient. He was admitted medically due to poor PO intake. Given his recent MDE and failure to thrive, he is unable to care for himself and therefore meets criteria for inpatient psychiatric hospitalization.   SH: Lives in [facility]. Retired from [employer], , no children.   PsyHx: 1x SA by cutting in 1990. Multiple hospitalizations MR Aug 2023. Was on ECT until 6 months ago.    working diagnosis: SAD bipolar type + current depressive episode w/ psychosis.       Plan:   1. Legal: admitted on 9.13 voluntary status  2. Safety:  Denies SI/SIB/HI/VI; continue routine observation.  3. Psychiatric:   - Venlafaxine  mg daily (depression)  	- Home medication  - Mirtazapine 15 mg QHS (depression)  	- Started during [facility] hospitalization  HOLD Olanzapine 25 mg QHS (psychosis)  	- Home medication, held due to prolonged QTc per recommendation of C/L team. Will repeat EKG.  	- Primary team to address    Agitation PRNs: Olanzapine PO/IM, Lorazepam PO    4. Medical conditions, other medication, consults  a) DVT  - Eliquis 5 mg BID  b) Constipation   - Senna 2 tabs QHS  c) HTN  - Amlodipine 10 mg daily  d) PT consult: fall precautions, recommend ambulating with walker   5. Psychosocial interventions  - Patient provided verbal consent to speak with TBD  - CO 1:1: Not required  - Restrictions/allowances: None   6. Collateral: outpatient psychiatrist Dr. Irby on 6/2/23 on [facility] and handoff from Dr. Hernandez. Collateral from sister. See  note.   7. Disposition: When stable.  Patient is a 70 yo M with history of HTN and DVT and hx of schizoaffective disorder, bipolar type, BIB sister from [facility] due to 2 months of worsening depression and psychosis. Patient with depressed mood, poor ADLs, and persecutory delusions given arguments he had with another patient where he made threats. Patient with numerous previous suicide attempts and previous ECT; he appears to have worsened after completing his course of ECT as an outpatient. He was admitted medically due to poor PO intake. Given his recent MDE and failure to thrive, he is unable to care for himself and therefore meets criteria for inpatient psychiatric hospitalization.   SH: Lives in [facility]. Retired from [employer], , no children.   PsyHx: 1x SA by cutting in 1990. Multiple hospitalizations MR Aug 2023. Was on ECT until 6 months ago.    On interview, the pt endorses depressive sxs mostly including anhedonia, decreased sleep and appetite, and lack of energy. Thought ruminations of guilt noted, in the setting of recent statement towards other tenant at [facility]. Pt experienced AH that include degrading voices that seem to have commanding character but so far no indication of voices directly telling him to hurt himself or others. Pt likely experiencing mood episode with psychotic features, affect noted to be mood-congruent. R/O substance-induced mood and/or psychotic d/o.    working diagnosis: schizoaffective disorder, current depressive episode w/ psychosis. ddx bipolar type per previous notes, however no h/o manic sxs elicited in current interview.    Plan:   1. Legal: admitted on 9.13 voluntary status  2. Safety:  Denies SI/SIB/HI/VI; continue routine observation.  3. Psychiatric:   - Venlafaxine  mg daily (depression)  	- Home medication  - Mirtazapine 15 mg QHS (depression)  	- Started during [facility] hospitalization  HOLD Olanzapine 25 mg QHS (psychosis)  	- Home medication, held due to prolonged QTc per recommendation of C/L team. Will repeat EKG.  	- Primary team to address    Agitation PRNs: Olanzapine PO/IM, Lorazepam PO    4. Medical conditions, other medication, consults  a) DVT  - Eliquis 5 mg BID  b) Constipation   - Senna 2 tabs QHS  c) HTN  - Amlodipine 10 mg daily  d) PT consult: fall precautions, recommend ambulating with walker   5. Psychosocial interventions  - Patient provided verbal consent to speak with TBD  - CO 1:1: Not required  - Restrictions/allowances: None   6. Collateral: outpatient psychiatrist Dr. Irby on 6/2/23 on [facility] and handoff from Dr. Hernandez. Collateral from sister. See  note.   7. Disposition: When stable.

## 2024-10-01 NOTE — DIETITIAN INITIAL EVALUATION ADULT - OTHER INFO
Patient is a 70 y/o male with PMHx of HTN and DVT, and PPHx of schizoaffective disorder, bipolar type, was on ECT until 6 months ago, BIB sister from [facility] due to 2 months of worsening depression and psychosis. Transferred from [facility] to [facility] 2S on 9/30/24.    Met with patient in his room who was lying in the bed during encounter. Patient reports his appetite has decreased with poor PO intake for the past 2 weeks during his stay in [facility] prior to being transferred to [facility], states he has been eating well at home prior to adm to [facility].  Reports current appetite is fair, still not at his baseline, confirmed with RN that patient had his breakfast in the communal dining area today with fair PO intake. Patient with poor dentition and is not wearing dentures (left with his sister), has been on easy to chew diet while in [facility], c/w easy to chew diet consistency here in [facility] per MD order which patient is amenable to having to ease in his chewing efforts. Denies swallowing difficulty on current diet. Denies GI distress (nausea/vomiting/ diarrhea/constipation) at this time. Writer encouraged po intake as tolerated, patient verbalized understanding and declined double portion/all ONS offered at this time (dislikes Ensure/mighty shake/magic cup etc.) c/w appetite stimulant which may help induce his appetite. 9/27 BMP, Mg, phos, and 9/15 lipids and A1C WNL. Reports UBW 220lb. Current adm wt: 220lb (9/30/24 chair wt). BMI >30. Patient declined diet education during encounter today.

## 2024-10-01 NOTE — BH INPATIENT PSYCHIATRY PROGRESS NOTE - OTHER
Mostly delusions of guilt, ruminations about wrongdoings and financial situation Pt vaguely alluding to voices telling him to do certain bad things, but does not seem to relate to violence at this point, likely more related to statements the pt feels guilty about

## 2024-10-01 NOTE — BH INPATIENT PSYCHIATRY PROGRESS NOTE - NSBHMETABOLIC_PSY_ALL_CORE_FT
BMI: BMI (kg/m2): 31.6 (09-30-24 @ 21:43)  HbA1c: A1C with Estimated Average Glucose Result: 5.2 % (09-15-24 @ 06:50)    Glucose:   BP: 102/67 (09-30-24 @ 21:43) (102/67 - 102/67)Vital Signs Last 24 Hrs  T(C): 36.6 (10-01-24 @ 07:54), Max: 36.8 (09-30-24 @ 16:00)  T(F): 97.8 (10-01-24 @ 07:54), Max: 98.2 (09-30-24 @ 16:00)  HR: 95 (09-30-24 @ 21:43) (70 - 95)  BP: 102/67 (09-30-24 @ 21:43) (102/67 - 127/87)  BP(mean): --  RR: 16 (10-01-24 @ 09:55) (16 - 18)  SpO2: 97% (09-30-24 @ 21:43) (95% - 97%)    Orthostatic VS  10-01-24 @ 09:55  Lying BP: --/-- HR: --  Sitting BP: 107/81 HR: 90  Standing BP: 103/72 HR: 93  Site: --  Mode: --  Orthostatic VS  10-01-24 @ 07:54  Lying BP: --/-- HR: --  Sitting BP: 116/87 HR: 74  Standing BP: 100/65 HR: 75  Site: --  Mode: --    Lipid Panel: Date/Time: 09-15-24 @ 06:50  Cholesterol, Serum: 159  LDL Cholesterol Calculated: 99  HDL Cholesterol, Serum: 43  Total Cholesterol/HDL Ration Measurement: --  Triglycerides, Serum: 87   BMI: BMI (kg/m2): 31.6 (09-30-24 @ 21:43)  HbA1c: A1C with Estimated Average Glucose Result: 5.2 % (09-15-24 @ 06:50)    Glucose:   BP: 102/67 (09-30-24 @ 21:43) (102/67 - 102/67)Vital Signs Last 24 Hrs  T(C): 36.6 (10-01-24 @ 07:54), Max: 36.8 (09-30-24 @ 16:00)  T(F): 97.8 (10-01-24 @ 07:54), Max: 98.2 (09-30-24 @ 16:00)  HR: 95 (09-30-24 @ 21:43) (85 - 95)  BP: 102/67 (09-30-24 @ 21:43) (102/67 - 112/93)  BP(mean): --  RR: 16 (10-01-24 @ 09:55) (16 - 18)  SpO2: 97% (09-30-24 @ 21:43) (95% - 97%)    Orthostatic VS  10-01-24 @ 09:55  Lying BP: --/-- HR: --  Sitting BP: 107/81 HR: 90  Standing BP: 103/72 HR: 93  Site: --  Mode: --  Orthostatic VS  10-01-24 @ 07:54  Lying BP: --/-- HR: --  Sitting BP: 116/87 HR: 74  Standing BP: 100/65 HR: 75  Site: --  Mode: --    Lipid Panel: Date/Time: 09-15-24 @ 06:50  Cholesterol, Serum: 159  LDL Cholesterol Calculated: 99  HDL Cholesterol, Serum: 43  Total Cholesterol/HDL Ration Measurement: --  Triglycerides, Serum: 87

## 2024-10-01 NOTE — CONSULT NOTE ADULT - SUBJECTIVE AND OBJECTIVE BOX
HPI: Pt is 69M admitted to [facility] 9/14/24 with hallucinations with decreased PO intake.  He was diagnosed with LE DVT and started on eliquis. He had an episod eof painless hematuria and renal US and CT AP were negative for abnormalities.    PAST MEDICAL & SURGICAL HISTORY:  HTN (hypertension)      HLD (hyperlipidemia)      Gait abnormality      Osteoarthritis      Schizoaffective disorder      MDD (major depressive disorder)      No significant past surgical history          Review of Systems:   CONSTITUTIONAL: No fever, weight loss, or fatigue  EYES: No eye pain, visual disturbances, or discharge  ENMT:  No difficulty hearing, tinnitus, vertigo; No sinus or throat pain  NECK: No pain or stiffness  RESPIRATORY: No cough, wheezing, chills or hemoptysis; No shortness of breath  CARDIOVASCULAR: No chest pain, palpitations, dizziness, or leg swelling  GASTROINTESTINAL: No abdominal or epigastric pain. No nausea, vomiting, or hematemesis; No diarrhea or constipation. No melena or hematochezia.  GENITOURINARY: No dysuria, frequency, hematuria, or incontinence  NEUROLOGICAL: No headaches, memory loss, loss of strength, numbness, or tremors  SKIN: No itching, burning, rashes, or lesions   LYMPH NODES: No enlarged glands  ENDOCRINE: No heat or cold intolerance; No hair loss  MUSCULOSKELETAL: No joint pain or swelling; No muscle, back, or extremity pain  HEME/LYMPH: No easy bruising, or bleeding gums  ALLERY AND IMMUNOLOGIC: No hives or eczema    Allergies    No Known Allergies    Intolerances        Social History:     FAMILY HISTORY:  No pertinent family history in first degree relatives        MEDICATIONS  (STANDING):  amLODIPine   Tablet 10 milliGRAM(s) Oral with breakfast  apixaban 5 milliGRAM(s) Oral every 12 hours  melatonin. 3 milliGRAM(s) Oral at bedtime  mirtazapine 15 milliGRAM(s) Oral at bedtime  senna 2 Tablet(s) Oral at bedtime  venlafaxine  milliGRAM(s) Oral daily    MEDICATIONS  (PRN):  acetaminophen     Tablet .. 650 milliGRAM(s) Oral every 6 hours PRN Temp greater or equal to 38C (100.4F), Mild Pain (1 - 3)  aluminum hydroxide/magnesium hydroxide/simethicone Suspension 30 milliLiter(s) Oral every 4 hours PRN Dyspepsia  LORazepam     Tablet 0.5 milliGRAM(s) Oral at bedtime PRN Anxiety      Vital Signs Last 24 Hrs  T(C): 36.6 (01 Oct 2024 07:54), Max: 36.8 (30 Sep 2024 16:00)  T(F): 97.8 (01 Oct 2024 07:54), Max: 98.2 (30 Sep 2024 16:00)  HR: 95 (30 Sep 2024 21:43) (70 - 95)  BP: 102/67 (30 Sep 2024 21:43) (102/67 - 127/87)  BP(mean): --  RR: 18 (30 Sep 2024 21:43) (18 - 18)  SpO2: 97% (30 Sep 2024 21:43) (95% - 97%)      CAPILLARY BLOOD GLUCOSE            PHYSICAL EXAM:  GENERAL: NAD  HEAD:  Atraumatic, Normocephalic  EYES: EOMI, conjunctiva and sclera clear  NECK: Supple, No JVD  CHEST/LUNG: Clear to auscultation bilaterally; No wheeze  HEART: Regular rate and rhythm; No murmurs, rubs, or gallops  ABDOMEN: Soft, Nontender, Nondistended; Bowel sounds present  EXTREMITIES:  2+ Peripheral Pulses, No clubbing, cyanosis, or edema  NEUROLOGY: non-focal  SKIN: No rashes or lesions    LABS:                    EKG(personally reviewed):    RADIOLOGY & ADDITIONAL TESTS:    Imaging Personally Reviewed:    Consultant(s) Notes Reviewed:      Care Discussed with Consultants/Other Providers:

## 2024-10-01 NOTE — BH INPATIENT PSYCHIATRY PROGRESS NOTE - NSBHCHARTREVIEWVS_PSY_A_CORE FT
Vital Signs Last 24 Hrs  T(C): 36.6 (10-01-24 @ 07:54), Max: 36.8 (09-30-24 @ 16:00)  T(F): 97.8 (10-01-24 @ 07:54), Max: 98.2 (09-30-24 @ 16:00)  HR: 95 (09-30-24 @ 21:43) (70 - 95)  BP: 102/67 (09-30-24 @ 21:43) (102/67 - 127/87)  BP(mean): --  RR: 16 (10-01-24 @ 09:55) (16 - 18)  SpO2: 97% (09-30-24 @ 21:43) (95% - 97%)    Orthostatic VS  10-01-24 @ 09:55  Lying BP: --/-- HR: --  Sitting BP: 107/81 HR: 90  Standing BP: 103/72 HR: 93  Site: --  Mode: --  Orthostatic VS  10-01-24 @ 07:54  Lying BP: --/-- HR: --  Sitting BP: 116/87 HR: 74  Standing BP: 100/65 HR: 75  Site: --  Mode: --   Vital Signs Last 24 Hrs  T(C): 36.6 (10-01-24 @ 07:54), Max: 36.8 (09-30-24 @ 16:00)  T(F): 97.8 (10-01-24 @ 07:54), Max: 98.2 (09-30-24 @ 16:00)  HR: 95 (09-30-24 @ 21:43) (85 - 95)  BP: 102/67 (09-30-24 @ 21:43) (102/67 - 112/93)  BP(mean): --  RR: 16 (10-01-24 @ 09:55) (16 - 18)  SpO2: 97% (09-30-24 @ 21:43) (95% - 97%)    Orthostatic VS  10-01-24 @ 09:55  Lying BP: --/-- HR: --  Sitting BP: 107/81 HR: 90  Standing BP: 103/72 HR: 93  Site: --  Mode: --  Orthostatic VS  10-01-24 @ 07:54  Lying BP: --/-- HR: --  Sitting BP: 116/87 HR: 74  Standing BP: 100/65 HR: 75  Site: --  Mode: --

## 2024-10-01 NOTE — BH INPATIENT PSYCHIATRY PROGRESS NOTE - CURRENT MEDICATION
MEDICATIONS  (STANDING):  amLODIPine   Tablet 10 milliGRAM(s) Oral with breakfast  apixaban 5 milliGRAM(s) Oral every 12 hours  melatonin. 3 milliGRAM(s) Oral at bedtime  mirtazapine 15 milliGRAM(s) Oral at bedtime  senna 2 Tablet(s) Oral at bedtime  venlafaxine  milliGRAM(s) Oral daily    MEDICATIONS  (PRN):  acetaminophen     Tablet .. 650 milliGRAM(s) Oral every 6 hours PRN Temp greater or equal to 38C (100.4F), Mild Pain (1 - 3)  aluminum hydroxide/magnesium hydroxide/simethicone Suspension 30 milliLiter(s) Oral every 4 hours PRN Dyspepsia  LORazepam     Tablet 0.5 milliGRAM(s) Oral at bedtime PRN Anxiety

## 2024-10-01 NOTE — DIETITIAN INITIAL EVALUATION ADULT - PERTINENT MEDS FT
MEDICATIONS  (STANDING):  amLODIPine   Tablet 10 milliGRAM(s) Oral with breakfast  apixaban 5 milliGRAM(s) Oral every 12 hours  melatonin. 3 milliGRAM(s) Oral at bedtime  mirtazapine 15 milliGRAM(s) Oral at bedtime  senna 2 Tablet(s) Oral at bedtime  venlafaxine  milliGRAM(s) Oral daily    MEDICATIONS  (PRN):  acetaminophen     Tablet .. 650 milliGRAM(s) Oral every 6 hours PRN Temp greater or equal to 38C (100.4F), Mild Pain (1 - 3)  aluminum hydroxide/magnesium hydroxide/simethicone Suspension 30 milliLiter(s) Oral every 4 hours PRN Dyspepsia  LORazepam     Tablet 0.5 milliGRAM(s) Oral at bedtime PRN Anxiety  polyethylene glycol 3350 17 Gram(s) Oral every 12 hours PRN obstipation

## 2024-10-01 NOTE — CONSULT NOTE ADULT - SUBJECTIVE AND OBJECTIVE BOX
HPI:     PAST MEDICAL & SURGICAL HISTORY:  HTN (hypertension)      HLD (hyperlipidemia)      Gait abnormality      Osteoarthritis      Schizoaffective disorder      MDD (major depressive disorder)      No significant past surgical history          Review of Systems:   CONSTITUTIONAL: No fever, weight loss, or fatigue  EYES: No eye pain, visual disturbances, or discharge  ENMT:  No difficulty hearing, tinnitus, vertigo; No sinus or throat pain  NECK: No pain or stiffness  RESPIRATORY: No cough, wheezing, chills or hemoptysis; No shortness of breath  CARDIOVASCULAR: No chest pain, palpitations, dizziness, or leg swelling  GASTROINTESTINAL: No abdominal or epigastric pain. No nausea, vomiting, or hematemesis; No diarrhea or constipation. No melena or hematochezia.  GENITOURINARY: No dysuria, frequency, hematuria, or incontinence  NEUROLOGICAL: No headaches, memory loss, loss of strength, numbness, or tremors  SKIN: No itching, burning, rashes, or lesions   LYMPH NODES: No enlarged glands  ENDOCRINE: No heat or cold intolerance; No hair loss  MUSCULOSKELETAL: No joint pain or swelling; No muscle, back, or extremity pain  HEME/LYMPH: No easy bruising, or bleeding gums  ALLERY AND IMMUNOLOGIC: No hives or eczema    Allergies    No Known Allergies    Intolerances        Social History:     FAMILY HISTORY:  No pertinent family history in first degree relatives        MEDICATIONS  (STANDING):  amLODIPine   Tablet 10 milliGRAM(s) Oral with breakfast  apixaban 5 milliGRAM(s) Oral every 12 hours  melatonin. 3 milliGRAM(s) Oral at bedtime  mirtazapine 15 milliGRAM(s) Oral at bedtime  senna 2 Tablet(s) Oral at bedtime  venlafaxine  milliGRAM(s) Oral daily    MEDICATIONS  (PRN):  acetaminophen     Tablet .. 650 milliGRAM(s) Oral every 6 hours PRN Temp greater or equal to 38C (100.4F), Mild Pain (1 - 3)  aluminum hydroxide/magnesium hydroxide/simethicone Suspension 30 milliLiter(s) Oral every 4 hours PRN Dyspepsia  LORazepam     Tablet 0.5 milliGRAM(s) Oral at bedtime PRN Anxiety  polyethylene glycol 3350 17 Gram(s) Oral every 12 hours PRN obstipation      Vital Signs Last 24 Hrs  T(C): 36.6 (01 Oct 2024 07:54), Max: 36.8 (30 Sep 2024 16:00)  T(F): 97.8 (01 Oct 2024 07:54), Max: 98.2 (30 Sep 2024 16:00)  HR: 95 (30 Sep 2024 21:43) (85 - 95)  BP: 102/67 (30 Sep 2024 21:43) (102/67 - 112/93)  BP(mean): --  RR: 16 (01 Oct 2024 09:55) (16 - 18)  SpO2: 97% (30 Sep 2024 21:43) (95% - 97%)      CAPILLARY BLOOD GLUCOSE            PHYSICAL EXAM:  GENERAL: NAD  HEAD:  Atraumatic, Normocephalic  EYES: EOMI, conjunctiva and sclera clear  NECK: Supple, No JVD  CHEST/LUNG: Clear to auscultation bilaterally; No wheeze  HEART: Regular rate and rhythm; No murmurs, rubs, or gallops  ABDOMEN: Soft, Nontender, Nondistended; Bowel sounds present  EXTREMITIES:  2+ Peripheral Pulses, No clubbing, cyanosis, or edema  NEUROLOGY: non-focal  SKIN: No rashes or lesions    LABS:                Urinalysis Basic - ( 01 Oct 2024 11:05 )    Color: Dark Yellow / Appearance: Cloudy / S.025 / pH: x  Gluc: x / Ketone: Trace mg/dL  / Bili: Negative / Urobili: 1.0 mg/dL   Blood: x / Protein: Trace mg/dL / Nitrite: Negative   Leuk Esterase: Trace / RBC: 5 /HPF / WBC 3 /HPF   Sq Epi: x / Non Sq Epi: 13 /HPF / Bacteria: Negative /HPF        EKG(personally reviewed): NSR QTc 443        Care Discussed with Consultants/Other Providers: Dr Dent   HPI: Pt is Pt is 69M admitted to [facility] 24 with hallucinations with decreased PO intake.  He was diagnosed with LE DVT and started on eliquis. He had an episod eof painless hematuria and renal US and CT AP were negative for abnormalities.    PAST MEDICAL & SURGICAL HISTORY:  HTN (hypertension)      HLD (hyperlipidemia)      Gait abnormality      Osteoarthritis      Schizoaffective disorder      MDD (major depressive disorder)      No significant past surgical history          Review of Systems:   CONSTITUTIONAL: No fever, weight loss, or fatigue  EYES: No eye pain, visual disturbances, or discharge  ENMT:  No difficulty hearing, tinnitus, vertigo; No sinus or throat pain  NECK: No pain or stiffness  RESPIRATORY: No cough, wheezing, chills or hemoptysis; No shortness of breath  CARDIOVASCULAR: No chest pain, palpitations, dizziness, or leg swelling  GASTROINTESTINAL: No abdominal or epigastric pain. No nausea, vomiting, or hematemesis; No diarrhea or constipation. No melena or hematochezia.  GENITOURINARY: No dysuria, frequency, hematuria, or incontinence  NEUROLOGICAL: No headaches, memory loss, loss of strength, numbness, or tremors  SKIN: No itching, burning, rashes, or lesions   LYMPH NODES: No enlarged glands  ENDOCRINE: No heat or cold intolerance; No hair loss  MUSCULOSKELETAL: No joint pain or swelling; No muscle, back, or extremity pain  HEME/LYMPH: No easy bruising, or bleeding gums  ALLERY AND IMMUNOLOGIC: No hives or eczema    Allergies    No Known Allergies    Intolerances        Social History:     FAMILY HISTORY:  No pertinent family history in first degree relatives        MEDICATIONS  (STANDING):  amLODIPine   Tablet 10 milliGRAM(s) Oral with breakfast  apixaban 5 milliGRAM(s) Oral every 12 hours  melatonin. 3 milliGRAM(s) Oral at bedtime  mirtazapine 15 milliGRAM(s) Oral at bedtime  senna 2 Tablet(s) Oral at bedtime  venlafaxine  milliGRAM(s) Oral daily    MEDICATIONS  (PRN):  acetaminophen     Tablet .. 650 milliGRAM(s) Oral every 6 hours PRN Temp greater or equal to 38C (100.4F), Mild Pain (1 - 3)  aluminum hydroxide/magnesium hydroxide/simethicone Suspension 30 milliLiter(s) Oral every 4 hours PRN Dyspepsia  LORazepam     Tablet 0.5 milliGRAM(s) Oral at bedtime PRN Anxiety  polyethylene glycol 3350 17 Gram(s) Oral every 12 hours PRN obstipation      Vital Signs Last 24 Hrs  T(C): 36.6 (01 Oct 2024 07:54), Max: 36.8 (30 Sep 2024 16:00)  T(F): 97.8 (01 Oct 2024 07:54), Max: 98.2 (30 Sep 2024 16:00)  HR: 95 (30 Sep 2024 21:43) (85 - 95)  BP: 102/67 (30 Sep 2024 21:43) (102/67 - 112/93)  BP(mean): --  RR: 16 (01 Oct 2024 09:55) (16 - 18)  SpO2: 97% (30 Sep 2024 21:43) (95% - 97%)      CAPILLARY BLOOD GLUCOSE            PHYSICAL EXAM:  GENERAL: NAD  HEAD:  Atraumatic, Normocephalic  EYES: EOMI, conjunctiva and sclera clear  NECK: Supple, No JVD  CHEST/LUNG: Clear to auscultation bilaterally; No wheeze  HEART: Regular rate and rhythm; No murmurs, rubs, or gallops  ABDOMEN: Soft, Nontender, Nondistended; Bowel sounds present  EXTREMITIES:  2+ Peripheral Pulses, No clubbing, cyanosis, or edema  NEUROLOGY: non-focal  SKIN: No rashes or lesions    LABS:                Urinalysis Basic - ( 01 Oct 2024 11:05 )    Color: Dark Yellow / Appearance: Cloudy / S.025 / pH: x  Gluc: x / Ketone: Trace mg/dL  / Bili: Negative / Urobili: 1.0 mg/dL   Blood: x / Protein: Trace mg/dL / Nitrite: Negative   Leuk Esterase: Trace / RBC: 5 /HPF / WBC 3 /HPF   Sq Epi: x / Non Sq Epi: 13 /HPF / Bacteria: Negative /HPF        EKG(personally reviewed): NSR QTc 443        Care Discussed with Consultants/Other Providers: Dr Dent

## 2024-10-01 NOTE — DIETITIAN INITIAL EVALUATION ADULT - PERTINENT LABORATORY DATA
Basic Metabolic Panel w/Mg & Inorg Phos (09.27.24 @ 05:53)   eGFR: 87: The estimated glomerular filtration rate (eGFR) calculation is based on   the 2021 CKD-EPI creatinine equation, which is validated in male and   female population 18 years of age and older (N Engl J Med 2021;   [id number]). mL/min/1.73m2  Sodium: 142 mmol/L  Potassium: 3.6 mmol/L  Chloride: 106 mmol/L  Carbon Dioxide: 23 mmol/L  Anion Gap: 13 mmol/L  Blood Urea Nitrogen: 14 mg/dL  Creatinine: 0.96 mg/dL  Glucose: 81 mg/dL  Calcium: 9.3 mg/dL  Magnesium: 2.00 mg/dL  Phosphorus: 3.4 mg/dL    A1C with Estimated Average Glucose Result: 5.2 % (09-15-24 @ 06:50)    Lipid Panel: Date/Time: 09-15-24 @ 06:50  Cholesterol, Serum: 159  LDL Cholesterol Calculated: 99  HDL Cholesterol, Serum: 43  Total Cholesterol/HDL Ration Measurement: --  Triglycerides, Serum: 87

## 2024-10-01 NOTE — BH INPATIENT PSYCHIATRY PROGRESS NOTE - NSBHFUPINTERVALHXFT_PSY_A_CORE
Chart reviewed. Case discussed with interdisciplinary team. The pt is compliant with meds, denies side effects or physical symptoms. No behavioral events reported overnight. No PRNs needed/requested. On encounter this morning pt denies any complaints, is calm and cooperative, dysphoric.    The pt explains that his depressive symptoms worsened around 2 months ago when he told one of the tenants at his custodial that he would "break his jaw". Thinks that he will be going to assisted because of that and that nothing can be done to prevent it. He mentions that he had already bene in assisted twice for DWI and that his recent statement would constitute his third felony. His symptoms would comprise low energy, feelings of guilt, thought ruminations about his wrongdoings, low appetite, lack of sleep, and passive SI. His ruminations would also revolve around financial issues as the pt is convinced that he does not have enough money to pay his treatment despite the interviewers explaining that his treatment will be covered by his insurance. Explains that he stopped his ECT treatment a few months ago because he felt like it did not help him. Denies active SI and HIIP. No SIIB noted. The pt explains that he is convinced other people on the unit would observe him and that he is being monitored. Denies mindreading, denies VH. Endorses AH, described as malicious voices degrading him by telling him he is a bad person and guilty for his recent statements and actions. Vaguely alludes to [facility] saying that they would make him say bad things although he denies ever having actively acted out physically on his statements. The pt shares that he has never been entirely happy in his life as he felt he never completely belonged, barely had friends and felt like he could not connect to other people. Denies any concrete physical trauma but says that he had a very hard time growing up with acne and mostly felt ostracized. Denies any manic episodes, no episodes of heightened energy, lack of need for sleep, or extreme talkativeness. Pt repeatedly states that he is feeling very nervous and anxious, especially around other people on the unit so that he is not felling completely save. The pt also endorses a history of alcohol excessive but non-regular until April 2024. Endorses ongoing bad sleep, low appetite, but denies any current physical symptoms. Chart reviewed. Case discussed with interdisciplinary team. The pt is compliant with meds, denies side effects or physical symptoms. No behavioral events reported overnight. No PRNs needed/requested. On encounter this morning pt denies any complaints, is calm and cooperative, dysphoric.    The pt explains that his depressive symptoms worsened around 2 months ago when he told one of the tenants at his long-term that he would "break his jaw". Thinks that he will be going to custodial because of that and that nothing can be done to prevent it. He mentions that he had already bene in custodial twice for DWI and that his recent statement would constitute his third felony. His symptoms would comprise low energy, feelings of guilt, thought ruminations about his wrongdoings, low appetite, lack of sleep, and passive SI. The pt's sleep would be disturbed because he would lay in bed with closed eyes but ongoing ruminations about his recent statement and "all the other things that I did wrong". His ruminations would also revolve around financial issues as the pt is convinced that he does not have enough money to pay his treatment despite the interviewers explaining that his treatment will be covered by his insurance. Explains that he stopped his ECT treatment a few months ago because he felt like it did not help him. Denies active SI and HIIP. No SIIB noted. The pt explains that he is convinced other people on the unit would observe him and that he is being monitored. Denies mindreading, denies VH. Endorses AH, described as malicious voices degrading him by telling him he is a bad person and guilty for his recent statements and actions. Vaguely alludes to [facility] saying that they would make him say bad things although he denies ever having actively acted out physically on his statements. The pt shares that he has never been entirely happy in his life as he felt he never completely belonged, barely had friends and felt like he could not connect to other people. Denies any concrete physical trauma but says that he had a very hard time growing up with acne and mostly felt ostracized. Denies any manic episodes, no episodes of heightened energy, lack of need for sleep, or extreme talkativeness. Pt repeatedly states that he is feeling very nervous and anxious, especially around other people on the unit so that he is not felling completely save. The pt also endorses a history of alcohol excessive but non-regular until April 2024. Endorses ongoing bad sleep, low appetite, but denies any current physical symptoms.

## 2024-10-01 NOTE — CONSULT NOTE ADULT - ASSESSMENT
69M admitted to [facility] with hallucinations and depression, treated for DVT, transferred to [facility]    Plan:  DVT, peroneal vein, left: Unclear if this was provoked by inactivity.  Would continue eliquis for at least three months then can consider discontinuation of AC.    Prolonged QTc: 9/30 EKG reported with QTc>550.  EKG was not sent and is not yet in EMR for review.  EKG today NSR with QTc 443. If possible QT-prolonging agents are added recommend monitoring EKGs for QTc.    Hematuria: Episode dark urine at [facility] with hematuria on UA, rneal US and CT negative for stone, patient asymptomatic. Repeat UA today improved with 5 WBC. Recommend outpatient PCP f/u for repeat UA and urology referral if WBC persist in the urine.    HTN: Continue amlodipine    Depression/psychosis: Management per primary team

## 2024-10-01 NOTE — PHYSICAL THERAPY INITIAL EVALUATION ADULT - PLANNED THERAPY INTERVENTIONS, PT EVAL
balance training/gait training/manual therapy techniques/neuromuscular re-education/postural re-education/ROM/stretching

## 2024-10-01 NOTE — DIETITIAN INITIAL EVALUATION ADULT - ADD RECOMMEND
- c/w appetite stimulant per MD order.  - May consider daily MVI for micronutrient coverage if no medical contraindications per MD's discretion.   - Encourage adequate po intake as tolerated and honor food preferences PRN.   - Monitor PO intake/tolerance, weights, labs, BM's, and skin integrity.

## 2024-10-02 PROCEDURE — 99232 SBSQ HOSP IP/OBS MODERATE 35: CPT | Mod: GC

## 2024-10-02 RX ORDER — MIRTAZAPINE 30 MG/1
30 TABLET ORAL AT BEDTIME
Refills: 0 | Status: DISCONTINUED | OUTPATIENT
Start: 2024-10-02 | End: 2024-11-07

## 2024-10-02 RX ADMIN — APIXABAN 5 MILLIGRAM(S): 5 TABLET, FILM COATED ORAL at 10:26

## 2024-10-02 RX ADMIN — Medication 3 MILLIGRAM(S): at 20:33

## 2024-10-02 RX ADMIN — Medication 2 TABLET(S): at 20:33

## 2024-10-02 RX ADMIN — MIRTAZAPINE 30 MILLIGRAM(S): 30 TABLET ORAL at 20:33

## 2024-10-02 RX ADMIN — POLYETHYLENE GLYCOL 3350 17 GRAM(S): 17 POWDER, FOR SOLUTION ORAL at 10:25

## 2024-10-02 RX ADMIN — Medication 225 MILLIGRAM(S): at 10:26

## 2024-10-02 RX ADMIN — APIXABAN 5 MILLIGRAM(S): 5 TABLET, FILM COATED ORAL at 20:32

## 2024-10-02 RX ADMIN — Medication 10 MILLIGRAM(S): at 10:26

## 2024-10-02 NOTE — BH INPATIENT PSYCHIATRY PROGRESS NOTE - NSBHMETABOLIC_PSY_ALL_CORE_FT
BMI: BMI (kg/m2): 31.6 (09-30-24 @ 21:43)  HbA1c: A1C with Estimated Average Glucose Result: 5.2 % (09-15-24 @ 06:50)    Glucose:   BP: 108/76 (10-02-24 @ 08:01) (102/67 - 108/76)Vital Signs Last 24 Hrs  T(C): 36.3 (10-02-24 @ 08:01), Max: 36.3 (10-02-24 @ 08:01)  T(F): 97.3 (10-02-24 @ 08:01), Max: 97.3 (10-02-24 @ 08:01)  HR: --  BP: 108/76 (10-02-24 @ 08:01) (108/76 - 108/76)  BP(mean): --  RR: 16 (10-02-24 @ 08:01) (16 - 16)  SpO2: --    Orthostatic VS  10-01-24 @ 09:55  Lying BP: --/-- HR: --  Sitting BP: 107/81 HR: 90  Standing BP: 103/72 HR: 93  Site: --  Mode: --  Orthostatic VS  10-01-24 @ 07:54  Lying BP: --/-- HR: --  Sitting BP: 116/87 HR: 74  Standing BP: 100/65 HR: 75  Site: --  Mode: --    Lipid Panel: Date/Time: 09-15-24 @ 06:50  Cholesterol, Serum: 159  LDL Cholesterol Calculated: 99  HDL Cholesterol, Serum: 43  Total Cholesterol/HDL Ration Measurement: --  Triglycerides, Serum: 87   BMI: BMI (kg/m2): 31.6 (09-30-24 @ 21:43)  HbA1c: A1C with Estimated Average Glucose Result: 5.2 % (09-15-24 @ 06:50)    Glucose:   BP: 108/76 (10-02-24 @ 08:01) (102/67 - 108/76)Vital Signs Last 24 Hrs  T(C): 36.3 (10-02-24 @ 08:01), Max: 36.3 (10-02-24 @ 08:01)  T(F): 97.3 (10-02-24 @ 08:01), Max: 97.3 (10-02-24 @ 08:01)  HR: --  BP: 108/76 (10-02-24 @ 08:01) (108/76 - 108/76)  BP(mean): --  RR: 16 (10-02-24 @ 10:25) (16 - 16)  SpO2: --    Orthostatic VS  10-02-24 @ 10:25  Lying BP: --/-- HR: --  Sitting BP: 122/78 HR: 72  Standing BP: 112/76 HR: 70  Site: --  Mode: --  Orthostatic VS  10-01-24 @ 09:55  Lying BP: --/-- HR: --  Sitting BP: 107/81 HR: 90  Standing BP: 103/72 HR: 93  Site: --  Mode: --  Orthostatic VS  10-01-24 @ 07:54  Lying BP: --/-- HR: --  Sitting BP: 116/87 HR: 74  Standing BP: 100/65 HR: 75  Site: --  Mode: --    Lipid Panel: Date/Time: 09-15-24 @ 06:50  Cholesterol, Serum: 159  LDL Cholesterol Calculated: 99  HDL Cholesterol, Serum: 43  Total Cholesterol/HDL Ration Measurement: --  Triglycerides, Serum: 87
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
## 2024-10-02 NOTE — BH INPATIENT PSYCHIATRY PROGRESS NOTE - NSBHASSESSSUMMFT_PSY_ALL_CORE
Patient is a 70 yo M with history of HTN and DVT and hx of schizoaffective disorder, bipolar type, BIB sister from [facility] due to 2 months of worsening depression and psychosis. Patient with depressed mood, poor ADLs, and persecutory delusions given arguments he had with another patient where he made threats. Patient with numerous previous suicide attempts and previous ECT; he appears to have worsened after completing his course of ECT as an outpatient. He was admitted medically due to poor PO intake. Given his recent MDE and failure to thrive, he is unable to care for himself and therefore meets criteria for inpatient psychiatric hospitalization.   SH: Lives in [facility]. Retired from [employer], , no children.   PsyHx: 1x SA by cutting in 1990. Multiple hospitalizations MR Aug 2023. Was on ECT until 6 months ago.    On interview, the pt endorses depressive sxs mostly including anhedonia, decreased sleep and appetite, and lack of energy. Thought ruminations of guilt noted, in the setting of recent statement towards other tenant at [facility]. Pt experienced AH that include degrading voices that seem to have commanding character but so far no indication of voices directly telling him to hurt himself or others. Pt likely experiencing mood episode with psychotic features, affect noted to be mood-congruent. R/O substance-induced mood and/or psychotic d/o.    working diagnosis: schizoaffective disorder, current depressive episode w/ psychosis. ddx bipolar type per previous notes, however no h/o manic sxs elicited in current interview.    Plan:   1. Legal: admitted on 9.13 voluntary status  2. Safety:  Denies SI/SIB/HI/VI; continue routine observation.  3. Psychiatric:   - Venlafaxine  mg daily (depression)  	- Home medication  - Mirtazapine 15 mg QHS (depression)  	- Started during [facility] hospitalization  HOLD Olanzapine 25 mg QHS (psychosis)  	- Home medication, held due to prolonged QTc per recommendation of C/L team. Will repeat EKG.  	- Primary team to address    Agitation PRNs: Olanzapine PO/IM, Lorazepam PO    4. Medical conditions, other medication, consults  a) DVT  - Eliquis 5 mg BID  b) Constipation   - Senna 2 tabs QHS  c) HTN  - Amlodipine 10 mg daily  d) PT consult: fall precautions, recommend ambulating with walker   5. Psychosocial interventions  - Patient provided verbal consent to speak with TBD  - CO 1:1: Not required  - Restrictions/allowances: None   6. Collateral: outpatient psychiatrist Dr. Irby on 6/2/23 on [facility] and handoff from Dr. Hernandez. Collateral from sister. See  note.   7. Disposition: When stable.  Patient is a 70 yo M with history of HTN and DVT and hx of schizoaffective disorder, bipolar type, BIB sister from [facility] due to 2 months of worsening depression and psychosis. Patient with depressed mood, poor ADLs, and persecutory delusions given arguments he had with another patient where he made threats. Patient with numerous previous suicide attempts and previous ECT; he appears to have worsened after completing his course of ECT as an outpatient. He was admitted medically due to poor PO intake. Given his recent MDE and failure to thrive, he is unable to care for himself and therefore meets criteria for inpatient psychiatric hospitalization.   SH: Lives in [facility]. Retired from [employer], , no children.   PsyHx: 1x SA by cutting in 1990. Multiple hospitalizations MR Aug 2023. Was on ECT until 6 months ago.    On interview, the pt endorses depressive sxs mostly including anhedonia, decreased sleep and appetite, and lack of energy. Thought ruminations of guilt noted, in the setting of recent statement towards other tenant at [facility]. Pt experienced AH that include degrading voices that seem to have commanding character but so far no indication of voices directly telling him to hurt himself or others. Pt likely experiencing mood episode with psychotic features, affect noted to be mood-congruent. R/O substance-induced mood and/or psychotic d/o.    10/2: Pt still very anxious, prominent delusion about having to go to longterm and prominent worrying about not being able to pay his hospital fees and housing in the future. Pt not acutely suicidal, denies SIIP/HIIP. PT with persistent insomnia and low appetite. Currently no AH. Will increase mirtazapine to address depressive symptoms, low appetite, and insomnia. Pt provided contact number of sister: [phone].    working diagnosis: schizoaffective disorder, current depressive episode w/ psychosis. ddx bipolar type per previous notes, however no h/o manic sxs elicited in current interview.    Plan:   1. Legal: admitted on 9.13 voluntary status  2. Safety:  Denies SI/SIB/HI/VI; continue routine observation.  3. Psychiatric:   - Venlafaxine  mg daily (depression)  	- Home medication  - Mirtazapine 15 mg QHS (depression)  	- Started during [facility] hospitalization  HOLD Olanzapine 25 mg QHS (psychosis)  	- Home medication, held due to prolonged QTc per recommendation of C/L team. Will repeat EKG.  	- Primary team to address    Agitation PRNs: Olanzapine PO/IM, Lorazepam PO    4. Medical conditions, other medication, consults  a) DVT  - Eliquis 5 mg BID  b) Constipation   - Senna 2 tabs QHS  c) HTN  - Amlodipine 10 mg daily  d) PT consult: fall precautions, recommend ambulating with walker   5. Psychosocial interventions  - Patient provided verbal consent to speak with TBD  - CO 1:1: Not required  - Restrictions/allowances: None   6. Collateral: outpatient psychiatrist Dr. Irby on 6/2/23 on [facility] and handoff from Dr. Hernandez. Collateral from sister. See  note.   7. Disposition: When stable.  Patient is a 68 yo M with history of HTN and DVT and hx of schizoaffective disorder, bipolar type, BIB sister from [facility] due to 2 months of worsening depression and psychosis. Patient with depressed mood, poor ADLs, and persecutory delusions given arguments he had with another patient where he made threats. Patient with numerous previous suicide attempts and previous ECT; he appears to have worsened after completing his course of ECT as an outpatient. He was admitted medically due to poor PO intake. Given his recent MDE and failure to thrive, he is unable to care for himself and therefore meets criteria for inpatient psychiatric hospitalization.   SH: Lives in [facility]. Retired from [employer], , no children.   PsyHx: 1x SA by cutting in 1990. Multiple hospitalizations MR Aug 2023. Was on ECT until 6 months ago.    On interview, the pt endorses depressive sxs mostly including anhedonia, decreased sleep and appetite, and lack of energy. Thought ruminations of guilt noted, in the setting of recent statement towards other tenant at [facility]. Pt experienced AH that include degrading voices that seem to have commanding character but so far no indication of voices directly telling him to hurt himself or others. Pt likely experiencing mood episode with psychotic features, affect noted to be mood-congruent. R/O substance-induced mood and/or psychotic d/o.    10/2: Pt still very anxious, prominent delusion about having to go to skilled nursing and prominent worrying about not being able to pay his hospital fees and housing in the future. Pt not acutely suicidal, denies SIIP/HIIP. PT with persistent insomnia and low appetite. Currently no AH. Will increase mirtazapine from 15 mg to 30 mg to address depressive symptoms, low appetite, and insomnia. Pt provided contact number of sister: [phone].    working diagnosis: schizoaffective disorder, current depressive episode w/ psychosis. ddx bipolar type per previous notes, however no h/o manic sxs elicited in current interview.    Plan:   1. Legal: admitted on 9.13 voluntary status  2. Safety:  Denies SI/SIB/HI/VI; continue routine observation.  3. Psychiatric:   - Venlafaxine  mg daily (depression)  	- Home medication  - Mirtazapine 30 mg QHS (depression)  	- Started during [facility] hospitalization   	- inc 10/2  HOLD Olanzapine 25 mg QHS (psychosis)  	- Home medication, held due to prolonged QTc per recommendation of C/L team. Will repeat EKG.  	- Primary team to address    Agitation PRNs: Olanzapine PO/IM, Lorazepam PO    4. Medical conditions, other medication, consults  a) DVT  - Eliquis 5 mg BID  b) Constipation   - Senna 2 tabs QHS  c) HTN  - Amlodipine 10 mg daily  d) PT consult: fall precautions, recommend ambulating with walker   5. Psychosocial interventions  - Patient provided verbal consent to speak with TBD  - CO 1:1: Not required  - Restrictions/allowances: None   6. Collateral: outpatient psychiatrist Dr. Irby on 6/2/23 on [facility] and handoff from Dr. Hernandez. Collateral from sister. See  note.   7. Disposition: When stable.
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
## 2024-10-02 NOTE — BH INPATIENT PSYCHIATRY PROGRESS NOTE - NSBHCHARTREVIEWVS_PSY_A_CORE FT
Vital Signs Last 24 Hrs  T(C): 36.3 (10-02-24 @ 08:01), Max: 36.3 (10-02-24 @ 08:01)  T(F): 97.3 (10-02-24 @ 08:01), Max: 97.3 (10-02-24 @ 08:01)  HR: --  BP: 108/76 (10-02-24 @ 08:01) (108/76 - 108/76)  BP(mean): --  RR: 16 (10-02-24 @ 08:01) (16 - 16)  SpO2: --    Orthostatic VS  10-01-24 @ 09:55  Lying BP: --/-- HR: --  Sitting BP: 107/81 HR: 90  Standing BP: 103/72 HR: 93  Site: --  Mode: --  Orthostatic VS  10-01-24 @ 07:54  Lying BP: --/-- HR: --  Sitting BP: 116/87 HR: 74  Standing BP: 100/65 HR: 75  Site: --  Mode: --   Vital Signs Last 24 Hrs  T(C): 36.3 (10-02-24 @ 08:01), Max: 36.3 (10-02-24 @ 08:01)  T(F): 97.3 (10-02-24 @ 08:01), Max: 97.3 (10-02-24 @ 08:01)  HR: --  BP: 108/76 (10-02-24 @ 08:01) (108/76 - 108/76)  BP(mean): --  RR: 16 (10-02-24 @ 10:25) (16 - 16)  SpO2: --    Orthostatic VS  10-02-24 @ 10:25  Lying BP: --/-- HR: --  Sitting BP: 122/78 HR: 72  Standing BP: 112/76 HR: 70  Site: --  Mode: --  Orthostatic VS  10-01-24 @ 09:55  Lying BP: --/-- HR: --  Sitting BP: 107/81 HR: 90  Standing BP: 103/72 HR: 93  Site: --  Mode: --  Orthostatic VS  10-01-24 @ 07:54  Lying BP: --/-- HR: --  Sitting BP: 116/87 HR: 74  Standing BP: 100/65 HR: 75  Site: --  Mode: --

## 2024-10-02 NOTE — BH INPATIENT PSYCHIATRY PROGRESS NOTE - CURRENT MEDICATION
MEDICATIONS  (STANDING):  amLODIPine   Tablet 10 milliGRAM(s) Oral with breakfast  apixaban 5 milliGRAM(s) Oral every 12 hours  melatonin. 3 milliGRAM(s) Oral at bedtime  mirtazapine 15 milliGRAM(s) Oral at bedtime  senna 2 Tablet(s) Oral at bedtime  venlafaxine  milliGRAM(s) Oral daily    MEDICATIONS  (PRN):  acetaminophen     Tablet .. 650 milliGRAM(s) Oral every 6 hours PRN Temp greater or equal to 38C (100.4F), Mild Pain (1 - 3)  aluminum hydroxide/magnesium hydroxide/simethicone Suspension 30 milliLiter(s) Oral every 4 hours PRN Dyspepsia  LORazepam     Tablet 0.5 milliGRAM(s) Oral at bedtime PRN Anxiety  polyethylene glycol 3350 17 Gram(s) Oral every 12 hours PRN obstipation   MEDICATIONS  (STANDING):  amLODIPine   Tablet 10 milliGRAM(s) Oral with breakfast  apixaban 5 milliGRAM(s) Oral every 12 hours  melatonin. 3 milliGRAM(s) Oral at bedtime  mirtazapine 30 milliGRAM(s) Oral at bedtime  senna 2 Tablet(s) Oral at bedtime  venlafaxine  milliGRAM(s) Oral daily    MEDICATIONS  (PRN):  acetaminophen     Tablet .. 650 milliGRAM(s) Oral every 6 hours PRN Temp greater or equal to 38C (100.4F), Mild Pain (1 - 3)  aluminum hydroxide/magnesium hydroxide/simethicone Suspension 30 milliLiter(s) Oral every 4 hours PRN Dyspepsia  LORazepam     Tablet 0.5 milliGRAM(s) Oral at bedtime PRN Anxiety  polyethylene glycol 3350 17 Gram(s) Oral every 12 hours PRN obstipation

## 2024-10-02 NOTE — BH INPATIENT PSYCHIATRY PROGRESS NOTE - OTHER
Mostly delusions of guilt, ruminations about wrongdoings and financial situation Pt vaguely alluding to voices telling him to do certain bad things, but does not seem to relate to violence at this point, likely more related to statements the pt feels guilty about Pt denies CAH, currently no AH, but when he experienced AH a few days ago, he heard a male voice commenting with derogatory content
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
## 2024-10-02 NOTE — PSYCHIATRIC REHAB INITIAL EVALUATION - NSBHSTRENGTHHOME_PSY_ALL_CORE
Patient at his baseline is manageable at the assisted living facility. Patient ambulates independently.

## 2024-10-03 PROCEDURE — 99232 SBSQ HOSP IP/OBS MODERATE 35: CPT

## 2024-10-03 RX ADMIN — Medication 10 MILLIGRAM(S): at 09:25

## 2024-10-03 RX ADMIN — Medication 3 MILLIGRAM(S): at 20:27

## 2024-10-03 RX ADMIN — APIXABAN 5 MILLIGRAM(S): 5 TABLET, FILM COATED ORAL at 09:26

## 2024-10-03 RX ADMIN — APIXABAN 5 MILLIGRAM(S): 5 TABLET, FILM COATED ORAL at 20:27

## 2024-10-03 RX ADMIN — MIRTAZAPINE 30 MILLIGRAM(S): 30 TABLET ORAL at 20:28

## 2024-10-03 RX ADMIN — Medication 225 MILLIGRAM(S): at 09:25

## 2024-10-03 RX ADMIN — Medication 2 TABLET(S): at 20:27

## 2024-10-03 NOTE — BH TREATMENT PLAN - NSTXDEPRESINTERMD_PSY_ALL_CORE
Treat depression and insomnia with Mirtazapine 30 mg, Venlafaxine 225 mg, and Melatonin 3 mg. Consider ECT trial.
Continue psychopharm and psychoeducation. Considering ECT.

## 2024-10-03 NOTE — BH TREATMENT PLAN - NSTXDCOTHRINTERSW_PSY_ALL_CORE
SW will meet pt routinely, encouraging his adherence to recommended treatment, and self-care.  ATA will update with pt's family supports and MALKA as tx progresses, planning for pt's eventual DC

## 2024-10-03 NOTE — BH INPATIENT PSYCHIATRY PROGRESS NOTE - NSBHCHARTREVIEWVS_PSY_A_CORE FT
Vital Signs Last 24 Hrs  T(C): 36.6 (10-03-24 @ 08:19), Max: 36.6 (10-03-24 @ 08:19)  T(F): 97.8 (10-03-24 @ 08:19), Max: 97.8 (10-03-24 @ 08:19)  HR: --  BP: --  BP(mean): --  RR: 16 (10-02-24 @ 10:25) (16 - 16)  SpO2: --    Orthostatic VS  10-03-24 @ 08:19  Lying BP: --/-- HR: --  Sitting BP: 115/82 HR: 90  Standing BP: 99/77 HR: 107  Site: --  Mode: --  Orthostatic VS  10-02-24 @ 10:25  Lying BP: --/-- HR: --  Sitting BP: 122/78 HR: 72  Standing BP: 112/76 HR: 70  Site: --  Mode: --   Vital Signs Last 24 Hrs  T(C): 36.6 (10-03-24 @ 08:19), Max: 36.6 (10-03-24 @ 08:19)  T(F): 97.8 (10-03-24 @ 08:19), Max: 97.8 (10-03-24 @ 08:19)  HR: --  BP: --  BP(mean): --  RR: 16 (10-03-24 @ 09:25) (16 - 16)  SpO2: --    Orthostatic VS  10-03-24 @ 09:25  Lying BP: --/-- HR: --  Sitting BP: 110/72 HR: 68  Standing BP: 108/76 HR: 70  Site: --  Mode: --  Orthostatic VS  10-03-24 @ 08:19  Lying BP: --/-- HR: --  Sitting BP: 115/82 HR: 90  Standing BP: 99/77 HR: 107  Site: --  Mode: --  Orthostatic VS  10-02-24 @ 10:25  Lying BP: --/-- HR: --  Sitting BP: 122/78 HR: 72  Standing BP: 112/76 HR: 70  Site: --  Mode: --

## 2024-10-03 NOTE — BH TREATMENT PLAN - NSTXANXINTERMD_PSY_ALL_CORE
c/w Klonopin 0.5 mg PO BID. Trial of Effexor   Trial of  ECT. 
Treat depression and insomnia with Mirtazapine 30 mg, Venlafaxine 225 mg, and Melatonin 3 mg. Consider ECT trial.

## 2024-10-03 NOTE — BH INPATIENT PSYCHIATRY PROGRESS NOTE - NSBHMETABOLIC_PSY_ALL_CORE_FT
BMI: BMI (kg/m2): 31.6 (09-30-24 @ 21:43)  HbA1c: A1C with Estimated Average Glucose Result: 5.2 % (09-15-24 @ 06:50)    Glucose:   BP: 108/76 (10-02-24 @ 08:01) (102/67 - 108/76)Vital Signs Last 24 Hrs  T(C): 36.6 (10-03-24 @ 08:19), Max: 36.6 (10-03-24 @ 08:19)  T(F): 97.8 (10-03-24 @ 08:19), Max: 97.8 (10-03-24 @ 08:19)  HR: --  BP: --  BP(mean): --  RR: 16 (10-02-24 @ 10:25) (16 - 16)  SpO2: --    Orthostatic VS  10-03-24 @ 08:19  Lying BP: --/-- HR: --  Sitting BP: 115/82 HR: 90  Standing BP: 99/77 HR: 107  Site: --  Mode: --  Orthostatic VS  10-02-24 @ 10:25  Lying BP: --/-- HR: --  Sitting BP: 122/78 HR: 72  Standing BP: 112/76 HR: 70  Site: --  Mode: --    Lipid Panel: Date/Time: 09-15-24 @ 06:50  Cholesterol, Serum: 159  LDL Cholesterol Calculated: 99  HDL Cholesterol, Serum: 43  Total Cholesterol/HDL Ration Measurement: --  Triglycerides, Serum: 87   BMI: BMI (kg/m2): 31.6 (09-30-24 @ 21:43)  HbA1c: A1C with Estimated Average Glucose Result: 5.2 % (09-15-24 @ 06:50)    Glucose:   BP: 108/76 (10-02-24 @ 08:01) (102/67 - 108/76)Vital Signs Last 24 Hrs  T(C): 36.6 (10-03-24 @ 08:19), Max: 36.6 (10-03-24 @ 08:19)  T(F): 97.8 (10-03-24 @ 08:19), Max: 97.8 (10-03-24 @ 08:19)  HR: --  BP: --  BP(mean): --  RR: 16 (10-03-24 @ 09:25) (16 - 16)  SpO2: --    Orthostatic VS  10-03-24 @ 09:25  Lying BP: --/-- HR: --  Sitting BP: 110/72 HR: 68  Standing BP: 108/76 HR: 70  Site: --  Mode: --  Orthostatic VS  10-03-24 @ 08:19  Lying BP: --/-- HR: --  Sitting BP: 115/82 HR: 90  Standing BP: 99/77 HR: 107  Site: --  Mode: --  Orthostatic VS  10-02-24 @ 10:25  Lying BP: --/-- HR: --  Sitting BP: 122/78 HR: 72  Standing BP: 112/76 HR: 70  Site: --  Mode: --    Lipid Panel: Date/Time: 09-15-24 @ 06:50  Cholesterol, Serum: 159  LDL Cholesterol Calculated: 99  HDL Cholesterol, Serum: 43  Total Cholesterol/HDL Ration Measurement: --  Triglycerides, Serum: 87

## 2024-10-03 NOTE — BH INPATIENT PSYCHIATRY PROGRESS NOTE - CURRENT MEDICATION
MEDICATIONS  (STANDING):  amLODIPine   Tablet 10 milliGRAM(s) Oral with breakfast  apixaban 5 milliGRAM(s) Oral every 12 hours  melatonin. 3 milliGRAM(s) Oral at bedtime  mirtazapine 30 milliGRAM(s) Oral at bedtime  senna 2 Tablet(s) Oral at bedtime  venlafaxine  milliGRAM(s) Oral daily    MEDICATIONS  (PRN):  acetaminophen     Tablet .. 650 milliGRAM(s) Oral every 6 hours PRN Temp greater or equal to 38C (100.4F), Mild Pain (1 - 3)  aluminum hydroxide/magnesium hydroxide/simethicone Suspension 30 milliLiter(s) Oral every 4 hours PRN Dyspepsia  LORazepam     Tablet 0.5 milliGRAM(s) Oral at bedtime PRN Anxiety  polyethylene glycol 3350 17 Gram(s) Oral every 12 hours PRN obstipation

## 2024-10-03 NOTE — BH INPATIENT PSYCHIATRY PROGRESS NOTE - OTHER
Pt denies CAH, currently no AH, but when he experienced AH a few days ago, he heard a male voice commenting with derogatory content Mostly delusions of guilt, ruminations about wrongdoings and financial situation

## 2024-10-03 NOTE — BH TREATMENT PLAN - NSTXFALLINTERMD_PSY_ALL_CORE
Routine checks. PT consult ordered, recommended patient to use walker but refuses. 
Routine checks. PT consult ordered, recommended patient to use walker but refuses.

## 2024-10-03 NOTE — BH INPATIENT PSYCHIATRY PROGRESS NOTE - NSBHFUPINTERVALHXFT_PSY_A_CORE
Chart reviewed. Case discussed with interdisciplinary team. The pt is compliant with meds, denies side effects or physical symptoms. No behavioral events reported overnight. Pt noted to be reclusive, mostly staying in his room. No PRNs needed/requested. On encounter this morning pt denies any physical complaints, is calm and cooperative, dysphoric.    Pt seen at bedside in his room, he states that he is convinced that he is going to go to [facility] and nothing can distract him from this distressing thought. He reports feeling very anxious about what he knows to be a fact that cannot be reversed. He is concerned about the police being able to find him on the unit. When the interviewer explains that the pt is safe in the hospital and that the police would not be able to come to the unit unless there is a concrete emergency, the pt adds that the police could just show up in disguise and be hidden among the patients on the unit. The patient cannot name any event other than his recent statement towards one of his assisted living facility tenants. He states that he is feeling hopeless, has no appetite, and has not been sleeping as the thoughts about his upcoming incarceration have been torturing him. The pt is still feeling too anxious to leave the room. He explains that he feels like he is always making situations worse. In the past, he would have been convinced that he had "a brace in my knee" but then a CT scan confirmed that everything was benign. Endorses guilt about aggravating situations and being a bad person. Denies any current AH/VH. Denies active SIIP/HIIP but then says that not being alive "would not be a bad idea" given his distress. Adds that he is not going to hurt himself ("I am not gonna do that of course"). Denies any current physical symptoms. confused

## 2024-10-03 NOTE — BH TREATMENT PLAN - NSTXCOPEINTERPR_PSY_ALL_CORE
Patient will identify and utilize two coping skills daily to manage his anxiety and depressive symptoms within seven days.
Patient will identify and utilize two coping skills daily to manage his anxiety and depressive symptoms within seven days.

## 2024-10-03 NOTE — BH TREATMENT PLAN - NSTXDCOTHRGOAL_PSY_ALL_CORE
Pt will maintain compliance with ECT course, with sustained improvement in symptoms, helping him to participate in his care at his intermediate
Pt will maintain compliance with ECT course, with sustained improvement in symptoms, helping him to participate in his care at his jail

## 2024-10-03 NOTE — BH INPATIENT PSYCHIATRY PROGRESS NOTE - NSBHASSESSSUMMFT_PSY_ALL_CORE
Patient is a 70 yo M with history of HTN and DVT and hx of schizoaffective disorder, bipolar type, BIB sister from [facility] due to 2 months of worsening depression and psychosis. Patient with depressed mood, poor ADLs, and persecutory delusions given arguments he had with another patient where he made threats. Patient with numerous previous suicide attempts and previous ECT; he appears to have worsened after completing his course of ECT as an outpatient. He was admitted medically due to poor PO intake. Given his recent MDE and failure to thrive, he is unable to care for himself and therefore meets criteria for inpatient psychiatric hospitalization.   SH: Lives in [facility]. Retired from [employer], , no children.   PsyHx: 1x SA by cutting in 1990. Multiple hospitalizations MR Aug 2023. Was on ECT until 6 months ago.    On interview, the pt endorses depressive sxs mostly including anhedonia, decreased sleep and appetite, and lack of energy. Thought ruminations of guilt noted, in the setting of recent statement towards other tenant at [facility]. Pt experienced AH that include degrading voices that seem to have commanding character but so far no indication of voices directly telling him to hurt himself or others. Pt likely experiencing mood episode with psychotic features, affect noted to be mood-congruent. R/O substance-induced mood and/or psychotic d/o.    10/2-10/3: Pt very anxious and fearful, fixed delusion about imminent incarceration and worrying about not being able to pay his hospital fees and housing in the future. Pt not acutely suicidal, denies active SIIP/HIIP but feels "tortured" by his anxiety and says on 10/3 that not being alive would "not be a bad mikey". Pt with persistent insomnia and low appetite. Currently no AH. Mirtazapine increased from 15 mg to 30 mg to address depressive symptoms, low appetite, and insomnia. Pt provided contact number of sister: [phone].    working diagnosis: schizoaffective disorder, current depressive episode w/ psychosis. ddx bipolar type per previous notes, however no h/o manic sxs elicited in current interview.    Plan:   1. Legal: admitted on 9.13 voluntary status  2. Safety:  Denies SI/SIB/HI/VI; continue routine observation.  3. Psychiatric:   - Venlafaxine  mg daily (depression)  	- Home medication  - Mirtazapine 30 mg QHS (depression)  	- Started during [facility] hospitalization   	- inc 10/2  HOLD Olanzapine 25 mg QHS (psychosis)  	- Home medication, held due to prolonged QTc per recommendation of C/L team. Will repeat EKG.  	- Primary team to address    Agitation PRNs: Olanzapine PO/IM, Lorazepam PO    4. Medical conditions, other medication, consults  a) DVT  - Eliquis 5 mg BID  b) Constipation   - Senna 2 tabs QHS  c) HTN  - Amlodipine 10 mg daily  d) PT consult: fall precautions, recommend ambulating with walker   5. Psychosocial interventions  - Patient provided verbal consent to speak with TBD  - CO 1:1: Not required  - Restrictions/allowances: None   6. Collateral: outpatient psychiatrist Dr. Irby on 6/2/23 on [facility] and handoff from Dr. Hernandez. Collateral from sister. See  note.   7. Disposition: When stable.

## 2024-10-04 PROCEDURE — 99232 SBSQ HOSP IP/OBS MODERATE 35: CPT | Mod: GC

## 2024-10-04 RX ORDER — ARIPIPRAZOLE 2 MG/1
2 TABLET ORAL DAILY
Refills: 0 | Status: DISCONTINUED | OUTPATIENT
Start: 2024-10-04 | End: 2024-10-07

## 2024-10-04 RX ADMIN — Medication 3 MILLIGRAM(S): at 21:10

## 2024-10-04 RX ADMIN — APIXABAN 5 MILLIGRAM(S): 5 TABLET, FILM COATED ORAL at 09:17

## 2024-10-04 RX ADMIN — Medication 225 MILLIGRAM(S): at 09:18

## 2024-10-04 RX ADMIN — Medication 10 MILLIGRAM(S): at 09:17

## 2024-10-04 RX ADMIN — Medication 2 TABLET(S): at 21:09

## 2024-10-04 RX ADMIN — APIXABAN 5 MILLIGRAM(S): 5 TABLET, FILM COATED ORAL at 21:10

## 2024-10-04 RX ADMIN — MIRTAZAPINE 30 MILLIGRAM(S): 30 TABLET ORAL at 21:09

## 2024-10-04 RX ADMIN — POLYETHYLENE GLYCOL 3350 17 GRAM(S): 17 POWDER, FOR SOLUTION ORAL at 10:21

## 2024-10-04 NOTE — BH INPATIENT PSYCHIATRY PROGRESS NOTE - NSBHCHARTREVIEWVS_PSY_A_CORE FT
Vital Signs Last 24 Hrs  T(C): --  T(F): --  HR: --  BP: --  BP(mean): --  RR: 16 (10-03-24 @ 09:25) (16 - 16)  SpO2: --    Orthostatic VS  10-03-24 @ 09:25  Lying BP: --/-- HR: --  Sitting BP: 110/72 HR: 68  Standing BP: 108/76 HR: 70  Site: --  Mode: --  Orthostatic VS  10-03-24 @ 08:19  Lying BP: --/-- HR: --  Sitting BP: 115/82 HR: 90  Standing BP: 99/77 HR: 107  Site: --  Mode: --  Orthostatic VS  10-02-24 @ 10:25  Lying BP: --/-- HR: --  Sitting BP: 122/78 HR: 72  Standing BP: 112/76 HR: 70  Site: --  Mode: --   Vital Signs Last 24 Hrs  T(C): 36.1 (10-04-24 @ 09:18), Max: 36.1 (10-04-24 @ 09:18)  T(F): 97 (10-04-24 @ 09:18), Max: 97 (10-04-24 @ 09:18)  HR: --  BP: --  BP(mean): --  RR: 16 (10-04-24 @ 09:18) (16 - 16)  SpO2: --    Orthostatic VS  10-04-24 @ 09:18  Lying BP: --/-- HR: --  Sitting BP: 118/68 HR: 78  Standing BP: 112/68 HR: 66  Site: --  Mode: --  Orthostatic VS  10-03-24 @ 09:25  Lying BP: --/-- HR: --  Sitting BP: 110/72 HR: 68  Standing BP: 108/76 HR: 70  Site: --  Mode: --  Orthostatic VS  10-03-24 @ 08:19  Lying BP: --/-- HR: --  Sitting BP: 115/82 HR: 90  Standing BP: 99/77 HR: 107  Site: --  Mode: --

## 2024-10-04 NOTE — BH INPATIENT PSYCHIATRY PROGRESS NOTE - NSBHASSESSSUMMFT_PSY_ALL_CORE
Patient is a 70 yo M with history of HTN and DVT and hx of schizoaffective disorder, bipolar type, BIB sister from [facility] due to 2 months of worsening depression and psychosis. Patient with depressed mood, poor ADLs, and persecutory delusions given arguments he had with another patient where he made threats. Patient with numerous previous suicide attempts and previous ECT; he appears to have worsened after completing his course of ECT as an outpatient. He was admitted medically due to poor PO intake. Given his recent MDE and failure to thrive, he is unable to care for himself and therefore meets criteria for inpatient psychiatric hospitalization.   SH: Lives in [facility]. Retired from [employer], , no children.   PsyHx: 1x SA by cutting in 1990. Multiple hospitalizations MR Aug 2023. Was on ECT until 6 months ago.    On interview, the pt endorses depressive sxs mostly including anhedonia, decreased sleep and appetite, and lack of energy. Thought ruminations of guilt noted, in the setting of recent statement towards other tenant at [facility]. Pt experienced AH that include degrading voices that seem to have commanding character but so far no indication of voices directly telling him to hurt himself or others. Pt likely experiencing mood episode with psychotic features, affect noted to be mood-congruent. R/O substance-induced mood and/or psychotic d/o.    10/2-10/3: Pt very anxious and fearful, fixed delusion about imminent incarceration and worrying about not being able to pay his hospital fees and housing in the future. Pt not acutely suicidal, denies active SIIP/HIIP but feels "tortured" by his anxiety and says on 10/3 that not being alive would "not be a bad mikey". Pt with persistent insomnia and low appetite. Currently no AH. Mirtazapine increased from 15 mg to 30 mg to address depressive symptoms, low appetite, and insomnia. Pt provided contact number of sister: [phone].    working diagnosis: schizoaffective disorder, current depressive episode w/ psychosis. ddx bipolar type per previous notes, however no h/o manic sxs elicited in current interview.    Plan:   1. Legal: admitted on 9.13 voluntary status  2. Safety:  Denies SI/SIB/HI/VI; continue routine observation.  3. Psychiatric:   - Venlafaxine  mg daily (depression)  	- Home medication  - Mirtazapine 30 mg QHS (depression)  	- Started during [facility] hospitalization   	- inc 10/2  HOLD Olanzapine 25 mg QHS (psychosis)  	- Home medication, held due to prolonged QTc per recommendation of C/L team. Will repeat EKG.  	- Primary team to address    Agitation PRNs: Olanzapine PO/IM, Lorazepam PO    4. Medical conditions, other medication, consults  a) DVT  - Eliquis 5 mg BID  b) Constipation   - Senna 2 tabs QHS  c) HTN  - Amlodipine 10 mg daily  d) PT consult: fall precautions, recommend ambulating with walker   5. Psychosocial interventions  - Patient provided verbal consent to speak with TBD  - CO 1:1: Not required  - Restrictions/allowances: None   6. Collateral: outpatient psychiatrist Dr. Irby on 6/2/23 on [facility] and handoff from Dr. Hernandez. Collateral from sister. See  note.   7. Disposition: When stable.  Patient is a 70 yo M with history of HTN and DVT and hx of schizoaffective disorder, bipolar type, BIB sister from [facility] due to 2 months of worsening depression and psychosis. Patient with depressed mood, poor ADLs, and persecutory delusions given arguments he had with another patient where he made threats. Patient with numerous previous suicide attempts and previous ECT; he appears to have worsened after completing his course of ECT as an outpatient. He was admitted medically due to poor PO intake. Given his recent MDE and failure to thrive, he is unable to care for himself and therefore meets criteria for inpatient psychiatric hospitalization.   SH: Lives in [facility]. Retired from [employer] , no children.   PsyHx: 1x SA by cutting in 1990. Multiple hospitalizations MR Aug 2023. Was on ECT until 6 months ago.    On interview, the pt endorses depressive sxs mostly including anhedonia, decreased sleep and appetite, and lack of energy. Thought ruminations of guilt noted, in the setting of recent statement towards other tenant at [facility]. Pt experienced AH that include degrading voices that seem to have commanding character but so far no indication of voices directly telling him to hurt himself or others. Pt likely experiencing mood episode with psychotic features, affect noted to be mood-congruent. R/O substance-induced mood and/or psychotic d/o.    10/2-10/3: Pt very anxious and fearful, fixed delusion about imminent incarceration and worrying about not being able to pay his hospital fees and housing in the future. Pt not acutely suicidal, denies active SIIP/HIIP but feels "tortured" by his anxiety and says on 10/3 that not being alive would "not be a bad mikey". Pt with persistent insomnia and low appetite. Currently no AH. Mirtazapine increased from 15 mg to 30 mg to address depressive symptoms, low appetite, and insomnia. Pt provided contact number of sister: [phone].      10/4: Pt very anxious and fearful, fixed delusion about inevitable incarceration and worrying about not being able to pay his hospital fees and housing in the future. Pt presenting as paranoid today, convinced that everyone on the unit is against him, not feeling safe to leave his room. Pt denies SIIP/HIIP but feels hopeless and helpless. Ongoing insomnia and low appetite. Currently no AH. Low appetite (only 1 meal per day), and insomnia. Will further discuss alternative treatment options given no significant improvement on Mirtazapine.     working diagnosis: schizoaffective disorder, current depressive episode w/ psychosis. ddx bipolar type per previous notes, however no h/o manic sxs elicited in current interview.    Plan:   1. Legal: admitted on 9.13 voluntary status  2. Safety:  Denies SI/SIB/HI/VI; continue routine observation.  3. Psychiatric:   - Venlafaxine  mg daily (depression)  	- Home medication  - Mirtazapine 30 mg QHS (depression)  	- Started during [facility] hospitalization   	- inc 10/2  HOLD Olanzapine 25 mg QHS (psychosis)  	- Home medication, held due to prolonged QTc per recommendation of C/L team. Will repeat EKG.  	- Primary team to address    Agitation PRNs: Olanzapine PO/IM, Lorazepam PO    4. Medical conditions, other medication, consults  a) DVT  - Eliquis 5 mg BID  b) Constipation   - Senna 2 tabs QHS  c) HTN  - Amlodipine 10 mg daily  d) PT consult: fall precautions, recommend ambulating with walker   5. Psychosocial interventions  - Patient provided verbal consent to speak with TBD  - CO 1:1: Not required  - Restrictions/allowances: None   6. Collateral: outpatient psychiatrist Dr. Irby on 6/2/23 on [facility] and handoff from Dr. Hernandez. Collateral from sister. See  note.   7. Disposition: When stable.  Patient is a 68 yo M with history of HTN and DVT and hx of schizoaffective disorder, bipolar type, BIB sister from [facility] due to 2 months of worsening depression and psychosis. Patient with depressed mood, poor ADLs, and persecutory delusions given arguments he had with another patient where he made threats. Patient with numerous previous suicide attempts and previous ECT; he appears to have worsened after completing his course of ECT as an outpatient. He was admitted medically due to poor PO intake. Given his recent MDE and failure to thrive, he is unable to care for himself and therefore meets criteria for inpatient psychiatric hospitalization.   SH: Lives in [facility]. Retired from [employer], , no children.   PsyHx: 1x SA by cutting in 1990. Multiple hospitalizations MR Aug 2023. Was on ECT until 6 months ago.    On interview, the pt endorses depressive sxs mostly including anhedonia, decreased sleep and appetite, and lack of energy. Thought ruminations of guilt noted, in the setting of recent statement towards other tenant at [facility]. Pt experienced AH that include degrading voices that seem to have commanding character but so far no indication of voices directly telling him to hurt himself or others. Pt likely experiencing mood episode with psychotic features, affect noted to be mood-congruent. R/O substance-induced mood and/or psychotic d/o.    10/2-10/3: Pt very anxious and fearful, fixed delusion about imminent incarceration and worrying about not being able to pay his hospital fees and housing in the future. Pt not acutely suicidal, denies active SIIP/HIIP but feels "tortured" by his anxiety and says on 10/3 that not being alive would "not be a bad mikey". Pt with persistent insomnia and low appetite. Currently no AH. Mirtazapine increased from 15 mg to 30 mg to address depressive symptoms, low appetite, and insomnia. Pt provided contact number of sister: [phone].      10/4: Pt very anxious and fearful, fixed delusion about inevitable incarceration and worrying about not being able to pay his hospital fees and housing in the future. Pt presenting as paranoid today, convinced that everyone on the unit is against him, not feeling safe to leave his room. Pt denies SIIP/HIIP but feels hopeless and helpless. Ongoing insomnia and low appetite. Currently no AH. Low appetite (only 1 meal per day), and insomnia. Will further discuss alternative treatment options given no significant improvement on Mirtazapine. Will initiate family meeting next week to discuss ECT, sister, brother and father agree that ECT helped in the past and may need to be restarted.    working diagnosis: schizoaffective disorder, current depressive episode w/ psychosis. ddx bipolar type per previous notes, however no h/o manic sxs elicited in current interview.    Plan:   1. Legal: admitted on 9.13 voluntary status  2. Safety:  Denies SI/SIB/HI/VI; continue routine observation.  3. Psychiatric:   - Venlafaxine  mg daily (depression)  	- Home medication  - Mirtazapine 30 mg QHS (depression)  	- Started during [facility] hospitalization   	- inc 10/2  HOLD Olanzapine 25 mg QHS (psychosis)  	- Home medication, held due to prolonged QTc per recommendation of C/L team. Will repeat EKG.  	- Primary team to address    Agitation PRNs: Olanzapine PO/IM, Lorazepam PO    4. Medical conditions, other medication, consults  a) DVT  - Eliquis 5 mg BID  b) Constipation   - Senna 2 tabs QHS  c) HTN  - Amlodipine 10 mg daily  d) PT consult: fall precautions, recommend ambulating with walker   5. Psychosocial interventions  - Patient provided verbal consent to speak with TBD  - CO 1:1: Not required  - Restrictions/allowances: None   6. Collateral: outpatient psychiatrist Dr. Irby on 6/2/23 on [facility] and handoff from Dr. Hernandez. Collateral from sister. See  note.   7. Disposition: When stable.

## 2024-10-04 NOTE — BH INPATIENT PSYCHIATRY PROGRESS NOTE - NSBHFUPINTERVALHXFT_PSY_A_CORE
Chart reviewed. Case discussed with interdisciplinary team. The pt is compliant with meds, denies side effects or physical symptoms. No behavioral events reported overnight. Pt noted to be reclusive, mostly staying in his room. No PRNs needed/requested. On encounter this morning pt denies any physical complaints, is calm and cooperative, dysphoric.    Pt seen at bedside in his room, he states that he is convinced that he is going to go to residential and nothing can distract him from this distressing thought. He reports feeling very anxious about what he knows to be a fact that cannot be reversed. He is concerned about the police being able to find him on the unit. When the interviewer explains that the pt is safe in the hospital and that the police would not be able to come to the unit unless there is a concrete emergency, the pt adds that the police could just show up in disguise and be hidden among the patients on the unit. The patient cannot name any event other than his recent statement towards one of his assisted living facility tenants. He states that he is feeling hopeless, has no appetite, and has not been sleeping as the thoughts about his upcoming incarceration have been torturing him. The pt is still feeling too anxious to leave the room. He explains that he feels like he is always making situations worse. In the past, he would have been convinced that he had "a brace in my knee" but then a CT scan confirmed that everything was benign. Endorses guilt about aggravating situations and being a bad person. Denies any current AH/VH. Denies active SIIP/HIIP but then says that not being alive "would not be a bad idea" given his distress. Adds that he is not going to hurt himself ("I am not gonna do that of course"). Denies any current physical symptoms. Chart reviewed. Case discussed with interdisciplinary team. The pt is compliant with meds, denies side effects or physical symptoms. No behavioral events reported overnight. Pt noted to be reclusive, mostly staying in his room. VSS. No PRNs needed/requested. On encounter this morning pt denies any physical complaints, is calm and cooperative, dysphoric.    Pt seen at bedside in his room, he states that he feels like everybody is against him although he has no evidence that anyone on the unit is actually going to harm him. He is still convinced that he is going to go to [facility] and adds that the medication can not change that fact. He repeatedly states that he feels scared about what happens after his hospital stay, which would after all only be "temporary", as it would then be only up to "them" (the police) to determine his fate. Pt cannot be reassured when the interviewer explains that the pt is safe in the hospital. The pt re-iterates that he is feeling hopeless, only had lunch yesterday, and has not been sleeping the whole night with all his thoughts revolving around his upcoming inevitable incarceration. The pt is still feeling too anxious to leave the room. Denies any current AH/VH. Denies SIIP/HIIP. When interviewer explains other possible treatment options, the pt states that no treatment option can help him and vehemently refuses to do any ECT as it would not have helped him in the past and led to memory problems. Denies any current side effects from his medications or any physical symptoms.

## 2024-10-04 NOTE — BH INPATIENT PSYCHIATRY PROGRESS NOTE - NSBHMETABOLIC_PSY_ALL_CORE_FT
BMI: BMI (kg/m2): 31.6 (09-30-24 @ 21:43)  HbA1c: A1C with Estimated Average Glucose Result: 5.2 % (09-15-24 @ 06:50)    Glucose:   BP: 108/76 (10-02-24 @ 08:01) (108/76 - 108/76)Vital Signs Last 24 Hrs  T(C): --  T(F): --  HR: --  BP: --  BP(mean): --  RR: 16 (10-03-24 @ 09:25) (16 - 16)  SpO2: --    Orthostatic VS  10-03-24 @ 09:25  Lying BP: --/-- HR: --  Sitting BP: 110/72 HR: 68  Standing BP: 108/76 HR: 70  Site: --  Mode: --  Orthostatic VS  10-03-24 @ 08:19  Lying BP: --/-- HR: --  Sitting BP: 115/82 HR: 90  Standing BP: 99/77 HR: 107  Site: --  Mode: --  Orthostatic VS  10-02-24 @ 10:25  Lying BP: --/-- HR: --  Sitting BP: 122/78 HR: 72  Standing BP: 112/76 HR: 70  Site: --  Mode: --    Lipid Panel: Date/Time: 09-15-24 @ 06:50  Cholesterol, Serum: 159  LDL Cholesterol Calculated: 99  HDL Cholesterol, Serum: 43  Total Cholesterol/HDL Ration Measurement: --  Triglycerides, Serum: 87   BMI: BMI (kg/m2): 31.6 (09-30-24 @ 21:43)  HbA1c: A1C with Estimated Average Glucose Result: 5.2 % (09-15-24 @ 06:50)    Glucose:   BP: 108/76 (10-02-24 @ 08:01) (108/76 - 108/76)Vital Signs Last 24 Hrs  T(C): 36.1 (10-04-24 @ 09:18), Max: 36.1 (10-04-24 @ 09:18)  T(F): 97 (10-04-24 @ 09:18), Max: 97 (10-04-24 @ 09:18)  HR: --  BP: --  BP(mean): --  RR: 16 (10-04-24 @ 09:18) (16 - 16)  SpO2: --    Orthostatic VS  10-04-24 @ 09:18  Lying BP: --/-- HR: --  Sitting BP: 118/68 HR: 78  Standing BP: 112/68 HR: 66  Site: --  Mode: --  Orthostatic VS  10-03-24 @ 09:25  Lying BP: --/-- HR: --  Sitting BP: 110/72 HR: 68  Standing BP: 108/76 HR: 70  Site: --  Mode: --  Orthostatic VS  10-03-24 @ 08:19  Lying BP: --/-- HR: --  Sitting BP: 115/82 HR: 90  Standing BP: 99/77 HR: 107  Site: --  Mode: --    Lipid Panel: Date/Time: 09-15-24 @ 06:50  Cholesterol, Serum: 159  LDL Cholesterol Calculated: 99  HDL Cholesterol, Serum: 43  Total Cholesterol/HDL Ration Measurement: --  Triglycerides, Serum: 87

## 2024-10-05 PROCEDURE — 93010 ELECTROCARDIOGRAM REPORT: CPT

## 2024-10-05 PROCEDURE — 99232 SBSQ HOSP IP/OBS MODERATE 35: CPT

## 2024-10-05 RX ADMIN — Medication 10 MILLIGRAM(S): at 08:00

## 2024-10-05 RX ADMIN — Medication 2 TABLET(S): at 21:50

## 2024-10-05 RX ADMIN — Medication 3 MILLIGRAM(S): at 21:50

## 2024-10-05 RX ADMIN — Medication 225 MILLIGRAM(S): at 09:31

## 2024-10-05 RX ADMIN — APIXABAN 5 MILLIGRAM(S): 5 TABLET, FILM COATED ORAL at 21:50

## 2024-10-05 RX ADMIN — MIRTAZAPINE 30 MILLIGRAM(S): 30 TABLET ORAL at 21:50

## 2024-10-05 RX ADMIN — APIXABAN 5 MILLIGRAM(S): 5 TABLET, FILM COATED ORAL at 09:31

## 2024-10-05 RX ADMIN — ARIPIPRAZOLE 2 MILLIGRAM(S): 2 TABLET ORAL at 09:31

## 2024-10-05 NOTE — BH INPATIENT PSYCHIATRY PROGRESS NOTE - NSBHASSESSSUMMFT_PSY_ALL_CORE
Patient is a 70 yo M with history of HTN and DVT and hx of schizoaffective disorder, bipolar type, BIB sister from [facility] due to 2 months of worsening depression and psychosis. Patient with depressed mood, poor ADLs, and persecutory delusions given arguments he had with another patient where he made threats. Patient with numerous previous suicide attempts and previous ECT; he appears to have worsened after completing his course of ECT as an outpatient. He was admitted medically due to poor PO intake. Given his recent MDE and failure to thrive, he is unable to care for himself and therefore meets criteria for inpatient psychiatric hospitalization.   SH: Lives in [facility]. Retired from [employer], , no children.   PsyHx: 1x SA by cutting in 1990. Multiple hospitalizations MR Aug 2023. Was on ECT until 6 months ago.    On interview, the pt endorses depressive sxs mostly including anhedonia, decreased sleep and appetite, and lack of energy. Thought ruminations of guilt noted, in the setting of recent statement towards other tenant at [facility]. Pt experienced AH that include degrading voices that seem to have commanding character but so far no indication of voices directly telling him to hurt himself or others. Pt likely experiencing mood episode with psychotic features, affect noted to be mood-congruent. R/O substance-induced mood and/or psychotic d/o.    10/2-10/3: Pt very anxious and fearful, fixed delusion about imminent incarceration and worrying about not being able to pay his hospital fees and housing in the future. Pt not acutely suicidal, denies active SIIP/HIIP but feels "tortured" by his anxiety and says on 10/3 that not being alive would "not be a bad mikey". Pt with persistent insomnia and low appetite. Currently no AH. Mirtazapine increased from 15 mg to 30 mg to address depressive symptoms, low appetite, and insomnia. Pt provided contact number of sister: [phone].      10/4: Pt very anxious and fearful, fixed delusion about inevitable incarceration and worrying about not being able to pay his hospital fees and housing in the future. Pt presenting as paranoid today, convinced that everyone on the unit is against him, not feeling safe to leave his room. Pt denies SIIP/HIIP but feels hopeless and helpless. Ongoing insomnia and low appetite. Currently no AH. Low appetite (only 1 meal per day), and insomnia. Will further discuss alternative treatment options given no significant improvement on Mirtazapine. Will initiate family meeting next week to discuss ECT, sister, brother and father agree that ECT helped in the past and may need to be restarted.    10/5: remains paranoid, seems to tolerating Abilify well. QTc 429. Continue abilify 2mg/day as is.     working diagnosis: schizoaffective disorder, current depressive episode w/ psychosis. ddx bipolar type per previous notes, however no h/o manic sxs elicited in current interview.    Plan:   1. Legal: admitted on 9.13 voluntary status  2. Safety:  Denies SI/SIB/HI/VI; continue routine observation.  3. Psychiatric:   - Venlafaxine  mg daily (depression)  	- Home medication  - Mirtazapine 30 mg QHS (depression)  	- Started during [facility] hospitalization   	- inc 10/2  HOLD Olanzapine 25 mg QHS (psychosis)  	- Home medication, held due to prolonged QTc per recommendation of C/L team. Will repeat EKG.  	- Primary team to address    Agitation PRNs: Olanzapine PO/IM, Lorazepam PO    4. Medical conditions, other medication, consults  a) DVT  - Eliquis 5 mg BID  b) Constipation   - Senna 2 tabs QHS  c) HTN  - Amlodipine 10 mg daily  d) PT consult: fall precautions, recommend ambulating with walker   5. Psychosocial interventions  - Patient provided verbal consent to speak with TBD  - CO 1:1: Not required  - Restrictions/allowances: None   6. Collateral: outpatient psychiatrist Dr. Irby on 6/2/23 on [facility] and handoff from Dr. Hernandez. Collateral from sister. See  note.   7. Disposition: When stable.

## 2024-10-05 NOTE — BH INPATIENT PSYCHIATRY PROGRESS NOTE - NSBHFUPINTERVALHXFT_PSY_A_CORE
Patient continues to have paranoid thoughts that he'll be arrested because he threatened to "break someone's neck." Was not aware he was started on abilify. Reports depressed mood. Has been isolative. Skipped breakfast but ate lunch.

## 2024-10-05 NOTE — BH INPATIENT PSYCHIATRY PROGRESS NOTE - NSBHCHARTREVIEWVS_PSY_A_CORE FT
Vital Signs Last 24 Hrs  T(C): 36.6 (10-05-24 @ 07:52), Max: 36.6 (10-05-24 @ 07:52)  T(F): 97.8 (10-05-24 @ 07:52), Max: 97.8 (10-05-24 @ 07:52)  HR: --  BP: --  BP(mean): --  RR: --  SpO2: --    Orthostatic VS  10-05-24 @ 07:52  Lying BP: --/-- HR: --  Sitting BP: 123/92 HR: 100  Standing BP: 112/89 HR: 113  Site: --  Mode: --  Orthostatic VS  10-04-24 @ 09:18  Lying BP: --/-- HR: --  Sitting BP: 118/68 HR: 78  Standing BP: 112/68 HR: 66  Site: --  Mode: --

## 2024-10-05 NOTE — BH INPATIENT PSYCHIATRY PROGRESS NOTE - CURRENT MEDICATION
MEDICATIONS  (STANDING):  amLODIPine   Tablet 10 milliGRAM(s) Oral with breakfast  apixaban 5 milliGRAM(s) Oral every 12 hours  ARIPiprazole 2 milliGRAM(s) Oral daily  melatonin. 3 milliGRAM(s) Oral at bedtime  mirtazapine 30 milliGRAM(s) Oral at bedtime  senna 2 Tablet(s) Oral at bedtime  venlafaxine  milliGRAM(s) Oral daily    MEDICATIONS  (PRN):  acetaminophen     Tablet .. 650 milliGRAM(s) Oral every 6 hours PRN Temp greater or equal to 38C (100.4F), Mild Pain (1 - 3)  aluminum hydroxide/magnesium hydroxide/simethicone Suspension 30 milliLiter(s) Oral every 4 hours PRN Dyspepsia  LORazepam     Tablet 0.5 milliGRAM(s) Oral at bedtime PRN Anxiety  polyethylene glycol 3350 17 Gram(s) Oral every 12 hours PRN obstipation

## 2024-10-05 NOTE — BH INPATIENT PSYCHIATRY PROGRESS NOTE - NSBHMETABOLIC_PSY_ALL_CORE_FT
BMI: BMI (kg/m2): 31.6 (09-30-24 @ 21:43)  HbA1c: A1C with Estimated Average Glucose Result: 5.2 % (09-15-24 @ 06:50)    Glucose:   BP: --Vital Signs Last 24 Hrs  T(C): 36.6 (10-05-24 @ 07:52), Max: 36.6 (10-05-24 @ 07:52)  T(F): 97.8 (10-05-24 @ 07:52), Max: 97.8 (10-05-24 @ 07:52)  HR: --  BP: --  BP(mean): --  RR: --  SpO2: --    Orthostatic VS  10-05-24 @ 07:52  Lying BP: --/-- HR: --  Sitting BP: 123/92 HR: 100  Standing BP: 112/89 HR: 113  Site: --  Mode: --  Orthostatic VS  10-04-24 @ 09:18  Lying BP: --/-- HR: --  Sitting BP: 118/68 HR: 78  Standing BP: 112/68 HR: 66  Site: --  Mode: --    Lipid Panel: Date/Time: 09-15-24 @ 06:50  Cholesterol, Serum: 159  LDL Cholesterol Calculated: 99  HDL Cholesterol, Serum: 43  Total Cholesterol/HDL Ration Measurement: --  Triglycerides, Serum: 87

## 2024-10-05 NOTE — BH INPATIENT PSYCHIATRY PROGRESS NOTE - NSBHCHARTREVIEWINVESTIGATE_PSY_A_CORE FT
7/6 WNL with QTc <500. 
QTc 429
7/6 WNL with QTc <500.

## 2024-10-06 PROCEDURE — 99232 SBSQ HOSP IP/OBS MODERATE 35: CPT

## 2024-10-06 RX ADMIN — Medication 2 TABLET(S): at 20:29

## 2024-10-06 RX ADMIN — Medication 3 MILLIGRAM(S): at 20:29

## 2024-10-06 RX ADMIN — MIRTAZAPINE 30 MILLIGRAM(S): 30 TABLET ORAL at 20:29

## 2024-10-06 RX ADMIN — ARIPIPRAZOLE 2 MILLIGRAM(S): 2 TABLET ORAL at 09:00

## 2024-10-06 RX ADMIN — Medication 225 MILLIGRAM(S): at 09:00

## 2024-10-06 RX ADMIN — APIXABAN 5 MILLIGRAM(S): 5 TABLET, FILM COATED ORAL at 20:29

## 2024-10-06 RX ADMIN — Medication 10 MILLIGRAM(S): at 09:00

## 2024-10-06 RX ADMIN — APIXABAN 5 MILLIGRAM(S): 5 TABLET, FILM COATED ORAL at 09:01

## 2024-10-06 NOTE — BH INPATIENT PSYCHIATRY PROGRESS NOTE - NSBHASSESSSUMMFT_PSY_ALL_CORE
Patient is a 68 yo M with history of HTN and DVT and hx of schizoaffective disorder, bipolar type, BIB sister from [facility] due to 2 months of worsening depression and psychosis. Patient with depressed mood, poor ADLs, and persecutory delusions given arguments he had with another patient where he made threats. Patient with numerous previous suicide attempts and previous ECT; he appears to have worsened after completing his course of ECT as an outpatient. He was admitted medically due to poor PO intake. Given his recent MDE and failure to thrive, he is unable to care for himself and therefore meets criteria for inpatient psychiatric hospitalization.   SH: Lives in [facility]. Retired from [employer], , no children.   PsyHx: 1x SA by cutting in 1990. Multiple hospitalizations MR Aug 2023. Was on ECT until 6 months ago.    On interview, the pt endorses depressive sxs mostly including anhedonia, decreased sleep and appetite, and lack of energy. Thought ruminations of guilt noted, in the setting of recent statement towards other tenant at [facility]. Pt experienced AH that include degrading voices that seem to have commanding character but so far no indication of voices directly telling him to hurt himself or others. Pt likely experiencing mood episode with psychotic features, affect noted to be mood-congruent. R/O substance-induced mood and/or psychotic d/o.    10/2-10/3: Pt very anxious and fearful, fixed delusion about imminent incarceration and worrying about not being able to pay his hospital fees and housing in the future. Pt not acutely suicidal, denies active SIIP/HIIP but feels "tortured" by his anxiety and says on 10/3 that not being alive would "not be a bad mikey". Pt with persistent insomnia and low appetite. Currently no AH. Mirtazapine increased from 15 mg to 30 mg to address depressive symptoms, low appetite, and insomnia. Pt provided contact number of sister: [phone].      10/4: Pt very anxious and fearful, fixed delusion about inevitable incarceration and worrying about not being able to pay his hospital fees and housing in the future. Pt presenting as paranoid today, convinced that everyone on the unit is against him, not feeling safe to leave his room. Pt denies SIIP/HIIP but feels hopeless and helpless. Ongoing insomnia and low appetite. Currently no AH. Low appetite (only 1 meal per day), and insomnia. Will further discuss alternative treatment options given no significant improvement on Mirtazapine. Will initiate family meeting next week to discuss ECT, sister, brother and father agree that ECT helped in the past and may need to be restarted.    10/5: remains paranoid, seems to tolerating Abilify well. QTc 429. Continue abilify 2mg/day as is.   10/6: poor PO intake, appears dehydrated, check CMP tomorrow; increase abilify to 5mg po daily    working diagnosis: schizoaffective disorder, current depressive episode w/ psychosis. ddx bipolar type per previous notes, however no h/o manic sxs elicited in current interview.    Plan:   1. Legal: admitted on 9.13 voluntary status  2. Safety:  Denies SI/SIB/HI/VI; continue routine observation.  3. Psychiatric:   - Venlafaxine  mg daily (depression)  	- Home medication  - Mirtazapine 30 mg QHS (depression)  	- Started during [facility] hospitalization   	- inc 10/2  HOLD Olanzapine 25 mg QHS (psychosis)  	- Home medication, held due to prolonged QTc per recommendation of C/L team. Will repeat EKG.  	- Primary team to address    Agitation PRNs: Olanzapine PO/IM, Lorazepam PO    4. Medical conditions, other medication, consults  a) DVT  - Eliquis 5 mg BID  b) Constipation   - Senna 2 tabs QHS  c) HTN  - Amlodipine 10 mg daily  d) PT consult: fall precautions, recommend ambulating with walker   5. Psychosocial interventions  - Patient provided verbal consent to speak with TBD  - CO 1:1: Not required  - Restrictions/allowances: None   6. Collateral: outpatient psychiatrist Dr. Irby on 6/2/23 on [facility] and handoff from Dr. Hernandez. Collateral from sister. See  note.   7. Disposition: When stable.

## 2024-10-06 NOTE — BH INPATIENT PSYCHIATRY PROGRESS NOTE - NSBHCHARTREVIEWVS_PSY_A_CORE FT
Vital Signs Last 24 Hrs  T(C): 36.7 (10-06-24 @ 08:13), Max: 36.7 (10-06-24 @ 08:13)  T(F): 98 (10-06-24 @ 08:13), Max: 98 (10-06-24 @ 08:13)  HR: --  BP: --  BP(mean): --  RR: --  SpO2: --    Orthostatic VS  10-06-24 @ 08:13  Lying BP: --/-- HR: --  Sitting BP: 112/85 HR: 115  Standing BP: 120/70 HR: 111  Site: upper left arm  Mode: electronic  Orthostatic VS  10-05-24 @ 07:52  Lying BP: --/-- HR: --  Sitting BP: 123/92 HR: 100  Standing BP: 112/89 HR: 113  Site: --  Mode: --

## 2024-10-06 NOTE — BH INPATIENT PSYCHIATRY PROGRESS NOTE - OTHER
Pt denies CAH, currently no AH, but when he experienced AH a few days ago, he heard a male voice commenting with derogatory content dry oral mucosa Mostly delusions of guilt, ruminations about wrongdoings and financial situation

## 2024-10-06 NOTE — BH INPATIENT PSYCHIATRY PROGRESS NOTE - NSBHMETABOLIC_PSY_ALL_CORE_FT
BMI: BMI (kg/m2): 28.7 (10-05-24 @ 15:58)  HbA1c: A1C with Estimated Average Glucose Result: 5.2 % (09-15-24 @ 06:50)    Glucose:   BP: --Vital Signs Last 24 Hrs  T(C): 36.7 (10-06-24 @ 08:13), Max: 36.7 (10-06-24 @ 08:13)  T(F): 98 (10-06-24 @ 08:13), Max: 98 (10-06-24 @ 08:13)  HR: --  BP: --  BP(mean): --  RR: --  SpO2: --    Orthostatic VS  10-06-24 @ 08:13  Lying BP: --/-- HR: --  Sitting BP: 112/85 HR: 115  Standing BP: 120/70 HR: 111  Site: upper left arm  Mode: electronic  Orthostatic VS  10-05-24 @ 07:52  Lying BP: --/-- HR: --  Sitting BP: 123/92 HR: 100  Standing BP: 112/89 HR: 113  Site: --  Mode: --    Lipid Panel: Date/Time: 09-15-24 @ 06:50  Cholesterol, Serum: 159  LDL Cholesterol Calculated: 99  HDL Cholesterol, Serum: 43  Total Cholesterol/HDL Ration Measurement: --  Triglycerides, Serum: 87

## 2024-10-06 NOTE — BH INPATIENT PSYCHIATRY PROGRESS NOTE - NSBHFUPINTERVALHXFT_PSY_A_CORE
Patient continues to think he will get arrested. He has note been eating adequately. Reports low appetite and ongoing depressed mood.

## 2024-10-07 LAB
ALBUMIN SERPL ELPH-MCNC: 3.1 G/DL — LOW (ref 3.3–5)
ALBUMIN SERPL ELPH-MCNC: 3.8 G/DL — SIGNIFICANT CHANGE UP (ref 3.3–5)
ALP SERPL-CCNC: 111 U/L — SIGNIFICANT CHANGE UP (ref 40–120)
ALP SERPL-CCNC: 94 U/L — SIGNIFICANT CHANGE UP (ref 40–120)
ALT FLD-CCNC: 9 U/L — SIGNIFICANT CHANGE UP (ref 4–41)
ALT FLD-CCNC: SIGNIFICANT CHANGE UP U/L (ref 4–41)
ANION GAP SERPL CALC-SCNC: 15 MMOL/L — HIGH (ref 7–14)
ANION GAP SERPL CALC-SCNC: 19 MMOL/L — HIGH (ref 7–14)
AST SERPL-CCNC: 12 U/L — SIGNIFICANT CHANGE UP (ref 4–40)
AST SERPL-CCNC: SIGNIFICANT CHANGE UP U/L (ref 4–40)
BILIRUB SERPL-MCNC: 0.7 MG/DL — SIGNIFICANT CHANGE UP (ref 0.2–1.2)
BILIRUB SERPL-MCNC: 0.7 MG/DL — SIGNIFICANT CHANGE UP (ref 0.2–1.2)
BUN SERPL-MCNC: 17 MG/DL — SIGNIFICANT CHANGE UP (ref 7–23)
BUN SERPL-MCNC: 18 MG/DL — SIGNIFICANT CHANGE UP (ref 7–23)
CALCIUM SERPL-MCNC: 9.2 MG/DL — SIGNIFICANT CHANGE UP (ref 8.4–10.5)
CALCIUM SERPL-MCNC: 9.4 MG/DL — SIGNIFICANT CHANGE UP (ref 8.4–10.5)
CHLORIDE SERPL-SCNC: 101 MMOL/L — SIGNIFICANT CHANGE UP (ref 98–107)
CHLORIDE SERPL-SCNC: 103 MMOL/L — SIGNIFICANT CHANGE UP (ref 98–107)
CO2 SERPL-SCNC: 12 MMOL/L — LOW (ref 22–31)
CO2 SERPL-SCNC: 22 MMOL/L — SIGNIFICANT CHANGE UP (ref 22–31)
CREAT SERPL-MCNC: 0.82 MG/DL — SIGNIFICANT CHANGE UP (ref 0.5–1.3)
CREAT SERPL-MCNC: 1.06 MG/DL — SIGNIFICANT CHANGE UP (ref 0.5–1.3)
EGFR: 77 ML/MIN/1.73M2 — SIGNIFICANT CHANGE UP
EGFR: 96 ML/MIN/1.73M2 — SIGNIFICANT CHANGE UP
GLUCOSE BLDC GLUCOMTR-MCNC: 96 MG/DL — SIGNIFICANT CHANGE UP (ref 70–99)
GLUCOSE SERPL-MCNC: 54 MG/DL — CRITICAL LOW (ref 70–99)
GLUCOSE SERPL-MCNC: 94 MG/DL — SIGNIFICANT CHANGE UP (ref 70–99)
POTASSIUM SERPL-MCNC: 4.3 MMOL/L — SIGNIFICANT CHANGE UP (ref 3.5–5.3)
POTASSIUM SERPL-MCNC: SIGNIFICANT CHANGE UP MMOL/L (ref 3.5–5.3)
POTASSIUM SERPL-SCNC: 4.3 MMOL/L — SIGNIFICANT CHANGE UP (ref 3.5–5.3)
POTASSIUM SERPL-SCNC: SIGNIFICANT CHANGE UP MMOL/L (ref 3.5–5.3)
PROT SERPL-MCNC: 6.9 G/DL — SIGNIFICANT CHANGE UP (ref 6–8.3)
PROT SERPL-MCNC: SIGNIFICANT CHANGE UP G/DL (ref 6–8.3)
SODIUM SERPL-SCNC: 132 MMOL/L — LOW (ref 135–145)
SODIUM SERPL-SCNC: 140 MMOL/L — SIGNIFICANT CHANGE UP (ref 135–145)

## 2024-10-07 RX ORDER — ARIPIPRAZOLE 2 MG/1
5 TABLET ORAL DAILY
Refills: 0 | Status: DISCONTINUED | OUTPATIENT
Start: 2024-10-07 | End: 2024-10-18

## 2024-10-07 RX ADMIN — Medication 2 TABLET(S): at 20:50

## 2024-10-07 RX ADMIN — MIRTAZAPINE 30 MILLIGRAM(S): 30 TABLET ORAL at 20:50

## 2024-10-07 RX ADMIN — Medication 225 MILLIGRAM(S): at 08:31

## 2024-10-07 RX ADMIN — Medication 10 MILLIGRAM(S): at 08:32

## 2024-10-07 RX ADMIN — Medication 3 MILLIGRAM(S): at 20:50

## 2024-10-07 RX ADMIN — ARIPIPRAZOLE 2 MILLIGRAM(S): 2 TABLET ORAL at 08:32

## 2024-10-07 RX ADMIN — APIXABAN 5 MILLIGRAM(S): 5 TABLET, FILM COATED ORAL at 08:32

## 2024-10-07 RX ADMIN — APIXABAN 5 MILLIGRAM(S): 5 TABLET, FILM COATED ORAL at 20:50

## 2024-10-07 NOTE — BH INPATIENT PSYCHIATRY PROGRESS NOTE - NSBHASSESSSUMMFT_PSY_ALL_CORE
Patient is a 70 yo M with history of HTN and DVT and hx of schizoaffective disorder, bipolar type, BIB sister from [facility] due to 2 months of worsening depression and psychosis. Patient with depressed mood, poor ADLs, and persecutory delusions given arguments he had with another patient where he made threats. Patient with numerous previous suicide attempts and previous ECT; he appears to have worsened after completing his course of ECT as an outpatient. He was admitted medically due to poor PO intake. Given his recent MDE and failure to thrive, he is unable to care for himself and therefore meets criteria for inpatient psychiatric hospitalization.   SH: Lives in [facility]. Retired from [employer], , no children.   PsyHx: 1x SA by cutting in 1990. Multiple hospitalizations MR Aug 2023. Was on ECT until 6 months ago.    On interview, the pt endorses depressive sxs mostly including anhedonia, decreased sleep and appetite, and lack of energy. Thought ruminations of guilt noted, in the setting of recent statement towards other tenant at [facility]. Pt experienced AH that include degrading voices that seem to have commanding character but so far no indication of voices directly telling him to hurt himself or others. Pt likely experiencing mood episode with psychotic features, affect noted to be mood-congruent. R/O substance-induced mood and/or psychotic d/o.    10/2-10/3: Pt very anxious and fearful, fixed delusion about imminent incarceration and worrying about not being able to pay his hospital fees and housing in the future. Pt not acutely suicidal, denies active SIIP/HIIP but feels "tortured" by his anxiety and says on 10/3 that not being alive would "not be a bad mikey". Pt with persistent insomnia and low appetite. Currently no AH. Mirtazapine increased from 15 mg to 30 mg to address depressive symptoms, low appetite, and insomnia. Pt provided contact number of sister: [phone].      10/4: Pt very anxious and fearful, fixed delusion about inevitable incarceration and worrying about not being able to pay his hospital fees and housing in the future. Pt presenting as paranoid today, convinced that everyone on the unit is against him, not feeling safe to leave his room. Pt denies SIIP/HIIP but feels hopeless and helpless. Ongoing insomnia and low appetite. Currently no AH. Low appetite (only 1 meal per day), and insomnia. Will further discuss alternative treatment options given no significant improvement on Mirtazapine. Will initiate family meeting next week to discuss ECT, sister, brother and father agree that ECT helped in the past and may need to be restarted.    10/5: remains paranoid, seems to tolerating Abilify well. QTc 429. Continue Abilify 2mg/day as is.   10/6: poor PO intake, appears dehydrated, check CMP tomorrow; Continue Abilify to 2mg po daily  10/7: still poor PO intake, Sodium 132, glucose 54; repeat CMP, same delusions noted, although pt more ambivalent about ECT, family meeting expected tomorrow (10/08)    working diagnosis: schizoaffective disorder, current depressive episode w/ psychosis. ddx bipolar type per previous notes, however no h/o manic sxs elicited in current interview.    Plan:   1. Legal: admitted on 9.13 voluntary status  2. Safety:  Denies SI/SIB/HI/VI; continue routine observation.  3. Psychiatric:   - Abilify 2 mg PO QD   - to be inc tomorrow (10/8)  - Venlafaxine  mg daily (depression)  	- Home medication  - Mirtazapine 30 mg QHS (depression)  	- Started during [facility] hospitalization   	- inc 10/2  HOLD Olanzapine 25 mg QHS (psychosis)  	- Home medication, held due to prolonged QTc per recommendation of C/L team. Will repeat EKG.  	- Primary team to address    Agitation PRNs: Olanzapine PO/IM, Lorazepam PO    4. Medical conditions, other medication, consults  a) DVT  - Eliquis 5 mg BID  b) Constipation   - Senna 2 tabs QHS  c) HTN  - Amlodipine 10 mg daily  d) PT consult: fall precautions, recommend ambulating with walker   5. Psychosocial interventions  - Patient provided verbal consent to speak with TBD  - CO 1:1: Not required  - Restrictions/allowances: None   6. Collateral: outpatient psychiatrist Dr. Irby on 6/2/23 on [facility] and handoff from Dr. Hernandez. Collateral from sister. See  note.   7. Disposition: When stable.

## 2024-10-07 NOTE — BH INPATIENT PSYCHIATRY PROGRESS NOTE - NSBHFUPINTERVALHXFT_PSY_A_CORE
Chart reviewed. Case discussed with interdisciplinary team. The pt is compliant with meds, denies side effects or physical symptoms. No behavioral events reported overnight. Pt noted to be reclusive, mostly staying in his room, only seen once when going to the shower room. VSS. No PRNs needed/requested. On encounter this morning pt denies any physical complaints, is calm and cooperative, dysphoric.    Pt seen at bedside in his room, he states that he still feels like he will soon go to [facility], he is convinced that a "placement to a different area" is supposed to take place today or tomorrow. Reports that he cannot complain about his current hospital stay as being in [facility] would be much worse. Interviewer explains that the pt is safe in the hospital and the police cannot enter the hospital without any emergent request. The pt insists that the police can still get him because he has been found guilty and there is "no escape". The pt has not been sleeping the whole night, ruminating about his imminent incarceration. The pt is still feeling too anxious to leave the room. Denies any current AH/VH. Denies SIIP/HIIP. Interviewer mentions that the team reached out to his siblings who are in favor of re--initiating ECT treatment, pt ambivalent about it. Planning to have family meeting tomorrow (10/09) at 10 am. Denies any current side effects from his medications or any physical symptoms.

## 2024-10-07 NOTE — PROVIDER CONTACT NOTE (CRITICAL VALUE NOTIFICATION) - ASSESSMENT
Pt upon notification and MD notification was not noted to be in acute distress. Pt was resting in his room alert and responsive no signs of low blood glucose

## 2024-10-07 NOTE — BH INPATIENT PSYCHIATRY PROGRESS NOTE - NSBHCHARTREVIEWVS_PSY_A_CORE FT
Vital Signs Last 24 Hrs  T(C): 36.5 (10-07-24 @ 08:35), Max: 36.5 (10-07-24 @ 08:35)  T(F): 97.7 (10-07-24 @ 08:35), Max: 97.7 (10-07-24 @ 08:35)  HR: --  BP: --  BP(mean): --  RR: 16 (10-07-24 @ 08:35) (16 - 16)  SpO2: 99% (10-07-24 @ 08:35) (99% - 99%)    Orthostatic VS  10-07-24 @ 08:35  Lying BP: --/-- HR: --  Sitting BP: 109/85 HR: 103  Standing BP: 108/78 HR: 85  Site: --  Mode: --  Orthostatic VS  10-06-24 @ 08:13  Lying BP: --/-- HR: --  Sitting BP: 112/85 HR: 115  Standing BP: 120/70 HR: 111  Site: upper left arm  Mode: electronic

## 2024-10-07 NOTE — BH INPATIENT PSYCHIATRY PROGRESS NOTE - OTHER
Pt denies CAH, currently no AH, but when he experienced AH a few days ago, he heard a male voice commenting with derogatory content Mostly delusions of guilt, ruminations about wrongdoings and financial situation dry oral mucosa

## 2024-10-08 PROCEDURE — 99233 SBSQ HOSP IP/OBS HIGH 50: CPT | Mod: GC

## 2024-10-08 RX ORDER — LORAZEPAM 2 MG
0.5 TABLET ORAL EVERY 6 HOURS
Refills: 0 | Status: DISCONTINUED | OUTPATIENT
Start: 2024-10-08 | End: 2024-10-15

## 2024-10-08 RX ADMIN — Medication 0.5 MILLIGRAM(S): at 14:50

## 2024-10-08 RX ADMIN — Medication 225 MILLIGRAM(S): at 08:28

## 2024-10-08 RX ADMIN — Medication 3 MILLIGRAM(S): at 20:51

## 2024-10-08 RX ADMIN — MIRTAZAPINE 30 MILLIGRAM(S): 30 TABLET ORAL at 20:51

## 2024-10-08 RX ADMIN — APIXABAN 5 MILLIGRAM(S): 5 TABLET, FILM COATED ORAL at 08:28

## 2024-10-08 RX ADMIN — APIXABAN 5 MILLIGRAM(S): 5 TABLET, FILM COATED ORAL at 20:51

## 2024-10-08 RX ADMIN — ARIPIPRAZOLE 5 MILLIGRAM(S): 2 TABLET ORAL at 08:28

## 2024-10-08 RX ADMIN — Medication 2 TABLET(S): at 20:51

## 2024-10-08 RX ADMIN — Medication 10 MILLIGRAM(S): at 08:27

## 2024-10-08 NOTE — BH INPATIENT PSYCHIATRY PROGRESS NOTE - OTHER
Mostly delusions of guilt, ruminations about wrongdoings and financial situation Pt with significant thought ruminations, perseverating about imminent incarceration and inability to cover treatment costs in the setting of fixed delusions dry oral mucosa, ot visibly distressed, grabbing his knees When the pt experienced AH a few days ago, he heard a male voice commenting with derogatory content
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
## 2024-10-08 NOTE — ECT CONSULT NOTE - NSECTASSESSRECOMM_PSY_ALL_CORE
A course of ECT is indicated to target the treatment refractory symptoms of depression and psychosis that patient is experiencing and that have responded to ECT in past.     Total time spent on the case: 75 minutes.     Time was spent in counselling and coordination of care, including obtaining collaterals from the inpatient attending (Dr. Dent), reviewing medical records, assessing the complex case, indication of ECT and confirming the history of treatment resistance and documentation of the assessment.  Time was also spent on supportive therapy with a emphasis on reality testing. Multiple attempts were made to discuss details with the patient, however patient declined information regarding the ECT procedure, how it is performed, potential benefits of ECT as well as potential risks of ECT, various ECT placements, the usual trajectory of response with acute course of ECT, informed consent for ECT, or ECT fasting guidelines. The patient's history and physical exam findings (H&P), labs and EKG were reviewed.  The referring psychiatrist was contacted.

## 2024-10-08 NOTE — BH INPATIENT PSYCHIATRY PROGRESS NOTE - NSBHMETABOLIC_PSY_ALL_CORE_FT
BMI: BMI (kg/m2): 28.7 (10-05-24 @ 15:58)  HbA1c: A1C with Estimated Average Glucose Result: 5.2 % (09-15-24 @ 06:50)    Glucose: POCT Blood Glucose.: 96 mg/dL (10-07-24 @ 14:15)    BP: --Vital Signs Last 24 Hrs  T(C): 36.7 (10-08-24 @ 08:12), Max: 36.7 (10-08-24 @ 08:12)  T(F): 98.1 (10-08-24 @ 08:12), Max: 98.1 (10-08-24 @ 08:12)  HR: --  BP: --  BP(mean): --  RR: --  SpO2: --    Orthostatic VS  10-08-24 @ 08:12  Lying BP: --/-- HR: --  Sitting BP: 137/93 HR: 88  Standing BP: 124/89 HR: 87  Site: --  Mode: --  Orthostatic VS  10-07-24 @ 08:35  Lying BP: --/-- HR: --  Sitting BP: 109/85 HR: 103  Standing BP: 108/78 HR: 85  Site: --  Mode: --    Lipid Panel: Date/Time: 09-15-24 @ 06:50  Cholesterol, Serum: 159  LDL Cholesterol Calculated: 99  HDL Cholesterol, Serum: 43  Total Cholesterol/HDL Ration Measurement: --  Triglycerides, Serum: 87

## 2024-10-08 NOTE — BH INPATIENT PSYCHIATRY PROGRESS NOTE - NSBHCHARTREVIEWVS_PSY_A_CORE FT
Vital Signs Last 24 Hrs  T(C): 36.7 (10-08-24 @ 08:12), Max: 36.7 (10-08-24 @ 08:12)  T(F): 98.1 (10-08-24 @ 08:12), Max: 98.1 (10-08-24 @ 08:12)  HR: --  BP: --  BP(mean): --  RR: --  SpO2: --    Orthostatic VS  10-08-24 @ 08:12  Lying BP: --/-- HR: --  Sitting BP: 137/93 HR: 88  Standing BP: 124/89 HR: 87  Site: --  Mode: --  Orthostatic VS  10-07-24 @ 08:35  Lying BP: --/-- HR: --  Sitting BP: 109/85 HR: 103  Standing BP: 108/78 HR: 85  Site: --  Mode: --

## 2024-10-08 NOTE — BH INPATIENT PSYCHIATRY PROGRESS NOTE - CURRENT MEDICATION
MEDICATIONS  (STANDING):  amLODIPine   Tablet 10 milliGRAM(s) Oral with breakfast  apixaban 5 milliGRAM(s) Oral every 12 hours  ARIPiprazole 5 milliGRAM(s) Oral daily  melatonin. 3 milliGRAM(s) Oral at bedtime  mirtazapine 30 milliGRAM(s) Oral at bedtime  senna 2 Tablet(s) Oral at bedtime  venlafaxine  milliGRAM(s) Oral daily    MEDICATIONS  (PRN):  acetaminophen     Tablet .. 650 milliGRAM(s) Oral every 6 hours PRN Temp greater or equal to 38C (100.4F), Mild Pain (1 - 3)  aluminum hydroxide/magnesium hydroxide/simethicone Suspension 30 milliLiter(s) Oral every 4 hours PRN Dyspepsia  LORazepam     Tablet 0.5 milliGRAM(s) Oral every 6 hours PRN anxiety  polyethylene glycol 3350 17 Gram(s) Oral every 12 hours PRN obstipation

## 2024-10-08 NOTE — ECT CONSULT NOTE - NSECTREASONCONS_PSY_ALL_CORE
Continuation of treatment to sustain remission and significant improvement/Rapid resolution of symptoms is necessary/Suicidality/Treatment resistant depression/Treatment resistant psychosis
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
## 2024-10-08 NOTE — ECT CONSULT NOTE - NSECTMENTALSTATUSEXAM_PSY_ALL_CORE
Conscious, cooperative, alert.   No psychomotor agitation/retardation.   Poor eye contact.   Speech: slow rate, low volume  Mood: "hopeless" Affect: dysphoric, restricted, appropriate.   Thought Process: linear but impaired reasoning and illogical  Thought Content: +persecutory and paranoid delusions, ruminations, passive suicidal ideation without active suicidal ideation, intent, plan. No homicidal ideation/intent/plan.    Perception: +auditory hallucinations, non-command. denied visual hallucinations.   Insight and Judgement: poor  Impulse Control: fair at this time.

## 2024-10-08 NOTE — ECT CONSULT NOTE - OTHER PAST PSYCHIATRIC HISTORY (INCLUDE DETAILS REGARDING ONSET, COURSE OF ILLNESS, INPATIENT/OUTPATIENT TREATMENT)
Patient is a 70y/o male, domiciled at [facility], , no children, retired from job at [employer] in 40s, with PPH of schizoaffective disorder, bipolar type, one past suicide attempt in 1992 via cutting wrists (found by mother and brought in to hospital), multiple past psychiatric hospitalizations (last [facility] 2S June - Aug 2023), sees psychiatrist at [facility] Dr. Sharma, substance use hx (ETOH-last used April 2024, h/o 3 DUIs in 40s; cocaine- last used 20 yrs ago), and PMH of HTN who presents to [facility] ED BIB sister from [facility] due to 2 months of worsening depression, psychosis (AH, paranoia), and poor attention to ADLs.     In the ED: Patient appeared anxious and depressed with poor eye contact. He states his mood has been worse for the past 2 months with no clear triggers, Reports sad mood, anhedonia, hopelessness, social withdrawal, low energy, and trouble concentrating. He states that 1 month ago, he started to hear a male voice that he describes as "evil." He reports AH have increased in frequency to several times per day now. He denies dangerous CAH to harm self or others but reports +CAH to "do the opposite of what staff tell me to do." Patient is not able to provide examples. Also reports paranoia with delusion that he will be sent to senior living due to verbal argument with fellow resident at [facility] about 3 months ago. He reports peer was picking on another female resident, so patient said "stop that or I'll break your jaw." Pt denies any actual physical violence or posturing during episode. He also states "I'm not sure if I said I'd break his jaw or if I just thought it." He states that staff and all other residents do not like his because he has poor attention to hygiene. He wonders whether staff did not wash his clothes properly last week because they don't like him. Denies VH, IOR, belief he is being watched/monitored. Pt reports decreased PO intake and states he only eats 1-2 meals. Denies lightheadedness or falls. He reports poor hygiene recently, stating it has been "years" since he last showered, that he does not brush his teeth, and that he has been changing his clothes less frequently (every 4-5 days). Reports passive SI with thoughts such as "it would be better for everyone if I was dead." Denies any active SIIP, preparatory actions, plans, or SIB.   - Collateral obtained from patient's sister Saritha (), who states pt called her this morning to report worsening mood. Sister was concerned and brought him to ED for inpt psych admission. She reports patient's mood and functioning were much better about 6 months ago while on maintenance ECT. ECT was stopped due to decreased benefit from treatments and mild memory loss.   - Further collateral obtained from [facility] - Acting  Gillian (). Reports pt has been calm but very isolative to his room. No aggression, not clearly RIS. Current meds: Olanzapine 20 mg HS (increased from 15 mg 1 week ago), Venlafaxine 225 mg daily (increased 7/31/24), PRN Ativan 0.5 mg HS for anxiety/insomnia (using almost nightly for past 5-6 nights), Amlodipine 10 mg daily for HTN, Vit B12, Vit D, Senna 2 tabs HS. Psychiatrist Dr. Sharma, [phone] - contacted, left voicemail     On the unit: Pt endorses depressive sxs mostly including anhedonia, decreased sleep and appetite, and lack of energy. Thought ruminations of guilt noted, in the setting of recent statement towards other tenant at [facility]. Pt experienced AH that include degrading voices that seem to have commanding character but so far no indication of voices directly telling him to hurt himself or others. Of note, the pt presents with severe delusions of imminent incarceration in the s/o MDD with psychotic features. Pt is in severe distress feeling that police will come to the unit and escort him to jail. Poor attention to ADLs, significant sleep deprivation and low appetite, anhedonia, severe anxiety. Patient has been medication adherent on the unit.     Today with the inpatient team: Patient had a family meeting with his brother Sabino to discuss initiation of ECT. "The pt ruminates about what he believes to be his imminent incarceration. States that he is convinced that "they will haul me and bring me to jail at any moment". Also mentions that he never had hip replacement surgery although the brother confirms that there was a surgical intervention while the pt was more lucid and agreeable. Treatment team (two MDs and SW) and brother Sabino are unable to convince him that he is safe at the hospital and that no persecution will be taking place. The pt perseverates about how the treatment cannot be afforded as ECT would lead to more bills and financially ruin him and his family. Also adds that he is afraid that his brother would therefore go to jail as well, not being able to stem the high treatment costs. The pt oscillates between ruminations of guilt prompting him to be brought to jail and his inability to pay the bills. Despite the team's and the brother's constant reassurances, the pt is in significant distress, sharing that he is guilty because of a "soto event" that occurred when he was 14-15 years old and involved people wearing masks. The brother confirms that ECT has worked well for the pt in the past, however the pt states that ECT made him feel like he "forgot my name and who I am". No acute safety concerns. Pt denies active SIIP/HIIP. No current side effects from his medications or any physical symptoms noted."     On interview with me: Patient presents as very dysphoric, hopeless, consistent with hx above. He admits to worsening depression over several months without any known triggers. He has intrusive thoughts that he will be arrested for his behaviors at age 17, holds full conviction that the police are in plain clothes waiting to take him as soon as he leaves the hospital. Pt admits to hearing derogatory non-command auditory hallucinations reminding him of this incident and saying that he will be beat up and raped when he goes to senior living. Pt also worries that he cannot afford the ECT treatments and adamantly declined hearing more information or wanting to pursue ECT at this time as he believes no treatment will help him. He insisted he has never tried ECT, contrary to his chart history and interview this morning. Pt admits to passive suicidal thoughts, denied any active suicidal ideation, intent, or plan. Denied any violent ideation, intent, or plan.    PPH: schizoaffective disorder, bipolar type, one past suicide attempt in 1992 via cutting wrists (found by mother and brought in to hospital), multiple past psychiatric hospitalizations (last [facility] 2S June - Aug 2023), sees psychiatrist at [facility] Dr. Sharma.   - The patient underwent a grand total of 26 bifrontal ECT at 100% stimulus dosing and was undergoing rescue treatment 2x/week when he ceased receiving ECT care in this episode from 8/29/23 to 5/16/24. [facility] Cognition Score was 10/10 and the patient had no cognitive complaints noted on last visit. Discharged in stable condition.     Social History: domiciled at [facility], , no children, retired from job at [employer] in 40s. Substance use hx (ETOH-last used April 2024, h/o 3 DUIs in 40s; cocaine- last used 20 yrs ago), h/o senior living due to substance use charges per chart. Pt has a living sister, brother, and father; long-time friend of 30 years, Ezequiel Cuevas ([phone]).

## 2024-10-09 PROCEDURE — 99233 SBSQ HOSP IP/OBS HIGH 50: CPT

## 2024-10-09 RX ORDER — LORAZEPAM 2 MG
1 TABLET ORAL ONCE
Refills: 0 | Status: DISCONTINUED | OUTPATIENT
Start: 2024-10-10 | End: 2024-10-10

## 2024-10-09 RX ADMIN — ARIPIPRAZOLE 5 MILLIGRAM(S): 2 TABLET ORAL at 09:50

## 2024-10-09 RX ADMIN — APIXABAN 5 MILLIGRAM(S): 5 TABLET, FILM COATED ORAL at 09:50

## 2024-10-09 RX ADMIN — Medication 3 MILLIGRAM(S): at 20:28

## 2024-10-09 RX ADMIN — MIRTAZAPINE 30 MILLIGRAM(S): 30 TABLET ORAL at 20:27

## 2024-10-09 RX ADMIN — Medication 10 MILLIGRAM(S): at 09:50

## 2024-10-09 RX ADMIN — APIXABAN 5 MILLIGRAM(S): 5 TABLET, FILM COATED ORAL at 20:28

## 2024-10-09 RX ADMIN — Medication 225 MILLIGRAM(S): at 09:50

## 2024-10-09 RX ADMIN — Medication 2 TABLET(S): at 20:28

## 2024-10-09 NOTE — BH INPATIENT PSYCHIATRY PROGRESS NOTE - NSBHMETABOLIC_PSY_ALL_CORE_FT
BMI: BMI (kg/m2): 28.7 (10-05-24 @ 15:58)  HbA1c: A1C with Estimated Average Glucose Result: 5.2 % (09-15-24 @ 06:50)    Glucose: POCT Blood Glucose.: 96 mg/dL (10-07-24 @ 14:15)    BP: --Vital Signs Last 24 Hrs  T(C): 36.7 (10-09-24 @ 08:07), Max: 36.7 (10-09-24 @ 08:07)  T(F): 98 (10-09-24 @ 08:07), Max: 98 (10-09-24 @ 08:07)  HR: --  BP: --  BP(mean): --  RR: --  SpO2: --    Orthostatic VS  10-09-24 @ 08:07  Lying BP: --/-- HR: --  Sitting BP: 122/86 HR: 96  Standing BP: 115/85 HR: 91  Site: upper right arm  Mode: electronic  Orthostatic VS  10-08-24 @ 08:12  Lying BP: --/-- HR: --  Sitting BP: 137/93 HR: 88  Standing BP: 124/89 HR: 87  Site: --  Mode: --    Lipid Panel: Date/Time: 09-15-24 @ 06:50  Cholesterol, Serum: 159  LDL Cholesterol Calculated: 99  HDL Cholesterol, Serum: 43  Total Cholesterol/HDL Ration Measurement: --  Triglycerides, Serum: 87

## 2024-10-09 NOTE — PROGRESS NOTE ADULT - TIME BILLING
I have ordered lab tests today.  You should receive a phone call or a Tytot message with those results.  If you have not heard from us in 7-10 days, please call the office.      We'll send labs to Dr. Lai (Dermatology) since you're on Accutane.   
56 minutes spent on total encounter; more than 50% of the visit was spent counseling and / or coordinating care by the attending physician.  The necessity of the time spent during the encounter on this date of service was due to:     review of laboratory data, radiology results, consultants' recommendations, documentation in [place], discussion with patient/ACP and interdisciplinary staff (such as , social workers, etc). Interventions were performed as documented above.

## 2024-10-09 NOTE — PROGRESS NOTE ADULT - ASSESSMENT
69M, h/oHTN, HLD, MDD, schizoaffective d/o recently admitted to [facility] with hallucinations and depression, treated for DVT, transferred to [facility]    #Pre-ECT Optimization  Patient does not have unstable or severe cardiovascular disease  There is no evidence of increased intracranial pressure, also no hx of strokes or cerebral hemorrhage  Patient does not complain of dyspnea on exertion  There is no contraindication to proceed with ECT from a medical standpoint       DVT, peroneal vein, left: Unclear if this was provoked by inactivity.  Would continue eliquis for at least three months then can consider discontinuation of AC.    Prolonged QTc: 9/30 EKG reported with QTc>550.  EKG was not sent and is not yet in EMR for review.  EKG today NSR with QTc 443. If possible QT-prolonging agents are added recommend monitoring EKGs for QTc.    Hematuria: Episode dark urine at [facility] with hematuria on UA, rneal US and CT negative for stone, patient asymptomatic. Repeat UA today improved with 5 WBC. Recommend outpatient PCP f/u for repeat UA and urology referral if WBC persist in the urine.    HTN: Continue amlodipine    Depression/psychosis: Management per primary team

## 2024-10-09 NOTE — BH INPATIENT PSYCHIATRY PROGRESS NOTE - NSBHFUPINTERVALHXFT_PSY_A_CORE
Here for a complete exam.      f/u hypercholesterolemia:    No shortness of breath, chest pain, palpitations, or leg swelling.  No problems with medications.      f/u gerd:    still doing well.  No problems with medications.      No other complaints.      Medicines and allergies reviewed.  Nurse's notes reviewed and accepted.        Past Medical History:   Diagnosis Date   • Alcohol dependence (CMS/HCC)    • Diverticulosis of colon (without mention of hemorrhage) 2011    Colonoscopy/Soliz   • GERD (gastroesophageal reflux disease)    • Low testosterone    • NO HX OF     CA, HTN, DM, CAD, CVA, DVT, Asthma   • Other and unspecified hyperlipidemia    • Reflux esophagitis 2011    EGD/[facility]iz       Past Surgical History:   Procedure Laterality Date   • COLONOSCOPY DIAGNOSTIC  2011    Dr. Soliz/diagnostic/recall 2021   • ESOPHAGOGASTRODUODENOSCOPY TRANSORAL FLEX W/BX SINGLE OR MULT  2011    Dr. Soliz w/bx/esophagitis/recall PRN   • PAST SURGICAL HISTORY      No hx of surgery   • WISDOM TOOTH EXTRACTION         ALLERGIES:  No Known Allergies    Current Outpatient Prescriptions   Medication Sig   • omeprazole (PRILOSEC) 40 MG capsule Take 1 capsule by mouth daily.   • dicyclomine (BENTYL) 20 MG tablet Take 1 tablet by mouth 4 times daily (before meals and nightly).   • testosterone 100 mg/g (10 %) cream (in non alcohol base) Apply 1 mL topically daily.   • tadalafil (CIALIS) 20 MG tablet Take 1 tablet by mouth as needed for Erectile Dysfunction.   • atorvastatin (LIPITOR) 20 MG tablet Take 1 tablet by mouth daily.     No current facility-administered medications for this visit.          Alcohol Use: Yes           14 oz/week       Comment: 3 drinks daily        Tobacco Use: Never               Drug Use:    No                  Sexually Active: Yes             Partners with: Female      Family History   Problem Relation Age of Onset   • NEGATIVE FAMILY HX OF Father       at 77 of prostate cancer   • Arthritis  Mother      inflamatory arthritis ? type   • Heart Paternal Grandfather      CHF   • Neurology Father      stroke x 2   • Cancer Father      thyroid cancer   • Cancer Brother      skin   • Heart Maternal Grandfather    • Heart Maternal Uncle    • Hypertension Brother    • Musculoskeletal Mother      osteoporosis   • Psychiatry Sister      paranoid Schizophrenia   • GI Brother      diverticulitis         REVIEW OF SYSTEMS:      General: No fever, chills, wt. gain or loss  Head: No headaches  Eyes: No change in vision, eye pain, or discharge  Ears: No hearing loss, tinnitus or ear pain  Nose: No rhinorrhea or epistaxis  Mouth: No sore throat, dysphagia, drooling or problems with speech  Neck: No lumps or goiter  Lungs: No shortness of breath, cough or wheezing  Heart: No chest pain or palpitations  Abdomen: No nausea, vomiting, pain, belching, constipation, diarrhea, change in bowel habits or blood in stools  Extremities: No joint aches, swelling or stiffness  Endocrine: No heat or cold intolerance, polyuria, polydipsia, or malaise  Skin: No rash or change in moles  Neurologic: No gait disturbance, numbness or focal weakness  Hematologic: No bruising or bleeding        OBJECTIVE:      Blood pressure 136/84, pulse 50, height 5' 9\" (1.753 m), weight 98 kg, SpO2 98 %.    Head: Atraumatic  Eyes: PERRLA, EOM's - intact, sclerae not icteric, fundi-sharp discs, normal vessels, no cataracts, conjunctiva - pink  Ears: Tympanic membranes normal  Nose: No polyps  Mouth: posterior pharynx pink, tongue - normal, no lesions  Neck: no JVD, carotid pulses normal - no bruits, thyroid - non-palpable, no lymphadenopathy  Lungs: clear to auscultation and percussion, respirations not labored and no egophony  Heart: normal S1, S2, no murmur, pmi-normal, no thrills  Breasts: no masses, dimpling and discharge  Chest: No tenderness  Abdomen: soft, non-tender, active BS's, no masses, no hepatosplenomegaly, no hernias  Lymph Nodes: Nonpalpable  in groin, axilla, or neck.  Musculoskeletal: normal gait, no scoliosis, no joint swelling, strength of all muscles WNL  Skin: No rashes or suspicious moles  Neurologic: Alert and oriented times three, CN  II-III intact, motor 5+/5 all extremities, sensation - intact, no deficits, DTR's 2 +, heel to shin, rapid alternating movements - normal and Romberg negative  Extremities: No edema, pulses 2+        A:    hypercholesterolemia:  not doing well    gerd-stable        P:    start atorvastatin 20mg 1 daily    flu shot    same meds     Chart reviewed. Case discussed with interdisciplinary team. The pt is compliant with meds, denies side effects or physical symptoms. No behavioral events reported overnight. Pt noted to be reclusive, mostly staying in his room, only seen once when going to the shower room. VSS. No PRNs needed/requested. On encounter this morning pt denies any physical complaints, is generally cooperative, but more irritable today.     Pt seen in his room, sitting on his bed. He states that there is no escape from the police and that they are going to get him even on the unit, regardless of his state of mind and his treatment. Shares that he has been involved as an observer in a "soto event" when he was a teenager and feels guilty about it because he should have stopped it in the first place, because "I am raised this way". Explains that he never had any other situation or comparable event, but the police would still be suspicious and call him a liar, not accepting any extenuating circumstances. States that he is very worried about his family as the would not be able to afford his hospital bills. Adds that he is convinced that the interviewer needs to see at least 50 patients a day and would barely have time for him or care about him, despite MD attempting to reassure the pt continuously. The pt perseverates about how the treatment cannot be afforded as ECT would lead to more bills and financially ruin him and his family. The pt states that he knows that the treatment team and the police are in cahoots and know everything about him. He refuses ECT as he says it does not make any difference given that he will be put into halfway by the police soon and is awaiting his trial. Pt denies active SIIP/HIIP. No current side effects from his medications or any physical symptoms noted.

## 2024-10-09 NOTE — BH INPATIENT PSYCHIATRY PROGRESS NOTE - NSBHASSESSSUMMFT_PSY_ALL_CORE
Patient is a 70 yo M with history of HTN and DVT and hx of schizoaffective disorder, bipolar type, BIB sister from [facility] due to 2 months of worsening depression and psychosis. Patient with depressed mood, poor ADLs, and persecutory delusions given arguments he had with another patient where he made threats. Patient with numerous previous suicide attempts and previous ECT; he appears to have worsened after completing his course of ECT as an outpatient. He was admitted medically due to poor PO intake. Given his recent MDE and failure to thrive, he is unable to care for himself and therefore meets criteria for inpatient psychiatric hospitalization.   SH: Lives in [facility]. Retired from [employer], , no children.   PsyHx: 1x SA by cutting in 1990. Multiple hospitalizations MR Aug 2023. Was on ECT until 6 months ago.    On interview, the pt endorses depressive sxs mostly including anhedonia, decreased sleep and appetite, and lack of energy. Thought ruminations of guilt noted, in the setting of recent statement towards other tenant at [facility]. Pt experienced AH that include degrading voices that seem to have commanding character but so far no indication of voices directly telling him to hurt himself or others. Pt likely experiencing mood episode with psychotic features, affect noted to be mood-congruent. R/O substance-induced mood and/or psychotic d/o.    10/2-10/3: Pt very anxious and fearful, fixed delusion about imminent incarceration and worrying about not being able to pay his hospital fees and housing in the future. Pt not acutely suicidal, denies active SIIP/HIIP but feels "tortured" by his anxiety and says on 10/3 that not being alive would "not be a bad mikey". Pt with persistent insomnia and low appetite. Currently no AH. Mirtazapine increased from 15 mg to 30 mg to address depressive symptoms, low appetite, and insomnia. Pt provided contact number of sister: [phone].      10/4: Pt very anxious and fearful, fixed delusion about inevitable incarceration and worrying about not being able to pay his hospital fees and housing in the future. Pt presenting as paranoid today, convinced that everyone on the unit is against him, not feeling safe to leave his room. Pt denies SIIP/HIIP but feels hopeless and helpless. Ongoing insomnia and low appetite. Currently no AH. Low appetite (only 1 meal per day), and insomnia. Will further discuss alternative treatment options given no significant improvement on Mirtazapine. Will initiate family meeting next week to discuss ECT, sister, brother and father agree that ECT helped in the past and may need to be restarted.    10/5: remains paranoid, seems to tolerating Abilify well. QTc 429. Continue Abilify 2mg/day as is.   10/6: poor PO intake, appears dehydrated, check CMP tomorrow; Continue Abilify to 2mg po daily  10/7: still poor PO intake, Sodium 132, glucose 54; repeat CMP, same delusions noted, although pt more ambivalent about ECT, family meeting expected tomorrow (10/08)  10/8: still poor PO intake, repeat CMP WNL, same fixed delusions noted, during family meeting with brother shahrzad (phone call), pt with significant thought ruminations, perseverating about imminent incarceration and inability to cover treatment costs, denies that ECT helped him in the past and is worried about side effect burden. Pt in visible distress with increased psychomotor activity. Will approach pt again and offer ECT when pt is less anxious.    10/8: still poor PO intake, same fixed delusions noted, significant thought ruminations, perseverating about imminent incarceration and inability to cover treatment costs, still refuses ECT. Pt shows paranoia regarding the treatment team cooperating with the police and knowing all information about him that would have led to his current charges.    working diagnosis: schizoaffective disorder, current depressive episode w/ psychosis. ddx bipolar type per previous notes, however no h/o manic sxs elicited in current interview.    Plan:   1. Legal: admitted on 9.13 voluntary status  2. Safety:  Denies SI/SIB/HI/VI; continue routine observation.  3. Psychiatric:   - Abilify 5 mg PO QD   - inc 10/8  - Venlafaxine  mg daily (depression)  	- Home medication  - Mirtazapine 30 mg QHS (depression)  	- Started during [facility] hospitalization   	- inc 10/2  HOLD Olanzapine 25 mg QHS (psychosis)  	- Home medication, held due to prolonged QTc per recommendation of C/L team. Will repeat EKG.  	- Primary team to address    Agitation PRNs: Olanzapine PO/IM, Lorazepam PO    4. Medical conditions, other medication, consults  a) DVT  - Eliquis 5 mg BID  b) Constipation   - Senna 2 tabs QHS  c) HTN  - Amlodipine 10 mg daily  d) PT consult: fall precautions, recommend ambulating with walker   5. Psychosocial interventions  - Patient provided verbal consent to speak with TBD  - CO 1:1: Not required  - Restrictions/allowances: None   6. Collateral: outpatient psychiatrist Dr. Irby on 6/2/23 on [facility] and handoff from Dr. Hernandez. Collateral from sister. See  note.   7. Disposition: When stable.

## 2024-10-09 NOTE — BH INPATIENT PSYCHIATRY PROGRESS NOTE - OTHER
Pt with significant thought ruminations, perseverating about imminent incarceration and inability to cover treatment costs in the setting of fixed delusions Mostly delusions of guilt, ruminations about wrongdoings and financial situation When the pt experienced AH a few days ago, he heard a male voice commenting with derogatory content dry oral mucosa, ot visibly distressed, grabbing his knees

## 2024-10-09 NOTE — PROGRESS NOTE ADULT - SUBJECTIVE AND OBJECTIVE BOX
Román Traylor MD  [employer] Hospitalist  Pager [phone]  Email: [email or web address]  Available on [employer] Teams        PROGRESS NOTE:     Patient is a 69y old  Male who presents with a chief complaint of depression (01 Oct 2024 14:17)      SUBJECTIVE / OVERNIGHT EVENTS:  Patient seen and examined this morning. Medicine consulted for preECT optimizations.   ADDITIONAL REVIEW OF SYSTEMS:  Patient without fevers/chills or trouble breathing.    MEDICATIONS  (STANDING):  amLODIPine   Tablet 10 milliGRAM(s) Oral with breakfast  apixaban 5 milliGRAM(s) Oral every 12 hours  ARIPiprazole 5 milliGRAM(s) Oral daily  melatonin. 3 milliGRAM(s) Oral at bedtime  mirtazapine 30 milliGRAM(s) Oral at bedtime  senna 2 Tablet(s) Oral at bedtime  venlafaxine  milliGRAM(s) Oral daily    MEDICATIONS  (PRN):  acetaminophen     Tablet .. 650 milliGRAM(s) Oral every 6 hours PRN Temp greater or equal to 38C (100.4F), Mild Pain (1 - 3)  aluminum hydroxide/magnesium hydroxide/simethicone Suspension 30 milliLiter(s) Oral every 4 hours PRN Dyspepsia  LORazepam     Tablet 0.5 milliGRAM(s) Oral every 6 hours PRN anxiety  polyethylene glycol 3350 17 Gram(s) Oral every 12 hours PRN obstipation      CAPILLARY BLOOD GLUCOSE        I&O's Summary      PHYSICAL EXAM:  Vital Signs Last 24 Hrs  T(C): 36.7 (09 Oct 2024 08:07), Max: 36.7 (09 Oct 2024 08:07)  T(F): 98 (09 Oct 2024 08:07), Max: 98 (09 Oct 2024 08:07)  HR: --  BP: --  BP(mean): --  RR: --  SpO2: --        PHYSICAL EXAM:  GENERAL: NAD  HEAD:  Atraumatic, Normocephalic  EYES: EOMI, conjunctiva and sclera clear  NECK: Supple, No JVD  CHEST/LUNG: Clear to auscultation bilaterally; No wheeze  HEART: Regular rate and rhythm; No murmurs, rubs, or gallops  ABDOMEN: Soft, Nontender, Nondistended; Bowel sounds present  EXTREMITIES:  2+ Peripheral Pulses, No clubbing, cyanosis, or edema  NEUROLOGY: non-focal  SKIN: No rashes or lesions    LABS:    10-07    140  |  103  |  18  ----------------------------<  94  4.3   |  22  |  1.06    Ca    9.4      07 Oct 2024 15:45    TPro  6.9  /  Alb  3.8  /  TBili  0.7  /  DBili  x   /  AST  12  /  ALT  9   /  AlkPhos  111  10-07          Urinalysis Basic - ( 07 Oct 2024 15:45 )    Color: x / Appearance: x / SG: x / pH: x  Gluc: 94 mg/dL / Ketone: x  / Bili: x / Urobili: x   Blood: x / Protein: x / Nitrite: x   Leuk Esterase: x / RBC: x / WBC x   Sq Epi: x / Non Sq Epi: x / Bacteria: x          RADIOLOGY & ADDITIONAL TESTS:  Results Reviewed:   Imaging Personally Reviewed:  Electrocardiogram Personally Reviewed:    COORDINATION OF CARE:  Care Discussed with Consultants/Other Providers [Y/N]:  Prior or Outpatient Records Reviewed [Y/N]:

## 2024-10-09 NOTE — BH INPATIENT PSYCHIATRY PROGRESS NOTE - NSBHCHARTREVIEWVS_PSY_A_CORE FT
Vital Signs Last 24 Hrs  T(C): 36.7 (10-09-24 @ 08:07), Max: 36.7 (10-09-24 @ 08:07)  T(F): 98 (10-09-24 @ 08:07), Max: 98 (10-09-24 @ 08:07)  HR: --  BP: --  BP(mean): --  RR: --  SpO2: --    Orthostatic VS  10-09-24 @ 08:07  Lying BP: --/-- HR: --  Sitting BP: 122/86 HR: 96  Standing BP: 115/85 HR: 91  Site: upper right arm  Mode: electronic  Orthostatic VS  10-08-24 @ 08:12  Lying BP: --/-- HR: --  Sitting BP: 137/93 HR: 88  Standing BP: 124/89 HR: 87  Site: --  Mode: --

## 2024-10-10 LAB
ALBUMIN SERPL ELPH-MCNC: 3.6 G/DL — SIGNIFICANT CHANGE UP (ref 3.3–5)
ALP SERPL-CCNC: 102 U/L — SIGNIFICANT CHANGE UP (ref 40–120)
ALT FLD-CCNC: 10 U/L — SIGNIFICANT CHANGE UP (ref 4–41)
ANION GAP SERPL CALC-SCNC: 13 MMOL/L — SIGNIFICANT CHANGE UP (ref 7–14)
AST SERPL-CCNC: 15 U/L — SIGNIFICANT CHANGE UP (ref 4–40)
BASOPHILS # BLD AUTO: 0.03 K/UL — SIGNIFICANT CHANGE UP (ref 0–0.2)
BASOPHILS NFR BLD AUTO: 0.4 % — SIGNIFICANT CHANGE UP (ref 0–2)
BILIRUB SERPL-MCNC: 0.5 MG/DL — SIGNIFICANT CHANGE UP (ref 0.2–1.2)
BUN SERPL-MCNC: 18 MG/DL — SIGNIFICANT CHANGE UP (ref 7–23)
CALCIUM SERPL-MCNC: 9.3 MG/DL — SIGNIFICANT CHANGE UP (ref 8.4–10.5)
CHLORIDE SERPL-SCNC: 105 MMOL/L — SIGNIFICANT CHANGE UP (ref 98–107)
CO2 SERPL-SCNC: 23 MMOL/L — SIGNIFICANT CHANGE UP (ref 22–31)
CREAT SERPL-MCNC: 1 MG/DL — SIGNIFICANT CHANGE UP (ref 0.5–1.3)
EGFR: 82 ML/MIN/1.73M2 — SIGNIFICANT CHANGE UP
EOSINOPHIL # BLD AUTO: 0.06 K/UL — SIGNIFICANT CHANGE UP (ref 0–0.5)
EOSINOPHIL NFR BLD AUTO: 0.8 % — SIGNIFICANT CHANGE UP (ref 0–6)
GLUCOSE SERPL-MCNC: 113 MG/DL — HIGH (ref 70–99)
HCT VFR BLD CALC: 42.2 % — SIGNIFICANT CHANGE UP (ref 39–50)
HGB BLD-MCNC: 14.9 G/DL — SIGNIFICANT CHANGE UP (ref 13–17)
IANC: 5.54 K/UL — SIGNIFICANT CHANGE UP (ref 1.8–7.4)
IMM GRANULOCYTES NFR BLD AUTO: 0.1 % — SIGNIFICANT CHANGE UP (ref 0–0.9)
LYMPHOCYTES # BLD AUTO: 1.39 K/UL — SIGNIFICANT CHANGE UP (ref 1–3.3)
LYMPHOCYTES # BLD AUTO: 18 % — SIGNIFICANT CHANGE UP (ref 13–44)
MCHC RBC-ENTMCNC: 30 PG — SIGNIFICANT CHANGE UP (ref 27–34)
MCHC RBC-ENTMCNC: 35.3 GM/DL — SIGNIFICANT CHANGE UP (ref 32–36)
MCV RBC AUTO: 84.9 FL — SIGNIFICANT CHANGE UP (ref 80–100)
MONOCYTES # BLD AUTO: 0.71 K/UL — SIGNIFICANT CHANGE UP (ref 0–0.9)
MONOCYTES NFR BLD AUTO: 9.2 % — SIGNIFICANT CHANGE UP (ref 2–14)
NEUTROPHILS # BLD AUTO: 5.54 K/UL — SIGNIFICANT CHANGE UP (ref 1.8–7.4)
NEUTROPHILS NFR BLD AUTO: 71.5 % — SIGNIFICANT CHANGE UP (ref 43–77)
NRBC # BLD: 0 /100 WBCS — SIGNIFICANT CHANGE UP (ref 0–0)
NRBC # FLD: 0 K/UL — SIGNIFICANT CHANGE UP (ref 0–0)
PLATELET # BLD AUTO: 275 K/UL — SIGNIFICANT CHANGE UP (ref 150–400)
POTASSIUM SERPL-MCNC: 4.1 MMOL/L — SIGNIFICANT CHANGE UP (ref 3.5–5.3)
POTASSIUM SERPL-SCNC: 4.1 MMOL/L — SIGNIFICANT CHANGE UP (ref 3.5–5.3)
PROT SERPL-MCNC: 6.4 G/DL — SIGNIFICANT CHANGE UP (ref 6–8.3)
RBC # BLD: 4.97 M/UL — SIGNIFICANT CHANGE UP (ref 4.2–5.8)
RBC # FLD: 14.4 % — SIGNIFICANT CHANGE UP (ref 10.3–14.5)
SODIUM SERPL-SCNC: 141 MMOL/L — SIGNIFICANT CHANGE UP (ref 135–145)
WBC # BLD: 7.74 K/UL — SIGNIFICANT CHANGE UP (ref 3.8–10.5)
WBC # FLD AUTO: 7.74 K/UL — SIGNIFICANT CHANGE UP (ref 3.8–10.5)

## 2024-10-10 PROCEDURE — 99232 SBSQ HOSP IP/OBS MODERATE 35: CPT | Mod: GC

## 2024-10-10 RX ADMIN — MIRTAZAPINE 30 MILLIGRAM(S): 30 TABLET ORAL at 20:22

## 2024-10-10 RX ADMIN — Medication 3 MILLIGRAM(S): at 20:22

## 2024-10-10 RX ADMIN — Medication 10 MILLIGRAM(S): at 08:58

## 2024-10-10 RX ADMIN — Medication 1 MILLIGRAM(S): at 08:58

## 2024-10-10 RX ADMIN — APIXABAN 5 MILLIGRAM(S): 5 TABLET, FILM COATED ORAL at 08:58

## 2024-10-10 RX ADMIN — APIXABAN 5 MILLIGRAM(S): 5 TABLET, FILM COATED ORAL at 20:23

## 2024-10-10 RX ADMIN — Medication 225 MILLIGRAM(S): at 08:58

## 2024-10-10 RX ADMIN — Medication 2 TABLET(S): at 20:23

## 2024-10-10 RX ADMIN — ARIPIPRAZOLE 5 MILLIGRAM(S): 2 TABLET ORAL at 08:57

## 2024-10-10 NOTE — BH INPATIENT PSYCHIATRY PROGRESS NOTE - NSBHMETABOLIC_PSY_ALL_CORE_FT
BMI: BMI (kg/m2): 28.7 (10-05-24 @ 15:58)  HbA1c: A1C with Estimated Average Glucose Result: 5.2 % (09-15-24 @ 06:50)    Glucose: POCT Blood Glucose.: 96 mg/dL (10-07-24 @ 14:15)    BP: --Vital Signs Last 24 Hrs  T(C): 36.4 (10-10-24 @ 07:49), Max: 36.4 (10-10-24 @ 07:49)  T(F): 97.5 (10-10-24 @ 07:49), Max: 97.5 (10-10-24 @ 07:49)  HR: --  BP: --  BP(mean): --  RR: --  SpO2: --    Orthostatic VS  10-10-24 @ 07:49  Lying BP: --/-- HR: --  Sitting BP: 109/84 HR: 82  Standing BP: 107/87 HR: 99  Site: --  Mode: --  Orthostatic VS  10-09-24 @ 08:07  Lying BP: --/-- HR: --  Sitting BP: 122/86 HR: 96  Standing BP: 115/85 HR: 91  Site: upper right arm  Mode: electronic    Lipid Panel: Date/Time: 09-15-24 @ 06:50  Cholesterol, Serum: 159  LDL Cholesterol Calculated: 99  HDL Cholesterol, Serum: 43  Total Cholesterol/HDL Ration Measurement: --  Triglycerides, Serum: 87

## 2024-10-10 NOTE — BH INPATIENT PSYCHIATRY PROGRESS NOTE - NSBHFUPINTERVALHXFT_PSY_A_CORE
Chart reviewed. Case discussed with interdisciplinary team. The pt is compliant with meds, denies side effects or physical symptoms. No behavioral events reported overnight. Pt noted to be reclusive, mostly staying in his room, only seen once when going to the shower room. VSS. No PRNs needed/requested. On encounter this morning pt denies any physical complaints, is generally cooperative, very tense and anxious. Fair sleep per sleep log.    Pt seen in his room, sitting on his bed. He states that the police will pick him up from ECT and that the treatment and his physical conditions won't make any difference for them as he would have to go to detention anyway. Recalls that he had been in alf twice, one time for 10 months and 1 time for 6 months. Adds that he is convinced that his roommate is staged and is in fact a  scrutinizing every move of the patient and constantly judging him. The pt perseverates about how there is no escape from detention for him and that all the staff knows about it. He does not refuse ECT but says that he will be taken to detention anyway and not come back to the unit after treatment. The pt does not go into details about his thoughts about the ECT and despite the interviewer's attempts to redirect the pt to the current situation and treatment, the pt refers to his time after the hospitalization which he would have to spend in detention for the rest of his life. Endorses poor food and fluid intake, adds that "the police would not care" if he were bleeding and physically ill as they would take him to detention anyway. Pt denies active SIIP/HIIP and expresses his frustration about this question being asked as he knows that nobody would actually be able to hurt themselves on the unit. No current side effects from his medications or any physical symptoms noted.

## 2024-10-10 NOTE — BH INPATIENT PSYCHIATRY PROGRESS NOTE - NSBHASSESSSUMMFT_PSY_ALL_CORE
Spironolactone Counseling: Patient advised regarding risks of diarrhea, abdominal pain, hyperkalemia, birth defects (for female patients), liver toxicity and renal toxicity. The patient may need blood work to monitor liver and kidney function and potassium levels while on therapy. The patient verbalized understanding of the proper use and possible adverse effects of spironolactone.  All of the patient's questions and concerns were addressed. Erythromycin Pregnancy And Lactation Text: This medication is Pregnancy Category B and is considered safe during pregnancy. It is also excreted in breast milk. Minocycline Counseling: Patient advised regarding possible photosensitivity and discoloration of the teeth, skin, lips, tongue and gums.  Patient instructed to avoid sunlight, if possible.  When exposed to sunlight, patients should wear protective clothing, sunglasses, and sunscreen.  The patient was instructed to call the office immediately if the following severe adverse effects occur:  hearing changes, easy bruising/bleeding, severe headache, or vision changes.  The patient verbalized understanding of the proper use and possible adverse effects of minocycline.  All of the patient's questions and concerns were addressed. Bactrim Counseling:  I discussed with the patient the risks of sulfa antibiotics including but not limited to GI upset, allergic reaction, drug rash, diarrhea, dizziness, photosensitivity, and yeast infections.  Rarely, more serious reactions can occur including but not limited to aplastic anemia, agranulocytosis, methemoglobinemia, blood dyscrasias, liver or kidney failure, lung infiltrates or desquamative/blistering drug rashes. Tazorac Pregnancy And Lactation Text: This medication is not safe during pregnancy. It is unknown if this medication is excreted in breast milk. Benzoyl Peroxide Pregnancy And Lactation Text: This medication is Pregnancy Category C. It is unknown if benzoyl peroxide is excreted in breast milk. Dapsone Pregnancy And Lactation Text: This medication is Pregnancy Category C and is not considered safe during pregnancy or breast feeding. Spironolactone Pregnancy And Lactation Text: This medication can cause feminization of the male fetus and should be avoided during pregnancy. The active metabolite is also found in breast milk. Topical Clindamycin Counseling: Patient counseled that this medication may cause skin irritation or allergic reactions.  In the event of skin irritation, the patient was advised to reduce the amount of the drug applied or use it less frequently.   The patient verbalized understanding of the proper use and possible adverse effects of clindamycin.  All of the patient's questions and concerns were addressed. Isotretinoin Counseling: Patient should get monthly blood tests, not donate blood, not drive at night if vision affected, not share medication, and not undergo elective surgery for 6 months after tx completed. Side effects reviewed, pt to contact office should one occur. Bactrim Pregnancy And Lactation Text: This medication is Pregnancy Category D and is known to cause fetal risk.  It is also excreted in breast milk. Doxycycline Counseling:  Patient counseled regarding possible photosensitivity and increased risk for sunburn.  Patient instructed to avoid sunlight, if possible.  When exposed to sunlight, patients should wear protective clothing, sunglasses, and sunscreen.  The patient was instructed to call the office immediately if the following severe adverse effects occur:  hearing changes, easy bruising/bleeding, severe headache, or vision changes.  The patient verbalized understanding of the proper use and possible adverse effects of doxycycline.  All of the patient's questions and concerns were addressed. Detail Level: Detailed Topical Retinoid counseling:  Patient advised to apply a pea-sized amount only at bedtime and wait 30 minutes after washing their face before applying.  If too drying, patient may add a non-comedogenic moisturizer. The patient verbalized understanding of the proper use and possible adverse effects of retinoids.  All of the patient's questions and concerns were addressed. Minocycline Pregnancy And Lactation Text: This medication is Pregnancy Category D and not consider safe during pregnancy. It is also excreted in breast milk. Tetracycline Counseling: Patient counseled regarding possible photosensitivity and increased risk for sunburn.  Patient instructed to avoid sunlight, if possible.  When exposed to sunlight, patients should wear protective clothing, sunglasses, and sunscreen.  The patient was instructed to call the office immediately if the following severe adverse effects occur:  hearing changes, easy bruising/bleeding, severe headache, or vision changes.  The patient verbalized understanding of the proper use and possible adverse effects of tetracycline.  All of the patient's questions and concerns were addressed. Patient understands to avoid pregnancy while on therapy due to potential birth defects. Isotretinoin Pregnancy And Lactation Text: This medication is Pregnancy Category X and is considered extremely dangerous during pregnancy. It is unknown if it is excreted in breast milk. Topical Clindamycin Pregnancy And Lactation Text: This medication is Pregnancy Category B and is considered safe during pregnancy. It is unknown if it is excreted in breast milk. Use Enhanced Medication Counseling?: No Birth Control Pills Counseling: Birth Control Pill Counseling: I discussed with the patient the potential side effects of OCPs including but not limited to increased risk of stroke, heart attack, thrombophlebitis, deep venous thrombosis, hepatic adenomas, breast changes, GI upset, headaches, and depression.  The patient verbalized understanding of the proper use and possible adverse effects of OCPs. All of the patient's questions and concerns were addressed. Topical Retinoid Pregnancy And Lactation Text: This medication is Pregnancy Category C. It is unknown if this medication is excreted in breast milk. Azithromycin Counseling:  I discussed with the patient the risks of azithromycin including but not limited to GI upset, allergic reaction, drug rash, diarrhea, and yeast infections. Sarecycline Counseling: Patient advised regarding possible photosensitivity and discoloration of the teeth, skin, lips, tongue and gums.  Patient instructed to avoid sunlight, if possible.  When exposed to sunlight, patients should wear protective clothing, sunglasses, and sunscreen.  The patient was instructed to call the office immediately if the following severe adverse effects occur:  hearing changes, easy bruising/bleeding, severe headache, or vision changes.  The patient verbalized understanding of the proper use and possible adverse effects of sarecycline.  All of the patient's questions and concerns were addressed. Doxycycline Pregnancy And Lactation Text: This medication is Pregnancy Category D and not consider safe during pregnancy. It is also excreted in breast milk but is considered safe for shorter treatment courses. High Dose Vitamin A Counseling: Side effects reviewed, pt to contact office should one occur. Birth Control Pills Pregnancy And Lactation Text: This medication should be avoided if pregnant and for the first 30 days post-partum. Topical Sulfur Applications Counseling: Topical Sulfur Counseling: Patient counseled that this medication may cause skin irritation or allergic reactions.  In the event of skin irritation, the patient was advised to reduce the amount of the drug applied or use it less frequently.   The patient verbalized understanding of the proper use and possible adverse effects of topical sulfur application.  All of the patient's questions and concerns were addressed. Tazorac Counseling:  Patient advised that medication is irritating and drying.  Patient may need to apply sparingly and wash off after an hour before eventually leaving it on overnight.  The patient verbalized understanding of the proper use and possible adverse effects of tazorac.  All of the patient's questions and concerns were addressed. Azithromycin Pregnancy And Lactation Text: This medication is considered safe during pregnancy and is also secreted in breast milk. Erythromycin Counseling:  I discussed with the patient the risks of erythromycin including but not limited to GI upset, allergic reaction, drug rash, diarrhea, increase in liver enzymes, and yeast infections. High Dose Vitamin A Pregnancy And Lactation Text: High dose vitamin A therapy is contraindicated during pregnancy and breast feeding. Topical Sulfur Applications Pregnancy And Lactation Text: This medication is Pregnancy Category C and has an unknown safety profile during pregnancy. It is unknown if this topical medication is excreted in breast milk. Dapsone Counseling: I discussed with the patient the risks of dapsone including but not limited to hemolytic anemia, agranulocytosis, rashes, methemoglobinemia, kidney failure, peripheral neuropathy, headaches, GI upset, and liver toxicity.  Patients who start dapsone require monitoring including baseline LFTs and weekly CBCs for the first month, then every month thereafter.  The patient verbalized understanding of the proper use and possible adverse effects of dapsone.  All of the patient's questions and concerns were addressed. Benzoyl Peroxide Counseling: Patient counseled that medicine may cause skin irritation and bleach clothing.  In the event of skin irritation, the patient was advised to reduce the amount of the drug applied or use it less frequently.   The patient verbalized understanding of the proper use and possible adverse effects of benzoyl peroxide.  All of the patient's questions and concerns were addressed. Winlevi Pregnancy And Lactation Text: This medication is considered safe during pregnancy and breastfeeding. Winlevi Counseling:  I discussed with the patient the risks of topical clascoterone including but not limited to erythema, scaling, itching, and stinging. Patient voiced their understanding. Aklief counseling:  Patient advised to apply a pea-sized amount only at bedtime and wait 30 minutes after washing their face before applying.  If too drying, patient may add a non-comedogenic moisturizer.  The most commonly reported side effects including irritation, redness, scaling, dryness, stinging, burning, itching, and increased risk of sunburn.  The patient verbalized understanding of the proper use and possible adverse effects of retinoids.  All of the patient's questions and concerns were addressed. Azelaic Acid Pregnancy And Lactation Text: This medication is considered safe during pregnancy and breast feeding. Azelaic Acid Counseling: Patient counseled that medicine may cause skin irritation and to avoid applying near the eyes.  In the event of skin irritation, the patient was advised to reduce the amount of the drug applied or use it less frequently.   The patient verbalized understanding of the proper use and possible adverse effects of azelaic acid.  All of the patient's questions and concerns were addressed. Aklief Pregnancy And Lactation Text: It is unknown if this medication is safe to use during pregnancy.  It is unknown if this medication is excreted in breast milk.  Breastfeeding women should use the topical cream on the smallest area of the skin for the shortest time needed while breastfeeding.  Do not apply to nipple and areola. Patient is a 68 yo M with history of HTN and DVT and hx of schizoaffective disorder, bipolar type, BIB sister from [facility] due to 2 months of worsening depression and psychosis. Patient with depressed mood, poor ADLs, and persecutory delusions given arguments he had with another patient where he made threats. Patient with numerous previous suicide attempts and previous ECT; he appears to have worsened after completing his course of ECT as an outpatient. He was admitted medically due to poor PO intake. Given his recent MDE and failure to thrive, he is unable to care for himself and therefore meets criteria for inpatient psychiatric hospitalization.   SH: Lives in [facility]. Retired from [employer], , no children.   PsyHx: 1x SA by cutting in 1990. Multiple hospitalizations MR Aug 2023. Was on ECT until 6 months ago.    On interview, the pt endorses depressive sxs mostly including anhedonia, decreased sleep and appetite, and lack of energy. Thought ruminations of guilt noted, in the setting of recent statement towards other tenant at [facility]. Pt experienced AH that include degrading voices that seem to have commanding character but so far no indication of voices directly telling him to hurt himself or others. Pt likely experiencing mood episode with psychotic features, affect noted to be mood-congruent. R/O substance-induced mood and/or psychotic d/o.    10/2-10/3: Pt very anxious and fearful, fixed delusion about imminent incarceration and worrying about not being able to pay his hospital fees and housing in the future. Pt not acutely suicidal, denies active SIIP/HIIP but feels "tortured" by his anxiety and says on 10/3 that not being alive would "not be a bad mikey". Pt with persistent insomnia and low appetite. Currently no AH. Mirtazapine increased from 15 mg to 30 mg to address depressive symptoms, low appetite, and insomnia. Pt provided contact number of sister: [phone].      10/4: Pt very anxious and fearful, fixed delusion about inevitable incarceration and worrying about not being able to pay his hospital fees and housing in the future. Pt presenting as paranoid today, convinced that everyone on the unit is against him, not feeling safe to leave his room. Pt denies SIIP/HIIP but feels hopeless and helpless. Ongoing insomnia and low appetite. Currently no AH. Low appetite (only 1 meal per day), and insomnia. Will further discuss alternative treatment options given no significant improvement on Mirtazapine. Will initiate family meeting next week to discuss ECT, sister, brother and father agree that ECT helped in the past and may need to be restarted.    10/5: remains paranoid, seems to tolerating Abilify well. QTc 429. Continue Abilify 2mg/day as is.   10/6: poor PO intake, appears dehydrated, check CMP tomorrow; Continue Abilify to 2mg po daily  10/7: still poor PO intake, Sodium 132, glucose 54; repeat CMP, same delusions noted, although pt more ambivalent about ECT, family meeting expected tomorrow (10/08)  10/8: still poor PO intake, repeat CMP WNL, same fixed delusions noted, during family meeting with brother shahrzad (phone call), pt with significant thought ruminations, perseverating about imminent incarceration and inability to cover treatment costs, denies that ECT helped him in the past and is worried about side effect burden. Pt in visible distress with increased psychomotor activity. Will approach pt again and offer ECT when pt is less anxious.    10/9: still poor PO intake, same fixed delusions noted, significant thought ruminations, perseverating about imminent incarceration and inability to cover treatment costs, still refuses ECT. Pt shows paranoia regarding the treatment team cooperating with the police and knowing all information about him that would have led to his current charges.    10/9: poor PO intake, same fixed delusions, perceives other patients as police in disguise, perseverating about imminent incarceration and inability to cover treatment costs, still refuses ECT. Pt shows paranoia regarding the treatment team cooperating with the police and knowing all information about him that would have led to his current charges.    working diagnosis: schizoaffective disorder, current depressive episode w/ psychosis. ddx bipolar type per previous notes, however no h/o manic sxs elicited in current interview.    Plan:   1. Legal: admitted on 9.13 voluntary status  2. Safety:  Denies SI/SIB/HI/VI; continue routine observation.  3. Psychiatric:   - Abilify 5 mg PO QD   - inc 10/8  - Venlafaxine  mg daily (depression)  	- Home medication  - Mirtazapine 30 mg QHS (depression)  	- Started during [facility] hospitalization   	- inc 10/2  HOLD Olanzapine 25 mg QHS (psychosis)  	- Home medication, held due to prolonged QTc per recommendation of C/L team. Will repeat EKG.  	- Primary team to address    Agitation PRNs: Olanzapine PO/IM, Lorazepam PO    4. Medical conditions, other medication, consults  a) DVT  - Eliquis 5 mg BID  b) Constipation   - Senna 2 tabs QHS  c) HTN  - Amlodipine 10 mg daily  d) PT consult: fall precautions, recommend ambulating with walker   5. Psychosocial interventions  - Patient provided verbal consent to speak with TBD  - CO 1:1: Not required  - Restrictions/allowances: None   6. Collateral: outpatient psychiatrist Dr. Irby on 6/2/23 on [facility] and handoff from Dr. Hernandez. Collateral from sister. See  note.   7. Disposition: When stable.  Patient is a 68 yo M with history of HTN and DVT and hx of schizoaffective disorder, bipolar type, BIB sister from [facility] due to 2 months of worsening depression and psychosis. Patient with depressed mood, poor ADLs, and persecutory delusions given arguments he had with another patient where he made threats. Patient with numerous previous suicide attempts and previous ECT; he appears to have worsened after completing his course of ECT as an outpatient. He was admitted medically due to poor PO intake. Given his recent MDE and failure to thrive, he is unable to care for himself and therefore meets criteria for inpatient psychiatric hospitalization.   SH: Lives in [facility]. Retired from [employer], , no children.   PsyHx: 1x SA by cutting in 1990. Multiple hospitalizations MR Aug 2023. Was on ECT until 6 months ago.    On interview, the pt endorses depressive sxs mostly including anhedonia, decreased sleep and appetite, and lack of energy. Thought ruminations of guilt noted, in the setting of recent statement towards other tenant at [facility]. Pt experienced AH that include degrading voices that seem to have commanding character but so far no indication of voices directly telling him to hurt himself or others. Pt likely experiencing mood episode with psychotic features, affect noted to be mood-congruent. R/O substance-induced mood and/or psychotic d/o.    10/2-10/3: Pt very anxious and fearful, fixed delusion about imminent incarceration and worrying about not being able to pay his hospital fees and housing in the future. Pt not acutely suicidal, denies active SIIP/HIIP but feels "tortured" by his anxiety and says on 10/3 that not being alive would "not be a bad mikey". Pt with persistent insomnia and low appetite. Currently no AH. Mirtazapine increased from 15 mg to 30 mg to address depressive symptoms, low appetite, and insomnia. Pt provided contact number of sister: [phone].      10/4: Pt very anxious and fearful, fixed delusion about inevitable incarceration and worrying about not being able to pay his hospital fees and housing in the future. Pt presenting as paranoid today, convinced that everyone on the unit is against him, not feeling safe to leave his room. Pt denies SIIP/HIIP but feels hopeless and helpless. Ongoing insomnia and low appetite. Currently no AH. Low appetite (only 1 meal per day), and insomnia. Will further discuss alternative treatment options given no significant improvement on Mirtazapine. Will initiate family meeting next week to discuss ECT, sister, brother and father agree that ECT helped in the past and may need to be restarted.    10/5: remains paranoid, seems to tolerating Abilify well. QTc 429. Continue Abilify 2mg/day as is.   10/6: poor PO intake, appears dehydrated, check CMP tomorrow; Continue Abilify to 2mg po daily  10/7: still poor PO intake, Sodium 132, glucose 54; repeat CMP, same delusions noted, although pt more ambivalent about ECT, family meeting expected tomorrow (10/08)  10/8: still poor PO intake, repeat CMP WNL, same fixed delusions noted, during family meeting with brother shahrzad (phone call), pt with significant thought ruminations, perseverating about imminent incarceration and inability to cover treatment costs, denies that ECT helped him in the past and is worried about side effect burden. Pt in visible distress with increased psychomotor activity. Will approach pt again and offer ECT when pt is less anxious.    10/9: still poor PO intake, same fixed delusions noted, significant thought ruminations, perseverating about imminent incarceration and inability to cover treatment costs, still refuses ECT. Pt shows paranoia regarding the treatment team cooperating with the police and knowing all information about him that would have led to his current charges.    10/10: poor PO intake, same fixed delusions, perceives other patients as police in disguise, perseverating about imminent incarceration but amenable to ECT. Pt denies SIIP/HIIP.    working diagnosis: schizoaffective disorder, current depressive episode w/ psychosis. ddx bipolar type per previous notes, however no h/o manic sxs elicited in current interview.    Plan:   1. Legal: admitted on 9.13 voluntary status  2. Safety:  Denies SI/SIB/HI/VI; continue routine observation.  3. Psychiatric:   - Abilify 5 mg PO QD   - inc 10/8  - Venlafaxine  mg daily (depression)  	- Home medication  - Mirtazapine 30 mg QHS (depression)  	- Started during [facility] hospitalization   	- inc 10/2  HOLD Olanzapine 25 mg QHS (psychosis)  	- Home medication, held due to prolonged QTc per recommendation of C/L team. Will repeat EKG.  	- Primary team to address    Agitation PRNs: Olanzapine PO/IM, Lorazepam PO    4. Medical conditions, other medication, consults  a) DVT  - Eliquis 5 mg BID  b) Constipation   - Senna 2 tabs QHS  c) HTN  - Amlodipine 10 mg daily  d) PT consult: fall precautions, recommend ambulating with walker   5. Psychosocial interventions  - Patient provided verbal consent to speak with TBD  - CO 1:1: Not required  - Restrictions/allowances: None   6. Collateral: outpatient psychiatrist Dr. Irby on 6/2/23 on [facility] and handoff from Dr. Hernandez. Collateral from sister. See  note.   7. Disposition: When stable.

## 2024-10-10 NOTE — BH INPATIENT PSYCHIATRY PROGRESS NOTE - NSBHATTESTCOMMENTATTENDFT_PSY_A_CORE
Problem: VTE, Risk for  Goal: # No s/s of VTE  Outcome: Outcome Met, Complete Goal  Goal: # Verbalizes understanding of VTE risk factors and prevention  Description: Document education using the patient education activity.   Outcome: Outcome Met, Complete Goal  Goal: Demonstrates ability to administer injectable anticoagulants if ordered for d/c  Description: Document education using the patient education activity.  Outcome: Outcome Met, Complete Goal     Problem: At Risk for Falls  Goal: # Patient does not fall  Outcome: Outcome Met, Complete Goal  Goal: # Takes action to control fall-related risks  Outcome: Outcome Met, Complete Goal  Goal: # Verbalizes understanding of fall risk/precautions  Description: Document education using the patient education activity  Outcome: Outcome Met, Complete Goal     
Agree with assessment above. Pt agreed to start ECT clearance process. 
Agree with assessment above. 
Agree with assessment above. Pt is agreeing to resume ECT treatments now. Hospitalist clearance completed yesterday 10/9. Pt denies having loose teeth, bridges or dental work and had been receiving ECT with standard bite block recently. ECT consent pending. Tentative 1st ECT tomorrow 10/11. 
Agree with assessment above. 
Agree with assessment above. Pt continues to endorse delusional belief he will go to retirement. Remains isolative to self, spends most of the day in his room and is not participating in any groups. Pt reports he is afraid of leaving his room and lies in bed ruminating about his future all the time. Does not believe meds or ECT will help him avoid his fate of going to retirement. Pt reports concerns about memory loss. Writer explained there are things we can to mitigate this side effect but pt declined and did not even want to listen. Family meeting is being prepared for next week. Family reports ECT helped him last year when he presented with similar symptoms and want to encourage pt to re-start treatments.

## 2024-10-10 NOTE — BH INPATIENT PSYCHIATRY PROGRESS NOTE - OTHER
Pt with significant thought ruminations, perseverating about imminent incarceration and inability to cover treatment costs in the setting of fixed delusions Mostly delusions of guilt, ruminations about wrongdoings and financial situation dry oral mucosa, ot visibly distressed, grabbing his knees When the pt experienced AH a few days ago, he heard a male voice commenting with derogatory content

## 2024-10-10 NOTE — BH INPATIENT PSYCHIATRY PROGRESS NOTE - NSBHFUPINTERVALCCFT_PSY_A_CORE
depression with psychotic features  "The police will pick me up from the ECT unit and take me to penitentiary. You all know."

## 2024-10-10 NOTE — BH INPATIENT PSYCHIATRY PROGRESS NOTE - NSBHCHARTREVIEWVS_PSY_A_CORE FT
Vital Signs Last 24 Hrs  T(C): 36.4 (10-10-24 @ 07:49), Max: 36.4 (10-10-24 @ 07:49)  T(F): 97.5 (10-10-24 @ 07:49), Max: 97.5 (10-10-24 @ 07:49)  HR: --  BP: --  BP(mean): --  RR: --  SpO2: --    Orthostatic VS  10-10-24 @ 07:49  Lying BP: --/-- HR: --  Sitting BP: 109/84 HR: 82  Standing BP: 107/87 HR: 99  Site: --  Mode: --  Orthostatic VS  10-09-24 @ 08:07  Lying BP: --/-- HR: --  Sitting BP: 122/86 HR: 96  Standing BP: 115/85 HR: 91  Site: upper right arm  Mode: electronic

## 2024-10-11 DIAGNOSIS — I10 ESSENTIAL (PRIMARY) HYPERTENSION: ICD-10-CM

## 2024-10-11 DIAGNOSIS — I82.409 ACUTE EMBOLISM AND THROMBOSIS OF UNSPECIFIED DEEP VEINS OF UNSPECIFIED LOWER EXTREMITY: ICD-10-CM

## 2024-10-11 PROCEDURE — 99232 SBSQ HOSP IP/OBS MODERATE 35: CPT | Mod: 25

## 2024-10-11 PROCEDURE — 90870 ELECTROCONVULSIVE THERAPY: CPT

## 2024-10-11 RX ORDER — MIDAZOLAM IN 5 % DEXTROSE/PF 15 MG/30ML
2 SYRINGE (ML) INTRAVENOUS ONCE
Refills: 0 | Status: DISCONTINUED | OUTPATIENT
Start: 2024-10-11 | End: 2024-10-11

## 2024-10-11 RX ADMIN — APIXABAN 5 MILLIGRAM(S): 5 TABLET, FILM COATED ORAL at 20:52

## 2024-10-11 RX ADMIN — ARIPIPRAZOLE 5 MILLIGRAM(S): 2 TABLET ORAL at 09:10

## 2024-10-11 RX ADMIN — Medication 3 MILLIGRAM(S): at 20:53

## 2024-10-11 RX ADMIN — Medication 10 MILLIGRAM(S): at 09:10

## 2024-10-11 RX ADMIN — MIRTAZAPINE 30 MILLIGRAM(S): 30 TABLET ORAL at 20:53

## 2024-10-11 RX ADMIN — Medication 2 TABLET(S): at 20:53

## 2024-10-11 RX ADMIN — Medication 225 MILLIGRAM(S): at 09:10

## 2024-10-11 RX ADMIN — APIXABAN 5 MILLIGRAM(S): 5 TABLET, FILM COATED ORAL at 09:10

## 2024-10-11 NOTE — BH INPATIENT PSYCHIATRY PROGRESS NOTE - NSBHFUPINTERVALCCFT_PSY_A_CORE
depression with psychotic features  "There's a whole TV crew out there to report when the police takes me."

## 2024-10-11 NOTE — BH INPATIENT PSYCHIATRY PROGRESS NOTE - NSBHFUPINTERVALHXFT_PSY_A_CORE
Chart reviewed. Case discussed with interdisciplinary team. The pt is compliant with meds, denies side effects or physical symptoms. No behavioral events reported overnight. Pt noted to be reclusive, mostly staying in his room, only seen once when going to the shower room. VSS. No PRNs needed/requested. On encounter this morning pt denies any physical complaints, is generally cooperative, very tense and anxious. Fair sleep per sleep log.    Pt seen in his room, sitting on his bed. Continues to endorse paranoid delusions about going to retirement for things he did when he was 16 or for cursing at one of his peers at [facility]. Pt stated there was a "whole TV crew" out in the dayroom this morning waiting for the police to come take him. Writer asked Mr. Arias to stand by the doorway and point to where he sees the TV crew, Mr. Arias responds "all those people in the dayroom are the TV crew, that one dai I remember used to work in [employer]". Pt was pointing to regular unit staff and his peers. Pt also reporting belief the police is coming to get him after he completes his ECT treatment today. Despite paranoia pt was able to go down for ECT and completed his 1st treatment with no complications.

## 2024-10-11 NOTE — BH INPATIENT PSYCHIATRY PROGRESS NOTE - NSBHASSESSSUMMFT_PSY_ALL_CORE
Patient is a 68 yo M with history of HTN and DVT and hx of schizoaffective disorder, bipolar type, BIB sister from [facility] due to 2 months of worsening depression and psychosis. Patient with depressed mood, poor ADLs, and persecutory delusions given arguments he had with another patient where he made threats. Patient with numerous previous suicide attempts and previous ECT; he appears to have worsened after completing his course of ECT as an outpatient. He was admitted medically due to poor PO intake. Given his recent MDE and failure to thrive, he is unable to care for himself and therefore meets criteria for inpatient psychiatric hospitalization.   SH: Lives in [facility]. Retired from [employer], , no children.   PsyHx: 1x SA by cutting in 1990. Multiple hospitalizations MR Aug 2023. Was on ECT until 6 months ago.    On interview, the pt endorses depressive sxs mostly including anhedonia, decreased sleep and appetite, and lack of energy. Thought ruminations of guilt noted, in the setting of recent statement towards other tenant at [facility]. Pt experienced AH that include degrading voices that seem to have commanding character but so far no indication of voices directly telling him to hurt himself or others. Pt likely experiencing mood episode with psychotic features, affect noted to be mood-congruent. R/O substance-induced mood and/or psychotic d/o.    10/2-10/3: Pt very anxious and fearful, fixed delusion about imminent incarceration and worrying about not being able to pay his hospital fees and housing in the future. Pt not acutely suicidal, denies active SIIP/HIIP but feels "tortured" by his anxiety and says on 10/3 that not being alive would "not be a bad mikey". Pt with persistent insomnia and low appetite. Currently no AH. Mirtazapine increased from 15 mg to 30 mg to address depressive symptoms, low appetite, and insomnia. Pt provided contact number of sister: [phone].      10/4: Pt very anxious and fearful, fixed delusion about inevitable incarceration and worrying about not being able to pay his hospital fees and housing in the future. Pt presenting as paranoid today, convinced that everyone on the unit is against him, not feeling safe to leave his room. Pt denies SIIP/HIIP but feels hopeless and helpless. Ongoing insomnia and low appetite. Currently no AH. Low appetite (only 1 meal per day), and insomnia. Will further discuss alternative treatment options given no significant improvement on Mirtazapine. Will initiate family meeting next week to discuss ECT, sister, brother and father agree that ECT helped in the past and may need to be restarted.    10/5: remains paranoid, seems to tolerating Abilify well. QTc 429. Continue Abilify 2mg/day as is.   10/6: poor PO intake, appears dehydrated, check CMP tomorrow; Continue Abilify to 2mg po daily  10/7: still poor PO intake, Sodium 132, glucose 54; repeat CMP, same delusions noted, although pt more ambivalent about ECT, family meeting expected tomorrow (10/08)  10/8: still poor PO intake, repeat CMP WNL, same fixed delusions noted, during family meeting with brother shahrzad (phone call), pt with significant thought ruminations, perseverating about imminent incarceration and inability to cover treatment costs, denies that ECT helped him in the past and is worried about side effect burden. Pt in visible distress with increased psychomotor activity. Will approach pt again and offer ECT when pt is less anxious.    10/9: still poor PO intake, same fixed delusions noted, significant thought ruminations, perseverating about imminent incarceration and inability to cover treatment costs, still refuses ECT. Pt shows paranoia regarding the treatment team cooperating with the police and knowing all information about him that would have led to his current charges.    10/10: poor PO intake, same fixed delusions, perceives other patients as police in disguise, perseverating about imminent incarceration but amenable to ECT. Pt denies SIIP/HIIP.    10/11: Completed ECT #1 today. Continues to endorse paranoid delusions. Perceives other patients and staff as undercover police or members of a TV crew documenting his arrest.     working diagnosis: schizoaffective disorder, current depressive episode w/ psychosis. ddx bipolar type per previous notes, however no h/o manic sxs elicited in current interview.    Plan:   1. Legal: admitted on 9.13 voluntary status  2. Safety:  Denies SI/SIB/HI/VI; continue routine observation.  3. Psychiatric:   - ECT #2 scheduled for Monday 10/14  - Abilify 5 mg PO QD   - inc 10/8  - Venlafaxine  mg daily (depression)  	- Home medication  - Mirtazapine 30 mg QHS (depression)  	- Started during [facility] hospitalization   	- inc 10/2  HOLD Olanzapine 25 mg QHS (psychosis)  	- Home medication, held due to prolonged QTc per recommendation of C/L team. Will repeat EKG.  	- Primary team to address    Agitation PRNs: Olanzapine PO/IM, Lorazepam PO    4. Medical conditions, other medication, consults  a) DVT  - Eliquis 5 mg BID  b) Constipation   - Senna 2 tabs QHS  c) HTN  - Amlodipine 10 mg daily  d) PT consult: fall precautions, recommend ambulating with walker   5. Psychosocial interventions  - Patient provided verbal consent to speak with TBD  - CO 1:1: Not required  - Restrictions/allowances: None   6. Collateral: outpatient psychiatrist Dr. Irby on 6/2/23 on [facility] and handoff from Dr. Hernandez. Collateral from sister. See  note.   7. Disposition: When stable.

## 2024-10-11 NOTE — BH INPATIENT PSYCHIATRY PROGRESS NOTE - NSBHMETABOLIC_PSY_ALL_CORE_FT
BMI: BMI (kg/m2): 28.7 (10-05-24 @ 15:58)  HbA1c: A1C with Estimated Average Glucose Result: 5.2 % (09-15-24 @ 06:50)    Glucose: POCT Blood Glucose.: 96 mg/dL (10-07-24 @ 14:15)    BP: 127/90 (10-11-24 @ 11:30) (120/92 - 147/102)Vital Signs Last 24 Hrs  T(C): 36.7 (10-11-24 @ 11:30), Max: 36.7 (10-11-24 @ 11:30)  T(F): 98 (10-11-24 @ 11:30), Max: 98 (10-11-24 @ 11:30)  HR: 102 (10-11-24 @ 11:30) (99 - 115)  BP: 127/90 (10-11-24 @ 11:30) (120/92 - 147/102)  BP(mean): --  RR: 18 (10-11-24 @ 11:30) (15 - 19)  SpO2: 99% (10-11-24 @ 11:30) (93% - 99%)    Orthostatic VS  10-11-24 @ 05:41  Lying BP: --/-- HR: --  Sitting BP: 118/87 HR: 65  Standing BP: 111/83 HR: 88  Site: --  Mode: --  Orthostatic VS  10-10-24 @ 07:49  Lying BP: --/-- HR: --  Sitting BP: 109/84 HR: 82  Standing BP: 107/87 HR: 99  Site: --  Mode: --    Lipid Panel: Date/Time: 09-15-24 @ 06:50  Cholesterol, Serum: 159  LDL Cholesterol Calculated: 99  HDL Cholesterol, Serum: 43  Total Cholesterol/HDL Ration Measurement: --  Triglycerides, Serum: 87

## 2024-10-11 NOTE — BH INPATIENT PSYCHIATRY PROGRESS NOTE - NSBHCHARTREVIEWVS_PSY_A_CORE FT
Vital Signs Last 24 Hrs  T(C): 36.7 (10-11-24 @ 11:30), Max: 36.7 (10-11-24 @ 11:30)  T(F): 98 (10-11-24 @ 11:30), Max: 98 (10-11-24 @ 11:30)  HR: 102 (10-11-24 @ 11:30) (99 - 115)  BP: 127/90 (10-11-24 @ 11:30) (120/92 - 147/102)  BP(mean): --  RR: 18 (10-11-24 @ 11:30) (15 - 19)  SpO2: 99% (10-11-24 @ 11:30) (93% - 99%)    Orthostatic VS  10-11-24 @ 05:41  Lying BP: --/-- HR: --  Sitting BP: 118/87 HR: 65  Standing BP: 111/83 HR: 88  Site: --  Mode: --  Orthostatic VS  10-10-24 @ 07:49  Lying BP: --/-- HR: --  Sitting BP: 109/84 HR: 82  Standing BP: 107/87 HR: 99  Site: --  Mode: --

## 2024-10-11 NOTE — BH INPATIENT PSYCHIATRY PROGRESS NOTE - OTHER
When the pt experienced AH a few days ago, he heard a male voice commenting with derogatory content dry oral mucosa, ot visibly distressed, grabbing his knees Pt with significant thought ruminations, perseverating about imminent incarceration and inability to cover treatment costs in the setting of fixed delusions Mostly delusions of guilt, ruminations about wrongdoings and financial situation

## 2024-10-12 RX ADMIN — MIRTAZAPINE 30 MILLIGRAM(S): 30 TABLET ORAL at 20:33

## 2024-10-12 RX ADMIN — Medication 2 TABLET(S): at 20:33

## 2024-10-12 RX ADMIN — APIXABAN 5 MILLIGRAM(S): 5 TABLET, FILM COATED ORAL at 08:31

## 2024-10-12 RX ADMIN — APIXABAN 5 MILLIGRAM(S): 5 TABLET, FILM COATED ORAL at 20:33

## 2024-10-12 RX ADMIN — Medication 3 MILLIGRAM(S): at 20:34

## 2024-10-12 RX ADMIN — Medication 10 MILLIGRAM(S): at 08:31

## 2024-10-12 RX ADMIN — ARIPIPRAZOLE 5 MILLIGRAM(S): 2 TABLET ORAL at 08:31

## 2024-10-12 RX ADMIN — Medication 225 MILLIGRAM(S): at 08:31

## 2024-10-13 RX ADMIN — ARIPIPRAZOLE 5 MILLIGRAM(S): 2 TABLET ORAL at 08:54

## 2024-10-13 RX ADMIN — Medication 2 TABLET(S): at 20:19

## 2024-10-13 RX ADMIN — Medication 10 MILLIGRAM(S): at 08:54

## 2024-10-13 RX ADMIN — Medication 3 MILLIGRAM(S): at 20:18

## 2024-10-13 RX ADMIN — MIRTAZAPINE 30 MILLIGRAM(S): 30 TABLET ORAL at 20:18

## 2024-10-13 RX ADMIN — APIXABAN 5 MILLIGRAM(S): 5 TABLET, FILM COATED ORAL at 20:19

## 2024-10-13 RX ADMIN — APIXABAN 5 MILLIGRAM(S): 5 TABLET, FILM COATED ORAL at 08:54

## 2024-10-13 RX ADMIN — Medication 225 MILLIGRAM(S): at 08:54

## 2024-10-14 PROCEDURE — 90870 ELECTROCONVULSIVE THERAPY: CPT

## 2024-10-14 RX ADMIN — Medication 3 MILLIGRAM(S): at 21:04

## 2024-10-14 RX ADMIN — APIXABAN 5 MILLIGRAM(S): 5 TABLET, FILM COATED ORAL at 09:40

## 2024-10-14 RX ADMIN — Medication 2 TABLET(S): at 21:04

## 2024-10-14 RX ADMIN — Medication 10 MILLIGRAM(S): at 09:40

## 2024-10-14 RX ADMIN — APIXABAN 5 MILLIGRAM(S): 5 TABLET, FILM COATED ORAL at 21:04

## 2024-10-14 RX ADMIN — Medication 225 MILLIGRAM(S): at 09:40

## 2024-10-14 RX ADMIN — MIRTAZAPINE 30 MILLIGRAM(S): 30 TABLET ORAL at 21:05

## 2024-10-14 RX ADMIN — ARIPIPRAZOLE 5 MILLIGRAM(S): 2 TABLET ORAL at 09:40

## 2024-10-14 NOTE — BH INPATIENT PSYCHIATRY PROGRESS NOTE - NSBHASSESSSUMMFT_PSY_ALL_CORE
Patient is a 68 yo M with history of HTN and DVT and hx of schizoaffective disorder, bipolar type, BIB sister from [facility] due to 2 months of worsening depression and psychosis. Patient with depressed mood, poor ADLs, and persecutory delusions given arguments he had with another patient where he made threats. Patient with numerous previous suicide attempts and previous ECT; he appears to have worsened after completing his course of ECT as an outpatient. He was admitted medically due to poor PO intake. Given his recent MDE and failure to thrive, he is unable to care for himself and therefore meets criteria for inpatient psychiatric hospitalization.   SH: Lives in [facility]. Retired from [employer], , no children.   PsyHx: 1x SA by cutting in 1990. Multiple hospitalizations MR Aug 2023. Was on ECT until 6 months ago.    On interview, the pt endorses depressive sxs mostly including anhedonia, decreased sleep and appetite, and lack of energy. Thought ruminations of guilt noted, in the setting of recent statement towards other tenant at [facility]. Pt experienced AH that include degrading voices that seem to have commanding character but so far no indication of voices directly telling him to hurt himself or others. Pt likely experiencing mood episode with psychotic features, affect noted to be mood-congruent. R/O substance-induced mood and/or psychotic d/o.    10/2-10/3: Pt very anxious and fearful, fixed delusion about imminent incarceration and worrying about not being able to pay his hospital fees and housing in the future. Pt not acutely suicidal, denies active SIIP/HIIP but feels "tortured" by his anxiety and says on 10/3 that not being alive would "not be a bad mikey". Pt with persistent insomnia and low appetite. Currently no AH. Mirtazapine increased from 15 mg to 30 mg to address depressive symptoms, low appetite, and insomnia. Pt provided contact number of sister: [phone].      10/4: Pt very anxious and fearful, fixed delusion about inevitable incarceration and worrying about not being able to pay his hospital fees and housing in the future. Pt presenting as paranoid today, convinced that everyone on the unit is against him, not feeling safe to leave his room. Pt denies SIIP/HIIP but feels hopeless and helpless. Ongoing insomnia and low appetite. Currently no AH. Low appetite (only 1 meal per day), and insomnia. Will further discuss alternative treatment options given no significant improvement on Mirtazapine. Will initiate family meeting next week to discuss ECT, sister, brother and father agree that ECT helped in the past and may need to be restarted.    10/5: remains paranoid, seems to tolerating Abilify well. QTc 429. Continue Abilify 2mg/day as is.   10/6: poor PO intake, appears dehydrated, check CMP tomorrow; Continue Abilify to 2mg po daily  10/7: still poor PO intake, Sodium 132, glucose 54; repeat CMP, same delusions noted, although pt more ambivalent about ECT, family meeting expected tomorrow (10/08)  10/8: still poor PO intake, repeat CMP WNL, same fixed delusions noted, during family meeting with brother shahrzad (phone call), pt with significant thought ruminations, perseverating about imminent incarceration and inability to cover treatment costs, denies that ECT helped him in the past and is worried about side effect burden. Pt in visible distress with increased psychomotor activity. Will approach pt again and offer ECT when pt is less anxious.    10/9: still poor PO intake, same fixed delusions noted, significant thought ruminations, perseverating about imminent incarceration and inability to cover treatment costs, still refuses ECT. Pt shows paranoia regarding the treatment team cooperating with the police and knowing all information about him that would have led to his current charges.    10/10: poor PO intake, same fixed delusions, perceives other patients as police in disguise, perseverating about imminent incarceration but amenable to ECT. Pt denies SIIP/HIIP.    10/11: Completed ECT #1 today. Continues to endorse paranoid delusions. Perceives other patients and staff as undercover police or members of a TV crew documenting his arrest.     working diagnosis: schizoaffective disorder, current depressive episode w/ psychosis. ddx bipolar type per previous notes, however no h/o manic sxs elicited in current interview.    Plan:   1. Legal: admitted on 9.13 voluntary status  2. Safety:  Denies SI/SIB/HI/VI; continue routine observation.  3. Psychiatric:   - ECT #2 scheduled for Monday 10/14  - Abilify 5 mg PO QD   - inc 10/8  - Venlafaxine  mg daily (depression)  	- Home medication  - Mirtazapine 30 mg QHS (depression)  	- Started during [facility] hospitalization   	- inc 10/2  HOLD Olanzapine 25 mg QHS (psychosis)  	- Home medication, held due to prolonged QTc per recommendation of C/L team. Will repeat EKG.  	- Primary team to address    Agitation PRNs: Olanzapine PO/IM, Lorazepam PO    4. Medical conditions, other medication, consults  a) DVT  - Eliquis 5 mg BID  b) Constipation   - Senna 2 tabs QHS  c) HTN  - Amlodipine 10 mg daily  d) PT consult: fall precautions, recommend ambulating with walker   5. Psychosocial interventions  - Patient provided verbal consent to speak with TBD  - CO 1:1: Not required  - Restrictions/allowances: None   6. Collateral: outpatient psychiatrist Dr. Irby on 6/2/23 on [facility] and handoff from Dr. Hernandez. Collateral from sister. See  note.   7. Disposition: When stable.  Patient is a 68 yo M with history of HTN and DVT and hx of schizoaffective disorder, bipolar type, BIB sister from [facility] due to 2 months of worsening depression and psychosis. Patient with depressed mood, poor ADLs, and persecutory delusions given arguments he had with another patient where he made threats. Patient with numerous previous suicide attempts and previous ECT; he appears to have worsened after completing his course of ECT as an outpatient. He was admitted medically due to poor PO intake. Given his recent MDE and failure to thrive, he is unable to care for himself and therefore meets criteria for inpatient psychiatric hospitalization.   SH: Lives in [facility]. Retired from [employer], , no children.   PsyHx: 1x SA by cutting in 1990. Multiple hospitalizations MR Aug 2023. Was on ECT until 6 months ago.    On interview, the pt endorses depressive sxs mostly including anhedonia, decreased sleep and appetite, and lack of energy. Thought ruminations of guilt noted, in the setting of recent statement towards other tenant at [facility]. Pt experienced AH that include degrading voices that seem to have commanding character but so far no indication of voices directly telling him to hurt himself or others. Pt likely experiencing mood episode with psychotic features, affect noted to be mood-congruent. R/O substance-induced mood and/or psychotic d/o.    10/2-10/3: Pt very anxious and fearful, fixed delusion about imminent incarceration and worrying about not being able to pay his hospital fees and housing in the future. Pt not acutely suicidal, denies active SIIP/HIIP but feels "tortured" by his anxiety and says on 10/3 that not being alive would "not be a bad mikey". Pt with persistent insomnia and low appetite. Currently no AH. Mirtazapine increased from 15 mg to 30 mg to address depressive symptoms, low appetite, and insomnia. Pt provided contact number of sister: [phone].      10/4: Pt very anxious and fearful, fixed delusion about inevitable incarceration and worrying about not being able to pay his hospital fees and housing in the future. Pt presenting as paranoid today, convinced that everyone on the unit is against him, not feeling safe to leave his room. Pt denies SIIP/HIIP but feels hopeless and helpless. Ongoing insomnia and low appetite. Currently no AH. Low appetite (only 1 meal per day), and insomnia. Will further discuss alternative treatment options given no significant improvement on Mirtazapine. Will initiate family meeting next week to discuss ECT, sister, brother and father agree that ECT helped in the past and may need to be restarted.    10/5: remains paranoid, seems to tolerating Abilify well. QTc 429. Continue Abilify 2mg/day as is.   10/6: poor PO intake, appears dehydrated, check CMP tomorrow; Continue Abilify to 2mg po daily  10/7: still poor PO intake, Sodium 132, glucose 54; repeat CMP, same delusions noted, although pt more ambivalent about ECT, family meeting expected tomorrow (10/08)  10/8: still poor PO intake, repeat CMP WNL, same fixed delusions noted, during family meeting with brother shahrzad (phone call), pt with significant thought ruminations, perseverating about imminent incarceration and inability to cover treatment costs, denies that ECT helped him in the past and is worried about side effect burden. Pt in visible distress with increased psychomotor activity. Will approach pt again and offer ECT when pt is less anxious.    10/9: still poor PO intake, same fixed delusions noted, significant thought ruminations, perseverating about imminent incarceration and inability to cover treatment costs, still refuses ECT. Pt shows paranoia regarding the treatment team cooperating with the police and knowing all information about him that would have led to his current charges.    10/10: poor PO intake, same fixed delusions, perceives other patients as police in disguise, perseverating about imminent incarceration but amenable to ECT. Pt denies SIIP/HIIP.    10/11: Completed ECT #1 today. Continues to endorse paranoid delusions. Perceives other patients and staff as undercover police or members of a TV crew documenting his arrest.     10/14: Continues to endorse paranoid delusions. Perceives other patients and staff as undercover police. PT still isolative in his room, has 1-2 meals per day. No AH. No active SIIP/HIIP, but pt is vague about passive SI.     working diagnosis: schizoaffective disorder, current depressive episode w/ psychosis. ddx bipolar type per previous notes, however no h/o manic sxs elicited in current interview.    Plan:   1. Legal: admitted on 9.13 voluntary status  2. Safety:  Denies SI/SIB/HI/VI; continue routine observation.  3. Psychiatric:   - ECT #2 scheduled for Monday 10/14  - Abilify 5 mg PO QD   - inc 10/8  - Venlafaxine  mg daily (depression)  	- Home medication  - Mirtazapine 30 mg QHS (depression)  	- Started during [facility] hospitalization   	- inc 10/2  HOLD Olanzapine 25 mg QHS (psychosis)  	- Home medication, held due to prolonged QTc per recommendation of C/L team. Will repeat EKG.  	- Primary team to address    Agitation PRNs: Olanzapine PO/IM, Lorazepam PO    4. Medical conditions, other medication, consults  a) DVT  - Eliquis 5 mg BID  b) Constipation   - Senna 2 tabs QHS  c) HTN  - Amlodipine 10 mg daily  d) PT consult: fall precautions, recommend ambulating with walker   5. Psychosocial interventions  - Patient provided verbal consent to speak with TBD  - CO 1:1: Not required  - Restrictions/allowances: None   6. Collateral: outpatient psychiatrist Dr. Irby on 6/2/23 on [facility] and handoff from Dr. Hernandez. Collateral from sister. See  note.   7. Disposition: When stable.

## 2024-10-14 NOTE — BH INPATIENT PSYCHIATRY PROGRESS NOTE - OTHER
dry oral mucosa, ot visibly distressed, grabbing his knees When the pt experienced AH a few days ago, he heard a male voice commenting with derogatory content Mostly delusions of guilt, ruminations about wrongdoings and financial situation Pt with significant thought ruminations, perseverating about imminent incarceration and inability to cover treatment costs in the setting of fixed delusions

## 2024-10-14 NOTE — BH INPATIENT PSYCHIATRY PROGRESS NOTE - NSBHCHARTREVIEWVS_PSY_A_CORE FT
Vital Signs Last 24 Hrs  T(C): 36.5 (10-14-24 @ 05:49), Max: 36.5 (10-14-24 @ 05:49)  T(F): 97.7 (10-14-24 @ 05:49), Max: 97.7 (10-14-24 @ 05:49)  HR: --  BP: --  BP(mean): --  RR: --  SpO2: --    Orthostatic VS  10-14-24 @ 05:49  Lying BP: --/-- HR: --  Sitting BP: 116/83 HR: 84  Standing BP: 109/83 HR: 95  Site: --  Mode: --  Orthostatic VS  10-13-24 @ 07:51  Lying BP: --/-- HR: --  Sitting BP: 125/88 HR: 106  Standing BP: 128/91 HR: 97  Site: --  Mode: --

## 2024-10-14 NOTE — BH INPATIENT PSYCHIATRY PROGRESS NOTE - NSBHFUPINTERVALHXFT_PSY_A_CORE
Chart reviewed. Case discussed with interdisciplinary team. The pt is compliant with meds, denies side effects or physical symptoms. No behavioral events reported overnight. Pt noted to be reclusive, mostly staying in his room, only seen once when going to the shower room. VSS. No PRNs needed/requested. On encounter this morning pt denies any physical complaints, is generally cooperative, very tense and anxious. Fair sleep per sleep log.    Pt seen in his room, sitting on his bed. Continues to endorse paranoid delusions about going to custodial for things he did when he was 16 or for cursing at one of his peers at [facility]. Pt stated there was a "whole TV crew" out in the dayroom this morning waiting for the police to come take him. Writer asked Mr. Arias to stand by the doorway and point to where he sees the TV crew, Mr. Arias responds "all those people in the dayroom are the TV crew, that one dai I remember used to work in [employer]". Pt was pointing to regular unit staff and his peers. Pt also reporting belief the police is coming to get him after he completes his ECT treatment today. Despite paranoia pt was able to go down for ECT and completed his 1st treatment with no complications.  Chart reviewed. Case discussed with interdisciplinary team. The pt is compliant with meds, denies side effects or physical symptoms. No behavioral events reported overnight. Pt noted to be reclusive, mostly staying in his room. VSS. No PRNs needed/requested. On encounter this morning pt denies any physical complaints, is generally cooperative, very tense and anxious. Fair sleep per sleep log, but patients reports poor sleep.    Pt seen in his room, laying in bed. Continues to endorse paranoid delusions about going to snf for things he did when he was 16 or for cursing at one of his peers at [facility]. States that he will be picked up by the police today and adds it is still possible that the people on the unit are actually police in disguise. Shares that he is certain that the security staff is observing him all day. States he feels very anxious as he knows he cannot prevent the police from taking him and that he will have to stay in alf, likely for a long time, but he does not know how long. Interviewer explains that the treatment in the hospital is separate from the occurences that the     Pt stated there was a "whole TV crew" out in the dayroom this morning waiting for the police to come take him. Writer asked Mr. Arias to stand by the doorway and point to where he sees the TV crew, Mr. Arias responds "all those people in the dayroom are the TV crew, that one dai I remember used to work in [employer]". Pt was pointing to regular unit staff and his peers. Pt also reporting belief the police is coming to get him after he completes his ECT treatment today. Despite paranoia pt was able to go down for ECT and completed his 1st treatment with no complications.  Chart reviewed. Case discussed with interdisciplinary team. The pt is compliant with meds, denies side effects or physical symptoms. No behavioral events reported overnight. Pt noted to be reclusive, mostly staying in his room. VSS. No PRNs needed/requested. On encounter this morning pt denies any physical complaints, is generally cooperative, very tense and anxious. Fair sleep per sleep log, but patients reports poor sleep.    Pt seen in his room, laying in bed. Continues to endorse paranoid delusions about going to [facility] for things he did when he was 16 or for cursing at one of his peers at [facility]. States that he will be picked up by the police today and adds it is still possible that the people on the unit are actually police in disguise. Shares that he is certain that the security staff is observing him all day. States he feels very anxious as he knows he cannot prevent the police from taking him and that he will have to stay in [facility], likely for a long time, but he does not know how long. Interviewer explains that the treatment in the hospital is separate from the court process and that the team is focusing on his treatment, the pt adds that he knows the police will "do what they have to do". The pt denies he had ECT treatment on Friday (10/11) and explains he was sent back by the people in the treatment room who would have told him not to come back again. Denies any improvement of mood symptoms as he is still very worried about his impending incarceration and cannot distract himself from these thoughts. Denies SIIP/HIIP but when asked about passive SI, he says that he will likely wake up the next morning anyway and that he could not do anything to himself on the unit. Says he eats 2 meals a day and is barely sleeping. Denies any other physical symptoms or side effects from medications.

## 2024-10-14 NOTE — BH INPATIENT PSYCHIATRY PROGRESS NOTE - NSBHMETABOLIC_PSY_ALL_CORE_FT
BMI: BMI (kg/m2): 28.7 (10-05-24 @ 15:58)  HbA1c: A1C with Estimated Average Glucose Result: 5.2 % (09-15-24 @ 06:50)    Glucose: POCT Blood Glucose.: 96 mg/dL (10-07-24 @ 14:15)    BP: 127/90 (10-11-24 @ 11:30) (120/92 - 127/90)Vital Signs Last 24 Hrs  T(C): 36.5 (10-14-24 @ 05:49), Max: 36.5 (10-14-24 @ 05:49)  T(F): 97.7 (10-14-24 @ 05:49), Max: 97.7 (10-14-24 @ 05:49)  HR: --  BP: --  BP(mean): --  RR: --  SpO2: --    Orthostatic VS  10-14-24 @ 05:49  Lying BP: --/-- HR: --  Sitting BP: 116/83 HR: 84  Standing BP: 109/83 HR: 95  Site: --  Mode: --  Orthostatic VS  10-13-24 @ 07:51  Lying BP: --/-- HR: --  Sitting BP: 125/88 HR: 106  Standing BP: 128/91 HR: 97  Site: --  Mode: --    Lipid Panel: Date/Time: 09-15-24 @ 06:50  Cholesterol, Serum: 159  LDL Cholesterol Calculated: 99  HDL Cholesterol, Serum: 43  Total Cholesterol/HDL Ration Measurement: --  Triglycerides, Serum: 87   BMI: BMI (kg/m2): 28.7 (10-05-24 @ 15:58)  HbA1c: A1C with Estimated Average Glucose Result: 5.2 % (09-15-24 @ 06:50)    Glucose: POCT Blood Glucose.: 96 mg/dL (10-07-24 @ 14:15)    BP: 127/90 (10-11-24 @ 11:30) (127/90 - 127/90)Vital Signs Last 24 Hrs  T(C): 36.5 (10-14-24 @ 05:49), Max: 36.5 (10-14-24 @ 05:49)  T(F): 97.7 (10-14-24 @ 05:49), Max: 97.7 (10-14-24 @ 05:49)  HR: --  BP: --  BP(mean): --  RR: --  SpO2: --    Orthostatic VS  10-14-24 @ 05:49  Lying BP: --/-- HR: --  Sitting BP: 116/83 HR: 84  Standing BP: 109/83 HR: 95  Site: --  Mode: --  Orthostatic VS  10-13-24 @ 07:51  Lying BP: --/-- HR: --  Sitting BP: 125/88 HR: 106  Standing BP: 128/91 HR: 97  Site: --  Mode: --    Lipid Panel: Date/Time: 09-15-24 @ 06:50  Cholesterol, Serum: 159  LDL Cholesterol Calculated: 99  HDL Cholesterol, Serum: 43  Total Cholesterol/HDL Ration Measurement: --  Triglycerides, Serum: 87   BMI: BMI (kg/m2): 28.7 (10-05-24 @ 15:58)  HbA1c: A1C with Estimated Average Glucose Result: 5.2 % (09-15-24 @ 06:50)    Glucose: POCT Blood Glucose.: 96 mg/dL (10-07-24 @ 14:15)    BP: --Vital Signs Last 24 Hrs  T(C): 36.5 (10-14-24 @ 05:49), Max: 36.5 (10-14-24 @ 05:49)  T(F): 97.7 (10-14-24 @ 05:49), Max: 97.7 (10-14-24 @ 05:49)  HR: --  BP: --  BP(mean): --  RR: --  SpO2: --    Orthostatic VS  10-14-24 @ 05:49  Lying BP: --/-- HR: --  Sitting BP: 116/83 HR: 84  Standing BP: 109/83 HR: 95  Site: --  Mode: --  Orthostatic VS  10-13-24 @ 07:51  Lying BP: --/-- HR: --  Sitting BP: 125/88 HR: 106  Standing BP: 128/91 HR: 97  Site: --  Mode: --    Lipid Panel: Date/Time: 09-15-24 @ 06:50  Cholesterol, Serum: 159  LDL Cholesterol Calculated: 99  HDL Cholesterol, Serum: 43  Total Cholesterol/HDL Ration Measurement: --  Triglycerides, Serum: 87

## 2024-10-14 NOTE — BH INPATIENT PSYCHIATRY PROGRESS NOTE - ADDITIONAL DETAILS / COMMENTS
PT has fixed delusion about imminent incarceration but amenable to ECT Pt has fixed delusion about imminent incarceration but had ECT on Friday (10/11).

## 2024-10-15 RX ORDER — LORAZEPAM 2 MG
0.5 TABLET ORAL EVERY 6 HOURS
Refills: 0 | Status: DISCONTINUED | OUTPATIENT
Start: 2024-10-15 | End: 2024-10-21

## 2024-10-15 RX ADMIN — Medication 3 MILLIGRAM(S): at 20:30

## 2024-10-15 RX ADMIN — APIXABAN 5 MILLIGRAM(S): 5 TABLET, FILM COATED ORAL at 09:29

## 2024-10-15 RX ADMIN — ARIPIPRAZOLE 5 MILLIGRAM(S): 2 TABLET ORAL at 09:29

## 2024-10-15 RX ADMIN — APIXABAN 5 MILLIGRAM(S): 5 TABLET, FILM COATED ORAL at 20:30

## 2024-10-15 RX ADMIN — Medication 225 MILLIGRAM(S): at 09:29

## 2024-10-15 RX ADMIN — Medication 2 TABLET(S): at 20:30

## 2024-10-15 RX ADMIN — Medication 10 MILLIGRAM(S): at 09:30

## 2024-10-15 RX ADMIN — MIRTAZAPINE 30 MILLIGRAM(S): 30 TABLET ORAL at 20:30

## 2024-10-15 NOTE — BH INPATIENT PSYCHIATRY PROGRESS NOTE - NSBHCHARTREVIEWVS_PSY_A_CORE FT
Vital Signs Last 24 Hrs  T(C): 36.4 (10-15-24 @ 07:59), Max: 37.1 (10-14-24 @ 12:25)  T(F): 97.5 (10-15-24 @ 07:59), Max: 98.8 (10-14-24 @ 12:25)  HR: 108 (10-14-24 @ 12:40) (88 - 112)  BP: 134/93 (10-14-24 @ 12:40) (125/90 - 147/98)  BP(mean): --  RR: 17 (10-14-24 @ 12:40) (15 - 23)  SpO2: 97% (10-14-24 @ 12:40) (93% - 98%)    Orthostatic VS  10-15-24 @ 07:59  Lying BP: --/-- HR: --  Sitting BP: 116/88 HR: 77  Standing BP: 105/76 HR: 101  Site: --  Mode: --  Orthostatic VS  10-14-24 @ 05:49  Lying BP: --/-- HR: --  Sitting BP: 116/83 HR: 84  Standing BP: 109/83 HR: 95  Site: --  Mode: --

## 2024-10-15 NOTE — BH INPATIENT PSYCHIATRY PROGRESS NOTE - CURRENT MEDICATION
MEDICATIONS  (STANDING):  amLODIPine   Tablet 10 milliGRAM(s) Oral with breakfast  apixaban 5 milliGRAM(s) Oral every 12 hours  ARIPiprazole 5 milliGRAM(s) Oral daily  melatonin. 3 milliGRAM(s) Oral at bedtime  mirtazapine 30 milliGRAM(s) Oral at bedtime  senna 2 Tablet(s) Oral at bedtime  venlafaxine  milliGRAM(s) Oral daily    MEDICATIONS  (PRN):  acetaminophen     Tablet .. 650 milliGRAM(s) Oral every 6 hours PRN Temp greater or equal to 38C (100.4F), Mild Pain (1 - 3)  aluminum hydroxide/magnesium hydroxide/simethicone Suspension 30 milliLiter(s) Oral every 4 hours PRN Dyspepsia  LORazepam     Tablet 0.5 milliGRAM(s) Oral every 6 hours PRN anxiety  polyethylene glycol 3350 17 Gram(s) Oral every 12 hours PRN obstipation

## 2024-10-15 NOTE — BH INPATIENT PSYCHIATRY PROGRESS NOTE - OTHER
Pt with significant thought ruminations, perseverating about imminent incarceration. dry oral mucosa, ot visibly distressed, grabbing his knees Mostly delusions of guilt, ruminations about wrongdoings and financial situation

## 2024-10-15 NOTE — BH INPATIENT PSYCHIATRY PROGRESS NOTE - ADDITIONAL DETAILS / COMMENTS
Pt has fixed delusion about imminent incarceration but had ECT on Friday (10/11) and yesterday (10/14). Also notable paranoia, pt asks about how resident knows about his new room and what resident is writing down when taking notes.

## 2024-10-15 NOTE — BH INPATIENT PSYCHIATRY PROGRESS NOTE - NSBHMETABOLIC_PSY_ALL_CORE_FT
BMI: BMI (kg/m2): 28.7 (10-05-24 @ 15:58)  HbA1c: A1C with Estimated Average Glucose Result: 5.2 % (09-15-24 @ 06:50)    Glucose: POCT Blood Glucose.: 96 mg/dL (10-07-24 @ 14:15)    BP: 134/93 (10-14-24 @ 12:40) (125/90 - 147/98)Vital Signs Last 24 Hrs  T(C): 36.4 (10-15-24 @ 07:59), Max: 37.1 (10-14-24 @ 12:25)  T(F): 97.5 (10-15-24 @ 07:59), Max: 98.8 (10-14-24 @ 12:25)  HR: 108 (10-14-24 @ 12:40) (88 - 112)  BP: 134/93 (10-14-24 @ 12:40) (125/90 - 147/98)  BP(mean): --  RR: 17 (10-14-24 @ 12:40) (15 - 23)  SpO2: 97% (10-14-24 @ 12:40) (93% - 98%)    Orthostatic VS  10-15-24 @ 07:59  Lying BP: --/-- HR: --  Sitting BP: 116/88 HR: 77  Standing BP: 105/76 HR: 101  Site: --  Mode: --  Orthostatic VS  10-14-24 @ 05:49  Lying BP: --/-- HR: --  Sitting BP: 116/83 HR: 84  Standing BP: 109/83 HR: 95  Site: --  Mode: --    Lipid Panel: Date/Time: 09-15-24 @ 06:50  Cholesterol, Serum: 159  LDL Cholesterol Calculated: 99  HDL Cholesterol, Serum: 43  Total Cholesterol/HDL Ration Measurement: --  Triglycerides, Serum: 87

## 2024-10-15 NOTE — BH INPATIENT PSYCHIATRY PROGRESS NOTE - NSBHFUPINTERVALHXFT_PSY_A_CORE
Chart reviewed. Case discussed with interdisciplinary team. The pt is compliant with meds, denies side effects or physical symptoms. No behavioral events reported overnight. Pt noted to be reclusive, mostly staying in his room. VSS. No PRNs needed/requested. On encounter this morning pt denies any physical complaints, is generally cooperative, but tense and anxious. Fair sleep per sleep log, but patients reports poor sleep.    Pt seen in his room, lying in bed. States he feels very anxious and denies that there has been any ECT treatment. Explains that he was simply brought downstairs to the treatment room but that no treatment took place as he was brought back to the unit after they put on electrodes. When interviewer asks about the reason they sent him back, the pt responds that nobody communicated it to him. Also says he does not remember any particular people on the ECT unit. Still denies any improvement of mood symptoms. Denies current SIIP/HIIP but does not eant to elaborate. Still reports poor sleep and appetite. No other physical symptoms or side effects from medications noted.

## 2024-10-15 NOTE — BH INPATIENT PSYCHIATRY PROGRESS NOTE - NSBHASSESSSUMMFT_PSY_ALL_CORE
Patient is a 70 yo M with history of HTN and DVT and hx of schizoaffective disorder, bipolar type, BIB sister from [facility] due to 2 months of worsening depression and psychosis. Patient with depressed mood, poor ADLs, and persecutory delusions given arguments he had with another patient where he made threats. Patient with numerous previous suicide attempts and previous ECT; he appears to have worsened after completing his course of ECT as an outpatient. He was admitted medically due to poor PO intake. Given his recent MDE and failure to thrive, he is unable to care for himself and therefore meets criteria for inpatient psychiatric hospitalization.   SH: Lives in [facility]. Retired from [employer], , no children.   PsyHx: 1x SA by cutting in 1990. Multiple hospitalizations MR Aug 2023. Was on ECT until 6 months ago.    On interview, the pt endorses depressive sxs mostly including anhedonia, decreased sleep and appetite, and lack of energy. Thought ruminations of guilt noted, in the setting of recent statement towards other tenant at [facility]. Pt experienced AH that include degrading voices that seem to have commanding character but so far no indication of voices directly telling him to hurt himself or others. Pt likely experiencing mood episode with psychotic features, affect noted to be mood-congruent. R/O substance-induced mood and/or psychotic d/o.    10/2-10/3: Pt very anxious and fearful, fixed delusion about imminent incarceration and worrying about not being able to pay his hospital fees and housing in the future. Pt not acutely suicidal, denies active SIIP/HIIP but feels "tortured" by his anxiety and says on 10/3 that not being alive would "not be a bad mikey". Pt with persistent insomnia and low appetite. Currently no AH. Mirtazapine increased from 15 mg to 30 mg to address depressive symptoms, low appetite, and insomnia. Pt provided contact number of sister: [phone].      10/4: Pt very anxious and fearful, fixed delusion about inevitable incarceration and worrying about not being able to pay his hospital fees and housing in the future. Pt presenting as paranoid today, convinced that everyone on the unit is against him, not feeling safe to leave his room. Pt denies SIIP/HIIP but feels hopeless and helpless. Ongoing insomnia and low appetite. Currently no AH. Low appetite (only 1 meal per day), and insomnia. Will further discuss alternative treatment options given no significant improvement on Mirtazapine. Will initiate family meeting next week to discuss ECT, sister, brother and father agree that ECT helped in the past and may need to be restarted.    10/5: remains paranoid, seems to tolerating Abilify well. QTc 429. Continue Abilify 2mg/day as is.   10/6: poor PO intake, appears dehydrated, check CMP tomorrow; Continue Abilify to 2mg po daily  10/7: still poor PO intake, Sodium 132, glucose 54; repeat CMP, same delusions noted, although pt more ambivalent about ECT, family meeting expected tomorrow (10/08)  10/8: still poor PO intake, repeat CMP WNL, same fixed delusions noted, during family meeting with brother shahrzad (phone call), pt with significant thought ruminations, perseverating about imminent incarceration and inability to cover treatment costs, denies that ECT helped him in the past and is worried about side effect burden. Pt in visible distress with increased psychomotor activity. Will approach pt again and offer ECT when pt is less anxious.    10/9: still poor PO intake, same fixed delusions noted, significant thought ruminations, perseverating about imminent incarceration and inability to cover treatment costs, still refuses ECT. Pt shows paranoia regarding the treatment team cooperating with the police and knowing all information about him that would have led to his current charges.    10/10: poor PO intake, same fixed delusions, perceives other patients as police in disguise, perseverating about imminent incarceration but amenable to ECT. Pt denies SIIP/HIIP.    10/11: Completed ECT #1 today. Continues to endorse paranoid delusions. Perceives other patients and staff as undercover police or members of a TV crew documenting his arrest.     10/14: Continues to endorse paranoid delusions. Perceives other patients and staff as undercover police. PT still isolative in his room, has 1-2 meals per day. No AH. No active SIIP/HIIP, but pt is vague about passive SI.     10/15: Continues to endorse paranoid delusions, is suspicious about his current environment, asks about how resident knows about his new room and what resident is writing down when taking notes. Denies having had any ECT treatment. Perceives other patients and staff as undercover police. Pt has poor sleep and appetite and is still isolative in his room. No active SIIP/HIIP, but pt still vague about passive SI. No significant mood improvement from ECT noted so far although pt generally seems more alert and is showing fewer physical signs of anxiety such as shaking.    working diagnosis: schizoaffective disorder, current depressive episode w/ psychosis. ddx bipolar type per previous notes, however no h/o manic sxs elicited in current interview.    Plan:   1. Legal: admitted on 9.13 voluntary status  2. Safety:  Denies SI/SIB/HI/VI; continue routine observation.  3. Psychiatric:   - ECT #3 scheduled for Wednesday 10/16  - Abilify 5 mg PO QD   - inc 10/8  - Venlafaxine  mg daily (depression)  	- Home medication  - Mirtazapine 30 mg QHS (depression)  	- Started during [facility] hospitalization   	- inc 10/2  HOLD Olanzapine 25 mg QHS (psychosis)  	- Home medication, held due to prolonged QTc per recommendation of C/L team. Will repeat EKG.  	- Primary team to address    Agitation PRNs: Olanzapine PO/IM, Lorazepam PO    4. Medical conditions, other medication, consults  a) DVT  - Eliquis 5 mg BID  b) Constipation   - Senna 2 tabs QHS  c) HTN  - Amlodipine 10 mg daily  d) PT consult: fall precautions, recommend ambulating with walker   5. Psychosocial interventions  - Patient provided verbal consent to speak with TBD  - CO 1:1: Not required  - Restrictions/allowances: None   6. Collateral: outpatient psychiatrist Dr. Irby on 6/2/23 on [facility] and handoff from Dr. Hernandez. Collateral from sister. See  note.   7. Disposition: When stable.

## 2024-10-16 PROCEDURE — 90870 ELECTROCONVULSIVE THERAPY: CPT

## 2024-10-16 PROCEDURE — 99232 SBSQ HOSP IP/OBS MODERATE 35: CPT | Mod: 25

## 2024-10-16 RX ADMIN — MIRTAZAPINE 30 MILLIGRAM(S): 30 TABLET ORAL at 21:25

## 2024-10-16 RX ADMIN — Medication 3 MILLIGRAM(S): at 21:25

## 2024-10-16 RX ADMIN — APIXABAN 5 MILLIGRAM(S): 5 TABLET, FILM COATED ORAL at 21:25

## 2024-10-16 RX ADMIN — ARIPIPRAZOLE 5 MILLIGRAM(S): 2 TABLET ORAL at 10:05

## 2024-10-16 RX ADMIN — APIXABAN 5 MILLIGRAM(S): 5 TABLET, FILM COATED ORAL at 10:05

## 2024-10-16 RX ADMIN — Medication 2 TABLET(S): at 21:25

## 2024-10-16 RX ADMIN — Medication 225 MILLIGRAM(S): at 10:05

## 2024-10-16 NOTE — BH INPATIENT PSYCHIATRY PROGRESS NOTE - NSBHCHARTREVIEWVS_PSY_A_CORE FT
Vital Signs Last 24 Hrs  T(C): 36.9 (10-16-24 @ 09:20), Max: 36.9 (10-16-24 @ 09:20)  T(F): 98.5 (10-16-24 @ 09:20), Max: 98.5 (10-16-24 @ 09:20)  HR: 104 (10-16-24 @ 09:20) (79 - 117)  BP: 136/99 (10-16-24 @ 09:20) (117/83 - 139/101)  BP(mean): --  RR: 17 (10-16-24 @ 09:20) (17 - 20)  SpO2: 96% (10-16-24 @ 09:20) (94% - 97%)    Orthostatic VS  10-16-24 @ 05:42  Lying BP: --/-- HR: --  Sitting BP: 116/88 HR: 90  Standing BP: 101/79 HR: 83  Site: --  Mode: --  Orthostatic VS  10-15-24 @ 07:59  Lying BP: --/-- HR: --  Sitting BP: 116/88 HR: 77  Standing BP: 105/76 HR: 101  Site: --  Mode: --

## 2024-10-16 NOTE — BH INPATIENT PSYCHIATRY PROGRESS NOTE - NSBHMETABOLIC_PSY_ALL_CORE_FT
BMI: BMI (kg/m2): 28.7 (10-05-24 @ 15:58)  HbA1c: A1C with Estimated Average Glucose Result: 5.2 % (09-15-24 @ 06:50)    Glucose: POCT Blood Glucose.: 96 mg/dL (10-07-24 @ 14:15)    BP: 136/99 (10-16-24 @ 09:20) (117/83 - 147/98)Vital Signs Last 24 Hrs  T(C): 36.9 (10-16-24 @ 09:20), Max: 36.9 (10-16-24 @ 09:20)  T(F): 98.5 (10-16-24 @ 09:20), Max: 98.5 (10-16-24 @ 09:20)  HR: 104 (10-16-24 @ 09:20) (79 - 117)  BP: 136/99 (10-16-24 @ 09:20) (117/83 - 139/101)  BP(mean): --  RR: 17 (10-16-24 @ 09:20) (17 - 20)  SpO2: 96% (10-16-24 @ 09:20) (94% - 97%)    Orthostatic VS  10-16-24 @ 05:42  Lying BP: --/-- HR: --  Sitting BP: 116/88 HR: 90  Standing BP: 101/79 HR: 83  Site: --  Mode: --  Orthostatic VS  10-15-24 @ 07:59  Lying BP: --/-- HR: --  Sitting BP: 116/88 HR: 77  Standing BP: 105/76 HR: 101  Site: --  Mode: --    Lipid Panel: Date/Time: 09-15-24 @ 06:50  Cholesterol, Serum: 159  LDL Cholesterol Calculated: 99  HDL Cholesterol, Serum: 43  Total Cholesterol/HDL Ration Measurement: --  Triglycerides, Serum: 87

## 2024-10-16 NOTE — BH INPATIENT PSYCHIATRY PROGRESS NOTE - NSBHFUPINTERVALHXFT_PSY_A_CORE
Chart reviewed. Case discussed with interdisciplinary team. The pt is compliant with meds, denies side effects or physical symptoms. No behavioral events reported overnight. Pt noted to be reclusive, mostly staying in his room. VSS. No PRNs needed/requested.    Completed ECT #3 this morning. Fort the 1st time this admission pt was seen outside his room during rounds. He was sitting in the dayroom after ECT. Once again, pt reports he wasn't treated because "I don't remember being put under". Writer normalized feeling forgetful after ECT and not recalling going to sleep from ther anesthesia or the treatment itself. Pt continues to report feeling anxious and states he is only in the dayroom because staff told him he had to wait there after ECT. He appears calmer.

## 2024-10-16 NOTE — BH INPATIENT PSYCHIATRY PROGRESS NOTE - NSBHASSESSSUMMFT_PSY_ALL_CORE
Patient is a 70 yo M with history of HTN and DVT and hx of schizoaffective disorder, bipolar type, BIB sister from [facility] due to 2 months of worsening depression and psychosis. Patient with depressed mood, poor ADLs, and persecutory delusions given arguments he had with another patient where he made threats. Patient with numerous previous suicide attempts and previous ECT; he appears to have worsened after completing his course of ECT as an outpatient. He was admitted medically due to poor PO intake. Given his recent MDE and failure to thrive, he is unable to care for himself and therefore meets criteria for inpatient psychiatric hospitalization.   SH: Lives in [facility]. Retired from [employer], , no children.   PsyHx: 1x SA by cutting in 1990. Multiple hospitalizations MR Aug 2023. Was on ECT until 6 months ago.    On interview, the pt endorses depressive sxs mostly including anhedonia, decreased sleep and appetite, and lack of energy. Thought ruminations of guilt noted, in the setting of recent statement towards other tenant at [facility]. Pt experienced AH that include degrading voices that seem to have commanding character but so far no indication of voices directly telling him to hurt himself or others. Pt likely experiencing mood episode with psychotic features, affect noted to be mood-congruent. R/O substance-induced mood and/or psychotic d/o.    10/2-10/3: Pt very anxious and fearful, fixed delusion about imminent incarceration and worrying about not being able to pay his hospital fees and housing in the future. Pt not acutely suicidal, denies active SIIP/HIIP but feels "tortured" by his anxiety and says on 10/3 that not being alive would "not be a bad mikey". Pt with persistent insomnia and low appetite. Currently no AH. Mirtazapine increased from 15 mg to 30 mg to address depressive symptoms, low appetite, and insomnia. Pt provided contact number of sister: [phone].      10/4: Pt very anxious and fearful, fixed delusion about inevitable incarceration and worrying about not being able to pay his hospital fees and housing in the future. Pt presenting as paranoid today, convinced that everyone on the unit is against him, not feeling safe to leave his room. Pt denies SIIP/HIIP but feels hopeless and helpless. Ongoing insomnia and low appetite. Currently no AH. Low appetite (only 1 meal per day), and insomnia. Will further discuss alternative treatment options given no significant improvement on Mirtazapine. Will initiate family meeting next week to discuss ECT, sister, brother and father agree that ECT helped in the past and may need to be restarted.    10/5: remains paranoid, seems to tolerating Abilify well. QTc 429. Continue Abilify 2mg/day as is.   10/6: poor PO intake, appears dehydrated, check CMP tomorrow; Continue Abilify to 2mg po daily  10/7: still poor PO intake, Sodium 132, glucose 54; repeat CMP, same delusions noted, although pt more ambivalent about ECT, family meeting expected tomorrow (10/08)  10/8: still poor PO intake, repeat CMP WNL, same fixed delusions noted, during family meeting with brother shahrzad (phone call), pt with significant thought ruminations, perseverating about imminent incarceration and inability to cover treatment costs, denies that ECT helped him in the past and is worried about side effect burden. Pt in visible distress with increased psychomotor activity. Will approach pt again and offer ECT when pt is less anxious.    10/9: still poor PO intake, same fixed delusions noted, significant thought ruminations, perseverating about imminent incarceration and inability to cover treatment costs, still refuses ECT. Pt shows paranoia regarding the treatment team cooperating with the police and knowing all information about him that would have led to his current charges.    10/10: poor PO intake, same fixed delusions, perceives other patients as police in disguise, perseverating about imminent incarceration but amenable to ECT. Pt denies SIIP/HIIP.    10/11: Completed ECT #1 today. Continues to endorse paranoid delusions. Perceives other patients and staff as undercover police or members of a TV crew documenting his arrest.     10/14: Continues to endorse paranoid delusions. Perceives other patients and staff as undercover police. PT still isolative in his room, has 1-2 meals per day. No AH. No active SIIP/HIIP, but pt is vague about passive SI.     10/15: Continues to endorse paranoid delusions, is suspicious about his current environment, asks about how resident knows about his new room and what resident is writing down when taking notes. Denies having had any ECT treatment. Perceives other patients and staff as undercover police. Pt has poor sleep and appetite and is still isolative in his room. No active SIIP/HIIP, but pt still vague about passive SI. No significant mood improvement from ECT noted so far although pt generally seems more alert and is showing fewer physical signs of anxiety such as shaking.    10/16: Tolerating ECT, appears slightly less anxious today after treatment. Will continue ECT this Friday 10/18.     working diagnosis: schizoaffective disorder, current depressive episode w/ psychosis. ddx bipolar type per previous notes, however no h/o manic sxs elicited in current interview.    Plan:   1. Legal: admitted on 9.13 voluntary status  2. Safety:  Denies SI/SIB/HI/VI; continue routine observation.  3. Psychiatric:   - ECT #4 scheduled for Wednesday 10/18  - Abilify 5 mg PO QD   - inc 10/8  - Venlafaxine  mg daily (depression)  	- Home medication  - Mirtazapine 30 mg QHS (depression)  	- Started during [facility] hospitalization   	- inc 10/2  HOLD Olanzapine 25 mg QHS (psychosis)  	- Home medication, held due to prolonged QTc per recommendation of C/L team. Will repeat EKG.  	- Primary team to address    Agitation PRNs: Olanzapine PO/IM, Lorazepam PO    4. Medical conditions, other medication, consults  a) DVT  - Eliquis 5 mg BID  b) Constipation   - Senna 2 tabs QHS  c) HTN  - Amlodipine 10 mg daily  d) PT consult: fall precautions, recommend ambulating with walker   5. Psychosocial interventions  - Patient provided verbal consent to speak with TBD  - CO 1:1: Not required  - Restrictions/allowances: None   6. Collateral: outpatient psychiatrist Dr. Irby on 6/2/23 on [facility] and handoff from Dr. Hernandez. Collateral from sister. See  note.   7. Disposition: When stable.

## 2024-10-17 PROCEDURE — 99232 SBSQ HOSP IP/OBS MODERATE 35: CPT

## 2024-10-17 RX ADMIN — Medication 10 MILLIGRAM(S): at 08:44

## 2024-10-17 RX ADMIN — ARIPIPRAZOLE 5 MILLIGRAM(S): 2 TABLET ORAL at 08:44

## 2024-10-17 RX ADMIN — MIRTAZAPINE 30 MILLIGRAM(S): 30 TABLET ORAL at 20:22

## 2024-10-17 RX ADMIN — Medication 3 MILLIGRAM(S): at 20:22

## 2024-10-17 RX ADMIN — Medication 2 TABLET(S): at 20:22

## 2024-10-17 RX ADMIN — APIXABAN 5 MILLIGRAM(S): 5 TABLET, FILM COATED ORAL at 08:44

## 2024-10-17 RX ADMIN — APIXABAN 5 MILLIGRAM(S): 5 TABLET, FILM COATED ORAL at 20:22

## 2024-10-17 RX ADMIN — Medication 225 MILLIGRAM(S): at 08:44

## 2024-10-17 NOTE — BH INPATIENT PSYCHIATRY PROGRESS NOTE - NSBHASSESSSUMMFT_PSY_ALL_CORE
Patient is a 68 yo M with history of HTN and DVT and hx of schizoaffective disorder, bipolar type, BIB sister from [facility] due to 2 months of worsening depression and psychosis. Patient with depressed mood, poor ADLs, and persecutory delusions given arguments he had with another patient where he made threats. Patient with numerous previous suicide attempts and previous ECT; he appears to have worsened after completing his course of ECT as an outpatient. He was admitted medically due to poor PO intake. Given his recent MDE and failure to thrive, he is unable to care for himself and therefore meets criteria for inpatient psychiatric hospitalization.   SH: Lives in [facility]. Retired from [employer], , no children.   PsyHx: 1x SA by cutting in 1990. Multiple hospitalizations MR Aug 2023. Was on ECT until 6 months ago.    On interview, the pt endorses depressive sxs mostly including anhedonia, decreased sleep and appetite, and lack of energy. Thought ruminations of guilt noted, in the setting of recent statement towards other tenant at [facility]. Pt experienced AH that include degrading voices that seem to have commanding character but so far no indication of voices directly telling him to hurt himself or others. Pt likely experiencing mood episode with psychotic features, affect noted to be mood-congruent. R/O substance-induced mood and/or psychotic d/o.    10/2-10/3: Pt very anxious and fearful, fixed delusion about imminent incarceration and worrying about not being able to pay his hospital fees and housing in the future. Pt not acutely suicidal, denies active SIIP/HIIP but feels "tortured" by his anxiety and says on 10/3 that not being alive would "not be a bad mikey". Pt with persistent insomnia and low appetite. Currently no AH. Mirtazapine increased from 15 mg to 30 mg to address depressive symptoms, low appetite, and insomnia. Pt provided contact number of sister: [phone].      10/4: Pt very anxious and fearful, fixed delusion about inevitable incarceration and worrying about not being able to pay his hospital fees and housing in the future. Pt presenting as paranoid today, convinced that everyone on the unit is against him, not feeling safe to leave his room. Pt denies SIIP/HIIP but feels hopeless and helpless. Ongoing insomnia and low appetite. Currently no AH. Low appetite (only 1 meal per day), and insomnia. Will further discuss alternative treatment options given no significant improvement on Mirtazapine. Will initiate family meeting next week to discuss ECT, sister, brother and father agree that ECT helped in the past and may need to be restarted.    10/5: remains paranoid, seems to tolerating Abilify well. QTc 429. Continue Abilify 2mg/day as is.   10/6: poor PO intake, appears dehydrated, check CMP tomorrow; Continue Abilify to 2mg po daily  10/7: still poor PO intake, Sodium 132, glucose 54; repeat CMP, same delusions noted, although pt more ambivalent about ECT, family meeting expected tomorrow (10/08)  10/8: still poor PO intake, repeat CMP WNL, same fixed delusions noted, during family meeting with brother shahrzad (phone call), pt with significant thought ruminations, perseverating about imminent incarceration and inability to cover treatment costs, denies that ECT helped him in the past and is worried about side effect burden. Pt in visible distress with increased psychomotor activity. Will approach pt again and offer ECT when pt is less anxious.    10/9: still poor PO intake, same fixed delusions noted, significant thought ruminations, perseverating about imminent incarceration and inability to cover treatment costs, still refuses ECT. Pt shows paranoia regarding the treatment team cooperating with the police and knowing all information about him that would have led to his current charges.    10/10: poor PO intake, same fixed delusions, perceives other patients as police in disguise, perseverating about imminent incarceration but amenable to ECT. Pt denies SIIP/HIIP.    10/11: Completed ECT #1 today. Continues to endorse paranoid delusions. Perceives other patients and staff as undercover police or members of a TV crew documenting his arrest.     10/14: Continues to endorse paranoid delusions. Perceives other patients and staff as undercover police. PT still isolative in his room, has 1-2 meals per day. No AH. No active SIIP/HIIP, but pt is vague about passive SI.     10/15: Continues to endorse paranoid delusions, is suspicious about his current environment, asks about how resident knows about his new room and what resident is writing down when taking notes. Denies having had any ECT treatment. Perceives other patients and staff as undercover police. Pt has poor sleep and appetite and is still isolative in his room. No active SIIP/HIIP, but pt still vague about passive SI. No significant mood improvement from ECT noted so far although pt generally seems more alert and is showing fewer physical signs of anxiety such as shaking.    10/16: Tolerating ECT, appears slightly less anxious today after treatment. Will continue ECT this Friday 10/18.     10/17: Pt does not bring up beliefs of going to detention today but remains anxious, endorsing sense of impending doom and isolative. ECT #4 scheduled for tomorrow 10/18.     working diagnosis: schizoaffective disorder, current depressive episode w/ psychosis. ddx bipolar type per previous notes, however no h/o manic sxs elicited in current interview.    Plan:   1. Legal: admitted on 9.13 voluntary status  2. Safety:  Denies SI/SIB/HI/VI; continue routine observation.  3. Psychiatric:   - ECT #4 scheduled for Wednesday 10/18  - Abilify 5 mg PO QD   - inc 10/8  - Venlafaxine  mg daily (depression)  	- Home medication  - Mirtazapine 30 mg QHS (depression)  	- Started during [facility] hospitalization   	- inc 10/2  HOLD Olanzapine 25 mg QHS (psychosis)  	- Home medication, held due to prolonged QTc per recommendation of C/L team. Will repeat EKG.  	- Primary team to address    Agitation PRNs: Olanzapine PO/IM, Lorazepam PO    4. Medical conditions, other medication, consults  a) DVT  - Eliquis 5 mg BID  b) Constipation   - Senna 2 tabs QHS  c) HTN  - Amlodipine 10 mg daily  d) PT consult: fall precautions, recommend ambulating with walker   5. Psychosocial interventions  - Patient provided verbal consent to speak with TBD  - CO 1:1: Not required  - Restrictions/allowances: None   6. Collateral: outpatient psychiatrist Dr. Irby on 6/2/23 on [facility] and handoff from Dr. Hernandez. Collateral from sister. See  note.   7. Disposition: When stable.

## 2024-10-17 NOTE — BH INPATIENT PSYCHIATRY PROGRESS NOTE - OTHER
dry oral mucosa, ot visibly distressed, grabbing his knees Pt with significant thought ruminations, perseverating about imminent incarceration. Mostly delusions of guilt, ruminations about wrongdoings and financial situation

## 2024-10-17 NOTE — BH INPATIENT PSYCHIATRY PROGRESS NOTE - NSBHMETABOLIC_PSY_ALL_CORE_FT
BMI: BMI (kg/m2): 28.7 (10-05-24 @ 15:58)  HbA1c: A1C with Estimated Average Glucose Result: 5.2 % (09-15-24 @ 06:50)    Glucose: POCT Blood Glucose.: 96 mg/dL (10-07-24 @ 14:15)    BP: 136/99 (10-16-24 @ 09:20) (117/83 - 139/101)Vital Signs Last 24 Hrs  T(C): 36.3 (10-17-24 @ 07:46), Max: 36.3 (10-17-24 @ 07:46)  T(F): 97.4 (10-17-24 @ 07:46), Max: 97.4 (10-17-24 @ 07:46)  HR: --  BP: --  BP(mean): --  RR: 17 (10-17-24 @ 07:46) (17 - 17)  SpO2: --    Orthostatic VS  10-17-24 @ 07:46  Lying BP: --/-- HR: --  Sitting BP: 114/64 HR: 61  Standing BP: 107/70 HR: 83  Site: --  Mode: --  Orthostatic VS  10-16-24 @ 05:42  Lying BP: --/-- HR: --  Sitting BP: 116/88 HR: 90  Standing BP: 101/79 HR: 83  Site: --  Mode: --    Lipid Panel: Date/Time: 09-15-24 @ 06:50  Cholesterol, Serum: 159  LDL Cholesterol Calculated: 99  HDL Cholesterol, Serum: 43  Total Cholesterol/HDL Ration Measurement: --  Triglycerides, Serum: 87

## 2024-10-17 NOTE — BH INPATIENT PSYCHIATRY PROGRESS NOTE - NSBHCHARTREVIEWVS_PSY_A_CORE FT
Vital Signs Last 24 Hrs  T(C): 36.3 (10-17-24 @ 07:46), Max: 36.3 (10-17-24 @ 07:46)  T(F): 97.4 (10-17-24 @ 07:46), Max: 97.4 (10-17-24 @ 07:46)  HR: --  BP: --  BP(mean): --  RR: 17 (10-17-24 @ 07:46) (17 - 17)  SpO2: --    Orthostatic VS  10-17-24 @ 07:46  Lying BP: --/-- HR: --  Sitting BP: 114/64 HR: 61  Standing BP: 107/70 HR: 83  Site: --  Mode: --  Orthostatic VS  10-16-24 @ 05:42  Lying BP: --/-- HR: --  Sitting BP: 116/88 HR: 90  Standing BP: 101/79 HR: 83  Site: --  Mode: --

## 2024-10-17 NOTE — BH INPATIENT PSYCHIATRY PROGRESS NOTE - NSBHFUPINTERVALHXFT_PSY_A_CORE
Chart reviewed. Case discussed with interdisciplinary team. The pt is compliant with meds, denies side effects or physical symptoms. No behavioral events reported overnight. Pt noted to be reclusive, mostly staying in his room. VSS. No PRNs needed/requested.    Completed ECT #3 yesterday. Remains isolative to self and stays most of the day in his room. Does not participate in groups. On encounter today contineus to report feeling anxious about "what is going to happen to me" but when asked to elaborate he does not bring up beliefs about going to [facility]. Appears more preoccupied with ECT and belief that they are not "putting me under" for the treatment.

## 2024-10-18 PROCEDURE — 99232 SBSQ HOSP IP/OBS MODERATE 35: CPT | Mod: 25

## 2024-10-18 PROCEDURE — 90870 ELECTROCONVULSIVE THERAPY: CPT

## 2024-10-18 RX ORDER — ARIPIPRAZOLE 2 MG/1
7 TABLET ORAL DAILY
Refills: 0 | Status: DISCONTINUED | OUTPATIENT
Start: 2024-10-19 | End: 2024-10-22

## 2024-10-18 RX ADMIN — APIXABAN 5 MILLIGRAM(S): 5 TABLET, FILM COATED ORAL at 21:46

## 2024-10-18 RX ADMIN — Medication 10 MILLIGRAM(S): at 11:49

## 2024-10-18 RX ADMIN — ARIPIPRAZOLE 5 MILLIGRAM(S): 2 TABLET ORAL at 11:48

## 2024-10-18 RX ADMIN — Medication 3 MILLIGRAM(S): at 21:46

## 2024-10-18 RX ADMIN — Medication 225 MILLIGRAM(S): at 11:47

## 2024-10-18 RX ADMIN — Medication 2 TABLET(S): at 21:46

## 2024-10-18 RX ADMIN — APIXABAN 5 MILLIGRAM(S): 5 TABLET, FILM COATED ORAL at 11:48

## 2024-10-18 RX ADMIN — MIRTAZAPINE 30 MILLIGRAM(S): 30 TABLET ORAL at 21:46

## 2024-10-18 NOTE — BH INPATIENT PSYCHIATRY PROGRESS NOTE - NSBHFUPINTERVALHXFT_PSY_A_CORE
Chart reviewed. Case discussed with interdisciplinary team. The pt is compliant with meds, denies side effects or physical symptoms. No behavioral events reported overnight. Pt noted to be reclusive, mostly staying in his room. VSS. No PRNs needed/requested.    Completed ECT #4 today. Pt is more visible in the unit and is no longer endorsing belief he is bound for nursing home. Remains suspicious and paranoid however. Continues to endorse belief ECT is just taking him down to the suite but not treating him "they never put me under". Endorses paranoia towards writer today and "fires" writer: "I know what's going on here, you're keeping me here and sending me down there just to make more money out of me. I have nothing else to say to you, we're done".

## 2024-10-18 NOTE — BH INPATIENT PSYCHIATRY PROGRESS NOTE - NSBHMETABOLIC_PSY_ALL_CORE_FT
BMI: BMI (kg/m2): 28.7 (10-05-24 @ 15:58)  HbA1c: A1C with Estimated Average Glucose Result: 5.2 % (09-15-24 @ 06:50)    Glucose: POCT Blood Glucose.: 96 mg/dL (10-07-24 @ 14:15)    BP: 123/78 (10-18-24 @ 11:40) (113/81 - 146/94)Vital Signs Last 24 Hrs  T(C): 36.9 (10-18-24 @ 10:30), Max: 36.9 (10-18-24 @ 09:32)  T(F): 98.4 (10-18-24 @ 10:30), Max: 98.5 (10-18-24 @ 09:32)  HR: 80 (10-18-24 @ 11:40) (80 - 106)  BP: 123/78 (10-18-24 @ 11:40) (113/81 - 146/94)  BP(mean): --  RR: 16 (10-18-24 @ 11:40) (16 - 21)  SpO2: 97% (10-18-24 @ 10:30) (94% - 98%)    Orthostatic VS  10-18-24 @ 05:42  Lying BP: --/-- HR: --  Sitting BP: 115/88 HR: 78  Standing BP: 104/76 HR: 91  Site: upper left arm  Mode: electronic  Orthostatic VS  10-17-24 @ 07:46  Lying BP: --/-- HR: --  Sitting BP: 114/64 HR: 61  Standing BP: 107/70 HR: 83  Site: --  Mode: --    Lipid Panel: Date/Time: 09-15-24 @ 06:50  Cholesterol, Serum: 159  LDL Cholesterol Calculated: 99  HDL Cholesterol, Serum: 43  Total Cholesterol/HDL Ration Measurement: --  Triglycerides, Serum: 87

## 2024-10-18 NOTE — BH INPATIENT PSYCHIATRY PROGRESS NOTE - NSBHCHARTREVIEWVS_PSY_A_CORE FT
Vital Signs Last 24 Hrs  T(C): 36.9 (10-18-24 @ 10:30), Max: 36.9 (10-18-24 @ 09:32)  T(F): 98.4 (10-18-24 @ 10:30), Max: 98.5 (10-18-24 @ 09:32)  HR: 80 (10-18-24 @ 11:40) (80 - 106)  BP: 123/78 (10-18-24 @ 11:40) (113/81 - 146/94)  BP(mean): --  RR: 16 (10-18-24 @ 11:40) (16 - 21)  SpO2: 97% (10-18-24 @ 10:30) (94% - 98%)    Orthostatic VS  10-18-24 @ 05:42  Lying BP: --/-- HR: --  Sitting BP: 115/88 HR: 78  Standing BP: 104/76 HR: 91  Site: upper left arm  Mode: electronic  Orthostatic VS  10-17-24 @ 07:46  Lying BP: --/-- HR: --  Sitting BP: 114/64 HR: 61  Standing BP: 107/70 HR: 83  Site: --  Mode: --

## 2024-10-18 NOTE — BH INPATIENT PSYCHIATRY PROGRESS NOTE - OTHER
Pt with significant thought ruminations, perseverating about imminent incarceration. Mostly delusions of guilt, ruminations about wrongdoings and financial situation dry oral mucosa, ot visibly distressed, grabbing his knees

## 2024-10-18 NOTE — BH INPATIENT PSYCHIATRY PROGRESS NOTE - NSBHASSESSSUMMFT_PSY_ALL_CORE
Patient is a 68 yo M with history of HTN and DVT and hx of schizoaffective disorder, bipolar type, BIB sister from [facility] due to 2 months of worsening depression and psychosis. Patient with depressed mood, poor ADLs, and persecutory delusions given arguments he had with another patient where he made threats. Patient with numerous previous suicide attempts and previous ECT; he appears to have worsened after completing his course of ECT as an outpatient. He was admitted medically due to poor PO intake. Given his recent MDE and failure to thrive, he is unable to care for himself and therefore meets criteria for inpatient psychiatric hospitalization.   SH: Lives in [facility]. Retired from [employer], , no children.   PsyHx: 1x SA by cutting in 1990. Multiple hospitalizations MR Aug 2023. Was on ECT until 6 months ago.    On interview, the pt endorses depressive sxs mostly including anhedonia, decreased sleep and appetite, and lack of energy. Thought ruminations of guilt noted, in the setting of recent statement towards other tenant at [facility]. Pt experienced AH that include degrading voices that seem to have commanding character but so far no indication of voices directly telling him to hurt himself or others. Pt likely experiencing mood episode with psychotic features, affect noted to be mood-congruent. R/O substance-induced mood and/or psychotic d/o.    10/2-10/3: Pt very anxious and fearful, fixed delusion about imminent incarceration and worrying about not being able to pay his hospital fees and housing in the future. Pt not acutely suicidal, denies active SIIP/HIIP but feels "tortured" by his anxiety and says on 10/3 that not being alive would "not be a bad mikey". Pt with persistent insomnia and low appetite. Currently no AH. Mirtazapine increased from 15 mg to 30 mg to address depressive symptoms, low appetite, and insomnia. Pt provided contact number of sister: [phone].      10/4: Pt very anxious and fearful, fixed delusion about inevitable incarceration and worrying about not being able to pay his hospital fees and housing in the future. Pt presenting as paranoid today, convinced that everyone on the unit is against him, not feeling safe to leave his room. Pt denies SIIP/HIIP but feels hopeless and helpless. Ongoing insomnia and low appetite. Currently no AH. Low appetite (only 1 meal per day), and insomnia. Will further discuss alternative treatment options given no significant improvement on Mirtazapine. Will initiate family meeting next week to discuss ECT, sister, brother and father agree that ECT helped in the past and may need to be restarted.    10/5: remains paranoid, seems to tolerating Abilify well. QTc 429. Continue Abilify 2mg/day as is.   10/6: poor PO intake, appears dehydrated, check CMP tomorrow; Continue Abilify to 2mg po daily  10/7: still poor PO intake, Sodium 132, glucose 54; repeat CMP, same delusions noted, although pt more ambivalent about ECT, family meeting expected tomorrow (10/08)  10/8: still poor PO intake, repeat CMP WNL, same fixed delusions noted, during family meeting with brother shahrzad (phone call), pt with significant thought ruminations, perseverating about imminent incarceration and inability to cover treatment costs, denies that ECT helped him in the past and is worried about side effect burden. Pt in visible distress with increased psychomotor activity. Will approach pt again and offer ECT when pt is less anxious.    10/9: still poor PO intake, same fixed delusions noted, significant thought ruminations, perseverating about imminent incarceration and inability to cover treatment costs, still refuses ECT. Pt shows paranoia regarding the treatment team cooperating with the police and knowing all information about him that would have led to his current charges.    10/10: poor PO intake, same fixed delusions, perceives other patients as police in disguise, perseverating about imminent incarceration but amenable to ECT. Pt denies SIIP/HIIP.    10/11: Completed ECT #1 today. Continues to endorse paranoid delusions. Perceives other patients and staff as undercover police or members of a TV crew documenting his arrest.     10/14: Continues to endorse paranoid delusions. Perceives other patients and staff as undercover police. PT still isolative in his room, has 1-2 meals per day. No AH. No active SIIP/HIIP, but pt is vague about passive SI.     10/15: Continues to endorse paranoid delusions, is suspicious about his current environment, asks about how resident knows about his new room and what resident is writing down when taking notes. Denies having had any ECT treatment. Perceives other patients and staff as undercover police. Pt has poor sleep and appetite and is still isolative in his room. No active SIIP/HIIP, but pt still vague about passive SI. No significant mood improvement from ECT noted so far although pt generally seems more alert and is showing fewer physical signs of anxiety such as shaking.    10/16: Tolerating ECT, appears slightly less anxious today after treatment. Will continue ECT this Friday 10/18.     10/17: Pt does not bring up beliefs of going to [facility] today but remains anxious, endorsing sense of impending doom and isolative. ECT #4 scheduled for tomorrow 10/18.     10/18: Pt is more visible in the unit and seems calmer. No longer endorsing delusions about going to [facility] but paranoia remains, now shifted towards this writer and ECT. Believes writer is keeping him in the hospital to make more money and ECT is providing some form of sham treatment where he is brought down to the suite but never given anesthesia or ECT. Will titrate Abilify to 7mg.     working diagnosis: schizoaffective disorder, current depressive episode w/ psychosis. ddx bipolar type per previous notes, however no h/o manic sxs elicited in current interview.    Plan:   1. Legal: admitted on 9.13 voluntary status  2. Safety:  Denies SI/SIB/HI/VI; continue routine observation.  3. Psychiatric:   - ECT #4 scheduled for Wednesday 10/18  - Abilify 7 mg PO QD   - inc 10/8, inc 10/18  - Venlafaxine  mg daily (depression)  	- Home medication  - Mirtazapine 30 mg QHS (depression)  	- Started during [facility] hospitalization   	- inc 10/2  HOLD Olanzapine 25 mg QHS (psychosis)  	- Home medication, held due to prolonged QTc per recommendation of C/L team. Will repeat EKG.  	- Primary team to address    Agitation PRNs: Olanzapine PO/IM, Lorazepam PO    4. Medical conditions, other medication, consults  a) DVT  - Eliquis 5 mg BID  b) Constipation   - Senna 2 tabs QHS  c) HTN  - Amlodipine 10 mg daily  d) PT consult: fall precautions, recommend ambulating with walker   5. Psychosocial interventions  - Patient provided verbal consent to speak with TBD  - CO 1:1: Not required  - Restrictions/allowances: None   6. Collateral: outpatient psychiatrist Dr. Irby on 6/2/23 on [facility] and handoff from Dr. Hernandez. Collateral from sister. See  note.   7. Disposition: When stable.

## 2024-10-19 RX ADMIN — APIXABAN 5 MILLIGRAM(S): 5 TABLET, FILM COATED ORAL at 19:44

## 2024-10-19 RX ADMIN — APIXABAN 5 MILLIGRAM(S): 5 TABLET, FILM COATED ORAL at 09:05

## 2024-10-19 RX ADMIN — Medication 225 MILLIGRAM(S): at 09:06

## 2024-10-19 RX ADMIN — Medication 3 MILLIGRAM(S): at 19:44

## 2024-10-19 RX ADMIN — MIRTAZAPINE 30 MILLIGRAM(S): 30 TABLET ORAL at 19:44

## 2024-10-19 RX ADMIN — Medication 2 TABLET(S): at 19:44

## 2024-10-19 RX ADMIN — Medication 10 MILLIGRAM(S): at 09:05

## 2024-10-19 RX ADMIN — ARIPIPRAZOLE 7 MILLIGRAM(S): 2 TABLET ORAL at 09:06

## 2024-10-20 RX ADMIN — APIXABAN 5 MILLIGRAM(S): 5 TABLET, FILM COATED ORAL at 08:15

## 2024-10-20 RX ADMIN — MIRTAZAPINE 30 MILLIGRAM(S): 30 TABLET ORAL at 23:01

## 2024-10-20 RX ADMIN — Medication 3 MILLIGRAM(S): at 23:02

## 2024-10-20 RX ADMIN — Medication 10 MILLIGRAM(S): at 08:17

## 2024-10-20 RX ADMIN — APIXABAN 5 MILLIGRAM(S): 5 TABLET, FILM COATED ORAL at 23:02

## 2024-10-20 RX ADMIN — Medication 225 MILLIGRAM(S): at 08:16

## 2024-10-20 RX ADMIN — ARIPIPRAZOLE 7 MILLIGRAM(S): 2 TABLET ORAL at 08:15

## 2024-10-21 PROCEDURE — 99232 SBSQ HOSP IP/OBS MODERATE 35: CPT | Mod: 25

## 2024-10-21 PROCEDURE — 90870 ELECTROCONVULSIVE THERAPY: CPT

## 2024-10-21 RX ORDER — LORAZEPAM 2 MG
0.5 TABLET ORAL EVERY 6 HOURS
Refills: 0 | Status: DISCONTINUED | OUTPATIENT
Start: 2024-10-21 | End: 2024-10-28

## 2024-10-21 RX ADMIN — ARIPIPRAZOLE 7 MILLIGRAM(S): 2 TABLET ORAL at 08:27

## 2024-10-21 RX ADMIN — Medication 225 MILLIGRAM(S): at 08:27

## 2024-10-21 RX ADMIN — APIXABAN 5 MILLIGRAM(S): 5 TABLET, FILM COATED ORAL at 08:28

## 2024-10-21 RX ADMIN — Medication 3 MILLIGRAM(S): at 21:10

## 2024-10-21 RX ADMIN — APIXABAN 5 MILLIGRAM(S): 5 TABLET, FILM COATED ORAL at 21:10

## 2024-10-21 RX ADMIN — Medication 10 MILLIGRAM(S): at 08:28

## 2024-10-21 RX ADMIN — MIRTAZAPINE 30 MILLIGRAM(S): 30 TABLET ORAL at 21:10

## 2024-10-21 RX ADMIN — Medication 2 TABLET(S): at 21:10

## 2024-10-21 NOTE — BH INPATIENT PSYCHIATRY PROGRESS NOTE - CURRENT MEDICATION
MEDICATIONS  (STANDING):  amLODIPine   Tablet 10 milliGRAM(s) Oral with breakfast  apixaban 5 milliGRAM(s) Oral every 12 hours  ARIPiprazole 7 milliGRAM(s) Oral daily  melatonin. 3 milliGRAM(s) Oral at bedtime  mirtazapine 30 milliGRAM(s) Oral at bedtime  senna 2 Tablet(s) Oral at bedtime  venlafaxine  milliGRAM(s) Oral daily    MEDICATIONS  (PRN):  acetaminophen     Tablet .. 650 milliGRAM(s) Oral every 6 hours PRN Temp greater or equal to 38C (100.4F), Mild Pain (1 - 3)  aluminum hydroxide/magnesium hydroxide/simethicone Suspension 30 milliLiter(s) Oral every 4 hours PRN Dyspepsia  LORazepam     Tablet 0.5 milliGRAM(s) Oral every 6 hours PRN anxiety  polyethylene glycol 3350 17 Gram(s) Oral every 12 hours PRN obstipation

## 2024-10-21 NOTE — BH INPATIENT PSYCHIATRY PROGRESS NOTE - NSBHCHARTREVIEWVS_PSY_A_CORE FT
Vital Signs Last 24 Hrs  T(C): 36.6 (10-21-24 @ 11:25), Max: 36.9 (10-21-24 @ 10:14)  T(F): 97.9 (10-21-24 @ 11:25), Max: 98.5 (10-21-24 @ 10:14)  HR: 81 (10-21-24 @ 11:25) (63 - 124)  BP: 120/88 (10-21-24 @ 11:25) (119/86 - 190/113)  BP(mean): --  RR: 17 (10-21-24 @ 11:25) (12 - 23)  SpO2: 97% (10-21-24 @ 11:25) (94% - 98%)    Orthostatic VS  10-21-24 @ 05:34  Lying BP: --/-- HR: --  Sitting BP: 111/88 HR: 94  Standing BP: 109/86 HR: 73  Site: --  Mode: --  Orthostatic VS  10-20-24 @ 09:42  Lying BP: --/-- HR: --  Sitting BP: 125/83 HR: 62  Standing BP: 125/89 HR: 80  Site: upper left arm  Mode: electronic

## 2024-10-21 NOTE — BH INPATIENT PSYCHIATRY PROGRESS NOTE - NSBHMETABOLIC_PSY_ALL_CORE_FT
BMI: BMI (kg/m2): 28.7 (10-05-24 @ 15:58)  HbA1c: A1C with Estimated Average Glucose Result: 5.2 % (09-15-24 @ 06:50)    Glucose: POCT Blood Glucose.: 96 mg/dL (10-07-24 @ 14:15)    BP: 120/88 (10-21-24 @ 11:25) (119/86 - 190/113)Vital Signs Last 24 Hrs  T(C): 36.6 (10-21-24 @ 11:25), Max: 36.9 (10-21-24 @ 10:14)  T(F): 97.9 (10-21-24 @ 11:25), Max: 98.5 (10-21-24 @ 10:14)  HR: 81 (10-21-24 @ 11:25) (63 - 124)  BP: 120/88 (10-21-24 @ 11:25) (119/86 - 190/113)  BP(mean): --  RR: 17 (10-21-24 @ 11:25) (12 - 23)  SpO2: 97% (10-21-24 @ 11:25) (94% - 98%)    Orthostatic VS  10-21-24 @ 05:34  Lying BP: --/-- HR: --  Sitting BP: 111/88 HR: 94  Standing BP: 109/86 HR: 73  Site: --  Mode: --  Orthostatic VS  10-20-24 @ 09:42  Lying BP: --/-- HR: --  Sitting BP: 125/83 HR: 62  Standing BP: 125/89 HR: 80  Site: upper left arm  Mode: electronic    Lipid Panel: Date/Time: 09-15-24 @ 06:50  Cholesterol, Serum: 159  LDL Cholesterol Calculated: 99  HDL Cholesterol, Serum: 43  Total Cholesterol/HDL Ration Measurement: --  Triglycerides, Serum: 87

## 2024-10-21 NOTE — BH INPATIENT PSYCHIATRY PROGRESS NOTE - NSBHFUPINTERVALHXFT_PSY_A_CORE
Chart reviewed. Case discussed with interdisciplinary team. The pt is compliant with meds. VSS. No PRNs needed/requested.    Completed ECT #5 today. Seen at bedside after treatment. Remains paranoid and suspicious of writer, engaging very superficially. Denies noticing any improvement and states "Im still the same, that's why I told you to leave last week". Endorses fear something bad is about to happen to him, mentioning [facility] for the 1st time in several days. Still believes ECT team is withholding anesthesia and not really treating him. Believes writer is keeping him in the hospital for financial gain.

## 2024-10-21 NOTE — BH INPATIENT PSYCHIATRY PROGRESS NOTE - NSBHASSESSSUMMFT_PSY_ALL_CORE
Patient is a 68 yo M with history of HTN and DVT and hx of schizoaffective disorder, bipolar type, BIB sister from [facility] due to 2 months of worsening depression and psychosis. Patient with depressed mood, poor ADLs, and persecutory delusions given arguments he had with another patient where he made threats. Patient with numerous previous suicide attempts and previous ECT; he appears to have worsened after completing his course of ECT as an outpatient. He was admitted medically due to poor PO intake. Given his recent MDE and failure to thrive, he is unable to care for himself and therefore meets criteria for inpatient psychiatric hospitalization.   SH: Lives in [facility]. Retired from [employer], , no children.   PsyHx: 1x SA by cutting in 1990. Multiple hospitalizations MR Aug 2023. Was on ECT until 6 months ago.    On interview, the pt endorses depressive sxs mostly including anhedonia, decreased sleep and appetite, and lack of energy. Thought ruminations of guilt noted, in the setting of recent statement towards other tenant at [facility]. Pt experienced AH that include degrading voices that seem to have commanding character but so far no indication of voices directly telling him to hurt himself or others. Pt likely experiencing mood episode with psychotic features, affect noted to be mood-congruent. R/O substance-induced mood and/or psychotic d/o.    10/2-10/3: Pt very anxious and fearful, fixed delusion about imminent incarceration and worrying about not being able to pay his hospital fees and housing in the future. Pt not acutely suicidal, denies active SIIP/HIIP but feels "tortured" by his anxiety and says on 10/3 that not being alive would "not be a bad mikey". Pt with persistent insomnia and low appetite. Currently no AH. Mirtazapine increased from 15 mg to 30 mg to address depressive symptoms, low appetite, and insomnia. Pt provided contact number of sister: [phone].      10/4: Pt very anxious and fearful, fixed delusion about inevitable incarceration and worrying about not being able to pay his hospital fees and housing in the future. Pt presenting as paranoid today, convinced that everyone on the unit is against him, not feeling safe to leave his room. Pt denies SIIP/HIIP but feels hopeless and helpless. Ongoing insomnia and low appetite. Currently no AH. Low appetite (only 1 meal per day), and insomnia. Will further discuss alternative treatment options given no significant improvement on Mirtazapine. Will initiate family meeting next week to discuss ECT, sister, brother and father agree that ECT helped in the past and may need to be restarted.    10/5: remains paranoid, seems to tolerating Abilify well. QTc 429. Continue Abilify 2mg/day as is.   10/6: poor PO intake, appears dehydrated, check CMP tomorrow; Continue Abilify to 2mg po daily  10/7: still poor PO intake, Sodium 132, glucose 54; repeat CMP, same delusions noted, although pt more ambivalent about ECT, family meeting expected tomorrow (10/08)  10/8: still poor PO intake, repeat CMP WNL, same fixed delusions noted, during family meeting with brother shahrzad (phone call), pt with significant thought ruminations, perseverating about imminent incarceration and inability to cover treatment costs, denies that ECT helped him in the past and is worried about side effect burden. Pt in visible distress with increased psychomotor activity. Will approach pt again and offer ECT when pt is less anxious.    10/9: still poor PO intake, same fixed delusions noted, significant thought ruminations, perseverating about imminent incarceration and inability to cover treatment costs, still refuses ECT. Pt shows paranoia regarding the treatment team cooperating with the police and knowing all information about him that would have led to his current charges.    10/10: poor PO intake, same fixed delusions, perceives other patients as police in disguise, perseverating about imminent incarceration but amenable to ECT. Pt denies SIIP/HIIP.    10/11: Completed ECT #1 today. Continues to endorse paranoid delusions. Perceives other patients and staff as undercover police or members of a TV crew documenting his arrest.     10/14: Continues to endorse paranoid delusions. Perceives other patients and staff as undercover police. PT still isolative in his room, has 1-2 meals per day. No AH. No active SIIP/HIIP, but pt is vague about passive SI.     10/15: Continues to endorse paranoid delusions, is suspicious about his current environment, asks about how resident knows about his new room and what resident is writing down when taking notes. Denies having had any ECT treatment. Perceives other patients and staff as undercover police. Pt has poor sleep and appetite and is still isolative in his room. No active SIIP/HIIP, but pt still vague about passive SI. No significant mood improvement from ECT noted so far although pt generally seems more alert and is showing fewer physical signs of anxiety such as shaking.    10/16: Tolerating ECT, appears slightly less anxious today after treatment. Will continue ECT this Friday 10/18.     10/17: Pt does not bring up beliefs of going to shelter today but remains anxious, endorsing sense of impending doom and isolative. ECT #4 scheduled for tomorrow 10/18.     10/18: Pt is more visible in the unit and seems calmer. No longer endorsing delusions about going to shelter but paranoia remains, now shifted towards this writer and ECT. Believes writer is keeping him in the hospital to make more money and ECT is providing some form of sham treatment where he is brought down to the suite but never given anesthesia or ECT. Will titrate Abilify to 7mg.     10/21: S/P ECT #5. Still very paranoid, suspicious of writer and ECT treatment team. Of note, when treated last year for similar presentation pt required higher number of ECT treatments before responding. Will continue titrating Abilify up to lowest effective dose and ECT three times weekly as tolerated.     working diagnosis: schizoaffective disorder, current depressive episode w/ psychosis. ddx bipolar type per previous notes, however no h/o manic sxs elicited in current interview.    Plan:   1. Legal: admitted on 9.13 voluntary status  2. Safety:  Denies SI/SIB/HI/VI; continue routine observation.  3. Psychiatric:   - ECT #4 scheduled for Wednesday 10/18  - Abilify 7 mg PO QD   - inc 10/8, inc 10/18  - Venlafaxine  mg daily (depression)  	- Home medication  - Mirtazapine 30 mg QHS (depression)  	- Started during [facility] hospitalization   	- inc 10/2  HOLD Olanzapine 25 mg QHS (psychosis)  	- Home medication, held due to prolonged QTc per recommendation of C/L team. Will repeat EKG.  	- Primary team to address    Agitation PRNs: Olanzapine PO/IM, Lorazepam PO    4. Medical conditions, other medication, consults  a) DVT  - Eliquis 5 mg BID  b) Constipation   - Senna 2 tabs QHS  c) HTN  - Amlodipine 10 mg daily  d) PT consult: fall precautions, recommend ambulating with walker   5. Psychosocial interventions  - Patient provided verbal consent to speak with TBD  - CO 1:1: Not required  - Restrictions/allowances: None   6. Collateral: outpatient psychiatrist Dr. Irby on 6/2/23 on [facility] and handoff from Dr. Hernandez. Collateral from sister. See  note.   7. Disposition: When stable.

## 2024-10-22 PROCEDURE — 99232 SBSQ HOSP IP/OBS MODERATE 35: CPT

## 2024-10-22 RX ORDER — ARIPIPRAZOLE 2 MG/1
10 TABLET ORAL DAILY
Refills: 0 | Status: DISCONTINUED | OUTPATIENT
Start: 2024-10-23 | End: 2024-11-07

## 2024-10-22 RX ADMIN — Medication 2 TABLET(S): at 20:14

## 2024-10-22 RX ADMIN — APIXABAN 5 MILLIGRAM(S): 5 TABLET, FILM COATED ORAL at 20:14

## 2024-10-22 RX ADMIN — Medication 3 MILLIGRAM(S): at 20:14

## 2024-10-22 RX ADMIN — Medication 10 MILLIGRAM(S): at 08:04

## 2024-10-22 RX ADMIN — MIRTAZAPINE 30 MILLIGRAM(S): 30 TABLET ORAL at 20:14

## 2024-10-22 RX ADMIN — APIXABAN 5 MILLIGRAM(S): 5 TABLET, FILM COATED ORAL at 08:05

## 2024-10-22 RX ADMIN — Medication 225 MILLIGRAM(S): at 08:04

## 2024-10-22 RX ADMIN — ARIPIPRAZOLE 7 MILLIGRAM(S): 2 TABLET ORAL at 08:04

## 2024-10-22 NOTE — BH INPATIENT PSYCHIATRY PROGRESS NOTE - NSBHASSESSSUMMFT_PSY_ALL_CORE
Patient is a 68 yo M with history of HTN and DVT and hx of schizoaffective disorder, bipolar type, BIB sister from [facility] due to 2 months of worsening depression and psychosis. Patient with depressed mood, poor ADLs, and persecutory delusions given arguments he had with another patient where he made threats. Patient with numerous previous suicide attempts and previous ECT; he appears to have worsened after completing his course of ECT as an outpatient. He was admitted medically due to poor PO intake. Given his recent MDE and failure to thrive, he is unable to care for himself and therefore meets criteria for inpatient psychiatric hospitalization.   SH: Lives in [facility]. Retired from [employer], , no children.   PsyHx: 1x SA by cutting in 1990. Multiple hospitalizations MR Aug 2023. Was on ECT until 6 months ago.    On interview, the pt endorses depressive sxs mostly including anhedonia, decreased sleep and appetite, and lack of energy. Thought ruminations of guilt noted, in the setting of recent statement towards other tenant at [facility]. Pt experienced AH that include degrading voices that seem to have commanding character but so far no indication of voices directly telling him to hurt himself or others. Pt likely experiencing mood episode with psychotic features, affect noted to be mood-congruent. R/O substance-induced mood and/or psychotic d/o.    10/2-10/3: Pt very anxious and fearful, fixed delusion about imminent incarceration and worrying about not being able to pay his hospital fees and housing in the future. Pt not acutely suicidal, denies active SIIP/HIIP but feels "tortured" by his anxiety and says on 10/3 that not being alive would "not be a bad mikey". Pt with persistent insomnia and low appetite. Currently no AH. Mirtazapine increased from 15 mg to 30 mg to address depressive symptoms, low appetite, and insomnia. Pt provided contact number of sister: [phone].      10/4: Pt very anxious and fearful, fixed delusion about inevitable incarceration and worrying about not being able to pay his hospital fees and housing in the future. Pt presenting as paranoid today, convinced that everyone on the unit is against him, not feeling safe to leave his room. Pt denies SIIP/HIIP but feels hopeless and helpless. Ongoing insomnia and low appetite. Currently no AH. Low appetite (only 1 meal per day), and insomnia. Will further discuss alternative treatment options given no significant improvement on Mirtazapine. Will initiate family meeting next week to discuss ECT, sister, brother and father agree that ECT helped in the past and may need to be restarted.    10/5: remains paranoid, seems to tolerating Abilify well. QTc 429. Continue Abilify 2mg/day as is.   10/6: poor PO intake, appears dehydrated, check CMP tomorrow; Continue Abilify to 2mg po daily  10/7: still poor PO intake, Sodium 132, glucose 54; repeat CMP, same delusions noted, although pt more ambivalent about ECT, family meeting expected tomorrow (10/08)  10/8: still poor PO intake, repeat CMP WNL, same fixed delusions noted, during family meeting with brother shahrzad (phone call), pt with significant thought ruminations, perseverating about imminent incarceration and inability to cover treatment costs, denies that ECT helped him in the past and is worried about side effect burden. Pt in visible distress with increased psychomotor activity. Will approach pt again and offer ECT when pt is less anxious.    10/9: still poor PO intake, same fixed delusions noted, significant thought ruminations, perseverating about imminent incarceration and inability to cover treatment costs, still refuses ECT. Pt shows paranoia regarding the treatment team cooperating with the police and knowing all information about him that would have led to his current charges.    10/10: poor PO intake, same fixed delusions, perceives other patients as police in disguise, perseverating about imminent incarceration but amenable to ECT. Pt denies SIIP/HIIP.    10/11: Completed ECT #1 today. Continues to endorse paranoid delusions. Perceives other patients and staff as undercover police or members of a TV crew documenting his arrest.     10/14: Continues to endorse paranoid delusions. Perceives other patients and staff as undercover police. PT still isolative in his room, has 1-2 meals per day. No AH. No active SIIP/HIIP, but pt is vague about passive SI.     10/15: Continues to endorse paranoid delusions, is suspicious about his current environment, asks about how resident knows about his new room and what resident is writing down when taking notes. Denies having had any ECT treatment. Perceives other patients and staff as undercover police. Pt has poor sleep and appetite and is still isolative in his room. No active SIIP/HIIP, but pt still vague about passive SI. No significant mood improvement from ECT noted so far although pt generally seems more alert and is showing fewer physical signs of anxiety such as shaking.    10/16: Tolerating ECT, appears slightly less anxious today after treatment. Will continue ECT this Friday 10/18.     10/17: Pt does not bring up beliefs of going to nursing home today but remains anxious, endorsing sense of impending doom and isolative. ECT #4 scheduled for tomorrow 10/18.     10/18: Pt is more visible in the unit and seems calmer. No longer endorsing delusions about going to nursing home but paranoia remains, now shifted towards this writer and ECT. Believes writer is keeping him in the hospital to make more money and ECT is providing some form of sham treatment where he is brought down to the suite but never given anesthesia or ECT. Will titrate Abilify to 7mg.     10/21: S/P ECT #5. Still very paranoid, suspicious of writer and ECT treatment team. Of note, when treated last year for similar presentation pt required higher number of ECT treatments before responding. Will continue titrating Abilify up to lowest effective dose and ECT three times weekly as tolerated.     working diagnosis: schizoaffective disorder, current depressive episode w/ psychosis. ddx bipolar type per previous notes, however no h/o manic sxs elicited in current interview.    Plan:   1. Legal: admitted on 9.13 voluntary status  2. Safety:  Denies SI/SIB/HI/VI; continue routine observation.  3. Psychiatric:   - ECT #4 scheduled for Wednesday 10/18  - Abilify 7 mg PO QD   - inc 10/8, inc 10/18  - Venlafaxine  mg daily (depression)  	- Home medication  - Mirtazapine 30 mg QHS (depression)  	- Started during [facility] hospitalization   	- inc 10/2  HOLD Olanzapine 25 mg QHS (psychosis)  	- Home medication, held due to prolonged QTc per recommendation of C/L team. Will repeat EKG.  	- Primary team to address    Agitation PRNs: Olanzapine PO/IM, Lorazepam PO    4. Medical conditions, other medication, consults  a) DVT  - Eliquis 5 mg BID  b) Constipation   - Senna 2 tabs QHS  c) HTN  - Amlodipine 10 mg daily  d) PT consult: fall precautions, recommend ambulating with walker   5. Psychosocial interventions  - Patient provided verbal consent to speak with TBD  - CO 1:1: Not required  - Restrictions/allowances: None   6. Collateral: outpatient psychiatrist Dr. Irby on 6/2/23 on [facility] and handoff from Dr. Hernandez. Collateral from sister. See  note.   7. Disposition: When stable.  Patient is a 68 yo M with history of HTN and DVT and hx of schizoaffective disorder, bipolar type, BIB sister from [facility] due to 2 months of worsening depression and psychosis. Patient with depressed mood, poor ADLs, and persecutory delusions given arguments he had with another patient where he made threats. Patient with numerous previous suicide attempts and previous ECT; he appears to have worsened after completing his course of ECT as an outpatient. He was admitted medically due to poor PO intake. Given his recent MDE and failure to thrive, he is unable to care for himself and therefore meets criteria for inpatient psychiatric hospitalization.   SH: Lives in [facility] [facility]. Retired from [employer], , no children.   PsyHx: 1x SA by cutting in 1990. Multiple hospitalizations MR Aug 2023. Was on ECT until 6 months ago.    On interview, the pt endorses depressive sxs mostly including anhedonia, decreased sleep and appetite, and lack of energy. Thought ruminations of guilt noted, in the setting of recent statement towards other tenant at [facility]. Pt experienced AH that include degrading voices that seem to have commanding character but so far no indication of voices directly telling him to hurt himself or others. Pt likely experiencing mood episode with psychotic features, affect noted to be mood-congruent. R/O substance-induced mood and/or psychotic d/o.    10/2-10/3: Pt very anxious and fearful, fixed delusion about imminent incarceration and worrying about not being able to pay his hospital fees and housing in the future. Pt not acutely suicidal, denies active SIIP/HIIP but feels "tortured" by his anxiety and says on 10/3 that not being alive would "not be a bad mikey". Pt with persistent insomnia and low appetite. Currently no AH. Mirtazapine increased from 15 mg to 30 mg to address depressive symptoms, low appetite, and insomnia. Pt provided contact number of sister: [phone].      10/4: Pt very anxious and fearful, fixed delusion about inevitable incarceration and worrying about not being able to pay his hospital fees and housing in the future. Pt presenting as paranoid today, convinced that everyone on the unit is against him, not feeling safe to leave his room. Pt denies SIIP/HIIP but feels hopeless and helpless. Ongoing insomnia and low appetite. Currently no AH. Low appetite (only 1 meal per day), and insomnia. Will further discuss alternative treatment options given no significant improvement on Mirtazapine. Will initiate family meeting next week to discuss ECT, sister, brother and father agree that ECT helped in the past and may need to be restarted.    10/5: remains paranoid, seems to tolerating Abilify well. QTc 429. Continue Abilify 2mg/day as is.   10/6: poor PO intake, appears dehydrated, check CMP tomorrow; Continue Abilify to 2mg po daily  10/7: still poor PO intake, Sodium 132, glucose 54; repeat CMP, same delusions noted, although pt more ambivalent about ECT, family meeting expected tomorrow (10/08)  10/8: still poor PO intake, repeat CMP WNL, same fixed delusions noted, during family meeting with brother shahrzad (phone call), pt with significant thought ruminations, perseverating about imminent incarceration and inability to cover treatment costs, denies that ECT helped him in the past and is worried about side effect burden. Pt in visible distress with increased psychomotor activity. Will approach pt again and offer ECT when pt is less anxious.    10/9: still poor PO intake, same fixed delusions noted, significant thought ruminations, perseverating about imminent incarceration and inability to cover treatment costs, still refuses ECT. Pt shows paranoia regarding the treatment team cooperating with the police and knowing all information about him that would have led to his current charges.    10/10: poor PO intake, same fixed delusions, perceives other patients as police in disguise, perseverating about imminent incarceration but amenable to ECT. Pt denies SIIP/HIIP.    10/11: Completed ECT #1 today. Continues to endorse paranoid delusions. Perceives other patients and staff as undercover police or members of a TV crew documenting his arrest.     10/14: Continues to endorse paranoid delusions. Perceives other patients and staff as undercover police. PT still isolative in his room, has 1-2 meals per day. No AH. No active SIIP/HIIP, but pt is vague about passive SI.     10/15: Continues to endorse paranoid delusions, is suspicious about his current environment, asks about how resident knows about his new room and what resident is writing down when taking notes. Denies having had any ECT treatment. Perceives other patients and staff as undercover police. Pt has poor sleep and appetite and is still isolative in his room. No active SIIP/HIIP, but pt still vague about passive SI. No significant mood improvement from ECT noted so far although pt generally seems more alert and is showing fewer physical signs of anxiety such as shaking.    10/16: Tolerating ECT, appears slightly less anxious today after treatment. Will continue ECT this Friday 10/18.     10/17: Pt does not bring up beliefs of going to residential today but remains anxious, endorsing sense of impending doom and isolative. ECT #4 scheduled for tomorrow 10/18.     10/18: Pt is more visible in the unit and seems calmer. No longer endorsing delusions about going to residential but paranoia remains, now shifted towards this writer and ECT. Believes writer is keeping him in the hospital to make more money and ECT is providing some form of sham treatment where he is brought down to the suite but never given anesthesia or ECT. Will titrate Abilify to 7mg.     10/21-10/22: S/P ECT #5. Still very paranoid, suspicious of writer and ECT treatment team. Of note, when treated last year for similar presentation pt required higher number of ECT treatments before responding. Will continue titrating Abilify up to lowest effective dose and ECT three times weekly as tolerated.     working diagnosis: schizoaffective disorder, current depressive episode w/ psychosis. ddx bipolar type per previous notes, however no h/o manic sxs elicited in current interview.    Plan:   1. Legal: admitted on 9.13 voluntary status  2. Safety:  Denies SI/SIB/HI/VI; continue routine observation.  3. Psychiatric:   - ECT #4 scheduled for Wednesday 10/18  - Abilify 7 mg PO QD   - inc 10/8, inc 10/18  - Venlafaxine  mg daily (depression)  	- Home medication  - Mirtazapine 30 mg QHS (depression)  	- Started during [facility] hospitalization   	- inc 10/2  HOLD Olanzapine 25 mg QHS (psychosis)  	- Home medication, held due to prolonged QTc per recommendation of C/L team. Will repeat EKG.  	- Primary team to address    Agitation PRNs: Olanzapine PO/IM, Lorazepam PO    4. Medical conditions, other medication, consults  a) DVT  - Eliquis 5 mg BID  b) Constipation   - Senna 2 tabs QHS  c) HTN  - Amlodipine 10 mg daily  d) PT consult: fall precautions, recommend ambulating with walker   5. Psychosocial interventions  - Patient provided verbal consent to speak with TBD  - CO 1:1: Not required  - Restrictions/allowances: None   6. Collateral: outpatient psychiatrist Dr. Irby on 6/2/23 on [facility] and handoff from Dr. Hernandez. Collateral from sister. See  note.   7. Disposition: When stable.  Patient is a 70 yo M with history of HTN and DVT and hx of schizoaffective disorder, bipolar type, BIB sister from [facility] due to 2 months of worsening depression and psychosis. Patient with depressed mood, poor ADLs, and persecutory delusions given arguments he had with another patient where he made threats. Patient with numerous previous suicide attempts and previous ECT; he appears to have worsened after completing his course of ECT as an outpatient. He was admitted medically due to poor PO intake. Given his recent MDE and failure to thrive, he is unable to care for himself and therefore meets criteria for inpatient psychiatric hospitalization.   SH: Lives in [facility]. Retired from [employer], , no children.   PsyHx: 1x SA by cutting in 1990. Multiple hospitalizations MR Aug 2023. Was on ECT until 6 months ago.    On interview, the pt endorses depressive sxs mostly including anhedonia, decreased sleep and appetite, and lack of energy. Thought ruminations of guilt noted, in the setting of recent statement towards other tenant at [facility]. Pt experienced AH that include degrading voices that seem to have commanding character but so far no indication of voices directly telling him to hurt himself or others. Pt likely experiencing mood episode with psychotic features, affect noted to be mood-congruent. R/O substance-induced mood and/or psychotic d/o.    10/2-10/3: Pt very anxious and fearful, fixed delusion about imminent incarceration and worrying about not being able to pay his hospital fees and housing in the future. Pt not acutely suicidal, denies active SIIP/HIIP but feels "tortured" by his anxiety and says on 10/3 that not being alive would "not be a bad mikey". Pt with persistent insomnia and low appetite. Currently no AH. Mirtazapine increased from 15 mg to 30 mg to address depressive symptoms, low appetite, and insomnia. Pt provided contact number of sister: [phone].      10/4: Pt very anxious and fearful, fixed delusion about inevitable incarceration and worrying about not being able to pay his hospital fees and housing in the future. Pt presenting as paranoid today, convinced that everyone on the unit is against him, not feeling safe to leave his room. Pt denies SIIP/HIIP but feels hopeless and helpless. Ongoing insomnia and low appetite. Currently no AH. Low appetite (only 1 meal per day), and insomnia. Will further discuss alternative treatment options given no significant improvement on Mirtazapine. Will initiate family meeting next week to discuss ECT, sister, brother and father agree that ECT helped in the past and may need to be restarted.    10/5: remains paranoid, seems to tolerating Abilify well. QTc 429. Continue Abilify 2mg/day as is.   10/6: poor PO intake, appears dehydrated, check CMP tomorrow; Continue Abilify to 2mg po daily  10/7: still poor PO intake, Sodium 132, glucose 54; repeat CMP, same delusions noted, although pt more ambivalent about ECT, family meeting expected tomorrow (10/08)  10/8: still poor PO intake, repeat CMP WNL, same fixed delusions noted, during family meeting with brother shahrzad (phone call), pt with significant thought ruminations, perseverating about imminent incarceration and inability to cover treatment costs, denies that ECT helped him in the past and is worried about side effect burden. Pt in visible distress with increased psychomotor activity. Will approach pt again and offer ECT when pt is less anxious.    10/9: still poor PO intake, same fixed delusions noted, significant thought ruminations, perseverating about imminent incarceration and inability to cover treatment costs, still refuses ECT. Pt shows paranoia regarding the treatment team cooperating with the police and knowing all information about him that would have led to his current charges.    10/10: poor PO intake, same fixed delusions, perceives other patients as police in disguise, perseverating about imminent incarceration but amenable to ECT. Pt denies SIIP/HIIP.    10/11: Completed ECT #1 today. Continues to endorse paranoid delusions. Perceives other patients and staff as undercover police or members of a TV crew documenting his arrest.     10/14: Continues to endorse paranoid delusions. Perceives other patients and staff as undercover police. PT still isolative in his room, has 1-2 meals per day. No AH. No active SIIP/HIIP, but pt is vague about passive SI.     10/15: Continues to endorse paranoid delusions, is suspicious about his current environment, asks about how resident knows about his new room and what resident is writing down when taking notes. Denies having had any ECT treatment. Perceives other patients and staff as undercover police. Pt has poor sleep and appetite and is still isolative in his room. No active SIIP/HIIP, but pt still vague about passive SI. No significant mood improvement from ECT noted so far although pt generally seems more alert and is showing fewer physical signs of anxiety such as shaking.    10/16: Tolerating ECT, appears slightly less anxious today after treatment. Will continue ECT this Friday 10/18.     10/17: Pt does not bring up beliefs of going to retirement today but remains anxious, endorsing sense of impending doom and isolative. ECT #4 scheduled for tomorrow 10/18.     10/18: Pt is more visible in the unit and seems calmer. No longer endorsing delusions about going to retirement but paranoia remains, now shifted towards this writer and ECT. Believes writer is keeping him in the hospital to make more money and ECT is providing some form of sham treatment where he is brought down to the suite but never given anesthesia or ECT. Will titrate Abilify to 7mg.     10/21-10/22: S/P ECT #5. Still very paranoid, suspicious of writer and ECT treatment team. Of note, when treated last year for similar presentation pt required higher number of ECT treatments before responding. Will continue titrating Abilify up to lowest effective dose and ECT three times weekly as tolerated.     working diagnosis: schizoaffective disorder, current depressive episode w/ psychosis. ddx bipolar type per previous notes, however no h/o manic sxs elicited in current interview.    Plan:   1. Legal: admitted on 9.13 voluntary status  2. Safety:  Denies SI/SIB/HI/VI; continue routine observation.  3. Psychiatric:   - ECT #4 scheduled for Wednesday 10/18  - Abilify 10 mg PO QD   - inc 10/8, inc 10/18, inc 10/22  - Venlafaxine  mg daily (depression)  	- Home medication  - Mirtazapine 30 mg QHS (depression)  	- Started during [facility] hospitalization   	- inc 10/2  HOLD Olanzapine 25 mg QHS (psychosis)  	- Home medication, held due to prolonged QTc per recommendation of C/L team. Will repeat EKG.  	- Primary team to address    Agitation PRNs: Olanzapine PO/IM, Lorazepam PO    4. Medical conditions, other medication, consults  a) DVT  - Eliquis 5 mg BID  b) Constipation   - Senna 2 tabs QHS  c) HTN  - Amlodipine 10 mg daily  d) PT consult: fall precautions, recommend ambulating with walker   5. Psychosocial interventions  - Patient provided verbal consent to speak with TBD  - CO 1:1: Not required  - Restrictions/allowances: None   6. Collateral: outpatient psychiatrist Dr. Irby on 6/2/23 on [facility] and handoff from Dr. Hernandez. Collateral from sister. See  note.   7. Disposition: When stable.

## 2024-10-22 NOTE — BH INPATIENT PSYCHIATRY PROGRESS NOTE - NSBHMETABOLIC_PSY_ALL_CORE_FT
BMI: BMI (kg/m2): 28.7 (10-05-24 @ 15:58)  HbA1c: A1C with Estimated Average Glucose Result: 5.2 % (09-15-24 @ 06:50)    Glucose: POCT Blood Glucose.: 96 mg/dL (10-07-24 @ 14:15)    BP: 120/88 (10-21-24 @ 11:25) (119/86 - 190/113)Vital Signs Last 24 Hrs  T(C): 36.6 (10-22-24 @ 07:50), Max: 36.9 (10-21-24 @ 10:14)  T(F): 97.8 (10-22-24 @ 07:50), Max: 98.5 (10-21-24 @ 10:14)  HR: 81 (10-21-24 @ 11:25) (63 - 124)  BP: 120/88 (10-21-24 @ 11:25) (119/86 - 190/113)  BP(mean): --  RR: 17 (10-21-24 @ 11:25) (12 - 23)  SpO2: 97% (10-21-24 @ 11:25) (94% - 98%)    Orthostatic VS  10-22-24 @ 07:50  Lying BP: --/-- HR: --  Sitting BP: 114/84 HR: 63  Standing BP: 109/81 HR: 86  Site: --  Mode: --  Orthostatic VS  10-21-24 @ 05:34  Lying BP: --/-- HR: --  Sitting BP: 111/88 HR: 94  Standing BP: 109/86 HR: 73  Site: --  Mode: --  Orthostatic VS  10-20-24 @ 09:42  Lying BP: --/-- HR: --  Sitting BP: 125/83 HR: 62  Standing BP: 125/89 HR: 80  Site: upper left arm  Mode: electronic    Lipid Panel: Date/Time: 09-15-24 @ 06:50  Cholesterol, Serum: 159  LDL Cholesterol Calculated: 99  HDL Cholesterol, Serum: 43  Total Cholesterol/HDL Ration Measurement: --  Triglycerides, Serum: 87   BMI: BMI (kg/m2): 28.7 (10-05-24 @ 15:58)  HbA1c: A1C with Estimated Average Glucose Result: 5.2 % (09-15-24 @ 06:50)    Glucose: POCT Blood Glucose.: 96 mg/dL (10-07-24 @ 14:15)    BP: 120/88 (10-21-24 @ 11:25) (119/86 - 190/113)Vital Signs Last 24 Hrs  T(C): 36.6 (10-22-24 @ 07:50), Max: 36.6 (10-22-24 @ 07:50)  T(F): 97.8 (10-22-24 @ 07:50), Max: 97.8 (10-22-24 @ 07:50)  HR: --  BP: --  BP(mean): --  RR: --  SpO2: --    Orthostatic VS  10-22-24 @ 07:50  Lying BP: --/-- HR: --  Sitting BP: 114/84 HR: 63  Standing BP: 109/81 HR: 86  Site: --  Mode: --  Orthostatic VS  10-21-24 @ 05:34  Lying BP: --/-- HR: --  Sitting BP: 111/88 HR: 94  Standing BP: 109/86 HR: 73  Site: --  Mode: --    Lipid Panel: Date/Time: 09-15-24 @ 06:50  Cholesterol, Serum: 159  LDL Cholesterol Calculated: 99  HDL Cholesterol, Serum: 43  Total Cholesterol/HDL Ration Measurement: --  Triglycerides, Serum: 87

## 2024-10-22 NOTE — BH INPATIENT PSYCHIATRY PROGRESS NOTE - NSBHCHARTREVIEWVS_PSY_A_CORE FT
Vital Signs Last 24 Hrs  T(C): 36.6 (10-22-24 @ 07:50), Max: 36.9 (10-21-24 @ 10:14)  T(F): 97.8 (10-22-24 @ 07:50), Max: 98.5 (10-21-24 @ 10:14)  HR: 81 (10-21-24 @ 11:25) (63 - 124)  BP: 120/88 (10-21-24 @ 11:25) (119/86 - 190/113)  BP(mean): --  RR: 17 (10-21-24 @ 11:25) (12 - 23)  SpO2: 97% (10-21-24 @ 11:25) (94% - 98%)    Orthostatic VS  10-22-24 @ 07:50  Lying BP: --/-- HR: --  Sitting BP: 114/84 HR: 63  Standing BP: 109/81 HR: 86  Site: --  Mode: --  Orthostatic VS  10-21-24 @ 05:34  Lying BP: --/-- HR: --  Sitting BP: 111/88 HR: 94  Standing BP: 109/86 HR: 73  Site: --  Mode: --  Orthostatic VS  10-20-24 @ 09:42  Lying BP: --/-- HR: --  Sitting BP: 125/83 HR: 62  Standing BP: 125/89 HR: 80  Site: upper left arm  Mode: electronic   Vital Signs Last 24 Hrs  T(C): 36.6 (10-22-24 @ 07:50), Max: 36.6 (10-22-24 @ 07:50)  T(F): 97.8 (10-22-24 @ 07:50), Max: 97.8 (10-22-24 @ 07:50)  HR: --  BP: --  BP(mean): --  RR: --  SpO2: --    Orthostatic VS  10-22-24 @ 07:50  Lying BP: --/-- HR: --  Sitting BP: 114/84 HR: 63  Standing BP: 109/81 HR: 86  Site: --  Mode: --  Orthostatic VS  10-21-24 @ 05:34  Lying BP: --/-- HR: --  Sitting BP: 111/88 HR: 94  Standing BP: 109/86 HR: 73  Site: --  Mode: --

## 2024-10-22 NOTE — BH INPATIENT PSYCHIATRY PROGRESS NOTE - CURRENT MEDICATION
MEDICATIONS  (STANDING):  amLODIPine   Tablet 10 milliGRAM(s) Oral with breakfast  apixaban 5 milliGRAM(s) Oral every 12 hours  ARIPiprazole 7 milliGRAM(s) Oral daily  melatonin. 3 milliGRAM(s) Oral at bedtime  mirtazapine 30 milliGRAM(s) Oral at bedtime  senna 2 Tablet(s) Oral at bedtime  venlafaxine  milliGRAM(s) Oral daily    MEDICATIONS  (PRN):  acetaminophen     Tablet .. 650 milliGRAM(s) Oral every 6 hours PRN Temp greater or equal to 38C (100.4F), Mild Pain (1 - 3)  aluminum hydroxide/magnesium hydroxide/simethicone Suspension 30 milliLiter(s) Oral every 4 hours PRN Dyspepsia  LORazepam     Tablet 0.5 milliGRAM(s) Oral every 6 hours PRN anxiety  polyethylene glycol 3350 17 Gram(s) Oral every 12 hours PRN obstipation   MEDICATIONS  (STANDING):  amLODIPine   Tablet 10 milliGRAM(s) Oral with breakfast  apixaban 5 milliGRAM(s) Oral every 12 hours  melatonin. 3 milliGRAM(s) Oral at bedtime  mirtazapine 30 milliGRAM(s) Oral at bedtime  senna 2 Tablet(s) Oral at bedtime  venlafaxine  milliGRAM(s) Oral daily    MEDICATIONS  (PRN):  acetaminophen     Tablet .. 650 milliGRAM(s) Oral every 6 hours PRN Temp greater or equal to 38C (100.4F), Mild Pain (1 - 3)  aluminum hydroxide/magnesium hydroxide/simethicone Suspension 30 milliLiter(s) Oral every 4 hours PRN Dyspepsia  LORazepam     Tablet 0.5 milliGRAM(s) Oral every 6 hours PRN anxiety  polyethylene glycol 3350 17 Gram(s) Oral every 12 hours PRN obstipation

## 2024-10-22 NOTE — BH INPATIENT PSYCHIATRY PROGRESS NOTE - NSBHFUPINTERVALHXFT_PSY_A_CORE
Chart reviewed. Case discussed with interdisciplinary team. The pt is compliant with meds. VSS. No PRNs needed/requested.    Completed ECT #5 yesterday.  Chart reviewed. Case discussed with interdisciplinary team. The pt is compliant with meds. VSS. No PRNs needed/requested.    Completed ECT #5 yesterday. Continues to endorse feeling anxious: "I'm afraid something bad is going to happen to me, but I don't know what". No longer endorsing belief the police is coming to arrest him. Continues to believe ECT is withholding anesthesia and not really treating him "I never started the treatment! They're not doing it".

## 2024-10-22 NOTE — BH INPATIENT PSYCHIATRY PROGRESS NOTE - OTHER
Mostly delusions of guilt, ruminations about wrongdoings and financial situation Pt with significant thought ruminations, perseverating about imminent incarceration. dry oral mucosa, ot visibly distressed, grabbing his knees

## 2024-10-23 PROCEDURE — 99232 SBSQ HOSP IP/OBS MODERATE 35: CPT | Mod: 25

## 2024-10-23 PROCEDURE — 90870 ELECTROCONVULSIVE THERAPY: CPT

## 2024-10-23 RX ADMIN — MIRTAZAPINE 30 MILLIGRAM(S): 30 TABLET ORAL at 21:12

## 2024-10-23 RX ADMIN — APIXABAN 5 MILLIGRAM(S): 5 TABLET, FILM COATED ORAL at 21:12

## 2024-10-23 RX ADMIN — Medication 2 TABLET(S): at 21:12

## 2024-10-23 RX ADMIN — ARIPIPRAZOLE 10 MILLIGRAM(S): 2 TABLET ORAL at 10:14

## 2024-10-23 RX ADMIN — Medication 225 MILLIGRAM(S): at 10:13

## 2024-10-23 RX ADMIN — APIXABAN 5 MILLIGRAM(S): 5 TABLET, FILM COATED ORAL at 10:14

## 2024-10-23 RX ADMIN — Medication 10 MILLIGRAM(S): at 10:14

## 2024-10-23 RX ADMIN — Medication 3 MILLIGRAM(S): at 21:12

## 2024-10-23 NOTE — BH INPATIENT PSYCHIATRY PROGRESS NOTE - NSBHMETABOLIC_PSY_ALL_CORE_FT
BMI: BMI (kg/m2): 28.7 (10-05-24 @ 15:58)  HbA1c: A1C with Estimated Average Glucose Result: 5.2 % (09-15-24 @ 06:50)    Glucose: POCT Blood Glucose.: 96 mg/dL (10-07-24 @ 14:15)    BP: 128/72 (10-23-24 @ 09:55) (119/86 - 190/113)Vital Signs Last 24 Hrs  T(C): 36.6 (10-23-24 @ 10:58), Max: 36.6 (10-23-24 @ 08:31)  T(F): 97.9 (10-23-24 @ 10:58), Max: 97.9 (10-23-24 @ 10:58)  HR: 64 (10-23-24 @ 09:55) (56 - 131)  BP: 128/72 (10-23-24 @ 09:55) (128/72 - 162/98)  BP(mean): --  RR: 16 (10-23-24 @ 09:55) (16 - 18)  SpO2: 95% (10-23-24 @ 09:55) (95% - 98%)    Orthostatic VS  10-23-24 @ 10:58  Lying BP: --/-- HR: --  Sitting BP: 118/100 HR: 70  Standing BP: 129/84 HR: 81  Site: upper left arm  Mode: electronic  Orthostatic VS  10-23-24 @ 05:42  Lying BP: 138/81 HR: --  Sitting BP: 114/92 HR: --  Standing BP: --/-- HR: --  Site: --  Mode: --  Orthostatic VS  10-22-24 @ 07:50  Lying BP: --/-- HR: --  Sitting BP: 114/84 HR: 63  Standing BP: 109/81 HR: 86  Site: --  Mode: --    Lipid Panel: Date/Time: 09-15-24 @ 06:50  Cholesterol, Serum: 159  LDL Cholesterol Calculated: 99  HDL Cholesterol, Serum: 43  Total Cholesterol/HDL Ration Measurement: --  Triglycerides, Serum: 87

## 2024-10-23 NOTE — BH INPATIENT PSYCHIATRY PROGRESS NOTE - NSBHCHARTREVIEWVS_PSY_A_CORE FT
Vital Signs Last 24 Hrs  T(C): 36.6 (10-23-24 @ 10:58), Max: 36.6 (10-23-24 @ 08:31)  T(F): 97.9 (10-23-24 @ 10:58), Max: 97.9 (10-23-24 @ 10:58)  HR: 64 (10-23-24 @ 09:55) (56 - 131)  BP: 128/72 (10-23-24 @ 09:55) (128/72 - 162/98)  BP(mean): --  RR: 16 (10-23-24 @ 09:55) (16 - 18)  SpO2: 95% (10-23-24 @ 09:55) (95% - 98%)    Orthostatic VS  10-23-24 @ 10:58  Lying BP: --/-- HR: --  Sitting BP: 118/100 HR: 70  Standing BP: 129/84 HR: 81  Site: upper left arm  Mode: electronic  Orthostatic VS  10-23-24 @ 05:42  Lying BP: 138/81 HR: --  Sitting BP: 114/92 HR: --  Standing BP: --/-- HR: --  Site: --  Mode: --  Orthostatic VS  10-22-24 @ 07:50  Lying BP: --/-- HR: --  Sitting BP: 114/84 HR: 63  Standing BP: 109/81 HR: 86  Site: --  Mode: --

## 2024-10-23 NOTE — BH INPATIENT PSYCHIATRY PROGRESS NOTE - NSBHASSESSSUMMFT_PSY_ALL_CORE
Patient is a 70 yo M with history of HTN and DVT and hx of schizoaffective disorder, bipolar type, BIB sister from [facility] due to 2 months of worsening depression and psychosis. Patient with depressed mood, poor ADLs, and persecutory delusions given arguments he had with another patient where he made threats. Patient with numerous previous suicide attempts and previous ECT; he appears to have worsened after completing his course of ECT as an outpatient. He was admitted medically due to poor PO intake. Given his recent MDE and failure to thrive, he is unable to care for himself and therefore meets criteria for inpatient psychiatric hospitalization.   SH: Lives in [facility]. Retired from [employer], , no children.   PsyHx: 1x SA by cutting in 1990. Multiple hospitalizations MR Aug 2023. Was on ECT until 6 months ago.    On interview, the pt endorses depressive sxs mostly including anhedonia, decreased sleep and appetite, and lack of energy. Thought ruminations of guilt noted, in the setting of recent statement towards other tenant at [facility]. Pt experienced AH that include degrading voices that seem to have commanding character but so far no indication of voices directly telling him to hurt himself or others. Pt likely experiencing mood episode with psychotic features, affect noted to be mood-congruent. R/O substance-induced mood and/or psychotic d/o.    10/2-10/3: Pt very anxious and fearful, fixed delusion about imminent incarceration and worrying about not being able to pay his hospital fees and housing in the future. Pt not acutely suicidal, denies active SIIP/HIIP but feels "tortured" by his anxiety and says on 10/3 that not being alive would "not be a bad mikey". Pt with persistent insomnia and low appetite. Currently no AH. Mirtazapine increased from 15 mg to 30 mg to address depressive symptoms, low appetite, and insomnia. Pt provided contact number of sister: [phone].      10/4: Pt very anxious and fearful, fixed delusion about inevitable incarceration and worrying about not being able to pay his hospital fees and housing in the future. Pt presenting as paranoid today, convinced that everyone on the unit is against him, not feeling safe to leave his room. Pt denies SIIP/HIIP but feels hopeless and helpless. Ongoing insomnia and low appetite. Currently no AH. Low appetite (only 1 meal per day), and insomnia. Will further discuss alternative treatment options given no significant improvement on Mirtazapine. Will initiate family meeting next week to discuss ECT, sister, brother and father agree that ECT helped in the past and may need to be restarted.    10/5: remains paranoid, seems to tolerating Abilify well. QTc 429. Continue Abilify 2mg/day as is.   10/6: poor PO intake, appears dehydrated, check CMP tomorrow; Continue Abilify to 2mg po daily  10/7: still poor PO intake, Sodium 132, glucose 54; repeat CMP, same delusions noted, although pt more ambivalent about ECT, family meeting expected tomorrow (10/08)  10/8: still poor PO intake, repeat CMP WNL, same fixed delusions noted, during family meeting with brother shahrzad (phone call), pt with significant thought ruminations, perseverating about imminent incarceration and inability to cover treatment costs, denies that ECT helped him in the past and is worried about side effect burden. Pt in visible distress with increased psychomotor activity. Will approach pt again and offer ECT when pt is less anxious.    10/9: still poor PO intake, same fixed delusions noted, significant thought ruminations, perseverating about imminent incarceration and inability to cover treatment costs, still refuses ECT. Pt shows paranoia regarding the treatment team cooperating with the police and knowing all information about him that would have led to his current charges.    10/10: poor PO intake, same fixed delusions, perceives other patients as police in disguise, perseverating about imminent incarceration but amenable to ECT. Pt denies SIIP/HIIP.    10/11: Completed ECT #1 today. Continues to endorse paranoid delusions. Perceives other patients and staff as undercover police or members of a TV crew documenting his arrest.     10/14: Continues to endorse paranoid delusions. Perceives other patients and staff as undercover police. PT still isolative in his room, has 1-2 meals per day. No AH. No active SIIP/HIIP, but pt is vague about passive SI.     10/15: Continues to endorse paranoid delusions, is suspicious about his current environment, asks about how resident knows about his new room and what resident is writing down when taking notes. Denies having had any ECT treatment. Perceives other patients and staff as undercover police. Pt has poor sleep and appetite and is still isolative in his room. No active SIIP/HIIP, but pt still vague about passive SI. No significant mood improvement from ECT noted so far although pt generally seems more alert and is showing fewer physical signs of anxiety such as shaking.    10/16: Tolerating ECT, appears slightly less anxious today after treatment. Will continue ECT this Friday 10/18.     10/17: Pt does not bring up beliefs of going to custodial today but remains anxious, endorsing sense of impending doom and isolative. ECT #4 scheduled for tomorrow 10/18.     10/18: Pt is more visible in the unit and seems calmer. No longer endorsing delusions about going to custodial but paranoia remains, now shifted towards this writer and ECT. Believes writer is keeping him in the hospital to make more money and ECT is providing some form of sham treatment where he is brought down to the suite but never given anesthesia or ECT. Will titrate Abilify to 7mg.     10/21-10/23: S/P ECT #6. Still very paranoid, suspicious of writer and ECT treatment team. Of note, when treated last year for similar presentation pt required higher number of ECT treatments before responding. Will continue titrating Abilify up to lowest effective dose and ECT three times weekly as tolerated.     working diagnosis: schizoaffective disorder, current depressive episode w/ psychosis. ddx bipolar type per previous notes, however no h/o manic sxs elicited in current interview.    Plan:   1. Legal: admitted on 9.13 voluntary status  2. Safety:  Denies SI/SIB/HI/VI; continue routine observation.  3. Psychiatric:   - ECT #4 scheduled for Wednesday 10/18  - Abilify 10 mg PO QD   - inc 10/8, inc 10/18, inc 10/22  - Venlafaxine  mg daily (depression)  	- Home medication  - Mirtazapine 30 mg QHS (depression)  	- Started during [facility] hospitalization   	- inc 10/2  HOLD Olanzapine 25 mg QHS (psychosis)  	- Home medication, held due to prolonged QTc per recommendation of C/L team. Will repeat EKG.  	- Primary team to address    Agitation PRNs: Olanzapine PO/IM, Lorazepam PO    4. Medical conditions, other medication, consults  a) DVT  - Eliquis 5 mg BID  b) Constipation   - Senna 2 tabs QHS  c) HTN  - Amlodipine 10 mg daily  d) PT consult: fall precautions, recommend ambulating with walker   5. Psychosocial interventions  - Patient provided verbal consent to speak with TBD  - CO 1:1: Not required  - Restrictions/allowances: None   6. Collateral: outpatient psychiatrist Dr. Irby on 6/2/23 on [facility] and handoff from Dr. Hernandez. Collateral from sister. See  note.   7. Disposition: When stable.

## 2024-10-23 NOTE — BH INPATIENT PSYCHIATRY PROGRESS NOTE - NSBHFUPINTERVALHXFT_PSY_A_CORE
Chart reviewed. Case discussed with interdisciplinary team. The pt is compliant with meds. VSS. No PRNs needed/requested.    Completed ECT #6 today. Seen after ECT this morning. Withdrawn and keeping to self in his room. Not seen joining any groups. Continues to endorse sense of impending doom and fearing something bad is going to happen to him but not sure what.

## 2024-10-23 NOTE — BH INPATIENT PSYCHIATRY PROGRESS NOTE - CURRENT MEDICATION
MEDICATIONS  (STANDING):  amLODIPine   Tablet 10 milliGRAM(s) Oral with breakfast  apixaban 5 milliGRAM(s) Oral every 12 hours  ARIPiprazole 10 milliGRAM(s) Oral daily  melatonin. 3 milliGRAM(s) Oral at bedtime  mirtazapine 30 milliGRAM(s) Oral at bedtime  senna 2 Tablet(s) Oral at bedtime  venlafaxine  milliGRAM(s) Oral daily    MEDICATIONS  (PRN):  acetaminophen     Tablet .. 650 milliGRAM(s) Oral every 6 hours PRN Temp greater or equal to 38C (100.4F), Mild Pain (1 - 3)  aluminum hydroxide/magnesium hydroxide/simethicone Suspension 30 milliLiter(s) Oral every 4 hours PRN Dyspepsia  LORazepam     Tablet 0.5 milliGRAM(s) Oral every 6 hours PRN anxiety  polyethylene glycol 3350 17 Gram(s) Oral every 12 hours PRN obstipation

## 2024-10-23 NOTE — BH INPATIENT PSYCHIATRY PROGRESS NOTE - OTHER
dry oral mucosa, ot visibly distressed, grabbing his knees Mostly delusions of guilt, ruminations about wrongdoings and financial situation Pt with significant thought ruminations, perseverating about imminent incarceration.

## 2024-10-24 PROCEDURE — 99232 SBSQ HOSP IP/OBS MODERATE 35: CPT

## 2024-10-24 RX ADMIN — MIRTAZAPINE 30 MILLIGRAM(S): 30 TABLET ORAL at 20:06

## 2024-10-24 RX ADMIN — Medication 225 MILLIGRAM(S): at 09:28

## 2024-10-24 RX ADMIN — Medication 3 MILLIGRAM(S): at 20:06

## 2024-10-24 RX ADMIN — APIXABAN 5 MILLIGRAM(S): 5 TABLET, FILM COATED ORAL at 09:28

## 2024-10-24 RX ADMIN — APIXABAN 5 MILLIGRAM(S): 5 TABLET, FILM COATED ORAL at 20:05

## 2024-10-24 RX ADMIN — ARIPIPRAZOLE 10 MILLIGRAM(S): 2 TABLET ORAL at 09:27

## 2024-10-24 RX ADMIN — Medication 2 TABLET(S): at 20:06

## 2024-10-24 NOTE — BH INPATIENT PSYCHIATRY PROGRESS NOTE - NSBHMETABOLIC_PSY_ALL_CORE_FT
BMI: BMI (kg/m2): 28.7 (10-05-24 @ 15:58)  HbA1c: A1C with Estimated Average Glucose Result: 5.2 % (09-15-24 @ 06:50)    Glucose: POCT Blood Glucose.: 96 mg/dL (10-07-24 @ 14:15)    BP: 128/72 (10-23-24 @ 09:55) (128/72 - 162/98)Vital Signs Last 24 Hrs  T(C): 36.6 (10-24-24 @ 08:30), Max: 36.6 (10-24-24 @ 08:30)  T(F): 97.9 (10-24-24 @ 08:30), Max: 97.9 (10-24-24 @ 08:30)  HR: --  BP: --  BP(mean): --  RR: --  SpO2: --    Orthostatic VS  10-24-24 @ 08:30  Lying BP: --/-- HR: --  Sitting BP: 104/70 HR: 78  Standing BP: 98/68 HR: 75  Site: upper left arm  Mode: electronic  Orthostatic VS  10-23-24 @ 10:58  Lying BP: --/-- HR: --  Sitting BP: 118/100 HR: 70  Standing BP: 129/84 HR: 81  Site: upper left arm  Mode: electronic  Orthostatic VS  10-23-24 @ 05:42  Lying BP: 138/81 HR: --  Sitting BP: 114/92 HR: --  Standing BP: --/-- HR: --  Site: --  Mode: --    Lipid Panel: Date/Time: 09-15-24 @ 06:50  Cholesterol, Serum: 159  LDL Cholesterol Calculated: 99  HDL Cholesterol, Serum: 43  Total Cholesterol/HDL Ration Measurement: --  Triglycerides, Serum: 87

## 2024-10-24 NOTE — BH INPATIENT PSYCHIATRY PROGRESS NOTE - NSBHCHARTREVIEWVS_PSY_A_CORE FT
Vital Signs Last 24 Hrs  T(C): 36.6 (10-24-24 @ 08:30), Max: 36.6 (10-24-24 @ 08:30)  T(F): 97.9 (10-24-24 @ 08:30), Max: 97.9 (10-24-24 @ 08:30)  HR: --  BP: --  BP(mean): --  RR: --  SpO2: --    Orthostatic VS  10-24-24 @ 08:30  Lying BP: --/-- HR: --  Sitting BP: 104/70 HR: 78  Standing BP: 98/68 HR: 75  Site: upper left arm  Mode: electronic  Orthostatic VS  10-23-24 @ 10:58  Lying BP: --/-- HR: --  Sitting BP: 118/100 HR: 70  Standing BP: 129/84 HR: 81  Site: upper left arm  Mode: electronic  Orthostatic VS  10-23-24 @ 05:42  Lying BP: 138/81 HR: --  Sitting BP: 114/92 HR: --  Standing BP: --/-- HR: --  Site: --  Mode: --

## 2024-10-24 NOTE — BH INPATIENT PSYCHIATRY PROGRESS NOTE - NSBHASSESSSUMMFT_PSY_ALL_CORE
Patient is a 70 yo M with history of HTN and DVT and hx of schizoaffective disorder, bipolar type, BIB sister from [facility] due to 2 months of worsening depression and psychosis. Patient with depressed mood, poor ADLs, and persecutory delusions given arguments he had with another patient where he made threats. Patient with numerous previous suicide attempts and previous ECT; he appears to have worsened after completing his course of ECT as an outpatient. He was admitted medically due to poor PO intake. Given his recent MDE and failure to thrive, he is unable to care for himself and therefore meets criteria for inpatient psychiatric hospitalization.   SH: Lives in [facility]. Retired from [employer], , no children.   PsyHx: 1x SA by cutting in 1990. Multiple hospitalizations MR Aug 2023. Was on ECT until 6 months ago.    On interview, the pt endorses depressive sxs mostly including anhedonia, decreased sleep and appetite, and lack of energy. Thought ruminations of guilt noted, in the setting of recent statement towards other tenant at [facility]. Pt experienced AH that include degrading voices that seem to have commanding character but so far no indication of voices directly telling him to hurt himself or others. Pt likely experiencing mood episode with psychotic features, affect noted to be mood-congruent. R/O substance-induced mood and/or psychotic d/o.    10/2-10/3: Pt very anxious and fearful, fixed delusion about imminent incarceration and worrying about not being able to pay his hospital fees and housing in the future. Pt not acutely suicidal, denies active SIIP/HIIP but feels "tortured" by his anxiety and says on 10/3 that not being alive would "not be a bad mikey". Pt with persistent insomnia and low appetite. Currently no AH. Mirtazapine increased from 15 mg to 30 mg to address depressive symptoms, low appetite, and insomnia. Pt provided contact number of sister: [phone].      10/4: Pt very anxious and fearful, fixed delusion about inevitable incarceration and worrying about not being able to pay his hospital fees and housing in the future. Pt presenting as paranoid today, convinced that everyone on the unit is against him, not feeling safe to leave his room. Pt denies SIIP/HIIP but feels hopeless and helpless. Ongoing insomnia and low appetite. Currently no AH. Low appetite (only 1 meal per day), and insomnia. Will further discuss alternative treatment options given no significant improvement on Mirtazapine. Will initiate family meeting next week to discuss ECT, sister, brother and father agree that ECT helped in the past and may need to be restarted.    10/5: remains paranoid, seems to tolerating Abilify well. QTc 429. Continue Abilify 2mg/day as is.   10/6: poor PO intake, appears dehydrated, check CMP tomorrow; Continue Abilify to 2mg po daily  10/7: still poor PO intake, Sodium 132, glucose 54; repeat CMP, same delusions noted, although pt more ambivalent about ECT, family meeting expected tomorrow (10/08)  10/8: still poor PO intake, repeat CMP WNL, same fixed delusions noted, during family meeting with brother shahrzad (phone call), pt with significant thought ruminations, perseverating about imminent incarceration and inability to cover treatment costs, denies that ECT helped him in the past and is worried about side effect burden. Pt in visible distress with increased psychomotor activity. Will approach pt again and offer ECT when pt is less anxious.    10/9: still poor PO intake, same fixed delusions noted, significant thought ruminations, perseverating about imminent incarceration and inability to cover treatment costs, still refuses ECT. Pt shows paranoia regarding the treatment team cooperating with the police and knowing all information about him that would have led to his current charges.    10/10: poor PO intake, same fixed delusions, perceives other patients as police in disguise, perseverating about imminent incarceration but amenable to ECT. Pt denies SIIP/HIIP.    10/11: Completed ECT #1 today. Continues to endorse paranoid delusions. Perceives other patients and staff as undercover police or members of a TV crew documenting his arrest.     10/14: Continues to endorse paranoid delusions. Perceives other patients and staff as undercover police. PT still isolative in his room, has 1-2 meals per day. No AH. No active SIIP/HIIP, but pt is vague about passive SI.     10/15: Continues to endorse paranoid delusions, is suspicious about his current environment, asks about how resident knows about his new room and what resident is writing down when taking notes. Denies having had any ECT treatment. Perceives other patients and staff as undercover police. Pt has poor sleep and appetite and is still isolative in his room. No active SIIP/HIIP, but pt still vague about passive SI. No significant mood improvement from ECT noted so far although pt generally seems more alert and is showing fewer physical signs of anxiety such as shaking.    10/16: Tolerating ECT, appears slightly less anxious today after treatment. Will continue ECT this Friday 10/18.     10/17: Pt does not bring up beliefs of going to retirement today but remains anxious, endorsing sense of impending doom and isolative. ECT #4 scheduled for tomorrow 10/18.     10/18: Pt is more visible in the unit and seems calmer. No longer endorsing delusions about going to retirement but paranoia remains, now shifted towards this writer and ECT. Believes writer is keeping him in the hospital to make more money and ECT is providing some form of sham treatment where he is brought down to the suite but never given anesthesia or ECT. Will titrate Abilify to 7mg.     10/21-10/23: S/P ECT #6. Still very paranoid, suspicious of writer and ECT treatment team. Of note, when treated last year for similar presentation pt required higher number of ECT treatments before responding. Will continue titrating Abilify up to lowest effective dose and ECT three times weekly as tolerated.   10/24: continue with plan for ECT tomorrow, remains paranoid    working diagnosis: schizoaffective disorder, current depressive episode w/ psychosis. ddx bipolar type per previous notes, however no h/o manic sxs elicited in current interview.    Plan:   1. Legal: admitted on 9.13 voluntary status  2. Safety:  Denies SI/SIB/HI/VI; continue routine observation.  3. Psychiatric:   - ECT #4 scheduled for Wednesday 10/18  - Abilify 10 mg PO QD   - inc 10/8, inc 10/18, inc 10/22  - Venlafaxine  mg daily (depression)  	- Home medication  - Mirtazapine 30 mg QHS (depression)  	- Started during [facility] hospitalization   	- inc 10/2  HOLD Olanzapine 25 mg QHS (psychosis)  	- Home medication, held due to prolonged QTc per recommendation of C/L team. Will repeat EKG.  	- Primary team to address    Agitation PRNs: Olanzapine PO/IM, Lorazepam PO    4. Medical conditions, other medication, consults  a) DVT  - Eliquis 5 mg BID  b) Constipation   - Senna 2 tabs QHS  c) HTN  - Amlodipine 10 mg daily  d) PT consult: fall precautions, recommend ambulating with walker   5. Psychosocial interventions  - Patient provided verbal consent to speak with TBD  - CO 1:1: Not required  - Restrictions/allowances: None   6. Collateral: outpatient psychiatrist Dr. Irby on 6/2/23 on [facility] and handoff from Dr. Hernandez. Collateral from sister. See  note.   7. Disposition: When stable.

## 2024-10-25 PROCEDURE — 99231 SBSQ HOSP IP/OBS SF/LOW 25: CPT | Mod: 25

## 2024-10-25 PROCEDURE — 90870 ELECTROCONVULSIVE THERAPY: CPT

## 2024-10-25 RX ADMIN — Medication 2 TABLET(S): at 20:47

## 2024-10-25 RX ADMIN — MIRTAZAPINE 30 MILLIGRAM(S): 30 TABLET ORAL at 20:46

## 2024-10-25 RX ADMIN — APIXABAN 5 MILLIGRAM(S): 5 TABLET, FILM COATED ORAL at 20:45

## 2024-10-25 RX ADMIN — Medication 10 MILLIGRAM(S): at 10:32

## 2024-10-25 RX ADMIN — Medication 3 MILLIGRAM(S): at 20:47

## 2024-10-25 RX ADMIN — APIXABAN 5 MILLIGRAM(S): 5 TABLET, FILM COATED ORAL at 10:32

## 2024-10-25 RX ADMIN — ARIPIPRAZOLE 10 MILLIGRAM(S): 2 TABLET ORAL at 10:33

## 2024-10-25 RX ADMIN — Medication 225 MILLIGRAM(S): at 10:32

## 2024-10-25 NOTE — BH INPATIENT PSYCHIATRY PROGRESS NOTE - NSBHASSESSSUMMFT_PSY_ALL_CORE
Patient is a 70 yo M with history of HTN and DVT and hx of schizoaffective disorder, bipolar type, BIB sister from [facility] due to 2 months of worsening depression and psychosis. Patient with depressed mood, poor ADLs, and persecutory delusions given arguments he had with another patient where he made threats. Patient with numerous previous suicide attempts and previous ECT; he appears to have worsened after completing his course of ECT as an outpatient. He was admitted medically due to poor PO intake. Given his recent MDE and failure to thrive, he is unable to care for himself and therefore meets criteria for inpatient psychiatric hospitalization.   SH: Lives in [facility]. Retired from [employer], , no children.   PsyHx: 1x SA by cutting in 1990. Multiple hospitalizations MR Aug 2023. Was on ECT until 6 months ago.    On interview, the pt endorses depressive sxs mostly including anhedonia, decreased sleep and appetite, and lack of energy. Thought ruminations of guilt noted, in the setting of recent statement towards other tenant at [facility]. Pt experienced AH that include degrading voices that seem to have commanding character but so far no indication of voices directly telling him to hurt himself or others. Pt likely experiencing mood episode with psychotic features, affect noted to be mood-congruent. R/O substance-induced mood and/or psychotic d/o.    10/2-10/3: Pt very anxious and fearful, fixed delusion about imminent incarceration and worrying about not being able to pay his hospital fees and housing in the future. Pt not acutely suicidal, denies active SIIP/HIIP but feels "tortured" by his anxiety and says on 10/3 that not being alive would "not be a bad mikey". Pt with persistent insomnia and low appetite. Currently no AH. Mirtazapine increased from 15 mg to 30 mg to address depressive symptoms, low appetite, and insomnia. Pt provided contact number of sister: [phone].      10/4: Pt very anxious and fearful, fixed delusion about inevitable incarceration and worrying about not being able to pay his hospital fees and housing in the future. Pt presenting as paranoid today, convinced that everyone on the unit is against him, not feeling safe to leave his room. Pt denies SIIP/HIIP but feels hopeless and helpless. Ongoing insomnia and low appetite. Currently no AH. Low appetite (only 1 meal per day), and insomnia. Will further discuss alternative treatment options given no significant improvement on Mirtazapine. Will initiate family meeting next week to discuss ECT, sister, brother and father agree that ECT helped in the past and may need to be restarted.    10/5: remains paranoid, seems to tolerating Abilify well. QTc 429. Continue Abilify 2mg/day as is.   10/6: poor PO intake, appears dehydrated, check CMP tomorrow; Continue Abilify to 2mg po daily  10/7: still poor PO intake, Sodium 132, glucose 54; repeat CMP, same delusions noted, although pt more ambivalent about ECT, family meeting expected tomorrow (10/08)  10/8: still poor PO intake, repeat CMP WNL, same fixed delusions noted, during family meeting with brother shahrzad (phone call), pt with significant thought ruminations, perseverating about imminent incarceration and inability to cover treatment costs, denies that ECT helped him in the past and is worried about side effect burden. Pt in visible distress with increased psychomotor activity. Will approach pt again and offer ECT when pt is less anxious.    10/9: still poor PO intake, same fixed delusions noted, significant thought ruminations, perseverating about imminent incarceration and inability to cover treatment costs, still refuses ECT. Pt shows paranoia regarding the treatment team cooperating with the police and knowing all information about him that would have led to his current charges.    10/10: poor PO intake, same fixed delusions, perceives other patients as police in disguise, perseverating about imminent incarceration but amenable to ECT. Pt denies SIIP/HIIP.    10/11: Completed ECT #1 today. Continues to endorse paranoid delusions. Perceives other patients and staff as undercover police or members of a TV crew documenting his arrest.     10/14: Continues to endorse paranoid delusions. Perceives other patients and staff as undercover police. PT still isolative in his room, has 1-2 meals per day. No AH. No active SIIP/HIIP, but pt is vague about passive SI.     10/15: Continues to endorse paranoid delusions, is suspicious about his current environment, asks about how resident knows about his new room and what resident is writing down when taking notes. Denies having had any ECT treatment. Perceives other patients and staff as undercover police. Pt has poor sleep and appetite and is still isolative in his room. No active SIIP/HIIP, but pt still vague about passive SI. No significant mood improvement from ECT noted so far although pt generally seems more alert and is showing fewer physical signs of anxiety such as shaking.    10/16: Tolerating ECT, appears slightly less anxious today after treatment. Will continue ECT this Friday 10/18.     10/17: Pt does not bring up beliefs of going to half-way today but remains anxious, endorsing sense of impending doom and isolative. ECT #4 scheduled for tomorrow 10/18.     10/18: Pt is more visible in the unit and seems calmer. No longer endorsing delusions about going to half-way but paranoia remains, now shifted towards this writer and ECT. Believes writer is keeping him in the hospital to make more money and ECT is providing some form of sham treatment where he is brought down to the suite but never given anesthesia or ECT. Will titrate Abilify to 7mg.     10/21-10/23: S/P ECT #6. Still very paranoid, suspicious of writer and ECT treatment team. Of note, when treated last year for similar presentation pt required higher number of ECT treatments before responding. Will continue titrating Abilify up to lowest effective dose and ECT three times weekly as tolerated.   10/24: continue with plan for ECT tomorrow, remains paranoid  10/25: Tolerating ECT, reports gains. Next ECT scheduled for Monday.     working diagnosis: schizoaffective disorder, current depressive episode w/ psychosis. ddx bipolar type per previous notes, however no h/o manic sxs elicited in current interview.    Plan:   1. Legal: admitted on 9.13 voluntary status  2. Safety:  Denies SI/SIB/HI/VI; continue routine observation.  3. Psychiatric:   - ECT #4 scheduled for Wednesday 10/18  - Abilify 10 mg PO QD   - inc 10/8, inc 10/18, inc 10/22  - Venlafaxine  mg daily (depression)  	- Home medication  - Mirtazapine 30 mg QHS (depression)  	- Started during [facility] hospitalization   	- inc 10/2  HOLD Olanzapine 25 mg QHS (psychosis)  	- Home medication, held due to prolonged QTc per recommendation of C/L team. Will repeat EKG.  	- Primary team to address    Agitation PRNs: Olanzapine PO/IM, Lorazepam PO    4. Medical conditions, other medication, consults  a) DVT  - Eliquis 5 mg BID  b) Constipation   - Senna 2 tabs QHS  c) HTN  - Amlodipine 10 mg daily  d) PT consult: fall precautions, recommend ambulating with walker   5. Psychosocial interventions  - Patient provided verbal consent to speak with TBD  - CO 1:1: Not required  - Restrictions/allowances: None   6. Collateral: outpatient psychiatrist Dr. Irby on 6/2/23 on [facility] and handoff from Dr. Hernandez. Collateral from sister. See  note.   7. Disposition: When stable.

## 2024-10-25 NOTE — BH INPATIENT PSYCHIATRY PROGRESS NOTE - NSBHCHARTREVIEWVS_PSY_A_CORE FT
Vital Signs Last 24 Hrs  T(C): 36.3 (10-25-24 @ 10:00), Max: 36.9 (10-25-24 @ 08:36)  T(F): 97.4 (10-25-24 @ 10:00), Max: 98.4 (10-25-24 @ 08:36)  HR: 60 (10-25-24 @ 10:35) (60 - 91)  BP: 132/94 (10-25-24 @ 10:35) (132/94 - 171/110)  BP(mean): --  RR: 19 (10-25-24 @ 10:00) (16 - 19)  SpO2: 97% (10-25-24 @ 10:00) (93% - 100%)    Orthostatic VS  10-25-24 @ 05:50  Lying BP: --/-- HR: --  Sitting BP: 116/84 HR: 55  Standing BP: 126/84 HR: 72  Site: --  Mode: --  Orthostatic VS  10-24-24 @ 08:30  Lying BP: --/-- HR: --  Sitting BP: 104/70 HR: 78  Standing BP: 98/68 HR: 75  Site: upper left arm  Mode: electronic

## 2024-10-25 NOTE — BH INPATIENT PSYCHIATRY PROGRESS NOTE - NSBHFUPINTERVALHXFT_PSY_A_CORE
Pt seen for follow up of paranoia. No overnight events reported. Reports gains in terms of anxiety but still feeling something bad is going to happen to him. Denies si/hi.

## 2024-10-25 NOTE — BH INPATIENT PSYCHIATRY PROGRESS NOTE - NSBHMETABOLIC_PSY_ALL_CORE_FT
BMI: BMI (kg/m2): 28.7 (10-05-24 @ 15:58)  HbA1c: A1C with Estimated Average Glucose Result: 5.2 % (09-15-24 @ 06:50)    Glucose: POCT Blood Glucose.: 96 mg/dL (10-07-24 @ 14:15)    BP: 132/94 (10-25-24 @ 10:35) (128/72 - 171/110)Vital Signs Last 24 Hrs  T(C): 36.3 (10-25-24 @ 10:00), Max: 36.9 (10-25-24 @ 08:36)  T(F): 97.4 (10-25-24 @ 10:00), Max: 98.4 (10-25-24 @ 08:36)  HR: 60 (10-25-24 @ 10:35) (60 - 91)  BP: 132/94 (10-25-24 @ 10:35) (132/94 - 171/110)  BP(mean): --  RR: 19 (10-25-24 @ 10:00) (16 - 19)  SpO2: 97% (10-25-24 @ 10:00) (93% - 100%)    Orthostatic VS  10-25-24 @ 05:50  Lying BP: --/-- HR: --  Sitting BP: 116/84 HR: 55  Standing BP: 126/84 HR: 72  Site: --  Mode: --  Orthostatic VS  10-24-24 @ 08:30  Lying BP: --/-- HR: --  Sitting BP: 104/70 HR: 78  Standing BP: 98/68 HR: 75  Site: upper left arm  Mode: electronic    Lipid Panel: Date/Time: 09-15-24 @ 06:50  Cholesterol, Serum: 159  LDL Cholesterol Calculated: 99  HDL Cholesterol, Serum: 43  Total Cholesterol/HDL Ration Measurement: --  Triglycerides, Serum: 87

## 2024-10-26 RX ADMIN — Medication 3 MILLIGRAM(S): at 21:13

## 2024-10-26 RX ADMIN — APIXABAN 5 MILLIGRAM(S): 5 TABLET, FILM COATED ORAL at 09:08

## 2024-10-26 RX ADMIN — Medication 10 MILLIGRAM(S): at 09:08

## 2024-10-26 RX ADMIN — Medication 225 MILLIGRAM(S): at 09:08

## 2024-10-26 RX ADMIN — Medication 2 TABLET(S): at 21:12

## 2024-10-26 RX ADMIN — ARIPIPRAZOLE 10 MILLIGRAM(S): 2 TABLET ORAL at 09:08

## 2024-10-26 RX ADMIN — MIRTAZAPINE 30 MILLIGRAM(S): 30 TABLET ORAL at 21:13

## 2024-10-26 RX ADMIN — APIXABAN 5 MILLIGRAM(S): 5 TABLET, FILM COATED ORAL at 21:12

## 2024-10-27 RX ADMIN — Medication 225 MILLIGRAM(S): at 08:48

## 2024-10-27 RX ADMIN — APIXABAN 5 MILLIGRAM(S): 5 TABLET, FILM COATED ORAL at 21:02

## 2024-10-27 RX ADMIN — Medication 3 MILLIGRAM(S): at 21:02

## 2024-10-27 RX ADMIN — ARIPIPRAZOLE 10 MILLIGRAM(S): 2 TABLET ORAL at 08:52

## 2024-10-27 RX ADMIN — MIRTAZAPINE 30 MILLIGRAM(S): 30 TABLET ORAL at 21:02

## 2024-10-27 RX ADMIN — Medication 2 TABLET(S): at 21:03

## 2024-10-27 RX ADMIN — Medication 10 MILLIGRAM(S): at 08:49

## 2024-10-27 RX ADMIN — APIXABAN 5 MILLIGRAM(S): 5 TABLET, FILM COATED ORAL at 08:49

## 2024-10-28 PROCEDURE — 90870 ELECTROCONVULSIVE THERAPY: CPT

## 2024-10-28 PROCEDURE — 99231 SBSQ HOSP IP/OBS SF/LOW 25: CPT | Mod: 25

## 2024-10-28 RX ORDER — LORAZEPAM 2 MG
0.5 TABLET ORAL EVERY 6 HOURS
Refills: 0 | Status: DISCONTINUED | OUTPATIENT
Start: 2024-10-28 | End: 2024-11-04

## 2024-10-28 RX ADMIN — Medication 2 TABLET(S): at 21:45

## 2024-10-28 RX ADMIN — Medication 10 MILLIGRAM(S): at 07:38

## 2024-10-28 RX ADMIN — Medication 3 MILLIGRAM(S): at 21:45

## 2024-10-28 RX ADMIN — Medication 225 MILLIGRAM(S): at 07:39

## 2024-10-28 RX ADMIN — MIRTAZAPINE 30 MILLIGRAM(S): 30 TABLET ORAL at 21:45

## 2024-10-28 RX ADMIN — APIXABAN 5 MILLIGRAM(S): 5 TABLET, FILM COATED ORAL at 21:46

## 2024-10-28 RX ADMIN — ARIPIPRAZOLE 10 MILLIGRAM(S): 2 TABLET ORAL at 07:38

## 2024-10-28 RX ADMIN — APIXABAN 5 MILLIGRAM(S): 5 TABLET, FILM COATED ORAL at 07:39

## 2024-10-28 NOTE — BH INPATIENT PSYCHIATRY PROGRESS NOTE - NSBHMETABOLIC_PSY_ALL_CORE_FT
BMI: BMI (kg/m2): 28.7 (10-05-24 @ 15:58)  HbA1c: A1C with Estimated Average Glucose Result: 5.2 % (09-15-24 @ 06:50)    Glucose: POCT Blood Glucose.: 96 mg/dL (10-07-24 @ 14:15)    BP: 148/93 (10-28-24 @ 11:35) (138/94 - 191/112)Vital Signs Last 24 Hrs  T(C): 36.4 (10-28-24 @ 11:35), Max: 36.6 (10-28-24 @ 10:17)  T(F): 97.5 (10-28-24 @ 11:35), Max: 97.8 (10-28-24 @ 10:17)  HR: 101 (10-28-24 @ 11:35) (61 - 118)  BP: 148/93 (10-28-24 @ 11:35) (138/94 - 191/112)  BP(mean): --  RR: 16 (10-28-24 @ 11:35) (16 - 23)  SpO2: 100% (10-28-24 @ 11:35) (95% - 100%)    Orthostatic VS  10-28-24 @ 05:30  Lying BP: --/-- HR: --  Sitting BP: 134/78 HR: 61  Standing BP: 114/84 HR: --  Site: --  Mode: --  Orthostatic VS  10-27-24 @ 07:46  Lying BP: --/-- HR: --  Sitting BP: 129/81 HR: 60  Standing BP: 131/87 HR: 86  Site: --  Mode: --    Lipid Panel: Date/Time: 09-15-24 @ 06:50  Cholesterol, Serum: 159  LDL Cholesterol Calculated: 99  HDL Cholesterol, Serum: 43  Total Cholesterol/HDL Ration Measurement: --  Triglycerides, Serum: 87

## 2024-10-28 NOTE — BH INPATIENT PSYCHIATRY PROGRESS NOTE - NSBHFUPINTERVALHXFT_PSY_A_CORE
Pt seen for follow up of paranoia. No incidents reported over the weekend. BP with 20 point systolic drop when standing, pt denies symptoms, encouraged hydration. S/P ECT #8 today. Pt was seen in the dayroom after ECT. Affect is brighter. Pt reports treatment went well and denies side effects such as headaches or reflux. Reports gains in terms of mood and anxiety. Denies feeling unsafe in the unit or having fear of impending doom. Denies AH/VH/SI/HI.

## 2024-10-28 NOTE — BH INPATIENT PSYCHIATRY PROGRESS NOTE - NSBHASSESSSUMMFT_PSY_ALL_CORE
Patient is a 70 yo M with history of HTN and DVT and hx of schizoaffective disorder, bipolar type, BIB sister from [facility] due to 2 months of worsening depression and psychosis. Patient with depressed mood, poor ADLs, and persecutory delusions given arguments he had with another patient where he made threats. Patient with numerous previous suicide attempts and previous ECT; he appears to have worsened after completing his course of ECT as an outpatient. He was admitted medically due to poor PO intake. Given his recent MDE and failure to thrive, he is unable to care for himself and therefore meets criteria for inpatient psychiatric hospitalization.   SH: Lives in [facility]. Retired from [employer], , no children.   PsyHx: 1x SA by cutting in 1990. Multiple hospitalizations MR Aug 2023. Was on ECT until 6 months ago.    On interview, the pt endorses depressive sxs mostly including anhedonia, decreased sleep and appetite, and lack of energy. Thought ruminations of guilt noted, in the setting of recent statement towards other tenant at [facility]. Pt experienced AH that include degrading voices that seem to have commanding character but so far no indication of voices directly telling him to hurt himself or others. Pt likely experiencing mood episode with psychotic features, affect noted to be mood-congruent. R/O substance-induced mood and/or psychotic d/o.    10/2-10/3: Pt very anxious and fearful, fixed delusion about imminent incarceration and worrying about not being able to pay his hospital fees and housing in the future. Pt not acutely suicidal, denies active SIIP/HIIP but feels "tortured" by his anxiety and says on 10/3 that not being alive would "not be a bad mikey". Pt with persistent insomnia and low appetite. Currently no AH. Mirtazapine increased from 15 mg to 30 mg to address depressive symptoms, low appetite, and insomnia. Pt provided contact number of sister: [phone].      10/4: Pt very anxious and fearful, fixed delusion about inevitable incarceration and worrying about not being able to pay his hospital fees and housing in the future. Pt presenting as paranoid today, convinced that everyone on the unit is against him, not feeling safe to leave his room. Pt denies SIIP/HIIP but feels hopeless and helpless. Ongoing insomnia and low appetite. Currently no AH. Low appetite (only 1 meal per day), and insomnia. Will further discuss alternative treatment options given no significant improvement on Mirtazapine. Will initiate family meeting next week to discuss ECT, sister, brother and father agree that ECT helped in the past and may need to be restarted.    10/5: remains paranoid, seems to tolerating Abilify well. QTc 429. Continue Abilify 2mg/day as is.   10/6: poor PO intake, appears dehydrated, check CMP tomorrow; Continue Abilify to 2mg po daily  10/7: still poor PO intake, Sodium 132, glucose 54; repeat CMP, same delusions noted, although pt more ambivalent about ECT, family meeting expected tomorrow (10/08)  10/8: still poor PO intake, repeat CMP WNL, same fixed delusions noted, during family meeting with brother shahrzad (phone call), pt with significant thought ruminations, perseverating about imminent incarceration and inability to cover treatment costs, denies that ECT helped him in the past and is worried about side effect burden. Pt in visible distress with increased psychomotor activity. Will approach pt again and offer ECT when pt is less anxious.    10/9: still poor PO intake, same fixed delusions noted, significant thought ruminations, perseverating about imminent incarceration and inability to cover treatment costs, still refuses ECT. Pt shows paranoia regarding the treatment team cooperating with the police and knowing all information about him that would have led to his current charges.    10/10: poor PO intake, same fixed delusions, perceives other patients as police in disguise, perseverating about imminent incarceration but amenable to ECT. Pt denies SIIP/HIIP.    10/11: Completed ECT #1 today. Continues to endorse paranoid delusions. Perceives other patients and staff as undercover police or members of a TV crew documenting his arrest.     10/14: Continues to endorse paranoid delusions. Perceives other patients and staff as undercover police. PT still isolative in his room, has 1-2 meals per day. No AH. No active SIIP/HIIP, but pt is vague about passive SI.     10/15: Continues to endorse paranoid delusions, is suspicious about his current environment, asks about how resident knows about his new room and what resident is writing down when taking notes. Denies having had any ECT treatment. Perceives other patients and staff as undercover police. Pt has poor sleep and appetite and is still isolative in his room. No active SIIP/HIIP, but pt still vague about passive SI. No significant mood improvement from ECT noted so far although pt generally seems more alert and is showing fewer physical signs of anxiety such as shaking.    10/16: Tolerating ECT, appears slightly less anxious today after treatment. Will continue ECT this Friday 10/18.     10/17: Pt does not bring up beliefs of going to senior care today but remains anxious, endorsing sense of impending doom and isolative. ECT #4 scheduled for tomorrow 10/18.     10/18: Pt is more visible in the unit and seems calmer. No longer endorsing delusions about going to senior care but paranoia remains, now shifted towards this writer and ECT. Believes writer is keeping him in the hospital to make more money and ECT is providing some form of sham treatment where he is brought down to the suite but never given anesthesia or ECT. Will titrate Abilify to 7mg.     10/21-10/23: S/P ECT #6. Still very paranoid, suspicious of writer and ECT treatment team. Of note, when treated last year for similar presentation pt required higher number of ECT treatments before responding. Will continue titrating Abilify up to lowest effective dose and ECT three times weekly as tolerated.   10/24: continue with plan for ECT tomorrow, remains paranoid  10/25: Tolerating ECT, reports gains. Next ECT scheduled for Monday.   10/28: Tolerating ECT, presenting gains. Denies feeling unsafe in the unit today.     working diagnosis: schizoaffective disorder, current depressive episode w/ psychosis. ddx bipolar type per previous notes, however no h/o manic sxs elicited in current interview.    Plan:   1. Legal: admitted on 9.13 voluntary status  2. Safety:  Denies SI/SIB/HI/VI; continue routine observation.  3. Psychiatric:   - ECT #4 scheduled for Wednesday 10/18  - Abilify 10 mg PO QD   - inc 10/8, inc 10/18, inc 10/22  - Venlafaxine  mg daily (depression)  	- Home medication  - Mirtazapine 30 mg QHS (depression)  	- Started during [facility] hospitalization   	- inc 10/2  HOLD Olanzapine 25 mg QHS (psychosis)  	- Home medication, held due to prolonged QTc per recommendation of C/L team. Will repeat EKG.  	- Primary team to address    Agitation PRNs: Olanzapine PO/IM, Lorazepam PO    4. Medical conditions, other medication, consults  a) DVT  - Eliquis 5 mg BID  b) Constipation   - Senna 2 tabs QHS  c) HTN  - Amlodipine 10 mg daily  d) PT consult: fall precautions, recommend ambulating with walker   5. Psychosocial interventions  - Patient provided verbal consent to speak with TBD  - CO 1:1: Not required  - Restrictions/allowances: None   6. Collateral: outpatient psychiatrist Dr. Irby on 6/2/23 on [facility] and handoff from Dr. Hernandez. Collateral from sister. See  note.   7. Disposition: When stable.

## 2024-10-28 NOTE — BH INPATIENT PSYCHIATRY PROGRESS NOTE - NSBHCHARTREVIEWVS_PSY_A_CORE FT
Vital Signs Last 24 Hrs  T(C): 36.4 (10-28-24 @ 11:35), Max: 36.6 (10-28-24 @ 10:17)  T(F): 97.5 (10-28-24 @ 11:35), Max: 97.8 (10-28-24 @ 10:17)  HR: 101 (10-28-24 @ 11:35) (61 - 118)  BP: 148/93 (10-28-24 @ 11:35) (138/94 - 191/112)  BP(mean): --  RR: 16 (10-28-24 @ 11:35) (16 - 23)  SpO2: 100% (10-28-24 @ 11:35) (95% - 100%)    Orthostatic VS  10-28-24 @ 05:30  Lying BP: --/-- HR: --  Sitting BP: 134/78 HR: 61  Standing BP: 114/84 HR: --  Site: --  Mode: --  Orthostatic VS  10-27-24 @ 07:46  Lying BP: --/-- HR: --  Sitting BP: 129/81 HR: 60  Standing BP: 131/87 HR: 86  Site: --  Mode: --

## 2024-10-29 PROCEDURE — 99232 SBSQ HOSP IP/OBS MODERATE 35: CPT

## 2024-10-29 RX ADMIN — ARIPIPRAZOLE 10 MILLIGRAM(S): 2 TABLET ORAL at 08:33

## 2024-10-29 RX ADMIN — Medication 225 MILLIGRAM(S): at 08:33

## 2024-10-29 RX ADMIN — APIXABAN 5 MILLIGRAM(S): 5 TABLET, FILM COATED ORAL at 08:33

## 2024-10-29 RX ADMIN — MIRTAZAPINE 30 MILLIGRAM(S): 30 TABLET ORAL at 20:47

## 2024-10-29 RX ADMIN — Medication 2 TABLET(S): at 20:47

## 2024-10-29 RX ADMIN — Medication 3 MILLIGRAM(S): at 20:47

## 2024-10-29 RX ADMIN — APIXABAN 5 MILLIGRAM(S): 5 TABLET, FILM COATED ORAL at 20:47

## 2024-10-29 RX ADMIN — Medication 10 MILLIGRAM(S): at 08:33

## 2024-10-29 NOTE — BH INPATIENT PSYCHIATRY DISCHARGE NOTE - NSDCMRMEDTOKEN_GEN_ALL_CORE_FT
amLODIPine 10 mg oral tablet: 1 tab(s) orally once a day  apixaban 5 mg oral tablet: 1 tab(s) orally 2 times a day take 2 tabs BID  thru 10/1, on 10/2 change to 1 tab BID  mirtazapine 15 mg oral tablet: 1 tab(s) orally once a day (at bedtime)  senna (sennosides) 8.6 mg oral tablet: 2 tab(s) orally once a day (at bedtime)  venlafaxine 225 mg oral tablet, extended release: 1 tab(s) orally once a day   Abilify 10 mg oral tablet: 1 tab(s) orally once a day  amLODIPine 10 mg oral tablet: 1 tab(s) orally once a day (before a meal)  apixaban 5 mg oral tablet: 1 tab(s) orally every 12 hours  melatonin 3 mg oral tablet: 1 tab(s) orally once a day (at bedtime)  mirtazapine 30 mg oral tablet: 1 tab(s) orally once a day (at bedtime)  senna leaf extract oral tablet: 2 tab(s) orally once a day (at bedtime)  venlafaxine 75 mg oral capsule, extended release: 3 cap(s) orally once a day

## 2024-10-29 NOTE — BH INPATIENT PSYCHIATRY DISCHARGE NOTE - DESCRIPTION
Lives in assisted living facility, pt reports no family or children; has living sister, brother, and father; long-time friend of 30 years, Ezequiel Cuevas ([phone]).

## 2024-10-29 NOTE — BH INPATIENT PSYCHIATRY DISCHARGE NOTE - HOSPITAL COURSE
69 year old man with history of Schizoaffective disorder, DVT and HTN admitted for worsening psychosis over the past 2 months following ECT discontinuation in May 2024. Pt is known to this unit and was treated last year for similar presentation.     On arrival to  Mr. Arias endorsed delusional belief he will go to custodial because he cursed at a peer in his [place] and believes that is considered a felony because peer is disabled. pt was also preoccupied with fear he would be incarcerated over a sexual encounter he had when he was very young. Pt spent most day in bed ruminating about these thoughts, fears the police may come "dressed in plainclothes" to the unit to take him to custodial. Initially refused ECT , feeling hopeless it would change his situation. Zyprexa was discontinued due to prolonged QTc in [facility],on Abilify 10mg QD now.    Started ECT and completed a total of ____ treatments in the hospital. 69 year old man with history of Schizoaffective disorder, DVT and HTN admitted for worsening psychosis over the past 2 months following ECT discontinuation in May 2024. Pt is known to this unit and was treated last year for similar presentation.     On arrival to  Mr. Arias endorsed delusional belief he will go to correction because he cursed at a peer in his [place] and believes that is considered a felony because peer is disabled. pt was also preoccupied with fear he would be incarcerated over a sexual encounter he had when he was very young. Pt spent most day in bed ruminating about these thoughts, fears the police may come "dressed in plainclothes" to the unit to take him to correction. Initially refused ECT , feeling hopeless it would change his situation. Zyprexa was discontinued due to prolonged QTc in [facility],on Abilify 10mg QD now.    Started ECT and completed a total of ____ treatments in the hospital. After his 7th-8th treatment pt started showing sustained gains in terms of anxiety and paranoia. 69 year old man with history of Schizoaffective disorder, DVT and HTN admitted for worsening psychosis over the past 2 months following ECT discontinuation in May 2024. Pt is known to this unit and was treated last year for similar presentation.     On arrival to  Mr. Arias endorsed delusional belief he will go to [facility] because he cursed at a peer in his [place] and believes that is considered a felony because peer is disabled. pt was also preoccupied with fear he would be incarcerated over a sexual encounter he had when he was very young. Pt spent most day in bed ruminating about these thoughts, fears the police may come "dressed in plainclothes" to the unit to take him to [facility]. Initially refused ECT , feeling hopeless it would change his situation. Zyprexa was discontinued due to prolonged QTc in [facility],on Abilify 10mg QD now.    Started ECT and completed a total of _10___ treatments in the hospital. After his 7th-8th treatment pt started showing sustained gains in terms of anxiety and paranoia. Patient will continue Maintenance ECT  treatment on the outpatient--once per week.    69 year old man with history of Schizoaffective disorder, DVT and HTN admitted for worsening psychosis over the past 2 months following ECT discontinuation in May 2024. Pt is known to this unit and was treated last year for similar presentation.     On arrival to  Mr. Arias endorsed delusional belief he will go to skilled nursing because he cursed at a peer in his [place] and believes that is considered a felony because peer is disabled. pt was also preoccupied with fear he would be incarcerated over a sexual encounter he had when he was very young. Pt spent most day in bed ruminating about these thoughts, fears the police may come "dressed in plainclothes" to the unit to take him to skilled nursing. Initially refused ECT , feeling hopeless it would change his situation. Zyprexa was discontinued due to prolonged QTc in [facility],on Abilify 10mg QD now.    Started ECT and completed a total of _10_bifrontal_ treatments in the hospital. After his 7th-8th treatment pt started showing sustained gains in terms of anxiety and paranoia. Patient will continue Maintenance ECT  treatment on the outpatient--once per week.     Patient's symptoms gradually improved over the course of the hospitalization.  There was notable improvement in depressed mood and delusions.   Patient did not become agitated and did not require emergent intramuscular medications. Sleep and appetite improved.    On day of discharge, the patient has improved significantly and no longer requires inpatient treatment and care. Patient denies all suicidal and aggressive ideation, intent and plan. Patient is not judged to be an acute danger to self or others at this time.    The patient has a low acute risk and chronic risk of self-harm. Protective factors include no SI, no SIB, no substance abuse, no current mood sx, no hopelessness, no access to firearms and receiving ECT.  Chronic risk factors include chronic course of presenting illness, hx of SA.  Immediate risk was minimized by inpatient admission to a safe environment with appropriate supervision and limited access to lethal means. Future risk was minimized before discharge  providing relevant patient education, discussing emergency procedures, and ensuring close follow-up. The patient remains at a low acute risk of self-harm, and such risk cannot be further ameliorated by continued inpatient treatment and the patient is therefore appropriate for discharge.

## 2024-10-29 NOTE — BH INPATIENT PSYCHIATRY DISCHARGE NOTE - HPI (INCLUDE ILLNESS QUALITY, SEVERITY, DURATION, TIMING, CONTEXT, MODIFYING FACTORS, ASSOCIATED SIGNS AND SYMPTOMS)
From admission interview:  Patient seen in her room, in no acute distress, amenable to interview. States that he has been depressed and is concerned that he will go to [facility]. Also states he will not be allowed back to his [place]. Denies any concerns or physical complaints currently. Denies allergies or dietary restrictions. Denies SI/HI/AVH.     From ED interview:  70 yo M, domiciled at [facility], , no children, retired from job at [employer] in 40s, with PPH of schizoaffective disorder (bipolar type with psychotic features), one past suicide attempt in 1992 via cutting wrists (found by mother and brought in to hospital), multiple past psychiatric hospitalizations (last [facility] June - Aug 2023), substance use hx (ETOH-last used April 2024, h/o 3 DUIs in 40s; cocaine- last used 20 yrs ago), and PMH of HTN who presents to [facility] ED BIB sister from [facility] due to 2 months of worsening depression, psychosis (AH, paranoia), and poor attention to ADLs.    On interview today, pt appears anxious and depressed with poor eye contact. He states his mood has been worse for the past 2 months with no clear triggers, Reports sad mood, anhedonia, hopelessness, social withdrawal, low energy, and trouble concentrating. He states that 1 month ago, he started to hear a male voice that he describes as "evil." He reports AH have increased in frequency to several times per day now. He denies dangerous CAH to harm self or others but reports +CAH to "do the opposite of what staff tell me to do." Patient is not able to provide examples. Also reports paranoia with delusion that he will be sent to half-way due to verbal argument with fellow resident at [facility] about 3 months ago. He reports peer was picking on another female resident, so patient said "stop that or I'll break your jaw." Pt denies any actual physical violence or posturing during episode. He also states "I'm not sure if I said I'd break his jaw or if I just thought it." He states that staff and all other residents do not like his because he has poor attention to hygiene. He wonders whether staff did not wash his clothes properly last week because they don't like him. Denies VH, IOR, belief he is being watched/monitored. Pt reports decreased PO intake and states he only eats 1-2 meals. Denies lightheadedness or falls. He reports poor hygiene recently, stating it has been "years" since he last showered, that he does not brush his teeth, and that he has been changing his clothes less frequently (every 4-5 days). Reports passive SI with thoughts such as "it would be better for everyone if I was dead." Denies any active SIIP, preparatory actions, plans, or SIB. On ROS, denies persistently elevated/irritable mood, increased energy, distractibility, impulsivity, HIIP, access to guns, or recent substance use (last ETOH in April 2024). States he has been adherent with meds and that his psychiatrist at [facility] recently increased his meds 1 week ago.  Pt is amenable to voluntary inpt admission.    Collateral obtained from patient's sister Saritha (), who states pt called her this morning to report worsening mood. Sister was concerned and brought him to ED for inpt psych admission. She reports patient's mood and functioning were much better about 6 months ago while on maintenance ECT. ECT was stopped due to decreased benefit from treatments and mild memory loss.     Further collateral obtained from [facility] - [facility]  Gillian (). Reports pt has been calm but very isolative to his room. No aggression, not clearly RIS. Current meds: Olanzapine 20 mg HS (increased from 15 mg 1 week ago), Venlafaxine 225 mg daily (increased 7/31/24), PRN Ativan 0.5 mg HS for anxiety/insomnia (using almost nightly for past 5-6 nights), Amlodipine 10 mg daily for HTN, Vit B12, Vit D, Senna 2 tabs HS. Psychiatrist Dr. Sharma, [phone] - contacted, left voicemail

## 2024-10-29 NOTE — BH INPATIENT PSYCHIATRY PROGRESS NOTE - NSBHASSESSSUMMFT_PSY_ALL_CORE
Patient is a 70 yo M with history of HTN and DVT and hx of schizoaffective disorder, bipolar type, BIB sister from [facility] due to 2 months of worsening depression and psychosis. Patient with depressed mood, poor ADLs, and persecutory delusions given arguments he had with another patient where he made threats. Patient with numerous previous suicide attempts and previous ECT; he appears to have worsened after completing his course of ECT as an outpatient. He was admitted medically due to poor PO intake. Given his recent MDE and failure to thrive, he is unable to care for himself and therefore meets criteria for inpatient psychiatric hospitalization.   SH: Lives in [place]. Retired from [employer], , no children.   PsyHx: 1x SA by cutting in 1990. Multiple hospitalizations MR Aug 2023. Was on ECT until 6 months ago.    On interview, the pt endorses depressive sxs mostly including anhedonia, decreased sleep and appetite, and lack of energy. Thought ruminations of guilt noted, in the setting of recent statement towards other tenant at [facility]. Pt experienced AH that include degrading voices that seem to have commanding character but so far no indication of voices directly telling him to hurt himself or others. Pt likely experiencing mood episode with psychotic features, affect noted to be mood-congruent. R/O substance-induced mood and/or psychotic d/o.    10/2-10/3: Pt very anxious and fearful, fixed delusion about imminent incarceration and worrying about not being able to pay his hospital fees and housing in the future. Pt not acutely suicidal, denies active SIIP/HIIP but feels "tortured" by his anxiety and says on 10/3 that not being alive would "not be a bad mikey". Pt with persistent insomnia and low appetite. Currently no AH. Mirtazapine increased from 15 mg to 30 mg to address depressive symptoms, low appetite, and insomnia. Pt provided contact number of sister: [phone].      10/4: Pt very anxious and fearful, fixed delusion about inevitable incarceration and worrying about not being able to pay his hospital fees and housing in the future. Pt presenting as paranoid today, convinced that everyone on the unit is against him, not feeling safe to leave his room. Pt denies SIIP/HIIP but feels hopeless and helpless. Ongoing insomnia and low appetite. Currently no AH. Low appetite (only 1 meal per day), and insomnia. Will further discuss alternative treatment options given no significant improvement on Mirtazapine. Will initiate family meeting next week to discuss ECT, sister, brother and father agree that ECT helped in the past and may need to be restarted.    10/5: remains paranoid, seems to tolerating Abilify well. QTc 429. Continue Abilify 2mg/day as is.   10/6: poor PO intake, appears dehydrated, check CMP tomorrow; Continue Abilify to 2mg po daily  10/7: still poor PO intake, Sodium 132, glucose 54; repeat CMP, same delusions noted, although pt more ambivalent about ECT, family meeting expected tomorrow (10/08)  10/8: still poor PO intake, repeat CMP WNL, same fixed delusions noted, during family meeting with brother shahrzad (phone call), pt with significant thought ruminations, perseverating about imminent incarceration and inability to cover treatment costs, denies that ECT helped him in the past and is worried about side effect burden. Pt in visible distress with increased psychomotor activity. Will approach pt again and offer ECT when pt is less anxious.    10/9: still poor PO intake, same fixed delusions noted, significant thought ruminations, perseverating about imminent incarceration and inability to cover treatment costs, still refuses ECT. Pt shows paranoia regarding the treatment team cooperating with the police and knowing all information about him that would have led to his current charges.    10/10: poor PO intake, same fixed delusions, perceives other patients as police in disguise, perseverating about imminent incarceration but amenable to ECT. Pt denies SIIP/HIIP.    10/11: Completed ECT #1 today. Continues to endorse paranoid delusions. Perceives other patients and staff as undercover police or members of a TV crew documenting his arrest.     10/14: Continues to endorse paranoid delusions. Perceives other patients and staff as undercover police. PT still isolative in his room, has 1-2 meals per day. No AH. No active SIIP/HIIP, but pt is vague about passive SI.     10/15: Continues to endorse paranoid delusions, is suspicious about his current environment, asks about how resident knows about his new room and what resident is writing down when taking notes. Denies having had any ECT treatment. Perceives other patients and staff as undercover police. Pt has poor sleep and appetite and is still isolative in his room. No active SIIP/HIIP, but pt still vague about passive SI. No significant mood improvement from ECT noted so far although pt generally seems more alert and is showing fewer physical signs of anxiety such as shaking.    10/16: Tolerating ECT, appears slightly less anxious today after treatment. Will continue ECT this Friday 10/18.     10/17: Pt does not bring up beliefs of going to custodial today but remains anxious, endorsing sense of impending doom and isolative. ECT #4 scheduled for tomorrow 10/18.     10/18: Pt is more visible in the unit and seems calmer. No longer endorsing delusions about going to custodial but paranoia remains, now shifted towards this writer and ECT. Believes writer is keeping him in the hospital to make more money and ECT is providing some form of sham treatment where he is brought down to the suite but never given anesthesia or ECT. Will titrate Abilify to 7mg.     10/21-10/23: S/P ECT #6. Still very paranoid, suspicious of writer and ECT treatment team. Of note, when treated last year for similar presentation pt required higher number of ECT treatments before responding. Will continue titrating Abilify up to lowest effective dose and ECT three times weekly as tolerated.   10/24: continue with plan for ECT tomorrow, remains paranoid  10/25: Tolerating ECT, reports gains. Next ECT scheduled for Monday.   10/28: Tolerating ECT, presenting gains. Denies feeling unsafe in the unit today.   10/29: Appears confused this morning, did not recall date or location. Remembered writer and overall situation (knew he was in the hospital receiving ECT treatments). Will hold tomorrow's ECT, pt may benefit from spacing out treatments to twice weekly. ECT order is in for Friday, may postpone if pt still confused on Thursday.    working diagnosis: schizoaffective disorder, current depressive episode w/ psychosis. ddx bipolar type per previous notes, however no h/o manic sxs elicited in current interview.    Plan:   1. Legal: admitted on 9.13 voluntary status  2. Safety:  Denies SI/SIB/HI/VI; continue routine observation.  3. Psychiatric:   - ECT #8 scheduled for Friday 11/1  - Abilify 10 mg PO QD   - inc 10/8, inc 10/18, inc 10/22  - Venlafaxine  mg daily (depression)  	- Home medication  - Mirtazapine 30 mg QHS (depression)  	- Started during [facility] hospitalization   	- inc 10/2  HOLD Olanzapine 25 mg QHS (psychosis)  	- Home medication, held due to prolonged QTc per recommendation of C/L team. Will repeat EKG.  	- Primary team to address    Agitation PRNs: Olanzapine PO/IM, Lorazepam PO    4. Medical conditions, other medication, consults  a) DVT  - Eliquis 5 mg BID  b) Constipation   - Senna 2 tabs QHS  c) HTN  - Amlodipine 10 mg daily  d) PT consult: fall precautions, recommend ambulating with walker   5. Psychosocial interventions  - Patient provided verbal consent to speak with TBD  - CO 1:1: Not required  - Restrictions/allowances: None   6. Collateral: outpatient psychiatrist Dr. Irby on 6/2/23 on [facility] and handoff from Dr. Hernandez. Collateral from sister. See  note.   7. Disposition: When stable.

## 2024-10-29 NOTE — BH INPATIENT PSYCHIATRY DISCHARGE NOTE - NSBHDCMEDICALFT_PSY_A_CORE
DVT, peroneal vein, left: Unclear if this was provoked by inactivity.  Would continue eliquis for at least three months then can consider discontinuation of AC.    Prolonged QTc: 9/30 EKG reported with QTc>550.  EKG was not sent and is not yet in EMR for review.  EKG today NSR with QTc 443. If possible QT-prolonging agents are added recommend monitoring EKGs for QTc.    Hematuria: Episode dark urine at [facility] with hematuria on UA, rneal US and CT negative for stone, patient asymptomatic. Repeat UA today improved with 5 WBC. Recommend outpatient PCP f/u for repeat UA and urology referral if WBC persist in the urine.    HTN: Continue amlodipine

## 2024-10-29 NOTE — BH INPATIENT PSYCHIATRY DISCHARGE NOTE - NSBHASSESSSUMMFT_PSY_ALL_CORE
! Patient is a 70 yo M with history of HTN and DVT and hx of schizoaffective disorder, bipolar type, BIB sister from [facility] due to 2 months of worsening depression and psychosis. Patient with depressed mood, poor ADLs, and persecutory delusions given arguments he had with another patient where he made threats. Patient with numerous previous suicide attempts and previous ECT; he appears to have worsened after completing his course of ECT as an outpatient. He was admitted medically due to poor PO intake. Given his recent MDE and failure to thrive, he is unable to care for himself and therefore meets criteria for inpatient psychiatric hospitalization.   SH: Lives in [facility]. Retired from [employer], , no children.   PsyHx: 1x SA by cutting in 1990. Multiple hospitalizations MR Aug 2023. Was on ECT until 6 months ago.    On interview, the pt endorses depressive sxs mostly including anhedonia, decreased sleep and appetite, and lack of energy. Thought ruminations of guilt noted, in the setting of recent statement towards other tenant at [facility]. Pt experienced AH that include degrading voices that seem to have commanding character but so far no indication of voices directly telling him to hurt himself or others. Pt likely experiencing mood episode with psychotic features, affect noted to be mood-congruent. R/O substance-induced mood and/or psychotic d/o.    10/2-10/3: Pt very anxious and fearful, fixed delusion about imminent incarceration and worrying about not being able to pay his hospital fees and housing in the future. Pt not acutely suicidal, denies active SIIP/HIIP but feels "tortured" by his anxiety and says on 10/3 that not being alive would "not be a bad mikey". Pt with persistent insomnia and low appetite. Currently no AH. Mirtazapine increased from 15 mg to 30 mg to address depressive symptoms, low appetite, and insomnia. Pt provided contact number of sister: [phone].      10/4: Pt very anxious and fearful, fixed delusion about inevitable incarceration and worrying about not being able to pay his hospital fees and housing in the future. Pt presenting as paranoid today, convinced that everyone on the unit is against him, not feeling safe to leave his room. Pt denies SIIP/HIIP but feels hopeless and helpless. Ongoing insomnia and low appetite. Currently no AH. Low appetite (only 1 meal per day), and insomnia. Will further discuss alternative treatment options given no significant improvement on Mirtazapine. Will initiate family meeting next week to discuss ECT, sister, brother and father agree that ECT helped in the past and may need to be restarted.    10/5: remains paranoid, seems to tolerating Abilify well. QTc 429. Continue Abilify 2mg/day as is.   10/6: poor PO intake, appears dehydrated, check CMP tomorrow; Continue Abilify to 2mg po daily  10/7: still poor PO intake, Sodium 132, glucose 54; repeat CMP, same delusions noted, although pt more ambivalent about ECT, family meeting expected tomorrow (10/08)  10/8: still poor PO intake, repeat CMP WNL, same fixed delusions noted, during family meeting with brother shahrzad (phone call), pt with significant thought ruminations, perseverating about imminent incarceration and inability to cover treatment costs, denies that ECT helped him in the past and is worried about side effect burden. Pt in visible distress with increased psychomotor activity. Will approach pt again and offer ECT when pt is less anxious.    10/9: still poor PO intake, same fixed delusions noted, significant thought ruminations, perseverating about imminent incarceration and inability to cover treatment costs, still refuses ECT. Pt shows paranoia regarding the treatment team cooperating with the police and knowing all information about him that would have led to his current charges.    10/10: poor PO intake, same fixed delusions, perceives other patients as police in disguise, perseverating about imminent incarceration but amenable to ECT. Pt denies SIIP/HIIP.    10/11: Completed ECT #1 today. Continues to endorse paranoid delusions. Perceives other patients and staff as undercover police or members of a TV crew documenting his arrest.     10/14: Continues to endorse paranoid delusions. Perceives other patients and staff as undercover police. PT still isolative in his room, has 1-2 meals per day. No AH. No active SIIP/HIIP, but pt is vague about passive SI.     10/15: Continues to endorse paranoid delusions, is suspicious about his current environment, asks about how resident knows about his new room and what resident is writing down when taking notes. Denies having had any ECT treatment. Perceives other patients and staff as undercover police. Pt has poor sleep and appetite and is still isolative in his room. No active SIIP/HIIP, but pt still vague about passive SI. No significant mood improvement from ECT noted so far although pt generally seems more alert and is showing fewer physical signs of anxiety such as shaking.    10/16: Tolerating ECT, appears slightly less anxious today after treatment. Will continue ECT this Friday 10/18.     10/17: Pt does not bring up beliefs of going to custodial today but remains anxious, endorsing sense of impending doom and isolative. ECT #4 scheduled for tomorrow 10/18.     10/18: Pt is more visible in the unit and seems calmer. No longer endorsing delusions about going to custodial but paranoia remains, now shifted towards this writer and ECT. Believes writer is keeping him in the hospital to make more money and ECT is providing some form of sham treatment where he is brought down to the suite but never given anesthesia or ECT. Will titrate Abilify to 7mg.     10/21-10/23: S/P ECT #6. Still very paranoid, suspicious of writer and ECT treatment team. Of note, when treated last year for similar presentation pt required higher number of ECT treatments before responding. Will continue titrating Abilify up to lowest effective dose and ECT three times weekly as tolerated.   10/24: continue with plan for ECT tomorrow, remains paranoid  10/25: Tolerating ECT, reports gains. Next ECT scheduled for Monday.   10/28: Tolerating ECT, presenting gains. Denies feeling unsafe in the unit today.   10/29: Appears confused this morning, did not recall date or location. Remembered writer and overall situation (knew he was in the hospital receiving ECT treatments). Will hold tomorrow's ECT, pt may benefit from spacing out treatments to twice weekly. ECT order is in for Friday, may postpone if pt still confused on Thursday.  10/31 Not confused today. Is aware that he is receiving ECT, knows the day of the week and the end of this month. Will plan for ECT tomorrow and continue to monitor for progress.  11/1 S/p ECT #8. No paranoia endorsed, calmer, cooperative. Will plan for another ECT on Wednesday and if tolerating well without recurrence of symptoms, can plan for spacing out to maintenance treatments thereafter.   11/4: appears to isolate self, not joining groups, appeared guarded on approach, suspected paranoia  11/5: isolates, less depressed, tolerating ECT, denied paranoid ideations. ECT tomorrow, tentative d/c thursdsay 11/6: tolerated EC T today, no complaints, tentative d/c tomorrow.     11/7: on assessment, patient is not an imminent danger to self/ others and is stable for discharge. Team in agreement.     working diagnosis: schizoaffective disorder, current depressive episode w/ psychosis. ddx bipolar type per previous notes, however no h/o manic sxs elicited in current interview.    Plan:   1. Legal: admitted on 9.13 voluntary status  2. Safety:  Denies SI/SIB/HI/VI; continue routine observation.  3. Psychiatric:   - ECT #10 completed on 11/6  - Abilify 10 mg PO QD   - inc 10/8, inc 10/18, inc 10/22  - Venlafaxine  mg daily (depression)  	- Home medication  - Mirtazapine 30 mg QHS (depression)  	- Started during [facility] hospitalization   	- inc 10/2  HOLD Olanzapine 25 mg QHS (psychosis)  	- Home medication, held due to prolonged QTc per recommendation of C/L team. Will repeat EKG.  	- Primary team to address    Agitation PRNs: Olanzapine PO/IM, Lorazepam PO    4. Medical conditions, other medication, consults  a) DVT  - Eliquis 5 mg BID  b) Constipation   - Senna 2 tabs QHS  c) HTN  - Amlodipine 10 mg daily  d) PT consult: fall precautions, recommend ambulating with walker   5. Psychosocial interventions  - Patient provided verbal consent to speak with TBD  - CO 1:1: Not required  - Restrictions/allowances: None   6. Collateral: outpatient psychiatrist Dr. Irby on 6/2/23 on [facility] and handoff from Dr. Hernandez. Collateral from sister. See  note.   7. Disposition: discharge today.

## 2024-10-29 NOTE — BH INPATIENT PSYCHIATRY DISCHARGE NOTE - NSBHFUPINTERVALHXFT_PSY_A_CORE
! f/up SAD, care discussed w/ tx team, VSS. no issues overnight. Patient had chest xray completed, which was negative,  no complaints offered, reported sleeping well/ good appetite. no delusions elicited. Patient denied SI/I/P and reported that his family is a protective factor for him. Denied feeling depressed, denied AH. no paranoid ideations.

## 2024-10-29 NOTE — BH INPATIENT PSYCHIATRY PROGRESS NOTE - NSBHCHARTREVIEWVS_PSY_A_CORE FT
Vital Signs Last 24 Hrs  T(C): 36.3 (10-29-24 @ 08:00), Max: 36.3 (10-29-24 @ 08:00)  T(F): 97.3 (10-29-24 @ 08:00), Max: 97.3 (10-29-24 @ 08:00)  HR: --  BP: --  BP(mean): --  RR: --  SpO2: --    Orthostatic VS  10-29-24 @ 08:00  Lying BP: --/-- HR: --  Sitting BP: 123/86 HR: 57  Standing BP: 114/69 HR: 81  Site: --  Mode: --  Orthostatic VS  10-28-24 @ 05:30  Lying BP: --/-- HR: --  Sitting BP: 134/78 HR: 61  Standing BP: 114/84 HR: --  Site: --  Mode: --
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
## 2024-10-29 NOTE — BH INPATIENT PSYCHIATRY PROGRESS NOTE - NSBHMETABOLIC_PSY_ALL_CORE_FT
BMI: BMI (kg/m2): 28.7 (10-05-24 @ 15:58)  HbA1c: A1C with Estimated Average Glucose Result: 5.2 % (09-15-24 @ 06:50)    Glucose: POCT Blood Glucose.: 96 mg/dL (10-07-24 @ 14:15)    BP: 148/93 (10-28-24 @ 11:35) (138/94 - 191/112)Vital Signs Last 24 Hrs  T(C): 36.3 (10-29-24 @ 08:00), Max: 36.3 (10-29-24 @ 08:00)  T(F): 97.3 (10-29-24 @ 08:00), Max: 97.3 (10-29-24 @ 08:00)  HR: --  BP: --  BP(mean): --  RR: --  SpO2: --    Orthostatic VS  10-29-24 @ 08:00  Lying BP: --/-- HR: --  Sitting BP: 123/86 HR: 57  Standing BP: 114/69 HR: 81  Site: --  Mode: --  Orthostatic VS  10-28-24 @ 05:30  Lying BP: --/-- HR: --  Sitting BP: 134/78 HR: 61  Standing BP: 114/84 HR: --  Site: --  Mode: --    Lipid Panel: Date/Time: 09-15-24 @ 06:50  Cholesterol, Serum: 159  LDL Cholesterol Calculated: 99  HDL Cholesterol, Serum: 43  Total Cholesterol/HDL Ration Measurement: --  Triglycerides, Serum: 87

## 2024-10-29 NOTE — BH INPATIENT PSYCHIATRY PROGRESS NOTE - NSBHFUPINTERVALHXFT_PSY_A_CORE
Pt seen for follow up of paranoia. No incidents reported overnight or PRNs needed. VSS.  Pt was seen at bedside. He was confused this morning, seemed to remember writer but did not know where he was: "Oh they told you I was living here now? We're in [place] right?". He recalled receiving ECT treatment yesterday. Reports gains in terms of mood, denies feeling paranoid today. Denies worrying the police is coming for him.

## 2024-10-30 RX ADMIN — APIXABAN 5 MILLIGRAM(S): 5 TABLET, FILM COATED ORAL at 21:39

## 2024-10-30 RX ADMIN — Medication 10 MILLIGRAM(S): at 09:27

## 2024-10-30 RX ADMIN — Medication 225 MILLIGRAM(S): at 09:26

## 2024-10-30 RX ADMIN — APIXABAN 5 MILLIGRAM(S): 5 TABLET, FILM COATED ORAL at 09:25

## 2024-10-30 RX ADMIN — Medication 3 MILLIGRAM(S): at 21:39

## 2024-10-30 RX ADMIN — MIRTAZAPINE 30 MILLIGRAM(S): 30 TABLET ORAL at 21:39

## 2024-10-30 RX ADMIN — Medication 2 TABLET(S): at 21:39

## 2024-10-30 RX ADMIN — ARIPIPRAZOLE 10 MILLIGRAM(S): 2 TABLET ORAL at 09:25

## 2024-10-31 PROCEDURE — 99232 SBSQ HOSP IP/OBS MODERATE 35: CPT

## 2024-10-31 RX ADMIN — Medication 2 TABLET(S): at 21:17

## 2024-10-31 RX ADMIN — APIXABAN 5 MILLIGRAM(S): 5 TABLET, FILM COATED ORAL at 09:26

## 2024-10-31 RX ADMIN — ARIPIPRAZOLE 10 MILLIGRAM(S): 2 TABLET ORAL at 09:26

## 2024-10-31 RX ADMIN — APIXABAN 5 MILLIGRAM(S): 5 TABLET, FILM COATED ORAL at 21:17

## 2024-10-31 RX ADMIN — Medication 3 MILLIGRAM(S): at 21:17

## 2024-10-31 RX ADMIN — MIRTAZAPINE 30 MILLIGRAM(S): 30 TABLET ORAL at 21:17

## 2024-10-31 RX ADMIN — Medication 225 MILLIGRAM(S): at 09:26

## 2024-10-31 NOTE — BH INPATIENT PSYCHIATRY PROGRESS NOTE - NSBHASSESSSUMMFT_PSY_ALL_CORE
Patient is a 70 yo M with history of HTN and DVT and hx of schizoaffective disorder, bipolar type, BIB sister from [facility] due to 2 months of worsening depression and psychosis. Patient with depressed mood, poor ADLs, and persecutory delusions given arguments he had with another patient where he made threats. Patient with numerous previous suicide attempts and previous ECT; he appears to have worsened after completing his course of ECT as an outpatient. He was admitted medically due to poor PO intake. Given his recent MDE and failure to thrive, he is unable to care for himself and therefore meets criteria for inpatient psychiatric hospitalization.   SH: Lives in [facility]. Retired from [employer], , no children.   PsyHx: 1x SA by cutting in 1990. Multiple hospitalizations MR Aug 2023. Was on ECT until 6 months ago.    On interview, the pt endorses depressive sxs mostly including anhedonia, decreased sleep and appetite, and lack of energy. Thought ruminations of guilt noted, in the setting of recent statement towards other tenant at [facility]. Pt experienced AH that include degrading voices that seem to have commanding character but so far no indication of voices directly telling him to hurt himself or others. Pt likely experiencing mood episode with psychotic features, affect noted to be mood-congruent. R/O substance-induced mood and/or psychotic d/o.    10/2-10/3: Pt very anxious and fearful, fixed delusion about imminent incarceration and worrying about not being able to pay his hospital fees and housing in the future. Pt not acutely suicidal, denies active SIIP/HIIP but feels "tortured" by his anxiety and says on 10/3 that not being alive would "not be a bad mikey". Pt with persistent insomnia and low appetite. Currently no AH. Mirtazapine increased from 15 mg to 30 mg to address depressive symptoms, low appetite, and insomnia. Pt provided contact number of sister: [phone].      10/4: Pt very anxious and fearful, fixed delusion about inevitable incarceration and worrying about not being able to pay his hospital fees and housing in the future. Pt presenting as paranoid today, convinced that everyone on the unit is against him, not feeling safe to leave his room. Pt denies SIIP/HIIP but feels hopeless and helpless. Ongoing insomnia and low appetite. Currently no AH. Low appetite (only 1 meal per day), and insomnia. Will further discuss alternative treatment options given no significant improvement on Mirtazapine. Will initiate family meeting next week to discuss ECT, sister, brother and father agree that ECT helped in the past and may need to be restarted.    10/5: remains paranoid, seems to tolerating Abilify well. QTc 429. Continue Abilify 2mg/day as is.   10/6: poor PO intake, appears dehydrated, check CMP tomorrow; Continue Abilify to 2mg po daily  10/7: still poor PO intake, Sodium 132, glucose 54; repeat CMP, same delusions noted, although pt more ambivalent about ECT, family meeting expected tomorrow (10/08)  10/8: still poor PO intake, repeat CMP WNL, same fixed delusions noted, during family meeting with brother shahrzad (phone call), pt with significant thought ruminations, perseverating about imminent incarceration and inability to cover treatment costs, denies that ECT helped him in the past and is worried about side effect burden. Pt in visible distress with increased psychomotor activity. Will approach pt again and offer ECT when pt is less anxious.    10/9: still poor PO intake, same fixed delusions noted, significant thought ruminations, perseverating about imminent incarceration and inability to cover treatment costs, still refuses ECT. Pt shows paranoia regarding the treatment team cooperating with the police and knowing all information about him that would have led to his current charges.    10/10: poor PO intake, same fixed delusions, perceives other patients as police in disguise, perseverating about imminent incarceration but amenable to ECT. Pt denies SIIP/HIIP.    10/11: Completed ECT #1 today. Continues to endorse paranoid delusions. Perceives other patients and staff as undercover police or members of a TV crew documenting his arrest.     10/14: Continues to endorse paranoid delusions. Perceives other patients and staff as undercover police. PT still isolative in his room, has 1-2 meals per day. No AH. No active SIIP/HIIP, but pt is vague about passive SI.     10/15: Continues to endorse paranoid delusions, is suspicious about his current environment, asks about how resident knows about his new room and what resident is writing down when taking notes. Denies having had any ECT treatment. Perceives other patients and staff as undercover police. Pt has poor sleep and appetite and is still isolative in his room. No active SIIP/HIIP, but pt still vague about passive SI. No significant mood improvement from ECT noted so far although pt generally seems more alert and is showing fewer physical signs of anxiety such as shaking.    10/16: Tolerating ECT, appears slightly less anxious today after treatment. Will continue ECT this Friday 10/18.     10/17: Pt does not bring up beliefs of going to half-way today but remains anxious, endorsing sense of impending doom and isolative. ECT #4 scheduled for tomorrow 10/18.     10/18: Pt is more visible in the unit and seems calmer. No longer endorsing delusions about going to half-way but paranoia remains, now shifted towards this writer and ECT. Believes writer is keeping him in the hospital to make more money and ECT is providing some form of sham treatment where he is brought down to the suite but never given anesthesia or ECT. Will titrate Abilify to 7mg.     10/21-10/23: S/P ECT #6. Still very paranoid, suspicious of writer and ECT treatment team. Of note, when treated last year for similar presentation pt required higher number of ECT treatments before responding. Will continue titrating Abilify up to lowest effective dose and ECT three times weekly as tolerated.   10/24: continue with plan for ECT tomorrow, remains paranoid  10/25: Tolerating ECT, reports gains. Next ECT scheduled for Monday.   10/28: Tolerating ECT, presenting gains. Denies feeling unsafe in the unit today.   10/29: Appears confused this morning, did not recall date or location. Remembered writer and overall situation (knew he was in the hospital receiving ECT treatments). Will hold tomorrow's ECT, pt may benefit from spacing out treatments to twice weekly. ECT order is in for Friday, may postpone if pt still confused on Thursday.  10/30 Not confused today. Is aware that he is receiving ECT, knows the day of the week and the end of this month. Will plan for ECT tomorrow and continue to monitor for progress.    working diagnosis: schizoaffective disorder, current depressive episode w/ psychosis. ddx bipolar type per previous notes, however no h/o manic sxs elicited in current interview.    Plan:   1. Legal: admitted on 9.13 voluntary status  2. Safety:  Denies SI/SIB/HI/VI; continue routine observation.  3. Psychiatric:   - ECT #8 scheduled for Friday 11/1  - Abilify 10 mg PO QD   - inc 10/8, inc 10/18, inc 10/22  - Venlafaxine  mg daily (depression)  	- Home medication  - Mirtazapine 30 mg QHS (depression)  	- Started during [facility] hospitalization   	- inc 10/2  HOLD Olanzapine 25 mg QHS (psychosis)  	- Home medication, held due to prolonged QTc per recommendation of C/L team. Will repeat EKG.  	- Primary team to address    Agitation PRNs: Olanzapine PO/IM, Lorazepam PO    4. Medical conditions, other medication, consults  a) DVT  - Eliquis 5 mg BID  b) Constipation   - Senna 2 tabs QHS  c) HTN  - Amlodipine 10 mg daily  d) PT consult: fall precautions, recommend ambulating with walker   5. Psychosocial interventions  - Patient provided verbal consent to speak with TBD  - CO 1:1: Not required  - Restrictions/allowances: None   6. Collateral: outpatient psychiatrist Dr. Irby on 6/2/23 on [facility] and handoff from Dr. Hernandez. Collateral from sister. See  note.   7. Disposition: When stable.

## 2024-10-31 NOTE — BH INPATIENT PSYCHIATRY PROGRESS NOTE - NSBHMETABOLIC_PSY_ALL_CORE_FT
BMI: BMI (kg/m2): 28.7 (10-05-24 @ 15:58)  HbA1c: A1C with Estimated Average Glucose Result: 5.2 % (09-15-24 @ 06:50)    Glucose: POCT Blood Glucose.: 96 mg/dL (10-07-24 @ 14:15)    BP: --Vital Signs Last 24 Hrs  T(C): 36.6 (10-31-24 @ 09:40), Max: 36.6 (10-31-24 @ 09:40)  T(F): 97.8 (10-31-24 @ 09:40), Max: 97.8 (10-31-24 @ 09:40)  HR: --  BP: --  BP(mean): --  RR: 17 (10-31-24 @ 09:40) (17 - 17)  SpO2: --    Orthostatic VS  10-31-24 @ 09:40  Lying BP: --/-- HR: --  Sitting BP: 100/72 HR: 68  Standing BP: 97/66 HR: 85  Site: --  Mode: --  Orthostatic VS  10-30-24 @ 08:26  Lying BP: --/-- HR: --  Sitting BP: 110/78 HR: 68  Standing BP: 115/72 HR: 74  Site: --  Mode: --    Lipid Panel: Date/Time: 09-15-24 @ 06:50  Cholesterol, Serum: 159  LDL Cholesterol Calculated: 99  HDL Cholesterol, Serum: 43  Total Cholesterol/HDL Ration Measurement: --  Triglycerides, Serum: 87

## 2024-10-31 NOTE — BH INPATIENT PSYCHIATRY PROGRESS NOTE - ADDITIONAL DETAILS / COMMENTS
Pt has fixed delusion about imminent incarceration bu prior to ECT. No paranoid delusions elicited during exam today

## 2024-10-31 NOTE — BH INPATIENT PSYCHIATRY PROGRESS NOTE - NSBHCHARTREVIEWVS_PSY_A_CORE FT
Vital Signs Last 24 Hrs  T(C): 36.6 (10-31-24 @ 09:40), Max: 36.6 (10-31-24 @ 09:40)  T(F): 97.8 (10-31-24 @ 09:40), Max: 97.8 (10-31-24 @ 09:40)  HR: --  BP: --  BP(mean): --  RR: 17 (10-31-24 @ 09:40) (17 - 17)  SpO2: --    Orthostatic VS  10-31-24 @ 09:40  Lying BP: --/-- HR: --  Sitting BP: 100/72 HR: 68  Standing BP: 97/66 HR: 85  Site: --  Mode: --  Orthostatic VS  10-30-24 @ 08:26  Lying BP: --/-- HR: --  Sitting BP: 110/78 HR: 68  Standing BP: 115/72 HR: 74  Site: --  Mode: --

## 2024-10-31 NOTE — BH INPATIENT PSYCHIATRY PROGRESS NOTE - NSBHFUPINTERVALHXFT_PSY_A_CORE
Pt seen for follow up of paranoia. No incidents reported overnight or PRNs needed. VS soft but denies headaches, dizziness or other symptoms.  On exam, patient reports improvements in mood. Denies any paranoid ideation. Report sleeping and eating well. Patient asks about next ECT being scheduled for tomorrow and is aware of ECT treatment course.

## 2024-11-01 PROCEDURE — 90870 ELECTROCONVULSIVE THERAPY: CPT

## 2024-11-01 PROCEDURE — 99232 SBSQ HOSP IP/OBS MODERATE 35: CPT | Mod: 25

## 2024-11-01 RX ADMIN — Medication 2 TABLET(S): at 20:27

## 2024-11-01 RX ADMIN — Medication 3 MILLIGRAM(S): at 20:27

## 2024-11-01 RX ADMIN — APIXABAN 5 MILLIGRAM(S): 5 TABLET, FILM COATED ORAL at 20:27

## 2024-11-01 RX ADMIN — APIXABAN 5 MILLIGRAM(S): 5 TABLET, FILM COATED ORAL at 07:54

## 2024-11-01 RX ADMIN — MIRTAZAPINE 30 MILLIGRAM(S): 30 TABLET ORAL at 20:27

## 2024-11-01 RX ADMIN — Medication 225 MILLIGRAM(S): at 07:54

## 2024-11-01 RX ADMIN — ARIPIPRAZOLE 10 MILLIGRAM(S): 2 TABLET ORAL at 07:54

## 2024-11-01 RX ADMIN — Medication 10 MILLIGRAM(S): at 07:54

## 2024-11-01 NOTE — BH INPATIENT PSYCHIATRY PROGRESS NOTE - NSBHMETABOLIC_PSY_ALL_CORE_FT
BMI: BMI (kg/m2): 28.7 (10-05-24 @ 15:58)  HbA1c: A1C with Estimated Average Glucose Result: 5.2 % (09-15-24 @ 06:50)    Glucose: POCT Blood Glucose.: 96 mg/dL (10-07-24 @ 14:15)    BP: 143/92 (11-01-24 @ 09:10) (131/70 - 156/97)Vital Signs Last 24 Hrs  T(C): 36.3 (11-01-24 @ 09:10), Max: 36.5 (11-01-24 @ 08:05)  T(F): 97.4 (11-01-24 @ 09:10), Max: 97.7 (11-01-24 @ 08:05)  HR: 75 (11-01-24 @ 09:10) (60 - 79)  BP: 143/92 (11-01-24 @ 09:10) (131/70 - 156/97)  BP(mean): 85 (11-01-24 @ 08:40) (85 - 112)  RR: 16 (11-01-24 @ 09:10) (14 - 19)  SpO2: 98% (11-01-24 @ 09:10) (95% - 99%)    Orthostatic VS  11-01-24 @ 05:37  Lying BP: --/-- HR: --  Sitting BP: 133/58 HR: 66  Standing BP: 124/58 HR: 67  Site: upper left arm  Mode: electronic  Orthostatic VS  10-31-24 @ 09:40  Lying BP: --/-- HR: --  Sitting BP: 100/72 HR: 68  Standing BP: 97/66 HR: 85  Site: --  Mode: --    Lipid Panel: Date/Time: 09-15-24 @ 06:50  Cholesterol, Serum: 159  LDL Cholesterol Calculated: 99  HDL Cholesterol, Serum: 43  Total Cholesterol/HDL Ration Measurement: --  Triglycerides, Serum: 87

## 2024-11-01 NOTE — BH INPATIENT PSYCHIATRY PROGRESS NOTE - NSBHASSESSSUMMFT_PSY_ALL_CORE
Patient is a 70 yo M with history of HTN and DVT and hx of schizoaffective disorder, bipolar type, BIB sister from [facility] due to 2 months of worsening depression and psychosis. Patient with depressed mood, poor ADLs, and persecutory delusions given arguments he had with another patient where he made threats. Patient with numerous previous suicide attempts and previous ECT; he appears to have worsened after completing his course of ECT as an outpatient. He was admitted medically due to poor PO intake. Given his recent MDE and failure to thrive, he is unable to care for himself and therefore meets criteria for inpatient psychiatric hospitalization.   SH: Lives in [facility]. Retired from [employer], , no children.   PsyHx: 1x SA by cutting in 1990. Multiple hospitalizations MR Aug 2023. Was on ECT until 6 months ago.    On interview, the pt endorses depressive sxs mostly including anhedonia, decreased sleep and appetite, and lack of energy. Thought ruminations of guilt noted, in the setting of recent statement towards other tenant at [facility]. Pt experienced AH that include degrading voices that seem to have commanding character but so far no indication of voices directly telling him to hurt himself or others. Pt likely experiencing mood episode with psychotic features, affect noted to be mood-congruent. R/O substance-induced mood and/or psychotic d/o.    10/2-10/3: Pt very anxious and fearful, fixed delusion about imminent incarceration and worrying about not being able to pay his hospital fees and housing in the future. Pt not acutely suicidal, denies active SIIP/HIIP but feels "tortured" by his anxiety and says on 10/3 that not being alive would "not be a bad mikey". Pt with persistent insomnia and low appetite. Currently no AH. Mirtazapine increased from 15 mg to 30 mg to address depressive symptoms, low appetite, and insomnia. Pt provided contact number of sister: [phone].      10/4: Pt very anxious and fearful, fixed delusion about inevitable incarceration and worrying about not being able to pay his hospital fees and housing in the future. Pt presenting as paranoid today, convinced that everyone on the unit is against him, not feeling safe to leave his room. Pt denies SIIP/HIIP but feels hopeless and helpless. Ongoing insomnia and low appetite. Currently no AH. Low appetite (only 1 meal per day), and insomnia. Will further discuss alternative treatment options given no significant improvement on Mirtazapine. Will initiate family meeting next week to discuss ECT, sister, brother and father agree that ECT helped in the past and may need to be restarted.    10/5: remains paranoid, seems to tolerating Abilify well. QTc 429. Continue Abilify 2mg/day as is.   10/6: poor PO intake, appears dehydrated, check CMP tomorrow; Continue Abilify to 2mg po daily  10/7: still poor PO intake, Sodium 132, glucose 54; repeat CMP, same delusions noted, although pt more ambivalent about ECT, family meeting expected tomorrow (10/08)  10/8: still poor PO intake, repeat CMP WNL, same fixed delusions noted, during family meeting with brother shahrzad (phone call), pt with significant thought ruminations, perseverating about imminent incarceration and inability to cover treatment costs, denies that ECT helped him in the past and is worried about side effect burden. Pt in visible distress with increased psychomotor activity. Will approach pt again and offer ECT when pt is less anxious.    10/9: still poor PO intake, same fixed delusions noted, significant thought ruminations, perseverating about imminent incarceration and inability to cover treatment costs, still refuses ECT. Pt shows paranoia regarding the treatment team cooperating with the police and knowing all information about him that would have led to his current charges.    10/10: poor PO intake, same fixed delusions, perceives other patients as police in disguise, perseverating about imminent incarceration but amenable to ECT. Pt denies SIIP/HIIP.    10/11: Completed ECT #1 today. Continues to endorse paranoid delusions. Perceives other patients and staff as undercover police or members of a TV crew documenting his arrest.     10/14: Continues to endorse paranoid delusions. Perceives other patients and staff as undercover police. PT still isolative in his room, has 1-2 meals per day. No AH. No active SIIP/HIIP, but pt is vague about passive SI.     10/15: Continues to endorse paranoid delusions, is suspicious about his current environment, asks about how resident knows about his new room and what resident is writing down when taking notes. Denies having had any ECT treatment. Perceives other patients and staff as undercover police. Pt has poor sleep and appetite and is still isolative in his room. No active SIIP/HIIP, but pt still vague about passive SI. No significant mood improvement from ECT noted so far although pt generally seems more alert and is showing fewer physical signs of anxiety such as shaking.    10/16: Tolerating ECT, appears slightly less anxious today after treatment. Will continue ECT this Friday 10/18.     10/17: Pt does not bring up beliefs of going to long-term today but remains anxious, endorsing sense of impending doom and isolative. ECT #4 scheduled for tomorrow 10/18.     10/18: Pt is more visible in the unit and seems calmer. No longer endorsing delusions about going to long-term but paranoia remains, now shifted towards this writer and ECT. Believes writer is keeping him in the hospital to make more money and ECT is providing some form of sham treatment where he is brought down to the suite but never given anesthesia or ECT. Will titrate Abilify to 7mg.     10/21-10/23: S/P ECT #6. Still very paranoid, suspicious of writer and ECT treatment team. Of note, when treated last year for similar presentation pt required higher number of ECT treatments before responding. Will continue titrating Abilify up to lowest effective dose and ECT three times weekly as tolerated.   10/24: continue with plan for ECT tomorrow, remains paranoid  10/25: Tolerating ECT, reports gains. Next ECT scheduled for Monday.   10/28: Tolerating ECT, presenting gains. Denies feeling unsafe in the unit today.   10/29: Appears confused this morning, did not recall date or location. Remembered writer and overall situation (knew he was in the hospital receiving ECT treatments). Will hold tomorrow's ECT, pt may benefit from spacing out treatments to twice weekly. ECT order is in for Friday, may postpone if pt still confused on Thursday.  10/31 Not confused today. Is aware that he is receiving ECT, knows the day of the week and the end of this month. Will plan for ECT tomorrow and continue to monitor for progress.  11/1 S/p ECT #8. No paranoia endorsed, calmer, cooperative. Will plan for another ECT on Wednesday and if tolerating well without recurrence of symptoms, can plan for spacing out to maintenance treatments thereafter.     working diagnosis: schizoaffective disorder, current depressive episode w/ psychosis. ddx bipolar type per previous notes, however no h/o manic sxs elicited in current interview.    Plan:   1. Legal: admitted on 9.13 voluntary status  2. Safety:  Denies SI/SIB/HI/VI; continue routine observation.  3. Psychiatric:   - ECT #8 scheduled for Friday 11/1  - Abilify 10 mg PO QD   - inc 10/8, inc 10/18, inc 10/22  - Venlafaxine  mg daily (depression)  	- Home medication  - Mirtazapine 30 mg QHS (depression)  	- Started during [facility] hospitalization   	- inc 10/2  HOLD Olanzapine 25 mg QHS (psychosis)  	- Home medication, held due to prolonged QTc per recommendation of C/L team. Will repeat EKG.  	- Primary team to address    Agitation PRNs: Olanzapine PO/IM, Lorazepam PO    4. Medical conditions, other medication, consults  a) DVT  - Eliquis 5 mg BID  b) Constipation   - Senna 2 tabs QHS  c) HTN  - Amlodipine 10 mg daily  d) PT consult: fall precautions, recommend ambulating with walker   5. Psychosocial interventions  - Patient provided verbal consent to speak with TBD  - CO 1:1: Not required  - Restrictions/allowances: None   6. Collateral: outpatient psychiatrist Dr. Irby on 6/2/23 on [facility] and handoff from Dr. Hernandez. Collateral from sister. See  note.   7. Disposition: When stable.

## 2024-11-01 NOTE — BH INPATIENT PSYCHIATRY PROGRESS NOTE - NSBHCHARTREVIEWVS_PSY_A_CORE FT
Vital Signs Last 24 Hrs  T(C): 36.3 (11-01-24 @ 09:10), Max: 36.5 (11-01-24 @ 08:05)  T(F): 97.4 (11-01-24 @ 09:10), Max: 97.7 (11-01-24 @ 08:05)  HR: 75 (11-01-24 @ 09:10) (60 - 79)  BP: 143/92 (11-01-24 @ 09:10) (131/70 - 156/97)  BP(mean): 85 (11-01-24 @ 08:40) (85 - 112)  RR: 16 (11-01-24 @ 09:10) (14 - 19)  SpO2: 98% (11-01-24 @ 09:10) (95% - 99%)    Orthostatic VS  11-01-24 @ 05:37  Lying BP: --/-- HR: --  Sitting BP: 133/58 HR: 66  Standing BP: 124/58 HR: 67  Site: upper left arm  Mode: electronic  Orthostatic VS  10-31-24 @ 09:40  Lying BP: --/-- HR: --  Sitting BP: 100/72 HR: 68  Standing BP: 97/66 HR: 85  Site: --  Mode: --

## 2024-11-01 NOTE — BH INPATIENT PSYCHIATRY PROGRESS NOTE - NSBHFUPINTERVALHXFT_PSY_A_CORE
Pt seen for follow up of paranoia. No incidents reported overnight or PRNs needed. VSS.  Seen s/p ECT. On exam, patient reports improvements in mood. Denies any paranoid ideation. Report sleeping and eating well. Tolerating ECT. Denies any thoughts of being targeted or followed by police.

## 2024-11-02 RX ADMIN — APIXABAN 5 MILLIGRAM(S): 5 TABLET, FILM COATED ORAL at 09:45

## 2024-11-02 RX ADMIN — MIRTAZAPINE 30 MILLIGRAM(S): 30 TABLET ORAL at 20:53

## 2024-11-02 RX ADMIN — APIXABAN 5 MILLIGRAM(S): 5 TABLET, FILM COATED ORAL at 20:53

## 2024-11-02 RX ADMIN — ARIPIPRAZOLE 10 MILLIGRAM(S): 2 TABLET ORAL at 09:45

## 2024-11-02 RX ADMIN — Medication 10 MILLIGRAM(S): at 09:46

## 2024-11-02 RX ADMIN — Medication 2 TABLET(S): at 20:53

## 2024-11-02 RX ADMIN — Medication 225 MILLIGRAM(S): at 09:45

## 2024-11-02 RX ADMIN — Medication 3 MILLIGRAM(S): at 20:53

## 2024-11-03 RX ADMIN — Medication 3 MILLIGRAM(S): at 20:45

## 2024-11-03 RX ADMIN — ARIPIPRAZOLE 10 MILLIGRAM(S): 2 TABLET ORAL at 08:35

## 2024-11-03 RX ADMIN — Medication 2 TABLET(S): at 20:45

## 2024-11-03 RX ADMIN — MIRTAZAPINE 30 MILLIGRAM(S): 30 TABLET ORAL at 20:45

## 2024-11-03 RX ADMIN — APIXABAN 5 MILLIGRAM(S): 5 TABLET, FILM COATED ORAL at 08:35

## 2024-11-03 RX ADMIN — Medication 225 MILLIGRAM(S): at 08:35

## 2024-11-03 RX ADMIN — Medication 10 MILLIGRAM(S): at 08:35

## 2024-11-03 RX ADMIN — APIXABAN 5 MILLIGRAM(S): 5 TABLET, FILM COATED ORAL at 20:44

## 2024-11-04 PROCEDURE — 99232 SBSQ HOSP IP/OBS MODERATE 35: CPT

## 2024-11-04 RX ORDER — LORAZEPAM 2 MG
0.5 TABLET ORAL EVERY 6 HOURS
Refills: 0 | Status: DISCONTINUED | OUTPATIENT
Start: 2024-11-04 | End: 2024-11-07

## 2024-11-04 RX ADMIN — Medication 2 TABLET(S): at 20:43

## 2024-11-04 RX ADMIN — Medication 10 MILLIGRAM(S): at 08:08

## 2024-11-04 RX ADMIN — Medication 225 MILLIGRAM(S): at 08:08

## 2024-11-04 RX ADMIN — MIRTAZAPINE 30 MILLIGRAM(S): 30 TABLET ORAL at 20:43

## 2024-11-04 RX ADMIN — Medication 3 MILLIGRAM(S): at 20:43

## 2024-11-04 RX ADMIN — ARIPIPRAZOLE 10 MILLIGRAM(S): 2 TABLET ORAL at 08:08

## 2024-11-04 RX ADMIN — APIXABAN 5 MILLIGRAM(S): 5 TABLET, FILM COATED ORAL at 08:08

## 2024-11-04 RX ADMIN — APIXABAN 5 MILLIGRAM(S): 5 TABLET, FILM COATED ORAL at 20:43

## 2024-11-04 NOTE — BH INPATIENT PSYCHIATRY PROGRESS NOTE - OTHER
Mostly delusions of guilt, ruminations about wrongdoings and financial situation previously, suspected paranoia less depressed

## 2024-11-04 NOTE — BH INPATIENT PSYCHIATRY PROGRESS NOTE - NSBHMETABOLIC_PSY_ALL_CORE_FT
BMI: BMI (kg/m2): 30.1 (11-02-24 @ 18:11)  HbA1c: A1C with Estimated Average Glucose Result: 5.2 % (09-15-24 @ 06:50)    Glucose: POCT Blood Glucose.: 96 mg/dL (10-07-24 @ 14:15)    BP: --Vital Signs Last 24 Hrs  T(C): 36.4 (11-04-24 @ 07:49), Max: 36.4 (11-04-24 @ 07:49)  T(F): 97.5 (11-04-24 @ 07:49), Max: 97.5 (11-04-24 @ 07:49)  HR: --  BP: --  BP(mean): --  RR: --  SpO2: --    Orthostatic VS  11-04-24 @ 07:49  Lying BP: --/-- HR: --  Sitting BP: 128/86 HR: 64  Standing BP: 123/72 HR: 81  Site: upper right arm  Mode: electronic    Lipid Panel: Date/Time: 09-15-24 @ 06:50  Cholesterol, Serum: 159  LDL Cholesterol Calculated: 99  HDL Cholesterol, Serum: 43  Total Cholesterol/HDL Ration Measurement: --  Triglycerides, Serum: 87

## 2024-11-04 NOTE — BH INPATIENT PSYCHIATRY PROGRESS NOTE - NSBHFUPINTERVALHXFT_PSY_A_CORE
f/up SAD, bipolar type, care discussed w/ tx team, MEGGANS. Patient reported that  he was admitted due to SI and paranoia on admission. Patient denied dizziness or constipation. Patient was isolative to his room this AM, declined to come outside for interview and declined to join group. Patient tolerating ECT. reported fair sleep and appetite. denied dizziness or constipation. LBM yesterday. Memory fair.

## 2024-11-04 NOTE — BH INPATIENT PSYCHIATRY PROGRESS NOTE - NSBHASSESSSUMMFT_PSY_ALL_CORE
Patient is a 68 yo M with history of HTN and DVT and hx of schizoaffective disorder, bipolar type, BIB sister from [facility] due to 2 months of worsening depression and psychosis. Patient with depressed mood, poor ADLs, and persecutory delusions given arguments he had with another patient where he made threats. Patient with numerous previous suicide attempts and previous ECT; he appears to have worsened after completing his course of ECT as an outpatient. He was admitted medically due to poor PO intake. Given his recent MDE and failure to thrive, he is unable to care for himself and therefore meets criteria for inpatient psychiatric hospitalization.   SH: Lives in [facility]. Retired from [employer], , no children.   PsyHx: 1x SA by cutting in 1990. Multiple hospitalizations MR Aug 2023. Was on ECT until 6 months ago.    On interview, the pt endorses depressive sxs mostly including anhedonia, decreased sleep and appetite, and lack of energy. Thought ruminations of guilt noted, in the setting of recent statement towards other tenant at [facility]. Pt experienced AH that include degrading voices that seem to have commanding character but so far no indication of voices directly telling him to hurt himself or others. Pt likely experiencing mood episode with psychotic features, affect noted to be mood-congruent. R/O substance-induced mood and/or psychotic d/o.    10/2-10/3: Pt very anxious and fearful, fixed delusion about imminent incarceration and worrying about not being able to pay his hospital fees and housing in the future. Pt not acutely suicidal, denies active SIIP/HIIP but feels "tortured" by his anxiety and says on 10/3 that not being alive would "not be a bad mikey". Pt with persistent insomnia and low appetite. Currently no AH. Mirtazapine increased from 15 mg to 30 mg to address depressive symptoms, low appetite, and insomnia. Pt provided contact number of sister: [phone].      10/4: Pt very anxious and fearful, fixed delusion about inevitable incarceration and worrying about not being able to pay his hospital fees and housing in the future. Pt presenting as paranoid today, convinced that everyone on the unit is against him, not feeling safe to leave his room. Pt denies SIIP/HIIP but feels hopeless and helpless. Ongoing insomnia and low appetite. Currently no AH. Low appetite (only 1 meal per day), and insomnia. Will further discuss alternative treatment options given no significant improvement on Mirtazapine. Will initiate family meeting next week to discuss ECT, sister, brother and father agree that ECT helped in the past and may need to be restarted.    10/5: remains paranoid, seems to tolerating Abilify well. QTc 429. Continue Abilify 2mg/day as is.   10/6: poor PO intake, appears dehydrated, check CMP tomorrow; Continue Abilify to 2mg po daily  10/7: still poor PO intake, Sodium 132, glucose 54; repeat CMP, same delusions noted, although pt more ambivalent about ECT, family meeting expected tomorrow (10/08)  10/8: still poor PO intake, repeat CMP WNL, same fixed delusions noted, during family meeting with brother shahrzad (phone call), pt with significant thought ruminations, perseverating about imminent incarceration and inability to cover treatment costs, denies that ECT helped him in the past and is worried about side effect burden. Pt in visible distress with increased psychomotor activity. Will approach pt again and offer ECT when pt is less anxious.    10/9: still poor PO intake, same fixed delusions noted, significant thought ruminations, perseverating about imminent incarceration and inability to cover treatment costs, still refuses ECT. Pt shows paranoia regarding the treatment team cooperating with the police and knowing all information about him that would have led to his current charges.    10/10: poor PO intake, same fixed delusions, perceives other patients as police in disguise, perseverating about imminent incarceration but amenable to ECT. Pt denies SIIP/HIIP.    10/11: Completed ECT #1 today. Continues to endorse paranoid delusions. Perceives other patients and staff as undercover police or members of a TV crew documenting his arrest.     10/14: Continues to endorse paranoid delusions. Perceives other patients and staff as undercover police. PT still isolative in his room, has 1-2 meals per day. No AH. No active SIIP/HIIP, but pt is vague about passive SI.     10/15: Continues to endorse paranoid delusions, is suspicious about his current environment, asks about how resident knows about his new room and what resident is writing down when taking notes. Denies having had any ECT treatment. Perceives other patients and staff as undercover police. Pt has poor sleep and appetite and is still isolative in his room. No active SIIP/HIIP, but pt still vague about passive SI. No significant mood improvement from ECT noted so far although pt generally seems more alert and is showing fewer physical signs of anxiety such as shaking.    10/16: Tolerating ECT, appears slightly less anxious today after treatment. Will continue ECT this Friday 10/18.     10/17: Pt does not bring up beliefs of going to nursing home today but remains anxious, endorsing sense of impending doom and isolative. ECT #4 scheduled for tomorrow 10/18.     10/18: Pt is more visible in the unit and seems calmer. No longer endorsing delusions about going to nursing home but paranoia remains, now shifted towards this writer and ECT. Believes writer is keeping him in the hospital to make more money and ECT is providing some form of sham treatment where he is brought down to the suite but never given anesthesia or ECT. Will titrate Abilify to 7mg.     10/21-10/23: S/P ECT #6. Still very paranoid, suspicious of writer and ECT treatment team. Of note, when treated last year for similar presentation pt required higher number of ECT treatments before responding. Will continue titrating Abilify up to lowest effective dose and ECT three times weekly as tolerated.   10/24: continue with plan for ECT tomorrow, remains paranoid  10/25: Tolerating ECT, reports gains. Next ECT scheduled for Monday.   10/28: Tolerating ECT, presenting gains. Denies feeling unsafe in the unit today.   10/29: Appears confused this morning, did not recall date or location. Remembered writer and overall situation (knew he was in the hospital receiving ECT treatments). Will hold tomorrow's ECT, pt may benefit from spacing out treatments to twice weekly. ECT order is in for Friday, may postpone if pt still confused on Thursday.  10/31 Not confused today. Is aware that he is receiving ECT, knows the day of the week and the end of this month. Will plan for ECT tomorrow and continue to monitor for progress.  11/1 S/p ECT #8. No paranoia endorsed, calmer, cooperative. Will plan for another ECT on Wednesday and if tolerating well without recurrence of symptoms, can plan for spacing out to maintenance treatments thereafter.   11/2: appears to isolate self, not joining groups, appeared guarded on approach, suspected paranoia    working diagnosis: schizoaffective disorder, current depressive episode w/ psychosis. ddx bipolar type per previous notes, however no h/o manic sxs elicited in current interview.    Plan:   1. Legal: admitted on 9.13 voluntary status  2. Safety:  Denies SI/SIB/HI/VI; continue routine observation.  3. Psychiatric:   - ECT #8 scheduled for Friday 11/1  - Abilify 10 mg PO QD   - inc 10/8, inc 10/18, inc 10/22  - Venlafaxine  mg daily (depression)  	- Home medication  - Mirtazapine 30 mg QHS (depression)  	- Started during [facility] hospitalization   	- inc 10/2  HOLD Olanzapine 25 mg QHS (psychosis)  	- Home medication, held due to prolonged QTc per recommendation of C/L team. Will repeat EKG.  	- Primary team to address    Agitation PRNs: Olanzapine PO/IM, Lorazepam PO    4. Medical conditions, other medication, consults  a) DVT  - Eliquis 5 mg BID  b) Constipation   - Senna 2 tabs QHS  c) HTN  - Amlodipine 10 mg daily  d) PT consult: fall precautions, recommend ambulating with walker   5. Psychosocial interventions  - Patient provided verbal consent to speak with TBD  - CO 1:1: Not required  - Restrictions/allowances: None   6. Collateral: outpatient psychiatrist Dr. Irby on 6/2/23 on [facility] and handoff from Dr. Hernandez. Collateral from sister. See  note.   7. Disposition: When stable.

## 2024-11-04 NOTE — BH INPATIENT PSYCHIATRY PROGRESS NOTE - NSBHCHARTREVIEWVS_PSY_A_CORE FT
Vital Signs Last 24 Hrs  T(C): 36.4 (11-04-24 @ 07:49), Max: 36.4 (11-04-24 @ 07:49)  T(F): 97.5 (11-04-24 @ 07:49), Max: 97.5 (11-04-24 @ 07:49)  HR: --  BP: --  BP(mean): --  RR: --  SpO2: --    Orthostatic VS  11-04-24 @ 07:49  Lying BP: --/-- HR: --  Sitting BP: 128/86 HR: 64  Standing BP: 123/72 HR: 81  Site: upper right arm  Mode: electronic

## 2024-11-05 PROCEDURE — 99232 SBSQ HOSP IP/OBS MODERATE 35: CPT

## 2024-11-05 RX ADMIN — APIXABAN 5 MILLIGRAM(S): 5 TABLET, FILM COATED ORAL at 08:14

## 2024-11-05 RX ADMIN — Medication 2 TABLET(S): at 20:43

## 2024-11-05 RX ADMIN — APIXABAN 5 MILLIGRAM(S): 5 TABLET, FILM COATED ORAL at 20:44

## 2024-11-05 RX ADMIN — Medication 3 MILLIGRAM(S): at 20:44

## 2024-11-05 RX ADMIN — Medication 225 MILLIGRAM(S): at 08:14

## 2024-11-05 RX ADMIN — Medication 10 MILLIGRAM(S): at 08:14

## 2024-11-05 RX ADMIN — MIRTAZAPINE 30 MILLIGRAM(S): 30 TABLET ORAL at 20:44

## 2024-11-05 RX ADMIN — ARIPIPRAZOLE 10 MILLIGRAM(S): 2 TABLET ORAL at 08:13

## 2024-11-05 NOTE — BH INPATIENT PSYCHIATRY PROGRESS NOTE - NSBHASSESSSUMMFT_PSY_ALL_CORE
Patient is a 70 yo M with history of HTN and DVT and hx of schizoaffective disorder, bipolar type, BIB sister from [facility] due to 2 months of worsening depression and psychosis. Patient with depressed mood, poor ADLs, and persecutory delusions given arguments he had with another patient where he made threats. Patient with numerous previous suicide attempts and previous ECT; he appears to have worsened after completing his course of ECT as an outpatient. He was admitted medically due to poor PO intake. Given his recent MDE and failure to thrive, he is unable to care for himself and therefore meets criteria for inpatient psychiatric hospitalization.   SH: Lives in [facility]. Retired from [employer], , no children.   PsyHx: 1x SA by cutting in 1990. Multiple hospitalizations MR Aug 2023. Was on ECT until 6 months ago.    On interview, the pt endorses depressive sxs mostly including anhedonia, decreased sleep and appetite, and lack of energy. Thought ruminations of guilt noted, in the setting of recent statement towards other tenant at [facility]. Pt experienced AH that include degrading voices that seem to have commanding character but so far no indication of voices directly telling him to hurt himself or others. Pt likely experiencing mood episode with psychotic features, affect noted to be mood-congruent. R/O substance-induced mood and/or psychotic d/o.    10/2-10/3: Pt very anxious and fearful, fixed delusion about imminent incarceration and worrying about not being able to pay his hospital fees and housing in the future. Pt not acutely suicidal, denies active SIIP/HIIP but feels "tortured" by his anxiety and says on 10/3 that not being alive would "not be a bad mikey". Pt with persistent insomnia and low appetite. Currently no AH. Mirtazapine increased from 15 mg to 30 mg to address depressive symptoms, low appetite, and insomnia. Pt provided contact number of sister: [phone].      10/4: Pt very anxious and fearful, fixed delusion about inevitable incarceration and worrying about not being able to pay his hospital fees and housing in the future. Pt presenting as paranoid today, convinced that everyone on the unit is against him, not feeling safe to leave his room. Pt denies SIIP/HIIP but feels hopeless and helpless. Ongoing insomnia and low appetite. Currently no AH. Low appetite (only 1 meal per day), and insomnia. Will further discuss alternative treatment options given no significant improvement on Mirtazapine. Will initiate family meeting next week to discuss ECT, sister, brother and father agree that ECT helped in the past and may need to be restarted.    10/5: remains paranoid, seems to tolerating Abilify well. QTc 429. Continue Abilify 2mg/day as is.   10/6: poor PO intake, appears dehydrated, check CMP tomorrow; Continue Abilify to 2mg po daily  10/7: still poor PO intake, Sodium 132, glucose 54; repeat CMP, same delusions noted, although pt more ambivalent about ECT, family meeting expected tomorrow (10/08)  10/8: still poor PO intake, repeat CMP WNL, same fixed delusions noted, during family meeting with brother shahrzad (phone call), pt with significant thought ruminations, perseverating about imminent incarceration and inability to cover treatment costs, denies that ECT helped him in the past and is worried about side effect burden. Pt in visible distress with increased psychomotor activity. Will approach pt again and offer ECT when pt is less anxious.    10/9: still poor PO intake, same fixed delusions noted, significant thought ruminations, perseverating about imminent incarceration and inability to cover treatment costs, still refuses ECT. Pt shows paranoia regarding the treatment team cooperating with the police and knowing all information about him that would have led to his current charges.    10/10: poor PO intake, same fixed delusions, perceives other patients as police in disguise, perseverating about imminent incarceration but amenable to ECT. Pt denies SIIP/HIIP.    10/11: Completed ECT #1 today. Continues to endorse paranoid delusions. Perceives other patients and staff as undercover police or members of a TV crew documenting his arrest.     10/14: Continues to endorse paranoid delusions. Perceives other patients and staff as undercover police. PT still isolative in his room, has 1-2 meals per day. No AH. No active SIIP/HIIP, but pt is vague about passive SI.     10/15: Continues to endorse paranoid delusions, is suspicious about his current environment, asks about how resident knows about his new room and what resident is writing down when taking notes. Denies having had any ECT treatment. Perceives other patients and staff as undercover police. Pt has poor sleep and appetite and is still isolative in his room. No active SIIP/HIIP, but pt still vague about passive SI. No significant mood improvement from ECT noted so far although pt generally seems more alert and is showing fewer physical signs of anxiety such as shaking.    10/16: Tolerating ECT, appears slightly less anxious today after treatment. Will continue ECT this Friday 10/18.     10/17: Pt does not bring up beliefs of going to [facility] today but remains anxious, endorsing sense of impending doom and isolative. ECT #4 scheduled for tomorrow 10/18.     10/18: Pt is more visible in the unit and seems calmer. No longer endorsing delusions about going to [facility] but paranoia remains, now shifted towards this writer and ECT. Believes writer is keeping him in the hospital to make more money and ECT is providing some form of sham treatment where he is brought down to the suite but never given anesthesia or ECT. Will titrate Abilify to 7mg.     10/21-10/23: S/P ECT #6. Still very paranoid, suspicious of writer and ECT treatment team. Of note, when treated last year for similar presentation pt required higher number of ECT treatments before responding. Will continue titrating Abilify up to lowest effective dose and ECT three times weekly as tolerated.   10/24: continue with plan for ECT tomorrow, remains paranoid  10/25: Tolerating ECT, reports gains. Next ECT scheduled for Monday.   10/28: Tolerating ECT, presenting gains. Denies feeling unsafe in the unit today.   10/29: Appears confused this morning, did not recall date or location. Remembered writer and overall situation (knew he was in the hospital receiving ECT treatments). Will hold tomorrow's ECT, pt may benefit from spacing out treatments to twice weekly. ECT order is in for Friday, may postpone if pt still confused on Thursday.  10/31 Not confused today. Is aware that he is receiving ECT, knows the day of the week and the end of this month. Will plan for ECT tomorrow and continue to monitor for progress.  11/1 S/p ECT #8. No paranoia endorsed, calmer, cooperative. Will plan for another ECT on Wednesday and if tolerating well without recurrence of symptoms, can plan for spacing out to maintenance treatments thereafter.   11/4: appears to isolate self, not joining groups, appeared guarded on approach, suspected paranoia  11/5: isolates, less depressed, tolerating ECT, denied paranoid ideations. ECT tomorrow, tentative d/c tyesha    working diagnosis: schizoaffective disorder, current depressive episode w/ psychosis. ddx bipolar type per previous notes, however no h/o manic sxs elicited in current interview.    Plan:   1. Legal: admitted on 9.13 voluntary status  2. Safety:  Denies SI/SIB/HI/VI; continue routine observation.  3. Psychiatric:   - ECT #8 scheduled for Friday 11/1  - Abilify 10 mg PO QD   - inc 10/8, inc 10/18, inc 10/22  - Venlafaxine  mg daily (depression)  	- Home medication  - Mirtazapine 30 mg QHS (depression)  	- Started during [facility] hospitalization   	- inc 10/2  HOLD Olanzapine 25 mg QHS (psychosis)  	- Home medication, held due to prolonged QTc per recommendation of C/L team. Will repeat EKG.  	- Primary team to address    Agitation PRNs: Olanzapine PO/IM, Lorazepam PO    4. Medical conditions, other medication, consults  a) DVT  - Eliquis 5 mg BID  b) Constipation   - Senna 2 tabs QHS  c) HTN  - Amlodipine 10 mg daily  d) PT consult: fall precautions, recommend ambulating with walker   5. Psychosocial interventions  - Patient provided verbal consent to speak with TBD  - CO 1:1: Not required  - Restrictions/allowances: None   6. Collateral: outpatient psychiatrist Dr. Irby on 6/2/23 on [facility] and handoff from Dr. Hernandez. Collateral from sister. See  note.   7. Disposition: When stable.

## 2024-11-05 NOTE — BH INPATIENT PSYCHIATRY PROGRESS NOTE - NSBHFUPINTERVALHXFT_PSY_A_CORE
f/up SAD, care discussed w/ tx team, MEGGANS. no issues overnight. Patient continues to remain in his room, isolating, coming out at intervals for meals, reported that he was not interested, denied feeling depressed. Patient tolerating ECT--explained that it has been decreased to weekly. reported fair sleep and appetite. denied dizziness or constipation. instructed patient that he would be leaving thursday, after getting ECT on wednesday. denied paranoid ideations.

## 2024-11-05 NOTE — BH INPATIENT PSYCHIATRY PROGRESS NOTE - NSBHCHARTREVIEWVS_PSY_A_CORE FT
Vital Signs Last 24 Hrs  T(C): 36.3 (11-05-24 @ 05:48), Max: 36.3 (11-05-24 @ 05:48)  T(F): 97.3 (11-05-24 @ 05:48), Max: 97.3 (11-05-24 @ 05:48)  HR: --  BP: --  BP(mean): --  RR: --  SpO2: --    Orthostatic VS  11-05-24 @ 05:48  Lying BP: --/-- HR: --  Sitting BP: 125/75 HR: 56  Standing BP: 127/94 HR: 72  Site: --  Mode: --  Orthostatic VS  11-04-24 @ 07:49  Lying BP: --/-- HR: --  Sitting BP: 128/86 HR: 64  Standing BP: 123/72 HR: 81  Site: upper right arm  Mode: electronic

## 2024-11-05 NOTE — BH INPATIENT PSYCHIATRY PROGRESS NOTE - NSBHMETABOLIC_PSY_ALL_CORE_FT
BMI: BMI (kg/m2): 30.1 (11-02-24 @ 18:11)  HbA1c: A1C with Estimated Average Glucose Result: 5.2 % (09-15-24 @ 06:50)    Glucose: POCT Blood Glucose.: 96 mg/dL (10-07-24 @ 14:15)    BP: --Vital Signs Last 24 Hrs  T(C): 36.3 (11-05-24 @ 05:48), Max: 36.3 (11-05-24 @ 05:48)  T(F): 97.3 (11-05-24 @ 05:48), Max: 97.3 (11-05-24 @ 05:48)  HR: --  BP: --  BP(mean): --  RR: --  SpO2: --    Orthostatic VS  11-05-24 @ 05:48  Lying BP: --/-- HR: --  Sitting BP: 125/75 HR: 56  Standing BP: 127/94 HR: 72  Site: --  Mode: --  Orthostatic VS  11-04-24 @ 07:49  Lying BP: --/-- HR: --  Sitting BP: 128/86 HR: 64  Standing BP: 123/72 HR: 81  Site: upper right arm  Mode: electronic    Lipid Panel: Date/Time: 09-15-24 @ 06:50  Cholesterol, Serum: 159  LDL Cholesterol Calculated: 99  HDL Cholesterol, Serum: 43  Total Cholesterol/HDL Ration Measurement: --  Triglycerides, Serum: 87

## 2024-11-06 PROCEDURE — 99232 SBSQ HOSP IP/OBS MODERATE 35: CPT

## 2024-11-06 PROCEDURE — 90870 ELECTROCONVULSIVE THERAPY: CPT

## 2024-11-06 RX ORDER — APIXABAN 5 MG/1
1 TABLET, FILM COATED ORAL
Qty: 60 | Refills: 0
Start: 2024-11-06 | End: 2024-12-05

## 2024-11-06 RX ORDER — MIRTAZAPINE 30 MG/1
1 TABLET ORAL
Qty: 30 | Refills: 0
Start: 2024-11-06 | End: 2024-12-05

## 2024-11-06 RX ORDER — MELATONIN 5 MG
1 TABLET ORAL
Qty: 30 | Refills: 0
Start: 2024-11-06 | End: 2024-12-05

## 2024-11-06 RX ORDER — SENNA 187 MG
2 TABLET ORAL
Qty: 60 | Refills: 0
Start: 2024-11-06 | End: 2024-12-05

## 2024-11-06 RX ORDER — ARIPIPRAZOLE 2 MG/1
1 TABLET ORAL
Qty: 30 | Refills: 0
Start: 2024-11-06 | End: 2024-12-05

## 2024-11-06 RX ORDER — AMLODIPINE BESYLATE 10 MG
1 TABLET ORAL
Qty: 30 | Refills: 0
Start: 2024-11-06 | End: 2024-12-05

## 2024-11-06 RX ORDER — VENLAFAXINE HCL 150 MG
3 CAPSULE, EXT RELEASE 24 HR ORAL
Qty: 90 | Refills: 0
Start: 2024-11-06 | End: 2024-12-05

## 2024-11-06 RX ADMIN — ARIPIPRAZOLE 10 MILLIGRAM(S): 2 TABLET ORAL at 11:53

## 2024-11-06 RX ADMIN — APIXABAN 5 MILLIGRAM(S): 5 TABLET, FILM COATED ORAL at 20:47

## 2024-11-06 RX ADMIN — Medication 225 MILLIGRAM(S): at 11:52

## 2024-11-06 RX ADMIN — APIXABAN 5 MILLIGRAM(S): 5 TABLET, FILM COATED ORAL at 11:53

## 2024-11-06 RX ADMIN — Medication 3 MILLIGRAM(S): at 20:47

## 2024-11-06 RX ADMIN — MIRTAZAPINE 30 MILLIGRAM(S): 30 TABLET ORAL at 20:47

## 2024-11-06 RX ADMIN — Medication 10 MILLIGRAM(S): at 11:53

## 2024-11-06 RX ADMIN — Medication 2 TABLET(S): at 20:47

## 2024-11-06 NOTE — BH INPATIENT PSYCHIATRY PROGRESS NOTE - NSTXDEPRESGOAL_PSY_ALL_CORE
Exhibit improvements in self-grooming, hygiene, sleep and appetite

## 2024-11-06 NOTE — BH INPATIENT PSYCHIATRY PROGRESS NOTE - NSBHMSEJUDGE_PSY_A_CORE
Poor
Fair
Poor
Fair
Poor

## 2024-11-06 NOTE — BH INPATIENT PSYCHIATRY PROGRESS NOTE - NSTXDCOTHRDATEEST_PSY_ALL_CORE
18-Oct-2024
30-Sep-2024
10-Oct-2024
10-Oct-2024
30-Sep-2024
18-Oct-2024
18-Oct-2024
10-Oct-2024
18-Oct-2024
18-Oct-2024
30-Sep-2024
18-Oct-2024
18-Oct-2024
16-Aug-2023
30-Sep-2024
30-Sep-2024
18-Oct-2024
30-Sep-2024
10-Oct-2024
30-Sep-2024
16-Aug-2023
10-Oct-2024
18-Oct-2024
16-Aug-2023
18-Oct-2024

## 2024-11-06 NOTE — ECT PRE-PROCEDURE CHECKLIST - HAND OFF
Unit RN to OR RN/Holding RN to OR RN
Unit RN to OR RN
Unit RN to OR RN
Unit RN to OR RN/Holding RN to OR RN

## 2024-11-06 NOTE — ECT PRE-PROCEDURE CHECKLIST - NSPROEDAABILITYLEARNOTHER_GEN_A_NUR
learning disabilities/literacy

## 2024-11-06 NOTE — ECT TREATMENT NOTE - NSECTIMPPLAN_PSY_ALL_CORE
Assessment today offers no contraindications to continue plan of treatment with ECT.

## 2024-11-06 NOTE — BH INPATIENT PSYCHIATRY PROGRESS NOTE - NSTXDCOTHRGOAL_PSY_ALL_CORE
Pt will maintain compliance with ECT course, with sustained improvement in symptoms, helping him to participate in his care at his [facility]
Pt will maintain compliance with ECT course, with sustained improvement in symptoms, helping him to participate in his care at his halfway
Pt will maintain compliance with ECT course, with sustained improvement in symptoms, helping him to participate in his care at his senior care
Pt will comply with recommended tx plan, with an improvement in symptoms, helping him toward his baseline functioning, where he can function with detention supports in place.
Pt will comply with recommended tx plan, with an improvement in symptoms, helping him toward his baseline functioning, where he can function with long term supports in place.
Pt will maintain compliance with ECT course, with sustained improvement in symptoms, helping him to participate in his care at his assisted
Pt will maintain compliance with ECT course, with sustained improvement in symptoms, helping him to participate in his care at his [facility]
Pt will maintain compliance with ECT course, with sustained improvement in symptoms, helping him to participate in his care at his intermediate
Pt will maintain compliance with ECT course, with sustained improvement in symptoms, helping him to participate in his care at his intermediate
Pt will comply with recommended tx plan, with an improvement in symptoms, helping him toward his baseline functioning, where he can function with correction supports in place.
Pt will comply with recommended tx plan, with an improvement in symptoms, helping him toward his baseline functioning, where he can function with group home supports in place.
Pt will comply with recommended tx plan, with an improvement in symptoms, helping him toward his baseline functioning, where he can function with assisted supports in place
Pt will maintain compliance with ECT course, with sustained improvement in symptoms, helping him to participate in his care at his [facility]
Pt will comply with recommended tx plan, with an improvement in symptoms, helping him toward his baseline functioning, where he can function with shelter supports in place.
Pt will comply with recommended tx plan, with an improvement in symptoms, helping him toward his baseline functioning, where he can function with senior living supports in place
Pt will comply with recommended tx plan, with an improvement in symptoms, helping him toward his baseline functioning, where he can function with custodial supports in place.
Pt will comply with recommended tx plan, with an improvement in symptoms, helping him toward his baseline functioning, where he can function with skilled nursing supports in place.
Pt will comply with recommended tx plan, with an improvement in symptoms, helping him toward his baseline functioning, where he can function with group home supports in place.
Pt will comply with recommended tx plan, with an improvement in symptoms, helping him toward his baseline functioning, where he can function with skilled nursing supports in place.
Pt will maintain compliance with ECT course, with sustained improvement in symptoms, helping him to participate in his care at his jail
Pt will comply with recommended tx plan, with an improvement in symptoms, helping him toward his baseline functioning, where he can function with retirement supports in place.
Pt will comply with recommended tx plan, with an improvement in symptoms, helping him toward his baseline functioning, where he can function with senior living supports in place
Pt will comply with recommended tx plan, with an improvement in symptoms, helping him toward his baseline functioning, where he can function with snf supports in place.
Pt will comply with recommended tx plan, with an improvement in symptoms, helping him toward his baseline functioning, where he can function with [facility] supports in place
Pt will comply with recommended tx plan, with an improvement in symptoms, helping him toward his baseline functioning, where he can function with senior living supports in place.
Pt will maintain compliance with ECT course, with sustained improvement in symptoms, helping him to participate in his care at his [facility]
Pt will comply with recommended tx plan, with an improvement in symptoms, helping him toward his baseline functioning, where he can function with assisted supports in place
Pt will comply with recommended tx plan, with an improvement in symptoms, helping him toward his baseline functioning, where he can function with halfway supports in place.

## 2024-11-06 NOTE — ECT TREATMENT NOTE - NSECTTXPERFDATETIME_PSY_ALL_CORE
11-Oct-2024 10:50
14-Oct-2024 11:56
18-Oct-2024 09:47
01-Nov-2024 08:27
06-Nov-2024 10:29
16-Oct-2024 08:35
23-Oct-2024 09:09
28-Oct-2024 10:52
25-Oct-2024 09:21
21-Oct-2024 10:44

## 2024-11-06 NOTE — ECT PRE-PROCEDURE CHECKLIST - ALLERGIES
Allergies:-  No Known Allergies      

## 2024-11-06 NOTE — BH INPATIENT PSYCHIATRY PROGRESS NOTE - NSICDXBHPRIMARYDX_PSY_ALL_CORE
Schizoaffective disorder, bipolar type   F25.0  

## 2024-11-06 NOTE — BH INPATIENT PSYCHIATRY PROGRESS NOTE - NSTXPSYCHODATEEST_PSY_ALL_CORE
30-Sep-2024
10-Aug-2023
30-Sep-2024
10-Aug-2023
10-Aug-2023
30-Sep-2024

## 2024-11-06 NOTE — BH INPATIENT PSYCHIATRY PROGRESS NOTE - NSBHATTESTTYPEVISIT_PSY_A_CORE
Attending Only
ALEXSANDER without on-site Attending supervision
Attending Only
ALEXSANDER without on-site Attending supervision
Attending Only
ALEXSANDER without on-site Attending supervision
Attending with Resident/Fellow/Student
Attending with Resident/Fellow/Student
Attending Only
Attending with Resident/Fellow/Student

## 2024-11-06 NOTE — BH INPATIENT PSYCHIATRY PROGRESS NOTE - NSTXFALLDATETRGT_PSY_ALL_CORE
17-Aug-2023
10-Oct-2024
17-Aug-2023
17-Aug-2023
10-Oct-2024

## 2024-11-06 NOTE — BH INPATIENT PSYCHIATRY PROGRESS NOTE - NSTXCOPEGOAL_PSY_ALL_CORE
Identify and utilize 2 coping skills that meet their needs

## 2024-11-06 NOTE — BH INPATIENT PSYCHIATRY PROGRESS NOTE - NSTXPSYCHOGOAL_PSY_ALL_CORE
Will ask for PRN medication to manage hallucinations
Will be able to report experiencing hallucinations to staff
Will ask for PRN medication to manage hallucinations
Will be able to report experiencing hallucinations to staff
Will ask for PRN medication to manage hallucinations
Will be able to report experiencing hallucinations to staff

## 2024-11-06 NOTE — BH INPATIENT PSYCHIATRY PROGRESS NOTE - NSTXDCOTHRINTERMD_PSY_ALL_CORE
Continue to work with SW to develop appropriate plan. 

## 2024-11-06 NOTE — ECT PRE-PROCEDURE CHECKLIST - PERIPHERAL IV: INSERTION DATE
01-Nov-2024
25-Oct-2024
06-Nov-2024
28-Oct-2024
11-Oct-2024
21-Oct-2024
16-Oct-2024
18-Oct-2024
23-Oct-2024
14-Oct-2024

## 2024-11-06 NOTE — BH INPATIENT PSYCHIATRY PROGRESS NOTE - NSBHMSEMOOD_PSY_A_CORE
Depressed/Anxious
Other
Depressed/Anxious
Other
Depressed/Anxious
Other
Depressed/Anxious
Other
Depressed/Anxious
Depressed
Normal
Depressed/Anxious
Depressed/Anxious
Depressed

## 2024-11-06 NOTE — BH DISCHARGE NOTE NURSING/SOCIAL WORK/PSYCH REHAB - NSDCADDINFO1FT_PSY_ALL_CORE
You will also resume outpatient medication management with Dr. Sharma [phone] onsite at the [facility]  when there are rounds

## 2024-11-06 NOTE — BH INPATIENT PSYCHIATRY PROGRESS NOTE - NSBHATTESTBILLING_PSY_A_CORE
91530-Xzjeegwviw OBS or IP - low complexity OR 25-34 mins
[id number]-Ofzfbtlgcu OBS or IP - moderate complexity OR 35-49 mins
[id number]-Pzvlicbyah OBS or IP - moderate complexity OR 35-49 mins
[id number]-Cpfrckzhgx OBS or IP - moderate complexity OR 35-49 mins
[id number]-Igmglcuiqf OBS or IP - moderate complexity OR 35-49 mins
75091-Odewvxazxg OBS or IP - moderate complexity OR 35-49 mins
02414-Taegnatruf OBS or IP - moderate complexity OR 35-49 mins
65525-Oprpoiwzgx OBS or IP - moderate complexity OR 35-49 mins
04639-Kcgjcfwput OBS or IP - moderate complexity OR 35-49 mins
[id number]-Nipovmynqb OBS or IP - moderate complexity OR 35-49 mins
[id number]-Nlsmaflxav OBS or IP - moderate complexity OR 35-49 mins
[id number]-Wbeuqrqgkx OBS or IP - low complexity OR 25-34 mins
06694-Istkwficir OBS or IP - moderate complexity OR 35-49 mins
[id number]-Dmaaconjuy OBS or IP - moderate complexity OR 35-49 mins
77834-Abvxdtlnjl OBS or IP - moderate complexity OR 35-49 mins
[id number]-Uzvweewgkj OBS or IP - moderate complexity OR 35-49 mins
03293-Tfiuuvhwes OBS or IP - moderate complexity OR 35-49 mins
25270-Sqwxpewvdm OBS or IP - moderate complexity OR 35-49 mins
71922-Imwmaprohh OBS or IP - moderate complexity OR 35-49 mins
34565-Ilodauejtg OBS or IP - moderate complexity OR 35-49 mins
71224-Ahydyusfgp OBS or IP - moderate complexity OR 35-49 mins
73458-Wekcdjlhwm OBS or IP - moderate complexity OR 35-49 mins
10496-Xxgyxemane OBS or IP - moderate complexity OR 35-49 mins

## 2024-11-06 NOTE — BH INPATIENT PSYCHIATRY PROGRESS NOTE - NSDCCRITERIA_PSY_ALL_CORE
Improvement in depression and appetite

## 2024-11-06 NOTE — BH INPATIENT PSYCHIATRY PROGRESS NOTE - NSTXPSYCHODATETRGT_PSY_ALL_CORE
10-Oct-2024
17-Aug-2023
10-Oct-2024
17-Aug-2023
10-Oct-2024
17-Aug-2023
10-Oct-2024

## 2024-11-06 NOTE — BH INPATIENT PSYCHIATRY PROGRESS NOTE - GENERAL APPEARANCE
No deformities present
No deformities present/Other
No deformities present/Other
No deformities present
No deformities present/Other
No deformities present/Other
No deformities present
No deformities present
No deformities present/Other
No deformities present
No deformities present/Other
No deformities present
No deformities present/Other
No deformities present
No deformities present/Other
No deformities present

## 2024-11-06 NOTE — BH INPATIENT PSYCHIATRY PROGRESS NOTE - NSBHASSESSSUMMFT_PSY_ALL_CORE
Patient is a 70 yo M with history of HTN and DVT and hx of schizoaffective disorder, bipolar type, BIB sister from [facility] due to 2 months of worsening depression and psychosis. Patient with depressed mood, poor ADLs, and persecutory delusions given arguments he had with another patient where he made threats. Patient with numerous previous suicide attempts and previous ECT; he appears to have worsened after completing his course of ECT as an outpatient. He was admitted medically due to poor PO intake. Given his recent MDE and failure to thrive, he is unable to care for himself and therefore meets criteria for inpatient psychiatric hospitalization.   SH: Lives in [facility]. Retired from [employer], , no children.   PsyHx: 1x SA by cutting in 1990. Multiple hospitalizations MR Aug 2023. Was on ECT until 6 months ago.    On interview, the pt endorses depressive sxs mostly including anhedonia, decreased sleep and appetite, and lack of energy. Thought ruminations of guilt noted, in the setting of recent statement towards other tenant at [facility]. Pt experienced AH that include degrading voices that seem to have commanding character but so far no indication of voices directly telling him to hurt himself or others. Pt likely experiencing mood episode with psychotic features, affect noted to be mood-congruent. R/O substance-induced mood and/or psychotic d/o.    10/2-10/3: Pt very anxious and fearful, fixed delusion about imminent incarceration and worrying about not being able to pay his hospital fees and housing in the future. Pt not acutely suicidal, denies active SIIP/HIIP but feels "tortured" by his anxiety and says on 10/3 that not being alive would "not be a bad mikey". Pt with persistent insomnia and low appetite. Currently no AH. Mirtazapine increased from 15 mg to 30 mg to address depressive symptoms, low appetite, and insomnia. Pt provided contact number of sister: [phone].      10/4: Pt very anxious and fearful, fixed delusion about inevitable incarceration and worrying about not being able to pay his hospital fees and housing in the future. Pt presenting as paranoid today, convinced that everyone on the unit is against him, not feeling safe to leave his room. Pt denies SIIP/HIIP but feels hopeless and helpless. Ongoing insomnia and low appetite. Currently no AH. Low appetite (only 1 meal per day), and insomnia. Will further discuss alternative treatment options given no significant improvement on Mirtazapine. Will initiate family meeting next week to discuss ECT, sister, brother and father agree that ECT helped in the past and may need to be restarted.    10/5: remains paranoid, seems to tolerating Abilify well. QTc 429. Continue Abilify 2mg/day as is.   10/6: poor PO intake, appears dehydrated, check CMP tomorrow; Continue Abilify to 2mg po daily  10/7: still poor PO intake, Sodium 132, glucose 54; repeat CMP, same delusions noted, although pt more ambivalent about ECT, family meeting expected tomorrow (10/08)  10/8: still poor PO intake, repeat CMP WNL, same fixed delusions noted, during family meeting with brother shahrzad (phone call), pt with significant thought ruminations, perseverating about imminent incarceration and inability to cover treatment costs, denies that ECT helped him in the past and is worried about side effect burden. Pt in visible distress with increased psychomotor activity. Will approach pt again and offer ECT when pt is less anxious.    10/9: still poor PO intake, same fixed delusions noted, significant thought ruminations, perseverating about imminent incarceration and inability to cover treatment costs, still refuses ECT. Pt shows paranoia regarding the treatment team cooperating with the police and knowing all information about him that would have led to his current charges.    10/10: poor PO intake, same fixed delusions, perceives other patients as police in disguise, perseverating about imminent incarceration but amenable to ECT. Pt denies SIIP/HIIP.    10/11: Completed ECT #1 today. Continues to endorse paranoid delusions. Perceives other patients and staff as undercover police or members of a TV crew documenting his arrest.     10/14: Continues to endorse paranoid delusions. Perceives other patients and staff as undercover police. PT still isolative in his room, has 1-2 meals per day. No AH. No active SIIP/HIIP, but pt is vague about passive SI.     10/15: Continues to endorse paranoid delusions, is suspicious about his current environment, asks about how resident knows about his new room and what resident is writing down when taking notes. Denies having had any ECT treatment. Perceives other patients and staff as undercover police. Pt has poor sleep and appetite and is still isolative in his room. No active SIIP/HIIP, but pt still vague about passive SI. No significant mood improvement from ECT noted so far although pt generally seems more alert and is showing fewer physical signs of anxiety such as shaking.    10/16: Tolerating ECT, appears slightly less anxious today after treatment. Will continue ECT this Friday 10/18.     10/17: Pt does not bring up beliefs of going to half-way today but remains anxious, endorsing sense of impending doom and isolative. ECT #4 scheduled for tomorrow 10/18.     10/18: Pt is more visible in the unit and seems calmer. No longer endorsing delusions about going to half-way but paranoia remains, now shifted towards this writer and ECT. Believes writer is keeping him in the hospital to make more money and ECT is providing some form of sham treatment where he is brought down to the suite but never given anesthesia or ECT. Will titrate Abilify to 7mg.     10/21-10/23: S/P ECT #6. Still very paranoid, suspicious of writer and ECT treatment team. Of note, when treated last year for similar presentation pt required higher number of ECT treatments before responding. Will continue titrating Abilify up to lowest effective dose and ECT three times weekly as tolerated.   10/24: continue with plan for ECT tomorrow, remains paranoid  10/25: Tolerating ECT, reports gains. Next ECT scheduled for Monday.   10/28: Tolerating ECT, presenting gains. Denies feeling unsafe in the unit today.   10/29: Appears confused this morning, did not recall date or location. Remembered writer and overall situation (knew he was in the hospital receiving ECT treatments). Will hold tomorrow's ECT, pt may benefit from spacing out treatments to twice weekly. ECT order is in for Friday, may postpone if pt still confused on Thursday.  10/31 Not confused today. Is aware that he is receiving ECT, knows the day of the week and the end of this month. Will plan for ECT tomorrow and continue to monitor for progress.  11/1 S/p ECT #8. No paranoia endorsed, calmer, cooperative. Will plan for another ECT on Wednesday and if tolerating well without recurrence of symptoms, can plan for spacing out to maintenance treatments thereafter.   11/4: appears to isolate self, not joining groups, appeared guarded on approach, suspected paranoia  11/5: isolates, less depressed, tolerating ECT, denied paranoid ideations. ECT tomorrow, tentative d/c thursdsay 11/6: tolerated EC T today, no complaints, tentative d/c tomorrow.     working diagnosis: schizoaffective disorder, current depressive episode w/ psychosis. ddx bipolar type per previous notes, however no h/o manic sxs elicited in current interview.    Plan:   1. Legal: admitted on 9.13 voluntary status  2. Safety:  Denies SI/SIB/HI/VI; continue routine observation.  3. Psychiatric:   - ECT #8 scheduled for Friday 11/1  - Abilify 10 mg PO QD   - inc 10/8, inc 10/18, inc 10/22  - Venlafaxine  mg daily (depression)  	- Home medication  - Mirtazapine 30 mg QHS (depression)  	- Started during [facility] hospitalization   	- inc 10/2  HOLD Olanzapine 25 mg QHS (psychosis)  	- Home medication, held due to prolonged QTc per recommendation of C/L team. Will repeat EKG.  	- Primary team to address    Agitation PRNs: Olanzapine PO/IM, Lorazepam PO    4. Medical conditions, other medication, consults  a) DVT  - Eliquis 5 mg BID  b) Constipation   - Senna 2 tabs QHS  c) HTN  - Amlodipine 10 mg daily  d) PT consult: fall precautions, recommend ambulating with walker   5. Psychosocial interventions  - Patient provided verbal consent to speak with TBD  - CO 1:1: Not required  - Restrictions/allowances: None   6. Collateral: outpatient psychiatrist Dr. Irby on 6/2/23 on [facility] and handoff from Dr. Hernandez. Collateral from sister. See  note.   7. Disposition: When stable.

## 2024-11-06 NOTE — BH DISCHARGE NOTE NURSING/SOCIAL WORK/PSYCH REHAB - DISCHARGE INSTRUCTIONS AFTERCARE APPOINTMENTS
In order to check the location, date, or time of your aftercare appointment, please refer to your Discharge Instructions Document given to you upon leaving the hospital.  If you have lost the instructions please call [phone]

## 2024-11-06 NOTE — ECT TREATMENT NOTE - NSECTCPTCODE_PSY_ALL_CORE
35394 (ECT-Electroconvulsive Therapy)
84262 (ECT-Electroconvulsive Therapy)
59080 (ECT-Electroconvulsive Therapy)
66367 (ECT-Electroconvulsive Therapy)
15314 (ECT-Electroconvulsive Therapy)
51122 (ECT-Electroconvulsive Therapy)
81046 (ECT-Electroconvulsive Therapy)
29437 (ECT-Electroconvulsive Therapy)
54261 (ECT-Electroconvulsive Therapy)
74922 (ECT-Electroconvulsive Therapy)

## 2024-11-06 NOTE — ECT TREATMENT NOTE - NSECTREVSYS_PSY_ALL_CORE
Cardiovascular/Other

## 2024-11-06 NOTE — BH INPATIENT PSYCHIATRY PROGRESS NOTE - NSBHFUPINTERVALHXFT_PSY_A_CORE
f/up SAD, care discussed w/ tx team, VSS. no issues overnight. Patient was sitting in dayroom, just returned from ECT, no complaints offered, reported sleeping well/ good appetite. no delusions elicited. Patient motivated for discharge tomorrow back to his residence.

## 2024-11-06 NOTE — ECT PRE-PROCEDURE CHECKLIST - ALLERGY BAND ON
no known allergies

## 2024-11-06 NOTE — ECT PRE-PROCEDURE CHECKLIST - AS BP NONINV METHOD
electronic

## 2024-11-06 NOTE — ECT PRE-PROCEDURE CHECKLIST - NSPROEDAREADYLEARN_GEN_A_NUR
anxiety/depression/fatigue

## 2024-11-06 NOTE — BH INPATIENT PSYCHIATRY PROGRESS NOTE - NSTXCOPEDATEEST_PSY_ALL_CORE
08-Oct-2024
21-Oct-2024
14-Aug-2023
21-Oct-2024
02-Oct-2024
30-Sep-2024
14-Oct-2024
04-Nov-2024
14-Oct-2024
08-Oct-2024
14-Aug-2023
21-Oct-2024
04-Nov-2024
08-Oct-2024
21-Oct-2024
30-Sep-2024
14-Oct-2024
21-Oct-2024
14-Oct-2024
04-Nov-2024
21-Oct-2024
30-Sep-2024
30-Sep-2024

## 2024-11-06 NOTE — BH DISCHARGE NOTE NURSING/SOCIAL WORK/PSYCH REHAB - PATIENT PORTAL LINK FT
You can access the FollowMyHealth Patient Portal offered by [facility] by registering at the following website: [email or web address]. By joining [employer]’s FollowMyHealth portal, you will also be able to view your health information using other applications (apps) compatible with our system.

## 2024-11-06 NOTE — BH INPATIENT PSYCHIATRY PROGRESS NOTE - NSTXCOPEDATETRGT_PSY_ALL_CORE
15-Oct-2024
15-Oct-2024
21-Oct-2024
04-Nov-2024
10-Oct-2024
28-Oct-2024
28-Oct-2024
10-Oct-2024
11-Nov-2024
21-Aug-2023
10-Oct-2024
15-Oct-2024
21-Oct-2024
09-Oct-2024
24-Oct-2024
04-Nov-2024
28-Oct-2024
21-Aug-2023
10-Oct-2024
04-Nov-2024
15-Oct-2024
11-Nov-2024
15-Oct-2024
11-Nov-2024
28-Oct-2024
21-Oct-2024
04-Nov-2024
28-Oct-2024

## 2024-11-06 NOTE — BH INPATIENT PSYCHIATRY PROGRESS NOTE - NSTXDISORGGOAL_PSY_ALL_CORE
Will make at least 3 goal and reality oriented statements during therapy
Will identify 2 coping skills that assist in organizing
Will make at least 3 goal and reality oriented statements during therapy
Will identify 2 coping skills that assist in organizing
Will make at least 3 goal and reality oriented statements during therapy
Will identify 2 coping skills that assist in organizing
Will make at least 3 goal and reality oriented statements during therapy
Will make at least 3 goal and reality oriented statements during therapy

## 2024-11-06 NOTE — BH INPATIENT PSYCHIATRY PROGRESS NOTE - NSICDXBHSECONDARYDX_PSY_ALL_CORE
Deep vein thrombosis (DVT)   I82.409  HTN (hypertension)   I10  
Gait abnormality   R26.9  
Deep vein thrombosis (DVT)   I82.409  HTN (hypertension)   I10  
Gait abnormality   R26.9  
Deep vein thrombosis (DVT)   I82.409  HTN (hypertension)   I10  
Gait abnormality   R26.9  
Gait abnormality   R26.9  
Deep vein thrombosis (DVT)   I82.409  HTN (hypertension)   I10  
Gait abnormality   R26.9  
Gait abnormality   R26.9  
Deep vein thrombosis (DVT)   I82.409  HTN (hypertension)   I10  
Gait abnormality   R26.9  
Deep vein thrombosis (DVT)   I82.409  HTN (hypertension)   I10  
Deep vein thrombosis (DVT)   I82.409  HTN (hypertension)   I10  
Gait abnormality   R26.9  
Deep vein thrombosis (DVT)   I82.409  HTN (hypertension)   I10  
Deep vein thrombosis (DVT)   I82.409  HTN (hypertension)   I10  
Gait abnormality   R26.9  
Deep vein thrombosis (DVT)   I82.409  HTN (hypertension)   I10  
Gait abnormality   R26.9  
Deep vein thrombosis (DVT)   I82.409  HTN (hypertension)   I10  

## 2024-11-06 NOTE — BH INPATIENT PSYCHIATRY PROGRESS NOTE - NSTXANXGOAL_PSY_ALL_CORE
Report a reduction in panic attacks and improving mood and confidence

## 2024-11-06 NOTE — BH INPATIENT PSYCHIATRY PROGRESS NOTE - NSTXDEPRESDATETRGT_PSY_ALL_CORE
24-Oct-2024
10-Oct-2024
24-Oct-2024
10-Oct-2024
17-Aug-2023
10-Oct-2024
24-Oct-2024
10-Oct-2024
24-Oct-2024
10-Oct-2024
24-Oct-2024
10-Oct-2024
24-Oct-2024
17-Aug-2023
17-Aug-2023
24-Oct-2024
24-Oct-2024

## 2024-11-06 NOTE — BH INPATIENT PSYCHIATRY PROGRESS NOTE - NSBHCHARTREVIEWVS_PSY_A_CORE FT
Vital Signs Last 24 Hrs  T(C): 36.3 (11-06-24 @ 11:15), Max: 36.4 (11-06-24 @ 05:50)  T(F): 97.4 (11-06-24 @ 11:15), Max: 97.5 (11-06-24 @ 05:50)  HR: 62 (11-06-24 @ 11:15) (61 - 75)  BP: 119/69 (11-06-24 @ 11:15) (119/69 - 147/89)  BP(mean): --  RR: 16 (11-06-24 @ 11:15) (15 - 17)  SpO2: 98% (11-06-24 @ 11:15) (95% - 98%)    Orthostatic VS  11-06-24 @ 05:50  Lying BP: --/-- HR: --  Sitting BP: 129/86 HR: 52  Standing BP: 132/81 HR: 58  Site: --  Mode: --  Orthostatic VS  11-05-24 @ 05:48  Lying BP: --/-- HR: --  Sitting BP: 125/75 HR: 56  Standing BP: 127/94 HR: 72  Site: --  Mode: --

## 2024-11-06 NOTE — BH INPATIENT PSYCHIATRY PROGRESS NOTE - NSTXPROBDCOTHR_PSY_ALL_CORE
DISCHARGE ISSUE - OTHER

## 2024-11-06 NOTE — BH INPATIENT PSYCHIATRY PROGRESS NOTE - NSTXPROBFALL_PSY_ALL_CORE
FALL RISK

## 2024-11-06 NOTE — ECT PRE-PROCEDURE CHECKLIST - NSPTSENTTO_PSY_ALL_CORE
procedural room
procedural room
holding area
procedural room
holding area
procedural room

## 2024-11-06 NOTE — ECT PRE-PROCEDURE CHECKLIST - NSECTCONSENT_PSY_ALL_CORE
ECT consent  obtained on 10/10/24  Anesthesia consent expires on 4/11/25/yes
ECT consent  obtained on 10/10/24  Anesthesia consent expires on 4/11/25/yes
BFA consent signed 10/10/24  Anesthesia consent expires on 4/11/25/yes
BFA consent signed 10/10/24  Anesthesia consent expires on 4/11/25/yes
ECT consent  obtained on 10/10/24  Anesthesia consent expires on 4/11/25/yes
BFA consent signed 10/10/24  Anesthesia consent expires on 4/11/25/yes
ECT consent  obtained on 10/10/24  Anesthesia consent expires on 4/11/25/yes
BFA consent signed 10/10/24  Anesthesia consent expires on 4/11/25/yes

## 2024-11-06 NOTE — ECT PRE-PROCEDURE CHECKLIST - NSECTNPODT_PSY_ALL_CORE
11-Oct-2024 00:00
22-Oct-2024 19:00
14-Oct-2024 00:00
15-Oct-2024 20:00
01-Nov-2024 23:00
28-Oct-2024 00:00
18-Oct-2024 00:00
25-Oct-2024 00:00
20-Oct-2024 23:59
06-Nov-2024 00:00

## 2024-11-06 NOTE — BH INPATIENT PSYCHIATRY PROGRESS NOTE - NSTXFALLGOAL_PSY_ALL_CORE
Call for assistance before getting out of bed or chair
Use non-slip footwear when out of bed
Call for assistance before getting out of bed or chair
Use non-slip footwear when out of bed
Use non-slip footwear when out of bed
Call for assistance before getting out of bed or chair

## 2024-11-06 NOTE — BH INPATIENT PSYCHIATRY PROGRESS NOTE - NSTXCOPEINTERMD_PSY_ALL_CORE
Encourage patient to participate G/M therapy. 

## 2024-11-06 NOTE — BH INPATIENT PSYCHIATRY PROGRESS NOTE - NSTXDCOTHRDATETRGT_PSY_ALL_CORE
25-Oct-2024
10-Oct-2024
17-Oct-2024
25-Oct-2024
10-Oct-2024
17-Oct-2024
17-Oct-2024
10-Oct-2024
23-Aug-2023
25-Oct-2024
25-Oct-2024
10-Oct-2024
23-Aug-2023
17-Oct-2024
25-Oct-2024
10-Oct-2024
25-Oct-2024
17-Oct-2024
23-Aug-2023
25-Oct-2024

## 2024-11-06 NOTE — BH INPATIENT PSYCHIATRY PROGRESS NOTE - NSTXANXINTERMD_PSY_ALL_CORE
Treat depression and insomnia with Mirtazapine 30 mg, Venlafaxine 225 mg, and Melatonin 3 mg. Consider ECT trial.
c/w Klonopin 0.5 mg PO BID. Trial of Effexor   Trial of  ECT. 
Treat depression and insomnia with Mirtazapine 30 mg, Venlafaxine 225 mg, and Melatonin 3 mg. Consider ECT trial.
c/w Klonopin 0.5 mg PO BID. Trial of Effexor   Trial of  ECT. 
Treat depression and insomnia with Mirtazapine 30 mg, Venlafaxine 225 mg, and Melatonin 3 mg. Consider ECT trial.
c/w Klonopin 0.5 mg PO BID. Trial of Effexor   Trial of  ECT. 
Treat depression and insomnia with Mirtazapine 30 mg, Venlafaxine 225 mg, and Melatonin 3 mg. Consider ECT trial.

## 2024-11-06 NOTE — BH INPATIENT PSYCHIATRY PROGRESS NOTE - NSBHMSEGROOM_PSY_A_CORE
Fair

## 2024-11-06 NOTE — BH INPATIENT PSYCHIATRY PROGRESS NOTE - NSBHMSEAFFRANGE_PSY_A_CORE
Constricted
Blunted
Constricted
Constricted
Blunted
Constricted
Blunted
Constricted
Blunted
Constricted
Blunted
Constricted

## 2024-11-06 NOTE — BH INPATIENT PSYCHIATRY PROGRESS NOTE - NSTXDISORGDATETRGT_PSY_ALL_CORE
10-Oct-2024
contact guard
10-Oct-2024
17-Aug-2023
10-Oct-2024
17-Aug-2023
10-Oct-2024
17-Aug-2023
10-Oct-2024
10-Oct-2024

## 2024-11-06 NOTE — BH INPATIENT PSYCHIATRY PROGRESS NOTE - NSBHMSEAFFQUAL_PSY_A_CORE
Depressed/Anxious
Depressed
Depressed/Anxious
Euthymic
Depressed/Anxious
Depressed
Depressed/Anxious

## 2024-11-06 NOTE — BH INPATIENT PSYCHIATRY PROGRESS NOTE - NSTXANXDATEEST_PSY_ALL_CORE
03-Jul-2023

## 2024-11-06 NOTE — ECT PRE-PROCEDURE CHECKLIST - NSPTSENTVIA_PSY_ALL_CORE
stretcher
wheelchair
stretcher
wheelchair
stretcher
stretcher

## 2024-11-06 NOTE — BH INPATIENT PSYCHIATRY PROGRESS NOTE - NSTXPROBCOPE_PSY_ALL_CORE
COPING, INEFFECTIVE

## 2024-11-06 NOTE — BH INPATIENT PSYCHIATRY PROGRESS NOTE - NSBHMSEINSIGHT_PSY_A_CORE
Poor
Fair
Poor
Fair
Poor
Poor

## 2024-11-06 NOTE — BH INPATIENT PSYCHIATRY PROGRESS NOTE - NSTXDISORGDATEEST_PSY_ALL_CORE
30-Sep-2024
10-Aug-2023
30-Sep-2024
10-Aug-2023
10-Aug-2023
30-Sep-2024

## 2024-11-06 NOTE — BH DISCHARGE NOTE NURSING/SOCIAL WORK/PSYCH REHAB - NSCDUDCCRISIS_PSY_A_CORE
.  [facility]  1 [phone] LIFENET [phone])/.  [facility]  [phone]/.  [facility]pl[facility] / [facility]  [phone])/.  [facility]  [phone] [place] [id number] [phone]   Hours:  Monday through Friday from 9 AM to 3 PM/[facility]

## 2024-11-06 NOTE — BH INPATIENT PSYCHIATRY PROGRESS NOTE - TELEPSYCHIATRY?
INSTRUCTIONS FOR COVID-19 OR ANY OTHER INFECTIOUS RESPIRATORY ILLNESSES    The Centers for Disease Control and Prevention (CDC) states that early indications for COVID-19 include cough, shortness of breath, difficulty breathing, or at least two of the following symptoms: chills, shaking with chills, muscle pain, headache, sore throat, and loss of taste or smell. Symptoms can range from mild to severe and may appear up to two weeks after exposure to the virus.    The practice of self-isolation and quarantine helps protect the public and your family by  preventing exposure to people who have or may have a contagious disease. Please follow the prevention steps below as based on CDC guidelines:    WHEN TO STOP ISOLATION: Persons with COVID-19 or any other infectious respiratory illness who have symptoms and were advised to care for themselves at home may discontinue home isolation under the following conditions:  · At least 24 hours have passed since recovery defined as resolution of fever without the use of fever-reducing medications; AND,  · Improvement in respiratory symptoms (e.g., cough, shortness of breath); AND,  · At least 10 days have passed since symptoms first appeared and have had no subsequent illness.    MONITOR YOUR SYMPTOMS: If your illness is worsening, seek prompt medical attention. If you have a medical emergency and need to call 911, notify the dispatch personnel that you have, or are being evaluated for confirmed or suspected COVID-19 or another infectious respiratory illness. Wear a facemask if possible.    ACTIVITY RESTRICTION: restrict activities outside your home, except for getting medical care. Do not go to work, school, or public areas. Avoid using public transportation, ride-sharing, or taxis.    SCHEDULED MEDICAL APPOINTMENTS: Notify your provider that you have, or are being evaluated for, confirmed or suspected COVID-19 or another infectious respiratory. This will help the healthcare 
provider’s office safely take care of you and keep other people from getting exposed or infected.    FACEMASKS, when to wear: Anytime you are away from your home or around other people or pets. If you are unable to wear one, maintain a minimum of 6 feet distancing from others.    LIVING ENVIRONMENT: Stay in a separate room from other people and pets. If possible, use a separate bathroom, have someone else care for your pets and avoid sharing household items. Any items used should be washed thoroughly with soap and water. Clean all “high-touch” surfaces every day. Use a household cleaning spray or wipe, according to the label instructions. High touch surfaces include (but are not limited to) counters, tabletops, doorknobs, bathroom fixtures, toilets, phones, keyboards, tablets, and bedside tables.     HAND WASHING: Frequently wash hands with soap and water for at least 20 seconds,  especially after blowing your nose, coughing, or sneezing; going to the bathroom; before and after interacting with pets; and before and after eating or preparing food. If hands are visibly dirty use soap and water. If soap and water are not available, use an alcohol-based hand  with at least 60% alcohol. Avoid touching your eyes, nose, and mouth with unwashed hands. Cover your coughs and sneezes with a tissue. Throw used tissues in a lined trash can. Immediately wash your hands.    ACTIVE/FACILITATED SELF-MONITORING: Follow instructions provided by your local health department or health professionals, as appropriate. When working with your local health department check their available hours.    [facility]   Phone Number   [facility] [phone]   [facility] [place] [phone]   [place] Call 211   [place] [phone]     IF YOU HAVE CONFIRMED POSITIVE COVID-19:    Those who have completely recovered from COVID-19 may have immune-boosting antibodies in their plasma--called “convalescent plasma”--that could be 
used to treat critically ill COVID19 patients.    [facility] is excited to begin working with [facility] on collecting convalescent plasma from  people who have recovered from COVID-19 as part of a program to treat patients infected with the virus. This FDA-approved “emergency investigational new drug” is a special blood product containing antibodies that may give patients an extra boost to fight the virus.    To be eligible to donate convalescent plasma, you must have a prior COVID-19 diagnosis documented by a laboratory test (or a positive test result for SARS-CoV-2 antibodies) and meet additional eligibility requirements.    If you are interested in donating convalescent plasma or have any additional questions, please contact the [facility] Convalescent Plasma  at [phone] or via e-mail at [email or web address].    
No

## 2024-11-06 NOTE — ECT PRE-PROCEDURE CHECKLIST - NSPROPTRIGHTSUPPORTNAME_GEN_A_NUR
Sabino Romero

## 2024-11-06 NOTE — BH DISCHARGE NOTE NURSING/SOCIAL WORK/PSYCH REHAB - NSDCPRRECOMMEND_PSY_ALL_CORE
Mrs encinas had questions about cardiac catheterization vs heart surgery. Will clarify with dr mcbride    Spoke with dr mcbride, pt is high risk for heart surgery and will need staged intervention. Attempted to reach mrs encinas to discuss her concerns - no answer. Will attempt to reach pt again   Patient is scheduled to be discharged today and will return to his previous residence. Writer encouraged patient to maintain medication compliance, to utilize his coping skills and to participate in structured social activities.

## 2024-11-06 NOTE — ECT TREATMENT NOTE - NSICDXBHPRIMARYDX_PSY_ALL_CORE
Schizoaffective disorder   F25.9  

## 2024-11-06 NOTE — BH INPATIENT PSYCHIATRY PROGRESS NOTE - NSBHMETABOLIC_PSY_ALL_CORE_FT
BMI: BMI (kg/m2): 30.1 (11-02-24 @ 18:11)  HbA1c: A1C with Estimated Average Glucose Result: 5.2 % (09-15-24 @ 06:50)    Glucose: POCT Blood Glucose.: 96 mg/dL (10-07-24 @ 14:15)    BP: 119/69 (11-06-24 @ 11:15) (119/69 - 147/89)Vital Signs Last 24 Hrs  T(C): 36.3 (11-06-24 @ 11:15), Max: 36.4 (11-06-24 @ 05:50)  T(F): 97.4 (11-06-24 @ 11:15), Max: 97.5 (11-06-24 @ 05:50)  HR: 62 (11-06-24 @ 11:15) (61 - 75)  BP: 119/69 (11-06-24 @ 11:15) (119/69 - 147/89)  BP(mean): --  RR: 16 (11-06-24 @ 11:15) (15 - 17)  SpO2: 98% (11-06-24 @ 11:15) (95% - 98%)    Orthostatic VS  11-06-24 @ 05:50  Lying BP: --/-- HR: --  Sitting BP: 129/86 HR: 52  Standing BP: 132/81 HR: 58  Site: --  Mode: --  Orthostatic VS  11-05-24 @ 05:48  Lying BP: --/-- HR: --  Sitting BP: 125/75 HR: 56  Standing BP: 127/94 HR: 72  Site: --  Mode: --    Lipid Panel: Date/Time: 09-15-24 @ 06:50  Cholesterol, Serum: 159  LDL Cholesterol Calculated: 99  HDL Cholesterol, Serum: 43  Total Cholesterol/HDL Ration Measurement: --  Triglycerides, Serum: 87

## 2024-11-06 NOTE — BH DISCHARGE NOTE NURSING/SOCIAL WORK/PSYCH REHAB - NSPROPASSIVESMOKEEXPOSURE_GEN_A_NUR
[facility] PRIMARY CARE  [address] [place] [address] , [address]  [place], [place], [address]    Franky Melendez is a 49 y.o. male with  has a past medical history of CAD (coronary artery disease), Carpal tunnel syndrome, Depressive disorder, not elsewhere classified, Diabetes mellitus (HCC), Gastrointestinal hemorrhage with hematemesis, Heart attack (HCC), History of echocardiogram, History of pancreatitis, Hyperlipidemia, Hypertension, and PTSD (post-traumatic stress disorder).  Presented to the office today for:  Chief Complaint   Patient presents with    Hip Pain     Assessment/Plan   1. Left hip pain  -     HYDROcodone-acetaminophen (NORCO) 5-325 MG per tablet; Take 1 tablet by mouth every 6 hours as needed for Pain for up to 7 days. Not to be taken with Xanax. Intended supply: 7 days. Take lowest dose possible to manage pain Max Daily Amount: 4 tablets, Disp-28 tablet, R-0Normal  2. Right hip pain  Return if symptoms worsen or fail to improve.    We discussed some of the etiologies and natural histories. We discussed the various treatment alternatives including medications, physical therapy, injections, further imaging studies and as a last resort surgery.  Plan to repeat hip injection. Short-term use of Norco PRN    Last PDMP Branden as Reviewed:  Review User Review Instant Review Result   CURT KATZ 2/8/2024 12:07 PM Reviewed PDMP [1]         All patient questions answered.  Pt voiced understanding.   Medications Discontinued During This Encounter   Medication Reason    Tirzepatide (MOUNJARO) 2.5 MG/0.5ML SOPN SC injection Side effects    HYDROcodone-acetaminophen (NORCO) 5-325 MG per tablet REORDER       Patient received counseling on the following healthy behaviors: nutrition, exercise and medication adherence. I encouraged and discussed lifestyle modifications including diet and exercise and the patient was agreeable to making positive/beneficial changes to both to help improve their overall health.  No

## 2024-11-06 NOTE — ECT PRE-PROCEDURE CHECKLIST - NSPROPTRIGHTSUPPORTPHONE_GEN_A_NUR
[phone]
[phone]
[phone]
[phone]
[phone]
[phone]
[phone]
[phone]
[phone]
[phone]

## 2024-11-06 NOTE — ECT PRE-PROCEDURE CHECKLIST - TO WHOM
Procedure room nurse 

## 2024-11-06 NOTE — ECT PRE-PROCEDURE CHECKLIST - AS BP NONINV SITE
left upper arm
right upper arm

## 2024-11-06 NOTE — BH INPATIENT PSYCHIATRY PROGRESS NOTE - NSTXPROBDISORG_PSY_ALL_CORE
DISORGANIZATION OF THOUGHT/BEHAVIOR

## 2024-11-06 NOTE — BH INPATIENT PSYCHIATRY PROGRESS NOTE - NSTXDEPRESINTERMD_PSY_ALL_CORE
Treat depression and insomnia with Mirtazapine 30 mg, Venlafaxine 225 mg, and Melatonin 3 mg. Consider ECT trial.
Continue psychopharm and psychoeducation. Considering ECT.
Treat depression and insomnia with Mirtazapine 30 mg, Venlafaxine 225 mg, and Melatonin 3 mg. Consider ECT trial.
Continue psychopharm and psychoeducation. Considering ECT.

## 2024-11-06 NOTE — BH INPATIENT PSYCHIATRY PROGRESS NOTE - NSBHMSEKNOWHOW_PSY_ALL_CORE
Educational attainment

## 2024-11-06 NOTE — BH INPATIENT PSYCHIATRY PROGRESS NOTE - NSTXFALLDATEEST_PSY_ALL_CORE
30-Sep-2024
10-Aug-2023
30-Sep-2024
10-Aug-2023
30-Sep-2024
10-Aug-2023
30-Sep-2024

## 2024-11-06 NOTE — BH INPATIENT PSYCHIATRY PROGRESS NOTE - NS ED BHA REVIEW OF ED CHART VITAL SIGNS REVIEWED
Brief Postoperative Note    Allison Schmitt  YOB: 1949  [id number]    Pre-operative Diagnosis: Lung cancer; right pleural effusion    Post-operative Diagnosis: Same    Procedure: Thoracentesis    Anesthesia: Local    Surgeons/Assistants: Jaimee Burr MD    Estimated Blood Loss: less than 50     Complications: None    Specimens: Was Obtained: 750 mls natalie fluid removed    Findings: Mod right eff; US used w 5 F centesis needle. Pt tolerated well.     Electronically signed by Jaimee Burr MD on 5/13/2019 at 1:17 PM
Yes

## 2024-11-06 NOTE — BH INPATIENT PSYCHIATRY PROGRESS NOTE - NSTXFALLINTERMD_PSY_ALL_CORE
Routine checks. PT consult ordered, recommended patient to use walker but refuses. 

## 2024-11-06 NOTE — BH INPATIENT PSYCHIATRY PROGRESS NOTE - PRN MEDS
MEDICATIONS  (PRN):  acetaminophen     Tablet .. 650 milliGRAM(s) Oral every 6 hours PRN Temp greater or equal to 38C (100.4F), Mild Pain (1 - 3)  aluminum hydroxide/magnesium hydroxide/simethicone Suspension 30 milliLiter(s) Oral every 4 hours PRN Dyspepsia  LORazepam     Tablet 0.5 milliGRAM(s) Oral every 6 hours PRN anxiety  polyethylene glycol 3350 17 Gram(s) Oral every 12 hours PRN obstipation  
MEDICATIONS  (PRN):  acetaminophen     Tablet .. 650 milliGRAM(s) Oral every 6 hours PRN Temp greater or equal to 38C (100.4F), Mild Pain (1 - 3)  aluminum hydroxide/magnesium hydroxide/simethicone Suspension 30 milliLiter(s) Oral every 4 hours PRN Dyspepsia  LORazepam     Tablet 0.5 milliGRAM(s) Oral every 6 hours PRN anxiety  polyethylene glycol 3350 17 Gram(s) Oral every 12 hours PRN obstipation  
MEDICATIONS  (PRN):  acetaminophen     Tablet .. 650 milliGRAM(s) Oral every 6 hours PRN Temp greater or equal to 38C (100.4F), Mild Pain (1 - 3)  aluminum hydroxide/magnesium hydroxide/simethicone Suspension 30 milliLiter(s) Oral every 4 hours PRN Dyspepsia  LORazepam     Tablet 0.5 milliGRAM(s) Oral at bedtime PRN Anxiety  polyethylene glycol 3350 17 Gram(s) Oral every 12 hours PRN obstipation  
MEDICATIONS  (PRN):  acetaminophen     Tablet .. 650 milliGRAM(s) Oral every 6 hours PRN Temp greater or equal to 38C (100.4F), Mild Pain (1 - 3)  aluminum hydroxide/magnesium hydroxide/simethicone Suspension 30 milliLiter(s) Oral every 4 hours PRN Dyspepsia  LORazepam     Tablet 0.5 milliGRAM(s) Oral at bedtime PRN Anxiety  polyethylene glycol 3350 17 Gram(s) Oral every 12 hours PRN obstipation  
MEDICATIONS  (PRN):  acetaminophen     Tablet .. 650 milliGRAM(s) Oral every 6 hours PRN Temp greater or equal to 38C (100.4F), Mild Pain (1 - 3)  aluminum hydroxide/magnesium hydroxide/simethicone Suspension 30 milliLiter(s) Oral every 4 hours PRN Dyspepsia  LORazepam     Tablet 0.5 milliGRAM(s) Oral every 6 hours PRN anxiety  polyethylene glycol 3350 17 Gram(s) Oral every 12 hours PRN obstipation  
MEDICATIONS  (PRN):  acetaminophen     Tablet .. 650 milliGRAM(s) Oral every 6 hours PRN Temp greater or equal to 38C (100.4F), Mild Pain (1 - 3)  aluminum hydroxide/magnesium hydroxide/simethicone Suspension 30 milliLiter(s) Oral every 4 hours PRN Dyspepsia  LORazepam     Tablet 0.5 milliGRAM(s) Oral at bedtime PRN Anxiety  polyethylene glycol 3350 17 Gram(s) Oral every 12 hours PRN obstipation  
MEDICATIONS  (PRN):  acetaminophen     Tablet .. 650 milliGRAM(s) Oral every 6 hours PRN Temp greater or equal to 38C (100.4F), Mild Pain (1 - 3)  aluminum hydroxide/magnesium hydroxide/simethicone Suspension 30 milliLiter(s) Oral every 4 hours PRN Dyspepsia  LORazepam     Tablet 0.5 milliGRAM(s) Oral at bedtime PRN Anxiety  polyethylene glycol 3350 17 Gram(s) Oral every 12 hours PRN obstipation  
MEDICATIONS  (PRN):  acetaminophen     Tablet .. 650 milliGRAM(s) Oral every 6 hours PRN Temp greater or equal to 38C (100.4F), Mild Pain (1 - 3)  aluminum hydroxide/magnesium hydroxide/simethicone Suspension 30 milliLiter(s) Oral every 4 hours PRN Dyspepsia  LORazepam     Tablet 0.5 milliGRAM(s) Oral every 6 hours PRN anxiety  polyethylene glycol 3350 17 Gram(s) Oral every 12 hours PRN obstipation  
MEDICATIONS  (PRN):  acetaminophen     Tablet .. 650 milliGRAM(s) Oral every 6 hours PRN Temp greater or equal to 38C (100.4F), Mild Pain (1 - 3)  aluminum hydroxide/magnesium hydroxide/simethicone Suspension 30 milliLiter(s) Oral every 4 hours PRN Dyspepsia  LORazepam     Tablet 0.5 milliGRAM(s) Oral at bedtime PRN Anxiety  polyethylene glycol 3350 17 Gram(s) Oral every 12 hours PRN obstipation  
MEDICATIONS  (PRN):  acetaminophen     Tablet .. 650 milliGRAM(s) Oral every 6 hours PRN Temp greater or equal to 38C (100.4F), Mild Pain (1 - 3)  aluminum hydroxide/magnesium hydroxide/simethicone Suspension 30 milliLiter(s) Oral every 4 hours PRN Dyspepsia  LORazepam     Tablet 0.5 milliGRAM(s) Oral at bedtime PRN Anxiety  
MEDICATIONS  (PRN):  acetaminophen     Tablet .. 650 milliGRAM(s) Oral every 6 hours PRN Temp greater or equal to 38C (100.4F), Mild Pain (1 - 3)  aluminum hydroxide/magnesium hydroxide/simethicone Suspension 30 milliLiter(s) Oral every 4 hours PRN Dyspepsia  LORazepam     Tablet 0.5 milliGRAM(s) Oral every 6 hours PRN anxiety  polyethylene glycol 3350 17 Gram(s) Oral every 12 hours PRN obstipation  
MEDICATIONS  (PRN):  acetaminophen     Tablet .. 650 milliGRAM(s) Oral every 6 hours PRN Temp greater or equal to 38C (100.4F), Mild Pain (1 - 3)  aluminum hydroxide/magnesium hydroxide/simethicone Suspension 30 milliLiter(s) Oral every 4 hours PRN Dyspepsia  LORazepam     Tablet 0.5 milliGRAM(s) Oral at bedtime PRN Anxiety  polyethylene glycol 3350 17 Gram(s) Oral every 12 hours PRN obstipation  
MEDICATIONS  (PRN):  acetaminophen     Tablet .. 650 milliGRAM(s) Oral every 6 hours PRN Temp greater or equal to 38C (100.4F), Mild Pain (1 - 3)  aluminum hydroxide/magnesium hydroxide/simethicone Suspension 30 milliLiter(s) Oral every 4 hours PRN Dyspepsia  LORazepam     Tablet 0.5 milliGRAM(s) Oral every 6 hours PRN anxiety  polyethylene glycol 3350 17 Gram(s) Oral every 12 hours PRN obstipation

## 2024-11-06 NOTE — BH INPATIENT PSYCHIATRY PROGRESS NOTE - NSTXDEPRESDATEEST_PSY_ALL_CORE
10-Aug-2023
30-Sep-2024
30-Sep-2024
17-Oct-2024
30-Sep-2024
30-Sep-2024
17-Oct-2024
17-Oct-2024
30-Sep-2024
17-Oct-2024
30-Sep-2024
17-Oct-2024
10-Aug-2023
30-Sep-2024
17-Oct-2024
30-Sep-2024
17-Oct-2024
30-Sep-2024
30-Sep-2024
17-Oct-2024
30-Sep-2024
17-Oct-2024
10-Aug-2023
30-Sep-2024

## 2024-11-06 NOTE — ECT TREATMENT NOTE - NSECTTHYPULSE1ST_PSY_ALL_CORE
1 ms (DGX)

## 2024-11-06 NOTE — BH DISCHARGE NOTE NURSING/SOCIAL WORK/PSYCH REHAB - NSDCPRGOAL_PSY_ALL_CORE
Patient initially presented with symptoms of depression, anxiety and decreased daily functioning. Patient's initial goals included increasing his hope in recovery, increasing his coping skills, decreasing anxiety and increasing daily functioning. Patient made some moderate gains towards his rehab goals as evidenced by patient's brighter affect, decreased anxiety and increased daily functioning. Patient also demonstrated daily medication compliance. However, patient was minimally involved in group activities and was minimally social with his peers.     ***Patient completed a safety plan.

## 2024-11-06 NOTE — BH INPATIENT PSYCHIATRY PROGRESS NOTE - NSTXPSYCHOINTERMD_PSY_ALL_CORE
Trial of Zyprexa. Trial of  ECT. 

## 2024-11-06 NOTE — BH INPATIENT PSYCHIATRY PROGRESS NOTE - NSBHMSETHTCONTENT_PSY_A_CORE
Delusions/Ruminations/Other
Delusions/Other
Delusions/Ruminations/Other
Unremarkable
Delusions/Ruminations/Other
Unremarkable
Delusions/Ruminations/Other

## 2024-11-06 NOTE — BH INPATIENT PSYCHIATRY PROGRESS NOTE - NSBHPSYCHOLCOGORIENT_PSY_A_CORE
Oriented to time, place, person, situation

## 2024-11-07 VITALS — TEMPERATURE: 98 F

## 2024-11-07 PROCEDURE — 71045 X-RAY EXAM CHEST 1 VIEW: CPT | Mod: 26

## 2024-11-07 RX ADMIN — ARIPIPRAZOLE 10 MILLIGRAM(S): 2 TABLET ORAL at 08:03

## 2024-11-07 RX ADMIN — APIXABAN 5 MILLIGRAM(S): 5 TABLET, FILM COATED ORAL at 08:03

## 2024-11-07 RX ADMIN — Medication 10 MILLIGRAM(S): at 08:03

## 2024-11-07 RX ADMIN — Medication 225 MILLIGRAM(S): at 08:03

## 2024-11-10 LAB
GAMMA INTERFERON BACKGROUND BLD IA-ACNC: 0.02 IU/ML — SIGNIFICANT CHANGE UP
M TB IFN-G BLD-IMP: NEGATIVE — SIGNIFICANT CHANGE UP
M TB IFN-G CD4+ BCKGRND COR BLD-ACNC: 0 IU/ML — SIGNIFICANT CHANGE UP
M TB IFN-G CD4+CD8+ BCKGRND COR BLD-ACNC: 0 IU/ML — SIGNIFICANT CHANGE UP
QUANT TB PLUS MITOGEN MINUS NIL: >10 IU/ML — SIGNIFICANT CHANGE UP

## 2024-11-27 NOTE — ECT CONSULT NOTE - NSECTREASONCONSCOMMENTS_PSY_ALL_CORE
Lack of sleep, low energy, low appetite, anhedonia, severe anxiety, guilty ruminations, occasional AH with derogatory content, hopelessness, passive SI
normal/normal affect/alert and oriented x3/normal behavior

## 2025-01-16 NOTE — ECT AMBULATORY DISCHARGE PLAN - NSPOSTECTCALLZHH_PSY_ALL_CORE
Per written order of Karyna Alicia MD patient presented for IV reclast 5 mg/100 cc diagnosis Osteoporosis. Baseline blood pressure obtained at 1345. At 1354 a 24 gauge IV catheter was inserted per policy into patient's left antecubital with good blood return, cap applied and secured with tape. IV tubing primed with 0.9% NaCL attached to cap and approximately 30 cc infused without difficulty via IV pump. No signs of infiltration or complaints of pain from patient. IV reclast administered via pump as secondary infusion over 15 minutes. The remainder of the 0.9% NaCl infused over 30 min via IV pump completed at 1443. Blood pressure monitored throughout IV administration and remained within normal limits. IV catheter removed intact and pressure dressing applied after direct pressure. Patient tolerated procedure without adverse reactions. Patient discharged via ambulation.     Lab orders placed after first dose? No, not patient's first dose.     Staff message not sent as reminder for future prior authorization. Patient to likely pursue drug holiday.   Call the [facility] [place] ECT suite at [phone]

## 2025-01-30 NOTE — BH TREATMENT PLAN - NSTXDCOTHRINTERMD_PSY_ALL_CORE
[facility]  Asael Redd M.D.  [address]  [place] [address]  Fax: [phone]   Authorization for Release/Disclosure of   Protected Health Information   Name: PING MAYS : 1943 SSN: [id number]   Address: [facility] [address]  [place] [address] Phone:    [phone] (home)    I authorize the entity listed below to release/disclose the PHI below to:   [facility]/Asael Redd M.D. and Asael Redd M.D.   Provider or Entity Name:  [facility]   Address   City, [facility]   Phone:      Fax:  [phone]   Reason for request: continuity of care   Information to be released:    [ x ] LAST COLONOSCOPY,  including any PATH REPORT and follow-up  [  ] LAST FIT/COLOGUARD RESULT [  ] LAST DEXA  [  ] LAST MAMMOGRAM  [  ] LAST PAP  [  ] LAST LABS [  ] RETINA EXAM REPORT  [  ] IMMUNIZATION RECORDS  [  ] Release all info      [  ] Check here and initial the line next to each item to release ALL health information INCLUDING  _____ Care and treatment for drug and / or alcohol abuse  _____ HIV testing, infection status, or AIDS  _____ Genetic Testing    DATES OF SERVICE OR TIME PERIOD TO BE DISCLOSED: _____________  I understand and acknowledge that:  * This Authorization may be revoked at any time by you in writing, except if your health information has already been used or disclosed.  * Your health information that will be used or disclosed as a result of you signing this authorization could be re-disclosed by the recipient. If this occurs, your re-disclosed health information may no longer be protected by State or Federal laws.  * You may refuse to sign this Authorization. Your refusal will not affect your ability to obtain treatment.  * This Authorization becomes effective upon signing and will  on (date) __________.      If no date is indicated, this Authorization will  one (1) year from the signature date.    Name: Ping Mays  Signature:continuity of care Date: 
  1/31/2025     PLEASE FAX REQUESTED RECORDS BACK TO: [phone]  
Continue to work with SW to develop appropriate plan. 
Continue to work with SW to develop appropriate plan.

## 2025-02-05 NOTE — DISCHARGE NOTE NURSING/CASE MANAGEMENT/SOCIAL WORK - NSDCPEELIQUISREACT_GEN_ALL_CORE
None
Apixaban/Eliquis increases your risk for bleeding. Notify your doctor if you experience any of the following side effects: bleeding, coughing or vomiting blood, red or black stool, unexpected pain or swelling, itching or hives, chest pain, chest tightness, trouble breathing, changes in how much or how often you urinate, red or pink urine, numbness or tingling in your feet, or unusual muscle weakness. When Apixaban/Eliquis is taken with other medicines, they can affect how it works. Taking other medications such as aspirin, blood thinners, nonsteroidal anti-inflammatories, and medications that treat depression can increase your risk of bleeding. It is very important to tell your health care provider about all of the other medicines, including over-the-counter medications, herbs, and vitamins you are taking. DO NOT start, stop, or change the dosage of any medicine, including over-the-counter medicines, vitamins, and herbal products without your doctor’s approval. Any products containing aspirin or are nonsteroidal anti-inflammatories lessen the blood’s ability to form clots and add to the effect of Apixaban/Eliquis. Never take aspirin or medicines that contain aspirin without speaking to your doctor.

## 2025-02-13 NOTE — BH INPATIENT PSYCHIATRY DISCHARGE NOTE - NSBHSUBSTUSED_PSY_A_CORE
Alcohol/Cocaine/Crack [TextEntry] : Echo(12/2024) reveals elevated right ventral systolic pressure 11.6, mild pulmonary HTN  CXR(11/2024) reveals no active pulmonary disease

## 2025-03-07 NOTE — BH INPATIENT PSYCHIATRY DISCHARGE NOTE - HOSPITAL COURSE
Pt is a 69 yo M domiciled in assisted living facility with hx of schizoaffective d/o (bipolar type), multiple past psych hospitalizations, 1 past SA in 1992 by cutting wrists, and remote substance use (alcohol use leading to 3 DUIs and 1 year-long senior care sentence, cocaine use 20 years ago), presenting to [facility] 2S as transfer from [facility] for ECT evaluation due to increasing paranoia and poor self-care.      Risk Assessment: Pt c/o productive cough with sputum. Recent flu diagnosis. Med s given, labs sent. Continue to monitor. Pt is a 69 yo man, domiciled in assisted living facility with hx of schizoaffective d/o (bipolar type), multiple past psych hospitalizations, 1 past SA in 1992 by cutting wrists, and remote substance use (alcohol use leading to 3 DUIs and 1 year-long skilled nursing sentence, cocaine use 20 years ago), presenting to [facility] 2S as transfer from [facility] for ECT evaluation due to increasing paranoia and poor self-care.     Patient's Prozac was tapered off due to poor response. He was started on Effexor as next line agent for management of depression, titrated to 150mg QD. Olanzapine was started and titrated up to 20mg QHS for treatment of mood congruent delusions of incarceration/impoverishment. Olanzapine later lowered to 15mg QHS due to mild urinary retention. Pt tolerated medication changes well. Pt also received a full ECT course to treat depressed mood and delusions, completed 12 treatments in the inpatient unit. Pt experienced some post-ECT confusion but tolerated it well, side effects waned by the next morning.     Patient's symptoms slowly improved, however it took some time to see a sustained response. Mood started improving week by week, gradually becoming brighter and showing more motivation. Delusional thoughts about incarceration appeared to wane after ECT but would reappear a day or two after treatment. Symptom response became more sustained after his 7th and 8th ECTs. Pt currently denying symptoms of depression such as low mood, anhedonia, suicidal ideation and appears motivated and future oriented. No longer endorsing delusional thoughts about incarceration. Amenable to returning to his assisted living facility.      Risk Assessment: Chronic/unmodifiable risk factors include age, gender, hx of schizoaffective disorder-bipolar subtype, multiple prior hospitalizations and 1 prior suicide attempt. Active and future risk have been mitigated with treatment of depressive episode and delusional thoughts with full sx remission. Addition of ECT treatment and enhancement of outpatient services with maintenance ECT which will add further protection against symptom remission. Protective factors include stable housing in higher level of care setting ([facility]), family involvement and treatment response.    Aftercare:    Onsite psychiatric consultant at [facility], Dr. Oral Irby  23-Aug-2023 10:00  [address]  [phone]  Mental Health Treatment    [facility] Outpatient ECT  29-Aug-2023 10:30  [address] [place] [address]  [address] [phone]

## 2025-04-08 NOTE — PHYSICAL THERAPY INITIAL EVALUATION ADULT - PERTINENT HX OF CURRENT PROBLEM, REHAB EVAL
Duration Of Freeze Thaw-Cycle (Seconds): 0 Render Note In Bullet Format When Appropriate: No Show Applicator Variable?: Yes Post-Care Instructions: I reviewed with the patient in detail post-care instructions. Patient is to wear sunprotection, and avoid picking at any of the treated lesions. Pt may apply Vaseline to crusted or scabbing areas. Consent: The patient's consent was obtained including but not limited to risks of crusting, scabbing, blistering, scarring, darker or lighter pigmentary change, recurrence, incomplete removal and infection. Detail Level: Detailed Patient is a 68 yo M, domiciled at [facility], , no children, retired from job at [employer] in 40s, with PPH of schizoaffective disorder (bipolar type with psychotic features), one past suicide attempt in 1992 via cutting wrists (found by mother and brought in to hospital), multiple past psychiatric hospitalizations (last [facility] 2S June - Aug 2023), substance use hx (ETOH-last used April 2024, h/o 3 DUIs in 40s; cocaine- last used 20 yrs ago), and PMH of HTN who presents to [facility] ED BIB sister from [facility] due to 2 months of worsening depression, psychosis (AH, paranoia), and poor attention to ADLs. Patient referred to Physical therapy secondary to unsteady gait

## 2025-04-26 NOTE — BH PATIENT PROFILE - NSPROMEDSADMININFO_GEN_A_NUR
"Omid Castillo"Abhilash was seen and treated in our emergency department on 4/26/2025.  He may return to work on 04/28/2025.       If you have any questions or concerns, please don't hesitate to call.       RN    "
"Omid Hessmagno Hung was seen and treated in our emergency department on 4/26/2025.  He may return to work on 04/28/2025.       If you have any questions or concerns, please don't hesitate to call.      Tasha MICHEL    "
"Omid Hessmagno Hung was seen and treated in our emergency department on 4/26/2025.  He may return to work on 04/28/2025.       If you have any questions or concerns, please don't hesitate to call.      Tasha MICHEL    "
no concerns

## 2025-06-01 NOTE — BH INPATIENT PSYCHIATRY PROGRESS NOTE - NSBHMETABOLICLABS_PSY_ALL_CORE
Labs within last 12 months
supervision
Labs within last 12 months